# Patient Record
Sex: MALE | Race: WHITE | NOT HISPANIC OR LATINO | URBAN - METROPOLITAN AREA
[De-identification: names, ages, dates, MRNs, and addresses within clinical notes are randomized per-mention and may not be internally consistent; named-entity substitution may affect disease eponyms.]

---

## 2017-01-11 ENCOUNTER — APPOINTMENT (RX ONLY)
Dept: URBAN - METROPOLITAN AREA OTHER 9 | Facility: OTHER | Age: 77
Setting detail: DERMATOLOGY
End: 2017-01-11

## 2017-01-11 DIAGNOSIS — D22 MELANOCYTIC NEVI: ICD-10-CM

## 2017-01-11 DIAGNOSIS — D485 NEOPLASM OF UNCERTAIN BEHAVIOR OF SKIN: ICD-10-CM

## 2017-01-11 DIAGNOSIS — L82.1 OTHER SEBORRHEIC KERATOSIS: ICD-10-CM

## 2017-01-11 DIAGNOSIS — Z85.828 PERSONAL HISTORY OF OTHER MALIGNANT NEOPLASM OF SKIN: ICD-10-CM | Status: RESOLVING

## 2017-01-11 DIAGNOSIS — D18.0 HEMANGIOMA: ICD-10-CM

## 2017-01-11 DIAGNOSIS — L81.4 OTHER MELANIN HYPERPIGMENTATION: ICD-10-CM

## 2017-01-11 DIAGNOSIS — L72.0 EPIDERMAL CYST: ICD-10-CM

## 2017-01-11 PROBLEM — D48.5 NEOPLASM OF UNCERTAIN BEHAVIOR OF SKIN: Status: ACTIVE | Noted: 2017-01-11

## 2017-01-11 PROBLEM — D22.4 MELANOCYTIC NEVI OF SCALP AND NECK: Status: ACTIVE | Noted: 2017-01-11

## 2017-01-11 PROBLEM — D18.01 HEMANGIOMA OF SKIN AND SUBCUTANEOUS TISSUE: Status: ACTIVE | Noted: 2017-01-11

## 2017-01-11 PROCEDURE — ? BIOPSY BY SHAVE METHOD

## 2017-01-11 PROCEDURE — 11100: CPT

## 2017-01-11 PROCEDURE — ? COUNSELING

## 2017-01-11 PROCEDURE — 99213 OFFICE O/P EST LOW 20 MIN: CPT | Mod: 25

## 2017-01-11 PROCEDURE — 11101: CPT

## 2017-01-11 ASSESSMENT — LOCATION DETAILED DESCRIPTION DERM
LOCATION DETAILED: RIGHT SUPERIOR LATERAL UPPER BACK
LOCATION DETAILED: RIGHT INFERIOR UPPER BACK
LOCATION DETAILED: LEFT CLAVICULAR NECK
LOCATION DETAILED: LEFT CENTRAL FRONTAL SCALP
LOCATION DETAILED: RIGHT LATERAL INFERIOR EYELID
LOCATION DETAILED: LEFT LATERAL UPPER BACK
LOCATION DETAILED: RIGHT ANTERIOR SHOULDER
LOCATION DETAILED: RIGHT SUPERIOR CENTRAL MALAR CHEEK
LOCATION DETAILED: MID-FRONTAL SCALP
LOCATION DETAILED: EPIGASTRIC SKIN

## 2017-01-11 ASSESSMENT — LOCATION SIMPLE DESCRIPTION DERM
LOCATION SIMPLE: RIGHT INFERIOR EYELID
LOCATION SIMPLE: LEFT ANTERIOR NECK
LOCATION SIMPLE: ABDOMEN
LOCATION SIMPLE: RIGHT CHEEK
LOCATION SIMPLE: ANTERIOR SCALP
LOCATION SIMPLE: SCALP
LOCATION SIMPLE: LEFT UPPER BACK
LOCATION SIMPLE: RIGHT UPPER BACK
LOCATION SIMPLE: RIGHT SHOULDER

## 2017-01-11 ASSESSMENT — LOCATION ZONE DERM
LOCATION ZONE: EYELID
LOCATION ZONE: ARM
LOCATION ZONE: FACE
LOCATION ZONE: SCALP
LOCATION ZONE: NECK
LOCATION ZONE: TRUNK

## 2017-01-11 NOTE — PROCEDURE: BIOPSY BY SHAVE METHOD
Post-Care Instructions: I reviewed with the patient in detail post-care instructions. Patient is to keep the biopsy site dry overnight, and then apply bacitracin twice daily until healed. Patient may apply hydrogen peroxide soaks to remove any crusting.
Electrodesiccation Text: The wound bed was treated with electrodesiccation after the biopsy was performed.
Size Of Lesion In Cm: 0
Electrodesiccation And Curettage Text: The wound bed was treated with electrodesiccation and curettage after the biopsy was performed.
Wound Care: Polysporin ointment
Anesthesia Type: 1% lidocaine with 1:200,000 epinephrine
Curettage Text: The wound bed was treated with cautery after the biopsy was performed.
Bill [id number] For Specimen Handling/Conveyance To Laboratory?: no
Anesthesia Volume In Cc: 3
Silver Nitrate Text: The wound bed was treated with silver nitrate after the biopsy was performed.
Type Of Destruction Used: Curettage
Notification Instructions: Patient will be notified of biopsy results. However, patient instructed to call the office if not contacted within 2 weeks.
Biopsy Method: Personna blade
Cryotherapy Text: The wound bed was treated with cryotherapy after the biopsy was performed.
Billing Type: Third-Party Bill
Biopsy Type: H and E
Body Location Override (Optional - Billing Will Still Be Based On Selected Body Map Location If Applicable): Right anterior shoulder
Lab: [id number]
Hemostasis: Aluminum Chloride
Detail Level: Simple
Consent: Verbal consent was obtained and risks were reviewed including but not limited to scarring, infection, bleeding, scabbing, incomplete removal, nerve damage and allergy to anesthesia.
Dressing: bandage
Body Location Override (Optional - Billing Will Still Be Based On Selected Body Map Location If Applicable): Right lateral inferior eyelid

## 2017-01-24 ENCOUNTER — APPOINTMENT (RX ONLY)
Dept: URBAN - METROPOLITAN AREA OTHER 9 | Facility: OTHER | Age: 77
Setting detail: DERMATOLOGY
End: 2017-01-24

## 2017-01-24 PROBLEM — C44.319 BASAL CELL CARCINOMA OF SKIN OF OTHER PARTS OF FACE: Status: ACTIVE | Noted: 2017-01-24

## 2017-01-24 PROCEDURE — 13131 CMPLX RPR F/C/C/M/N/AX/G/H/F: CPT

## 2017-01-24 PROCEDURE — ? MOHS SURGERY

## 2017-01-24 PROCEDURE — 17311 MOHS 1 STAGE H/N/HF/G: CPT

## 2017-01-24 NOTE — PROCEDURE: MOHS SURGERY
Bcc Histology Text: There were numerous aggregates of basaloid cells.
Graft Donor Site Will Heal By Secondary Intention: No
Surgical Defect Width In Cm (Optional): 0
S Plasty Text: Given the location and shape of the defect, and the orientation of relaxed skin tension lines, an S-plasty was deemed most appropriate for repair.  Using a sterile surgical marker, the appropriate outline of the S-plasty was drawn, incorporating the defect and placing the expected incisions within the relaxed skin tension lines where possible.  The area thus outlined was incised deep to adipose tissue with a #15 scalpel blade.  The skin margins were undermined to an appropriate distance in all directions utilizing iris scissors. The skin flaps were advanced over the defect.  The opposing margins were then approximated with interrupted buried subcutaneous sutures.
Suture Removal: 10 days
Bi-Rhombic Flap Text: The defect edges were debeveled with a #15 scalpel blade.  Given the location of the defect and the proximity to free margins a bi-rhombic flap was deemed most appropriate.  Using a sterile surgical marker, an appropriate rhombic flap was drawn incorporating the defect. The area thus outlined was incised deep to adipose tissue with a #15 scalpel blade.  The skin margins were undermined to an appropriate distance in all directions utilizing iris scissors.
Stage 8: Additional Anesthesia Type: 1% lidocaine with epinephrine
Number Of Stages: 1
V-Y Flap Text: The defect edges were debeveled with a #15 scalpel blade.  Given the location of the defect, shape of the defect and the proximity to free margins a V-Y flap was deemed most appropriate.  Using a sterile surgical marker, an appropriate advancement flap was drawn incorporating the defect and placing the expected incisions within the relaxed skin tension lines where possible.    The area thus outlined was incised deep to adipose tissue with a #15 scalpel blade.  The skin margins were undermined to an appropriate distance in all directions utilizing iris scissors.
Hatchet Flap Text: The defect edges were debeveled with a #15 scalpel blade.  Given the location of the defect, shape of the defect and the proximity to free margins a hatchet flap was deemed most appropriate.  Using a sterile surgical marker, an appropriate hatchet flap was drawn incorporating the defect and placing the expected incisions within the relaxed skin tension lines where possible.    The area thus outlined was incised deep to adipose tissue with a #15 scalpel blade.  The skin margins were undermined to an appropriate distance in all directions utilizing iris scissors.
Complex Repair And Flap Additional Text (Will Appearing After The Standard Complex Repair Text): The complex repair was not sufficient to completely close the primary defect. The remaining additional defect was repaired with the flap mentioned below.
Island Pedicle Flap-Requiring Vessel Identification Text: The defect edges were debeveled with a #15 scalpel blade.  Given the location of the defect, shape of the defect and the proximity to free margins an island pedicle advancement flap was deemed most appropriate.  Using a sterile surgical marker, an appropriate advancement flap was drawn, based on the axial vessel mentioned above, incorporating the defect, outlining the appropriate donor tissue and placing the expected incisions within the relaxed skin tension lines where possible.    The area thus outlined was incised deep to adipose tissue with a #15 scalpel blade.  The skin margins were undermined to an appropriate distance in all directions around the primary defect and laterally outward around the island pedicle utilizing iris scissors.  There was minimal undermining beneath the pedicle flap.
Tissue Cultured Epidermal Autograft Text: The defect edges were debeveled with a #15 scalpel blade.  Given the location of the defect, shape of the defect and the proximity to free margins a tissue cultured epidermal autograft was deemed most appropriate.  The graft was then trimmed to fit the size of the defect.  The graft was then placed in the primary defect and oriented appropriately.
X Size Of Lesion In Cm (Optional): 0.6
Tumor Debulked?: scalpel
Composite Graft Text: The defect edges were debeveled with a #15 scalpel blade.  Given the location of the defect, shape of the defect, the proximity to free margins and the fact the defect was full thickness a composite graft was deemed most appropriate.  The defect was outline and then transferred to the donor site.  A full thickness graft was then excised from the donor site. The graft was then placed in the primary defect, oriented appropriately and then sutured into place.  The secondary defect was then repaired using a primary closure.
Crescentic Complex Repair Preamble Text (Leave Blank If You Do Not Want): Extensive wide undermining was performed.
Hemostasis: Electrodesiccation
Posterior Auricular Interpolation Flap Text: A decision was made to reconstruct the defect utilizing an interpolation axial flap and a staged reconstruction.  A telfa template was made of the defect.  This telfa template was then used to outline the posterior auricular interpolation flap.  The donor area for the pedicle flap was then injected with anesthesia.  The flap was excised through the skin and subcutaneous tissue down to the layer of the underlying musculature.  The pedicle flap was carefully excised within this deep plane to maintain its blood supply.  The edges of the donor site were undermined.   The donor site was closed in a primary fashion.  The pedicle was then rotated into position and sutured.  Once the tube was sutured into place, adequate blood supply was confirmed with blanching and refill.  The pedicle was then wrapped with xeroform gauze and dressed appropriately with a telfa and gauze bandage to ensure continued blood supply and protect the attached pedicle.
Biopsy Photograph Reviewed: Yes
Post-Care Instructions: I reviewed with the patient in detail post-care instructions. Patient is not to engage in any heavy lifting, exercise, or swimming for the next 14 days. Should the patient develop any fevers, chills, bleeding, or severe pain the patient will contact the office.
H Plasty Text: Given the location of the defect, shape of the defect and the proximity to free margins a H-plasty was deemed most appropriate for repair.  Using a sterile surgical marker, the appropriate advancement arms of the H-plasty were drawn incorporating the defect and placing the expected incisions within the relaxed skin tension lines where possible. The area thus outlined was incised deep to adipose tissue with a #15 scalpel blade. The skin margins were undermined to an appropriate distance in all directions utilizing iris scissors.  The opposing advancement arms were then advanced into place in opposite direction and anchored with interrupted buried subcutaneous sutures.
Epidermal Sutures: 5-0 Nylon
Manual Repair Warning Statement: We plan on removing the manually selected variable below in favor of our much easier automatic structured text blocks found in the previous tab. We decided to do this to help make the flow better and give you the full power of structured data. Manual selection is never going to be ideal in our platform and I would encourage you to avoid using manual selection from this point on, especially since I will be sunsetting this feature. It is important that you do one of two things with the customized text below. First, you can save all of the text in a word file so you can have it for future reference. Second, transfer the text to the appropriate area in the Library tab. Lastly, if there is a flap or graft type which we do not have you need to let us know right away so I can add it in before the variable is hidden. No need to panic, we plan to give you roughly 6 months to make the change.
Detail Level: Detailed
Inflammation Suggestive Of Cancer Camouflage Histology Text: There was a dense lymphocytic infiltrate which prevented adequate histologic evaluation of adjacent structures.
Surgeon/Pathologist Verbiage (Will Incorporate Name Of Surgeon From Intro If Not Blank): operated in two distinct and integrated capacities as the surgeon and pathologist.
Repair Anesthesia Method: local infiltration
Bilobed Transposition Flap Text: The defect edges were debeveled with a #15 scalpel blade.  Given the location of the defect and the proximity to free margins a bilobed transposition flap was deemed most appropriate.  Using a sterile surgical marker, an appropriate bilobe flap drawn around the defect.    The area thus outlined was incised deep to adipose tissue with a #15 scalpel blade.  The skin margins were undermined to an appropriate distance in all directions utilizing iris scissors.
W Plasty Text: The lesion was extirpated to the level of the fat with a #15 scalpel blade.  Given the location of the defect, shape of the defect and the proximity to free margins a W-plasty was deemed most appropriate for repair.  Using a sterile surgical marker, the appropriate transposition arms of the W-plasty were drawn incorporating the defect and placing the expected incisions within the relaxed skin tension lines where possible.    The area thus outlined was incised deep to adipose tissue with a #15 scalpel blade.  The skin margins were undermined to an appropriate distance in all directions utilizing iris scissors.  The opposing transposition arms were then transposed into place in opposite direction and anchored with interrupted buried subcutaneous sutures.
Island Pedicle Flap With Canthal Suspension Text: The defect edges were debeveled with a #15 scalpel blade.  Given the location of the defect, shape of the defect and the proximity to free margins an island pedicle advancement flap was deemed most appropriate.  Using a sterile surgical marker, an appropriate advancement flap was drawn incorporating the defect, outlining the appropriate donor tissue and placing the expected incisions within the relaxed skin tension lines where possible. The area thus outlined was incised deep to adipose tissue with a #15 scalpel blade.  The skin margins were undermined to an appropriate distance in all directions around the primary defect and laterally outward around the island pedicle utilizing iris scissors.  There was minimal undermining beneath the pedicle flap. A suspension suture was placed in the canthal tendon to prevent tension and prevent ectropion.
Consent (Temporal Branch)/Introductory Paragraph: The rationale for Mohs was explained to the patient and consent was obtained. The risks, benefits and alternatives to therapy were discussed in detail. Specifically, the risks of damage to the temporal branch of the facial nerve, infection, scarring, bleeding, prolonged wound healing, incomplete removal, allergy to anesthesia, and recurrence were addressed. Prior to the procedure, the treatment site was clearly identified and confirmed by the patient. All components of Universal Protocol/PAUSE Rule completed.
Advancement Flap (Single) Text: The defect edges were debeveled with a #15 scalpel blade.  Given the location of the defect and the proximity to free margins a single advancement flap was deemed most appropriate.  Using a sterile surgical marker, an appropriate advancement flap was drawn incorporating the defect and placing the expected incisions within the relaxed skin tension lines where possible.    The area thus outlined was incised deep to adipose tissue with a #15 scalpel blade.  The skin margins were undermined to an appropriate distance in all directions utilizing iris scissors.
O-T Advancement Flap Text: The defect edges were debeveled with a #15 scalpel blade.  Given the location of the defect, shape of the defect and the proximity to free margins an O-T advancement flap was deemed most appropriate.  Using a sterile surgical marker, an appropriate advancement flap was drawn incorporating the defect and placing the expected incisions within the relaxed skin tension lines where possible.    The area thus outlined was incised deep to adipose tissue with a #15 scalpel blade.  The skin margins were undermined to an appropriate distance in all directions utilizing iris scissors.
Purse String (Intermediate) Text: Given the location of the defect and the characteristics of the surrounding skin a pursestring intermediate closure was deemed most appropriate.  Undermining was performed circumfirentially around the surgical defect.  A purstring suture was then placed and tightened.
Melolabial Transposition Flap Text: The defect edges were debeveled with a #15 scalpel blade.  Given the location of the defect and the proximity to free margins a melolabial flap was deemed most appropriate.  Using a sterile surgical marker, an appropriate melolabial transposition flap was drawn incorporating the defect.    The area thus outlined was incised deep to adipose tissue with a #15 scalpel blade.  The skin margins were undermined to an appropriate distance in all directions utilizing iris scissors.
Referred To Plastics For Closure Text (Leave Blank If You Do Not Want): After obtaining clear surgical margins the patient was sent to plastics for surgical repair.  The patient understands they will receive post-surgical care and follow-up from the referring physician's office.
Area H Indication Text: Tumors in this location are included in Area H (eyelids, eyebrows, nose, lips, chin, ear, pre-auricular, post-auricular, temple, genitalia, hands, feet, ankles and areola).  Tissue conservation is critical in these anatomic locations.
Area L Indication Text: Tumors in this location are included in Area L (trunk and extremities).  Mohs surgery is indicated for larger tumors, 2 cm or larger, in these anatomic locations.
Lazy S Intermediate Repair Preamble Text (Leave Blank If You Do Not Want): Undermining was performed with blunt dissection.
Alar Island Pedicle Flap Text: The defect edges were debeveled with a #15 scalpel blade.  Given the location of the defect, shape of the defect and the proximity to the alar rim an island pedicle advancement flap was deemed most appropriate.  Using a sterile surgical marker, an appropriate advancement flap was drawn incorporating the defect, outlining the appropriate donor tissue and placing the expected incisions within the nasal ala running parallel to the alar rim. The area thus outlined was incised with a #15 scalpel blade.  The skin margins were undermined minimally to an appropriate distance in all directions around the primary defect and laterally outward around the island pedicle utilizing iris scissors.  There was minimal undermining beneath the pedicle flap.
Melolabial Interpolation Flap Text: A decision was made to reconstruct the defect utilizing an interpolation axial flap and a staged reconstruction.  A telfa template was made of the defect.  This telfa template was then used to outline the melolabial interpolation flap.  The donor area for the pedicle flap was then injected with anesthesia.  The flap was excised through the skin and subcutaneous tissue down to the layer of the underlying musculature.  The pedicle flap was carefully excised within this deep plane to maintain its blood supply.  The edges of the donor site were undermined.   The donor site was closed in a primary fashion.  The pedicle was then rotated into position and sutured.  Once the tube was sutured into place, adequate blood supply was confirmed with blanching and refill.  The pedicle was then wrapped with xeroform gauze and dressed appropriately with a telfa and gauze bandage to ensure continued blood supply and protect the attached pedicle.
Date Of Previous Biopsy (Optional): 1/11/17
Epidermal Closure Graft Donor Site (Optional): simple interrupted
Cheiloplasty (Complex) Text: A decision was made to reconstruct the defect with a  cheiloplasty.  The defect was undermined extensively.  Additional obicularis oris muscle was excised with a 15 blade scalpel.  The defect was converted into a full thickness wedge to facilite a better cosmetic result.  Small vessels were then tied off with 5-0 monocyrl. The obicularis oris, superficial fascia, adipose and dermis were then reapproximated.  After the deeper layers were approximated the epidermis was reapproximated with particular care given to realign the vermillion border.
Modified Advancement Flap Text: The defect edges were debeveled with a #15 scalpel blade.  Given the location of the defect, shape of the defect and the proximity to free margins a modified advancement flap was deemed most appropriate.  Using a sterile surgical marker, an appropriate advancement flap was drawn incorporating the defect and placing the expected incisions within the relaxed skin tension lines where possible.    The area thus outlined was incised deep to adipose tissue with a #15 scalpel blade.  The skin margins were undermined to an appropriate distance in all directions utilizing iris scissors.
Bilateral Helical Rim Advancement Flap Text: The defect edges were debeveled with a #15 blade scalpel.  Given the location of the defect and the proximity to free margins (helical rim) a bilateral helical rim advancement flap was deemed most appropriate.  Using a sterile surgical marker, the appropriate advancement flaps were drawn incorporating the defect and placing the expected incisions between the helical rim and antihelix where possible.  The area thus outlined was incised through and through with a #15 scalpel blade.  With a skin hook and iris scissors, the flaps were gently and sharply undermined and freed up.
Dressing: pressure dressing with telfa
Primary Defect Width In Cm (Final Defect Size - Required For Flaps/Grafts): 0.8
Alternatives Discussed Intro (Do Not Add Period): I discussed alternative treatments to Mohs surgery and specifically discussed the risks and benefits of
Consent (Nose)/Introductory Paragraph: The rationale for Mohs was explained to the patient and consent was obtained. The risks, benefits and alternatives to therapy were discussed in detail. Specifically, the risks of nasal deformity, changes in the flow of air through the nose, infection, scarring, bleeding, prolonged wound healing, incomplete removal, allergy to anesthesia, nerve injury and recurrence were addressed. Prior to the procedure, the treatment site was clearly identified and confirmed by the patient. All components of Universal Protocol/PAUSE Rule completed.
Cheek-To-Nose Interpolation Flap Text: A decision was made to reconstruct the defect utilizing an interpolation axial flap and a staged reconstruction.  A telfa template was made of the defect.  This telfa template was then used to outline the Cheek-To-Nose Interpolation flap.  The donor area for the pedicle flap was then injected with anesthesia.  The flap was excised through the skin and subcutaneous tissue down to the layer of the underlying musculature.  The interpolation flap was carefully excised within this deep plane to maintain its blood supply.  The edges of the donor site were undermined.   The donor site was closed in a primary fashion.  The pedicle was then rotated into position and sutured.  Once the tube was sutured into place, adequate blood supply was confirmed with blanching and refill.  The pedicle was then wrapped with xeroform gauze and dressed appropriately with a telfa and gauze bandage to ensure continued blood supply and protect the attached pedicle.
Anesthesia Volume In Cc: 6
Purse String (Simple) Text: Given the location of the defect and the characteristics of the surrounding skin a pursestring closure was deemed most appropriate.  Undermining was performed circumfirentially around the surgical defect.  A purstring suture was then placed and tightened.
Graft Donor Site Bandage (Optional-Leave Blank If You Don't Want In Note): Steri-strips and a pressure bandage were applied to the donor site.
Paramedian Forehead Flap Text: A decision was made to reconstruct the defect utilizing an interpolation axial flap and a staged reconstruction.  A telfa template was made of the defect.  This telfa template was then used to outline the paramedian forehead pedicle flap.  The donor area for the pedicle flap was then injected with anesthesia.  The flap was excised through the skin and subcutaneous tissue down to the layer of the underlying musculature.  The pedicle flap was carefully excised within this deep plane to maintain its blood supply.  The edges of the donor site were undermined.   The donor site was closed in a primary fashion.  The pedicle was then rotated into position and sutured.  Once the tube was sutured into place, adequate blood supply was confirmed with blanching and refill.  The pedicle was then wrapped with xeroform gauze and dressed appropriately with a telfa and gauze bandage to ensure continued blood supply and protect the attached pedicle.
Full Thickness Lip Wedge Repair (Flap) Text: Given the location of the defect and the proximity to free margins a full thickness wedge repair was deemed most appropriate.  Using a sterile surgical marker, the appropriate repair was drawn incorporating the defect and placing the expected incisions perpendicular to the vermillion border.  The vermillion border was also meticulously outlined to ensure appropriate reapproximation during the repair.  The area thus outlined was incised through and through with a #15 scalpel blade.  The muscularis and dermis were reaproximated with deep sutures following hemostasis. Care was taken to realign the vermillion border before proceeding with the superficial closure.  Once the vermillion was realigned the superfical and mucosal closure was finished.
Consent Type: Consent 1 (Standard)
Same Histology In Subsequent Stages Text: The pattern and morphology of the tumor is as described in the first stage.
Transposition Flap Text: The defect edges were debeveled with a #15 scalpel blade.  Given the location of the defect and the proximity to free margins a transposition flap was deemed most appropriate.  Using a sterile surgical marker, an appropriate transposition flap was drawn incorporating the defect.    The area thus outlined was incised deep to adipose tissue with a #15 scalpel blade.  The skin margins were undermined to an appropriate distance in all directions utilizing iris scissors.
Bilobed Flap Text: The defect edges were debeveled with a #15 scalpel blade.  Given the location of the defect and the proximity to free margins a bilobe flap was deemed most appropriate.  Using a sterile surgical marker, an appropriate bilobe flap drawn around the defect.    The area thus outlined was incised deep to adipose tissue with a #15 scalpel blade.  The skin margins were undermined to an appropriate distance in all directions utilizing iris scissors.
Referred To Otolaryngology For Closure Text (Leave Blank If You Do Not Want): After obtaining clear surgical margins the patient was sent to otolaryngology for surgical repair.  The patient understands they will receive post-surgical care and follow-up from the referring physician's office.
Split-Thickness Skin Graft Text: The defect edges were debeveled with a #15 scalpel blade.  Given the location of the defect, shape of the defect and the proximity to free margins a split thickness skin graft was deemed most appropriate.  Using a sterile surgical marker, the primary defect shape was transferred to the donor site. The split thickness graft was then harvested.  The skin graft was then placed in the primary defect and oriented appropriately.
Consent (Scalp)/Introductory Paragraph: The rationale for Mohs was explained to the patient and consent was obtained. The risks, benefits and alternatives to therapy were discussed in detail. Specifically, the risks of changes in hair growth pattern secondary to repair, infection, scarring, bleeding, prolonged wound healing, incomplete removal, allergy to anesthesia, nerve injury and recurrence were addressed. Prior to the procedure, the treatment site was clearly identified and confirmed by the patient. All components of Universal Protocol/PAUSE Rule completed.
Rotation Flap Text: The defect edges were debeveled with a #15 scalpel blade.  Given the location of the defect, shape of the defect and the proximity to free margins a rotation flap was deemed most appropriate.  Using a sterile surgical marker, an appropriate rotation flap was drawn incorporating the defect and placing the expected incisions within the relaxed skin tension lines where possible.    The area thus outlined was incised deep to adipose tissue with a #15 scalpel blade.  The skin margins were undermined to an appropriate distance in all directions utilizing iris scissors.
Closure 2 Information: This tab is for additional flaps and grafts, including complex repair and grafts and complex repair and flaps. You can also specify a different location for the additional defect, if the location is the same you do not need to select a new one. We will insert the automated text for the repair you select below just as we do for solitary flaps and grafts. Please note that at this time if you select a location with a different insurance zone you will need to override the ICD10 and CPT if appropriate.
Dorsal Nasal Flap Text: The defect edges were debeveled with a #15 scalpel blade.  Given the location of the defect and the proximity to free margins a dorsal nasal flap was deemed most appropriate.  Using a sterile surgical marker, an appropriate dorsal nasal flap was drawn around the defect.    The area thus outlined was incised deep to adipose tissue with a #15 scalpel blade.  The skin margins were undermined to an appropriate distance in all directions utilizing iris scissors.
No Repair - Repaired With Adjacent Surgical Defect Text (Leave Blank If You Do Not Want): After obtaining clear surgical margins the defect was repaired concurrently with another surgical defect which was in close approximation.
Crescentic Advancement Flap Text: The defect edges were debeveled with a #15 scalpel blade.  Given the location of the defect and the proximity to free margins a crescentic advancement flap was deemed most appropriate.  Using a sterile surgical marker, the appropriate advancement flap was drawn incorporating the defect and placing the expected incisions within the relaxed skin tension lines where possible.    The area thus outlined was incised deep to adipose tissue with a #15 scalpel blade.  The skin margins were undermined to an appropriate distance in all directions utilizing iris scissors.
Consent (Spinal Accessory)/Introductory Paragraph: The rationale for Mohs was explained to the patient and consent was obtained. The risks, benefits and alternatives to therapy were discussed in detail. Specifically, the risks of damage to the spinal accessory nerve, infection, scarring, bleeding, prolonged wound healing, incomplete removal, allergy to anesthesia, and recurrence were addressed. Prior to the procedure, the treatment site was clearly identified and confirmed by the patient. All components of Universal Protocol/PAUSE Rule completed.
Mohs Rapid Report Verbiage: The area of clinically evident tumor was marked with skin marking ink and appropriately hatched.  The initial incision was made following the Mohs approach through the skin.  The specimen was taken to the lab, divided into the necessary number of pieces, chromacoded and processed according to the Mohs protocol.  This was repeated in successive stages until a tumor free defect was achieved.
O-Z Plasty Text: The defect edges were debeveled with a #15 scalpel blade.  Given the location of the defect, shape of the defect and the proximity to free margins an O-Z plasty (double transposition flap) was deemed most appropriate.  Using a sterile surgical marker, the appropriate transposition flaps were drawn incorporating the defect and placing the expected incisions within the relaxed skin tension lines where possible.    The area thus outlined was incised deep to adipose tissue with a #15 scalpel blade.  The skin margins were undermined to an appropriate distance in all directions utilizing iris scissors.  Hemostasis was achieved with electrocautery.  The flaps were then transposed into place, one clockwise and the other counterclockwise, and anchored with interrupted buried subcutaneous sutures.
O-L Flap Text: The defect edges were debeveled with a #15 scalpel blade.  Given the location of the defect, shape of the defect and the proximity to free margins an O-L flap was deemed most appropriate.  Using a sterile surgical marker, an appropriate advancement flap was drawn incorporating the defect and placing the expected incisions within the relaxed skin tension lines where possible.    The area thus outlined was incised deep to adipose tissue with a #15 scalpel blade.  The skin margins were undermined to an appropriate distance in all directions utilizing iris scissors.
V-Y Plasty Text: The defect edges were debeveled with a #15 scalpel blade.  Given the location of the defect, shape of the defect and the proximity to free margins an V-Y advancement flap was deemed most appropriate.  Using a sterile surgical marker, an appropriate advancement flap was drawn incorporating the defect and placing the expected incisions within the relaxed skin tension lines where possible.    The area thus outlined was incised deep to adipose tissue with a #15 scalpel blade.  The skin margins were undermined to an appropriate distance in all directions utilizing iris scissors.
Estimated Blood Loss (Cc): minimal
Consent 3/Introductory Paragraph: I gave the patient a chance to ask questions they had about the procedure.  Following this I explained the Mohs procedure and consent was obtained. The risks, benefits and alternatives to therapy were discussed in detail. Specifically, the risks of infection, scarring, bleeding, prolonged wound healing, incomplete removal, allergy to anesthesia, nerve injury and recurrence were addressed. Prior to the procedure, the treatment site was clearly identified and confirmed by the patient. All components of Universal Protocol/PAUSE Rule completed.
Z Plasty Text: The lesion was extirpated to the level of the fat with a #15 scalpel blade.  Given the location of the defect, shape of the defect and the proximity to free margins a Z-plasty was deemed most appropriate for repair.  Using a sterile surgical marker, the appropriate transposition arms of the Z-plasty were drawn incorporating the defect and placing the expected incisions within the relaxed skin tension lines where possible.    The area thus outlined was incised deep to adipose tissue with a #15 scalpel blade.  The skin margins were undermined to an appropriate distance in all directions utilizing iris scissors.  The opposing transposition arms were then transposed into place in opposite direction and anchored with interrupted buried subcutaneous sutures.
Postop Diagnosis: same
Skin Substitute Text: The defect edges were debeveled with a #15 scalpel blade.  Given the location of the defect, shape of the defect and the proximity to free margins a skin substitute graft was deemed most appropriate.  The graft material was trimmed to fit the size of the defect. The graft was then placed in the primary defect and oriented appropriately.
No Residual Tumor Seen Histology Text: There were no malignant cells seen in the sections examined.
Trilobed Flap Text: The defect edges were debeveled with a #15 scalpel blade.  Given the location of the defect and the proximity to free margins a trilobed flap was deemed most appropriate.  Using a sterile surgical marker, an appropriate trilobed flap drawn around the defect.    The area thus outlined was incised deep to adipose tissue with a #15 scalpel blade.  The skin margins were undermined to an appropriate distance in all directions utilizing iris scissors.
Secondary Intention Text (Leave Blank If You Do Not Want): The defect will heal with secondary intention.
Double Island Pedicle Flap Text: The defect edges were debeveled with a #15 scalpel blade.  Given the location of the defect, shape of the defect and the proximity to free margins a double island pedicle advancement flap was deemed most appropriate.  Using a sterile surgical marker, an appropriate advancement flap was drawn incorporating the defect, outlining the appropriate donor tissue and placing the expected incisions within the relaxed skin tension lines where possible.    The area thus outlined was incised deep to adipose tissue with a #15 scalpel blade.  The skin margins were undermined to an appropriate distance in all directions around the primary defect and laterally outward around the island pedicle utilizing iris scissors.  There was minimal undermining beneath the pedicle flap.
Referred To Asc For Closure Text (Leave Blank If You Do Not Want): After obtaining clear surgical margins the patient was sent to an ASC for surgical repair.  The patient understands they will receive post-surgical care and follow-up from the ASC physician.
Body Location Override (Optional - Billing Will Still Be Based On Selected Body Map Location If Applicable): Right lateral inferior eyelid
Subsequent Stages Histo Method Verbiage: Using a similar technique to that described above, a thin layer of tissue was removed from all areas where tumor was visible on the previous stage.  The tissue was again oriented, mapped, dyed, and processed as above.
Mohs Histo Method Verbiage: Each section was then chromacoded and processed in the Mohs lab using the Mohs protocol and submitted for frozen section.
Eye Protection Verbiage: Before proceeding with the stage, a plastic scleral shield was inserted. The globe was anesthetized with a few drops of 1% lidocaine with 1:100,000 epinephrine. Then, an appropriate sized scleral shield was chosen and coated with lacrilube ointment. The shield was gently inserted and left in place for the duration of each stage. After the stage was completed, the shield was gently removed.
Mastoid Interpolation Flap Text: A decision was made to reconstruct the defect utilizing an interpolation axial flap and a staged reconstruction.  A telfa template was made of the defect.  This telfa template was then used to outline the mastoid interpolation flap.  The donor area for the pedicle flap was then injected with anesthesia.  The flap was excised through the skin and subcutaneous tissue down to the layer of the underlying musculature.  The pedicle flap was carefully excised within this deep plane to maintain its blood supply.  The edges of the donor site were undermined.   The donor site was closed in a primary fashion.  The pedicle was then rotated into position and sutured.  Once the tube was sutured into place, adequate blood supply was confirmed with blanching and refill.  The pedicle was then wrapped with xeroform gauze and dressed appropriately with a telfa and gauze bandage to ensure continued blood supply and protect the attached pedicle.
Muscle Hinge Flap Text: The defect edges were debeveled with a #15 scalpel blade.  Given the size, depth and location of the defect and the proximity to free margins a muscle hinge flap was deemed most appropriate.  Using a sterile surgical marker, an appropriate hinge flap was drawn incorporating the defect. The area thus outlined was incised with a #15 scalpel blade.  The skin margins were undermined to an appropriate distance in all directions utilizing iris scissors.
Repair Performed By Another Provider Text (Leave Blank If You Do Not Want): After obtaining clear surgical margins the defect was repaired by another provider.
Advancement-Rotation Flap Text: The defect edges were debeveled with a #15 scalpel blade.  Given the location of the defect, shape of the defect and the proximity to free margins an advancement-rotation flap was deemed most appropriate.  Using a sterile surgical marker, an appropriate flap was drawn incorporating the defect and placing the expected incisions within the relaxed skin tension lines where possible. The area thus outlined was incised deep to adipose tissue with a #15 scalpel blade.  The skin margins were undermined to an appropriate distance in all directions utilizing iris scissors.
Cheiloplasty (Less Than 50%) Text: A decision was made to reconstruct the defect with a  cheiloplasty.  The defect was undermined extensively.  Additional obicularis oris muscle was excised with a 15 blade scalpel.  The defect was converted into a full thickness wedge, of less than 50% of the vertical height of the lip, to facilite a better cosmetic result.  Small vessels were then tied off with 5-0 monocyrl. The obicularis oris, superficial fascia, adipose and dermis were then reapproximated.  After the deeper layers were approximated the epidermis was reapproximated with particular care given to realign the vermillion border.
Consent (Marginal Mandibular)/Introductory Paragraph: The rationale for Mohs was explained to the patient and consent was obtained. The risks, benefits and alternatives to therapy were discussed in detail. Specifically, the risks of damage to the marginal mandibular branch of the facial nerve, infection, scarring, bleeding, prolonged wound healing, incomplete removal, allergy to anesthesia, and recurrence were addressed. Prior to the procedure, the treatment site was clearly identified and confirmed by the patient. All components of Universal Protocol/PAUSE Rule completed.
Mohs Case Number: 
Rhombic Flap Text: The defect edges were debeveled with a #15 scalpel blade.  Given the location of the defect and the proximity to free margins a rhombic flap was deemed most appropriate.  Using a sterile surgical marker, an appropriate rhombic flap was drawn incorporating the defect.    The area thus outlined was incised deep to adipose tissue with a #15 scalpel blade.  The skin margins were undermined to an appropriate distance in all directions utilizing iris scissors.
Advancement Flap (Double) Text: The defect edges were debeveled with a #15 scalpel blade.  Given the location of the defect and the proximity to free margins a double advancement flap was deemed most appropriate.  Using a sterile surgical marker, the appropriate advancement flaps were drawn incorporating the defect and placing the expected incisions within the relaxed skin tension lines where possible.    The area thus outlined was incised deep to adipose tissue with a #15 scalpel blade.  The skin margins were undermined to an appropriate distance in all directions utilizing iris scissors.
Helical Rim Advancement Flap Text: The defect edges were debeveled with a #15 blade scalpel.  Given the location of the defect and the proximity to free margins (helical rim) a double helical rim advancement flap was deemed most appropriate.  Using a sterile surgical marker, the appropriate advancement flaps were drawn incorporating the defect and placing the expected incisions between the helical rim and antihelix where possible.  The area thus outlined was incised through and through with a #15 scalpel blade.  With a skin hook and iris scissors, the flaps were gently and sharply undermined and freed up.
A-T Advancement Flap Text: The defect edges were debeveled with a #15 scalpel blade.  Given the location of the defect, shape of the defect and the proximity to free margins an A-T advancement flap was deemed most appropriate.  Using a sterile surgical marker, an appropriate advancement flap was drawn incorporating the defect and placing the expected incisions within the relaxed skin tension lines where possible.    The area thus outlined was incised deep to adipose tissue with a #15 scalpel blade.  The skin margins were undermined to an appropriate distance in all directions utilizing iris scissors.
Anesthesia Type: 1% lidocaine with 1:200,000 epinephrine
Localized Dermabrasion With Wire Brush Text: The patient was draped in routine manner.  Localized dermabrasion using 3 x 17 mm wire brush was performed in routine manner to papillary dermis. This spot dermabrasion is being performed to complete skin cancer reconstruction. It also will eliminate the other sun damaged precancerous cells that are known to be part of the regional effect of a lifetime's worth of sun exposure. This localized dermabrasion is therapeutic and should not be considered cosmetic in any regard.
Area M Indication Text: Tumors in this location are included in Area M (cheek, forehead, scalp, neck, jawline and pretibial skin).  Mohs surgery is indicated for tumors 1 cm or larger in these anatomic locations.
Mucosal Advancement Flap Text: Given the location of the defect, shape of the defect and the proximity to free margins a mucosal advancement flap was deemed most appropriate. Incisions were made with a 15 blade scalpel in the appropriate fashion along the cutaneous vermillion border and the mucosal lip. The remaining actinically damaged mucosal tissue was excised.  The mucosal advancement flap was then elevated to the gingival sulcus with care taken to preserve the neurovascular structures and advanced into the primary defect. Care was taken to ensure that precise realignment of the vermillion border was achieved.
Primary Defect Length In Cm (Final Defect Size - Required For Flaps/Grafts): 1.5
Previous Accession (Optional): [id number]
Epidermal Autograft Text: The defect edges were debeveled with a #15 scalpel blade.  Given the location of the defect, shape of the defect and the proximity to free margins an epidermal autograft was deemed most appropriate.  Using a sterile surgical marker, the primary defect shape was transferred to the donor site. The epidermal graft was then harvested.  The skin graft was then placed in the primary defect and oriented appropriately.
Consent 1/Introductory Paragraph: The rationale for Mohs was explained to the patient and consent was obtained. The risks, benefits and alternatives to therapy were discussed in detail. Specifically, the risks of infection, scarring, bleeding, prolonged wound healing, incomplete removal, allergy to anesthesia, nerve injury and recurrence were addressed. Prior to the procedure, the treatment site was clearly identified and confirmed by the patient. All components of Universal Protocol/PAUSE Rule completed.
Home Suture Removal Text: Patient was provided instructions on removing sutures and will remove their sutures at home.  If they have any questions or difficulties they will call the office.
Consent (Ear)/Introductory Paragraph: The rationale for Mohs was explained to the patient and consent was obtained. The risks, benefits and alternatives to therapy were discussed in detail. Specifically, the risks of ear deformity, infection, scarring, bleeding, prolonged wound healing, incomplete removal, allergy to anesthesia, nerve injury and recurrence were addressed. Prior to the procedure, the treatment site was clearly identified and confirmed by the patient. All components of Universal Protocol/PAUSE Rule completed.
Medical Necessity Statement: Based on my medical judgement, Mohs surgery is the most appropriate treatment for this cancer compared to other treatments.
Mohs Method Verbiage: An incision at a 45 degree angle following the standard Mohs approach was done and the specimen was harvested as a microscopic controlled layer.
Closure 4 Information: This tab is for additional flaps and grafts above and beyond our usual structured repairs.  Please note if you enter information here it will not currently bill and you will need to add the billing information manually.
Stage 1: Number Of Blocks?: 2
Burow's Advancement Flap Text: The defect edges were debeveled with a #15 scalpel blade.  Given the location of the defect and the proximity to free margins a Burow's advancement flap was deemed most appropriate.  Using a sterile surgical marker, the appropriate advancement flap was drawn incorporating the defect and placing the expected incisions within the relaxed skin tension lines where possible.    The area thus outlined was incised deep to adipose tissue with a #15 scalpel blade.  The skin margins were undermined to an appropriate distance in all directions utilizing iris scissors.
Initial Size Of Lesion: 0.7
Consent (Near Eyelid Margin)/Introductory Paragraph: The rationale for Mohs was explained to the patient and consent was obtained. The risks, benefits and alternatives to therapy were discussed in detail. Specifically, the risks of ectropion or eyelid deformity, infection, scarring, bleeding, prolonged wound healing, incomplete removal, allergy to anesthesia, nerve injury and recurrence were addressed. Prior to the procedure, the treatment site was clearly identified and confirmed by the patient. All components of Universal Protocol/PAUSE Rule completed.
Ear Wedge Repair Text: A wedge excision was completed by carrying down an excision through the full thickness of the ear and cartilage with an inward facing Burow's triangle. The wound was then closed in a layered fashion.
Mauc Instructions: By selecting yes to the question below the MAUC number will be added into the note.  This will be calculated automatically based on the diagnosis chosen, the size entered, the body zone selected (H,M,L) and the specific indications you chose. You will also have the option to override the Mohs AUC if you disagree with the automatically calculated number and this option is found in the Case Summary tab.
Spiral Flap Text: The defect edges were debeveled with a #15 scalpel blade.  Given the location of the defect, shape of the defect and the proximity to free margins a spiral flap was deemed most appropriate.  Using a sterile surgical marker, an appropriate rotation flap was drawn incorporating the defect and placing the expected incisions within the relaxed skin tension lines where possible. The area thus outlined was incised deep to adipose tissue with a #15 scalpel blade.  The skin margins were undermined to an appropriate distance in all directions utilizing iris scissors.
Cheek Interpolation Flap Text: A decision was made to reconstruct the defect utilizing an interpolation axial flap and a staged reconstruction.  A telfa template was made of the defect.  This telfa template was then used to outline the Cheek Interpolation flap.  The donor area for the pedicle flap was then injected with anesthesia.  The flap was excised through the skin and subcutaneous tissue down to the layer of the underlying musculature.  The interpolation flap was carefully excised within this deep plane to maintain its blood supply.  The edges of the donor site were undermined.   The donor site was closed in a primary fashion.  The pedicle was then rotated into position and sutured.  Once the tube was sutured into place, adequate blood supply was confirmed with blanching and refill.  The pedicle was then wrapped with xeroform gauze and dressed appropriately with a telfa and gauze bandage to ensure continued blood supply and protect the attached pedicle.
Wound Care: Polysporin ointment
Dermal Autograft Text: The defect edges were debeveled with a #15 scalpel blade.  Given the location of the defect, shape of the defect and the proximity to free margins a dermal autograft was deemed most appropriate.  Using a sterile surgical marker, the primary defect shape was transferred to the donor site. The area thus outlined was incised deep to adipose tissue with a #15 scalpel blade.  The harvested graft was then trimmed of adipose and epidermal tissue until only dermis was left.  The skin graft was then placed in the primary defect and oriented appropriately.
Repair Type: Complex Repair
Complex Repair And Graft Additional Text (Will Appearing After The Standard Complex Repair Text): The complex repair was not sufficient to completely close the primary defect. The remaining additional defect was repaired with the graft mentioned below.
Ftsg Text: The defect edges were debeveled with a #15 scalpel blade.  Given the location of the defect, shape of the defect and the proximity to free margins a full thickness skin graft was deemed most appropriate.  Using a sterile surgical marker, the primary defect shape was transferred to the donor site. The area thus outlined was incised deep to adipose tissue with a #15 scalpel blade.  The harvested graft was then trimmed of adipose tissue until only dermis and epidermis was left.  The skin margins of the secondary defect were undermined to an appropriate distance in all directions utilizing iris scissors.  The secondary defect was closed with interrupted buried subcutaneous sutures.  The skin edges were then re-apposed with running  sutures.  The skin graft was then placed in the primary defect and oriented appropriately.
Island Pedicle Flap Text: The defect edges were debeveled with a #15 scalpel blade.  Given the location of the defect, shape of the defect and the proximity to free margins an island pedicle advancement flap was deemed most appropriate.  Using a sterile surgical marker, an appropriate advancement flap was drawn incorporating the defect, outlining the appropriate donor tissue and placing the expected incisions within the relaxed skin tension lines where possible.    The area thus outlined was incised deep to adipose tissue with a #15 scalpel blade.  The skin margins were undermined to an appropriate distance in all directions around the primary defect and laterally outward around the island pedicle utilizing iris scissors.  There was minimal undermining beneath the pedicle flap.
Referred To Oculoplastics For Closure Text (Leave Blank If You Do Not Want): After obtaining clear surgical margins the patient was sent to oculoplastics for surgical repair.  The patient understands they will receive post-surgical care and follow-up from the referring physician's office.
Cartilage Graft Text: The defect edges were debeveled with a #15 scalpel blade.  Given the location of the defect, shape of the defect, the fact the defect involved a full thickness cartilage defect a cartilage graft was deemed most appropriate.  An appropriate donor site was identified, cleansed, and anesthetized. The cartilage graft was then harvested and transferred to the recipient site, oriented appropriately and then sutured into place.  The secondary defect was then repaired using a primary closure.
Consent 2/Introductory Paragraph: Mohs surgery was explained to the patient and consent was obtained. The risks, benefits and alternatives to therapy were discussed in detail. Specifically, the risks of infection, scarring, bleeding, prolonged wound healing, incomplete removal, allergy to anesthesia, nerve injury and recurrence were addressed. Prior to the procedure, the treatment site was clearly identified and confirmed by the patient. All components of Universal Protocol/PAUSE Rule completed.
Ear Star Wedge Flap Text: The defect edges were debeveled with a #15 blade scalpel.  Given the location of the defect and the proximity to free margins (helical rim) an ear star wedge flap was deemed most appropriate.  Using a sterile surgical marker, the appropriate flap was drawn incorporating the defect and placing the expected incisions between the helical rim and antihelix where possible.  The area thus outlined was incised through and through with a #15 scalpel blade.
Xenograft Text: The defect edges were debeveled with a #15 scalpel blade.  Given the location of the defect, shape of the defect and the proximity to free margins a xenograft was deemed most appropriate.  The graft was then trimmed to fit the size of the defect.  The graft was then placed in the primary defect and oriented appropriately.
Interpolation Flap Text: A decision was made to reconstruct the defect utilizing an interpolation axial flap and a staged reconstruction.  A telfa template was made of the defect.  This telfa template was then used to outline the interpolation flap.  The donor area for the pedicle flap was then injected with anesthesia.  The flap was excised through the skin and subcutaneous tissue down to the layer of the underlying musculature.  The interpolation flap was carefully excised within this deep plane to maintain its blood supply.  The edges of the donor site were undermined.   The donor site was closed in a primary fashion.  The pedicle was then rotated into position and sutured.  Once the tube was sutured into place, adequate blood supply was confirmed with blanching and refill.  The pedicle was then wrapped with xeroform gauze and dressed appropriately with a telfa and gauze bandage to ensure continued blood supply and protect the attached pedicle.
Consent (Lip)/Introductory Paragraph: The rationale for Mohs was explained to the patient and consent was obtained. The risks, benefits and alternatives to therapy were discussed in detail. Specifically, the risks of lip deformity, changes in the oral aperture, infection, scarring, bleeding, prolonged wound healing, incomplete removal, allergy to anesthesia, nerve injury and recurrence were addressed. Prior to the procedure, the treatment site was clearly identified and confirmed by the patient. All components of Universal Protocol/PAUSE Rule completed.
Simple / Intermediate / Complex Repair - Final Wound Length In Cm: 2.5
O-T Plasty Text: The defect edges were debeveled with a #15 scalpel blade.  Given the location of the defect, shape of the defect and the proximity to free margins an O-T plasty was deemed most appropriate.  Using a sterile surgical marker, an appropriate O-T plasty was drawn incorporating the defect and placing the expected incisions within the relaxed skin tension lines where possible.    The area thus outlined was incised deep to adipose tissue with a #15 scalpel blade.  The skin margins were undermined to an appropriate distance in all directions utilizing iris scissors.
Bcc Infiltrative Histology Text: There were numerous aggregates of basaloid cells demonstrating an infiltrative pattern.
Unna Boot Text: An Unna boot was placed to help immobilize the limb and facilitate more rapid healing.

## 2017-01-24 NOTE — HPI: PROCEDURE (MOHS)
Has The Growth Been Previously Biopsied?: has been previously biopsied
Body Location Override (Optional): Right lateral inferior eyelid

## 2017-02-02 ENCOUNTER — APPOINTMENT (RX ONLY)
Dept: URBAN - METROPOLITAN AREA OTHER 9 | Facility: OTHER | Age: 77
Setting detail: DERMATOLOGY
End: 2017-02-02

## 2017-02-02 DIAGNOSIS — Z48.02 ENCOUNTER FOR REMOVAL OF SUTURES: ICD-10-CM

## 2017-02-02 PROCEDURE — ? SUTURE REMOVAL (GLOBAL PERIOD)

## 2017-02-02 PROCEDURE — 99024 POSTOP FOLLOW-UP VISIT: CPT

## 2017-02-02 ASSESSMENT — LOCATION ZONE DERM: LOCATION ZONE: FACE

## 2017-02-02 ASSESSMENT — LOCATION DETAILED DESCRIPTION DERM: LOCATION DETAILED: RIGHT SUPERIOR CENTRAL MALAR CHEEK

## 2017-02-02 ASSESSMENT — LOCATION SIMPLE DESCRIPTION DERM: LOCATION SIMPLE: RIGHT CHEEK

## 2017-02-02 NOTE — PROCEDURE: SUTURE REMOVAL (GLOBAL PERIOD)
Add 02185 Cpt? (Important Note: In 2017 The Use Of 46137 Is Being Tracked By Cms To Determine Future Global Period Reimbursement For Global Periods): yes
Detail Level: Detailed
Body Location Override (Optional - Billing Will Still Be Based On Selected Body Map Location If Applicable): Right lateral inferior eyelid

## 2017-05-30 ENCOUNTER — APPOINTMENT (RX ONLY)
Dept: URBAN - METROPOLITAN AREA OTHER 9 | Facility: OTHER | Age: 77
Setting detail: DERMATOLOGY
End: 2017-05-30

## 2017-05-30 DIAGNOSIS — D485 NEOPLASM OF UNCERTAIN BEHAVIOR OF SKIN: ICD-10-CM

## 2017-05-30 DIAGNOSIS — L72.0 EPIDERMAL CYST: ICD-10-CM

## 2017-05-30 DIAGNOSIS — Z87.2 PERSONAL HISTORY OF DISEASES OF THE SKIN AND SUBCUTANEOUS TISSUE: ICD-10-CM

## 2017-05-30 DIAGNOSIS — Z85.828 PERSONAL HISTORY OF OTHER MALIGNANT NEOPLASM OF SKIN: ICD-10-CM

## 2017-05-30 PROBLEM — D48.5 NEOPLASM OF UNCERTAIN BEHAVIOR OF SKIN: Status: ACTIVE | Noted: 2017-05-30

## 2017-05-30 PROCEDURE — 99213 OFFICE O/P EST LOW 20 MIN: CPT | Mod: 25

## 2017-05-30 PROCEDURE — 11100: CPT

## 2017-05-30 PROCEDURE — ? COUNSELING

## 2017-05-30 PROCEDURE — ? BIOPSY BY SHAVE METHOD

## 2017-05-30 ASSESSMENT — LOCATION DETAILED DESCRIPTION DERM
LOCATION DETAILED: LEFT MEDIAL MALAR CHEEK
LOCATION DETAILED: LEFT NASAL DORSUM
LOCATION DETAILED: LEFT NASAL DORSUM

## 2017-05-30 ASSESSMENT — LOCATION SIMPLE DESCRIPTION DERM
LOCATION SIMPLE: NOSE
LOCATION SIMPLE: NOSE
LOCATION SIMPLE: LEFT CHEEK

## 2017-05-30 ASSESSMENT — LOCATION ZONE DERM
LOCATION ZONE: NOSE
LOCATION ZONE: FACE
LOCATION ZONE: NOSE

## 2017-05-30 NOTE — PROCEDURE: BIOPSY BY SHAVE METHOD
Lab Facility: [id number]
Electrodesiccation And Curettage Text: The wound bed was treated with electrodesiccation and curettage after the biopsy was performed.
Silver Nitrate Text: The wound bed was treated with silver nitrate after the biopsy was performed.
Wound Care: Polysporin ointment
Electrodesiccation Text: The wound bed was treated with electrodesiccation after the biopsy was performed.
Anesthesia Volume In Cc: 1.5
Detail Level: Detailed
Biopsy Type: H and E
Cryotherapy Text: The wound bed was treated with cryotherapy after the biopsy was performed.
Destruction After The Procedure: No
Render Post-Care Instructions In Note?: yes
Billing Type: Third-Party Bill
Size Of Lesion In Cm: 0
Post-Care Instructions: I reviewed with the patient in detail post-care instructions. Patient is to keep the biopsy site dry overnight, and then apply bacitracin twice daily until healed. Patient may apply hydrogen peroxide soaks to remove any crusting.Dependent on the results of the pathology pt will return for additional surgery or wound check and repair
Hemostasis: Aluminum Chloride
Anesthesia Type: 1% lidocaine with 1:200,000 epinephrine
Type Of Destruction Used: Electrodesiccation
Notification Instructions: Patient will be notified of biopsy results. However, patient instructed to call the office if not contacted within 2 weeks.
Lab: [id number]
Biopsy Method: Dermablade
Dressing: bandage
Body Location Override (Optional - Billing Will Still Be Based On Selected Body Map Location If Applicable): Left side of nose
Consent: Written consent was obtained and risks were reviewed including but not limited to scarring, infection, bleeding, scabbing, incomplete removal, nerve damage and allergy to anesthesia.

## 2017-06-20 ENCOUNTER — APPOINTMENT (RX ONLY)
Dept: URBAN - METROPOLITAN AREA OTHER 9 | Facility: OTHER | Age: 77
Setting detail: DERMATOLOGY
End: 2017-06-20

## 2017-06-20 PROBLEM — C44.311 BASAL CELL CARCINOMA OF SKIN OF NOSE: Status: ACTIVE | Noted: 2017-06-20

## 2017-06-20 PROCEDURE — 13151 CMPLX RPR E/N/E/L 1.1-2.5 CM: CPT

## 2017-06-20 PROCEDURE — ? MOHS SURGERY

## 2017-06-20 PROCEDURE — 17311 MOHS 1 STAGE H/N/HF/G: CPT

## 2017-06-20 NOTE — PROCEDURE: MOHS SURGERY
Split-Thickness Skin Graft Text: The defect edges were debeveled with a #15 scalpel blade.  Given the location of the defect, shape of the defect and the proximity to free margins a split thickness skin graft was deemed most appropriate.  Using a sterile surgical marker, the primary defect shape was transferred to the donor site. The split thickness graft was then harvested.  The skin graft was then placed in the primary defect and oriented appropriately.
Melolabial Interpolation Flap Text: A decision was made to reconstruct the defect utilizing an interpolation axial flap and a staged reconstruction.  A telfa template was made of the defect.  This telfa template was then used to outline the melolabial interpolation flap.  The donor area for the pedicle flap was then injected with anesthesia.  The flap was excised through the skin and subcutaneous tissue down to the layer of the underlying musculature.  The pedicle flap was carefully excised within this deep plane to maintain its blood supply.  The edges of the donor site were undermined.   The donor site was closed in a primary fashion.  The pedicle was then rotated into position and sutured.  Once the tube was sutured into place, adequate blood supply was confirmed with blanching and refill.  The pedicle was then wrapped with xeroform gauze and dressed appropriately with a telfa and gauze bandage to ensure continued blood supply and protect the attached pedicle.
O-T Advancement Flap Text: The defect edges were debeveled with a #15 scalpel blade.  Given the location of the defect, shape of the defect and the proximity to free margins an O-T advancement flap was deemed most appropriate.  Using a sterile surgical marker, an appropriate advancement flap was drawn incorporating the defect and placing the expected incisions within the relaxed skin tension lines where possible.    The area thus outlined was incised deep to adipose tissue with a #15 scalpel blade.  The skin margins were undermined to an appropriate distance in all directions utilizing iris scissors.
Primary Defect Width In Cm (Final Defect Size - Required For Flaps/Grafts): 0.6
Quadrants Reporting?: 0
Display The Individual Mohs Indications As Separate Paragraphs: Yes
Full Thickness Lip Wedge Repair (Flap) Text: Given the location of the defect and the proximity to free margins a full thickness wedge repair was deemed most appropriate.  Using a sterile surgical marker, the appropriate repair was drawn incorporating the defect and placing the expected incisions perpendicular to the vermillion border.  The vermillion border was also meticulously outlined to ensure appropriate reapproximation during the repair.  The area thus outlined was incised through and through with a #15 scalpel blade.  The muscularis and dermis were reaproximated with deep sutures following hemostasis. Care was taken to realign the vermillion border before proceeding with the superficial closure.  Once the vermillion was realigned the superfical and mucosal closure was finished.
Quadrant Reporting?: no
Location Indication Override (Is Already Calculated Based On Selected Body Location): Area H
Muscle Hinge Flap Text: The defect edges were debeveled with a #15 scalpel blade.  Given the size, depth and location of the defect and the proximity to free margins a muscle hinge flap was deemed most appropriate.  Using a sterile surgical marker, an appropriate hinge flap was drawn incorporating the defect. The area thus outlined was incised with a #15 scalpel blade.  The skin margins were undermined to an appropriate distance in all directions utilizing iris scissors.
Stage 5: Additional Anesthesia Type: 1% lidocaine with epinephrine
O-Z Plasty Text: The defect edges were debeveled with a #15 scalpel blade.  Given the location of the defect, shape of the defect and the proximity to free margins an O-Z plasty (double transposition flap) was deemed most appropriate.  Using a sterile surgical marker, the appropriate transposition flaps were drawn incorporating the defect and placing the expected incisions within the relaxed skin tension lines where possible.    The area thus outlined was incised deep to adipose tissue with a #15 scalpel blade.  The skin margins were undermined to an appropriate distance in all directions utilizing iris scissors.  Hemostasis was achieved with electrocautery.  The flaps were then transposed into place, one clockwise and the other counterclockwise, and anchored with interrupted buried subcutaneous sutures.
Mohs Case Number: 
Modified Advancement Flap Text: The defect edges were debeveled with a #15 scalpel blade.  Given the location of the defect, shape of the defect and the proximity to free margins a modified advancement flap was deemed most appropriate.  Using a sterile surgical marker, an appropriate advancement flap was drawn incorporating the defect and placing the expected incisions within the relaxed skin tension lines where possible.    The area thus outlined was incised deep to adipose tissue with a #15 scalpel blade.  The skin margins were undermined to an appropriate distance in all directions utilizing iris scissors.
Epidermal Closure Graft Donor Site (Optional): simple interrupted
Bcc Infiltrative Histology Text: There were numerous aggregates of basaloid cells demonstrating an infiltrative pattern.
Area L Indication Text: Tumors in this location are included in Area L (trunk and extremities).  Mohs surgery is indicated for larger tumors, 2 cm or larger, in these anatomic locations.
No Repair - Repaired With Adjacent Surgical Defect Text (Leave Blank If You Do Not Want): After obtaining clear surgical margins the defect was repaired concurrently with another surgical defect which was in close approximation.
Purse String (Simple) Text: Given the location of the defect and the characteristics of the surrounding skin a pursestring closure was deemed most appropriate.  Undermining was performed circumfirentially around the surgical defect.  A purstring suture was then placed and tightened.
Skin Substitute Text: The defect edges were debeveled with a #15 scalpel blade.  Given the location of the defect, shape of the defect and the proximity to free margins a skin substitute graft was deemed most appropriate.  The graft material was trimmed to fit the size of the defect. The graft was then placed in the primary defect and oriented appropriately.
Medical Necessity Statement: Based on my medical judgement, Mohs surgery is the most appropriate treatment for this cancer compared to other treatments.
Burow's Advancement Flap Text: The defect edges were debeveled with a #15 scalpel blade.  Given the location of the defect and the proximity to free margins a Burow's advancement flap was deemed most appropriate.  Using a sterile surgical marker, the appropriate advancement flap was drawn incorporating the defect and placing the expected incisions within the relaxed skin tension lines where possible.    The area thus outlined was incised deep to adipose tissue with a #15 scalpel blade.  The skin margins were undermined to an appropriate distance in all directions utilizing iris scissors.
W Plasty Text: The lesion was extirpated to the level of the fat with a #15 scalpel blade.  Given the location of the defect, shape of the defect and the proximity to free margins a W-plasty was deemed most appropriate for repair.  Using a sterile surgical marker, the appropriate transposition arms of the W-plasty were drawn incorporating the defect and placing the expected incisions within the relaxed skin tension lines where possible.    The area thus outlined was incised deep to adipose tissue with a #15 scalpel blade.  The skin margins were undermined to an appropriate distance in all directions utilizing iris scissors.  The opposing transposition arms were then transposed into place in opposite direction and anchored with interrupted buried subcutaneous sutures.
Previous Accession (Optional): [id number]
V-Y Flap Text: The defect edges were debeveled with a #15 scalpel blade.  Given the location of the defect, shape of the defect and the proximity to free margins a V-Y flap was deemed most appropriate.  Using a sterile surgical marker, an appropriate advancement flap was drawn incorporating the defect and placing the expected incisions within the relaxed skin tension lines where possible.    The area thus outlined was incised deep to adipose tissue with a #15 scalpel blade.  The skin margins were undermined to an appropriate distance in all directions utilizing iris scissors.
Unna Boot Text: An Unna boot was placed to help immobilize the limb and facilitate more rapid healing.
V-Y Plasty Text: The defect edges were debeveled with a #15 scalpel blade.  Given the location of the defect, shape of the defect and the proximity to free margins an V-Y advancement flap was deemed most appropriate.  Using a sterile surgical marker, an appropriate advancement flap was drawn incorporating the defect and placing the expected incisions within the relaxed skin tension lines where possible.    The area thus outlined was incised deep to adipose tissue with a #15 scalpel blade.  The skin margins were undermined to an appropriate distance in all directions utilizing iris scissors.
Bi-Rhombic Flap Text: The defect edges were debeveled with a #15 scalpel blade.  Given the location of the defect and the proximity to free margins a bi-rhombic flap was deemed most appropriate.  Using a sterile surgical marker, an appropriate rhombic flap was drawn incorporating the defect. The area thus outlined was incised deep to adipose tissue with a #15 scalpel blade.  The skin margins were undermined to an appropriate distance in all directions utilizing iris scissors.
Body Location Override (Optional - Billing Will Still Be Based On Selected Body Map Location If Applicable): left side of nose
Referred To Mid-Level For Closure Text (Leave Blank If You Do Not Want): After obtaining clear surgical margins the patient was sent to a mid-level provider for surgical repair.  The patient understands they will receive post-surgical care and follow-up from the mid-level provider.
Consent 2/Introductory Paragraph: Mohs surgery was explained to the patient and consent was obtained. The risks, benefits and alternatives to therapy were discussed in detail. Specifically, the risks of infection, scarring, bleeding, prolonged wound healing, incomplete removal, allergy to anesthesia, nerve injury and recurrence were addressed. Prior to the procedure, the treatment site was clearly identified and confirmed by the patient. All components of Universal Protocol/PAUSE Rule completed.
Partial Purse String (Simple) Text: Given the location of the defect and the characteristics of the surrounding skin a simple purse string closure was deemed most appropriate.  Undermining was performed circumfirentially around the surgical defect.  A purse string suture was then placed and tightened. Wound tension only allowed a partial closure of the circular defect.
Hatchet Flap Text: The defect edges were debeveled with a #15 scalpel blade.  Given the location of the defect, shape of the defect and the proximity to free margins a hatchet flap was deemed most appropriate.  Using a sterile surgical marker, an appropriate hatchet flap was drawn incorporating the defect and placing the expected incisions within the relaxed skin tension lines where possible.    The area thus outlined was incised deep to adipose tissue with a #15 scalpel blade.  The skin margins were undermined to an appropriate distance in all directions utilizing iris scissors.
Tarsorrhaphy Text: A tarsorrhaphy was performed using Frost sutures.
Repair Anesthesia Method: local infiltration
Consent (Near Eyelid Margin)/Introductory Paragraph: The rationale for Mohs was explained to the patient and consent was obtained. The risks, benefits and alternatives to therapy were discussed in detail. Specifically, the risks of ectropion or eyelid deformity, infection, scarring, bleeding, prolonged wound healing, incomplete removal, allergy to anesthesia, nerve injury and recurrence were addressed. Prior to the procedure, the treatment site was clearly identified and confirmed by the patient. All components of Universal Protocol/PAUSE Rule completed.
Consent (Nose)/Introductory Paragraph: The rationale for Mohs was explained to the patient and consent was obtained. The risks, benefits and alternatives to therapy were discussed in detail. Specifically, the risks of nasal deformity, changes in the flow of air through the nose, infection, scarring, bleeding, prolonged wound healing, incomplete removal, allergy to anesthesia, nerve injury and recurrence were addressed. Prior to the procedure, the treatment site was clearly identified and confirmed by the patient. All components of Universal Protocol/PAUSE Rule completed.
Crescentic Complex Repair Preamble Text (Leave Blank If You Do Not Want): Extensive wide undermining was performed.
Bilobed Transposition Flap Text: The defect edges were debeveled with a #15 scalpel blade.  Given the location of the defect and the proximity to free margins a bilobed transposition flap was deemed most appropriate.  Using a sterile surgical marker, an appropriate bilobe flap drawn around the defect.    The area thus outlined was incised deep to adipose tissue with a #15 scalpel blade.  The skin margins were undermined to an appropriate distance in all directions utilizing iris scissors.
Composite Graft Text: The defect edges were debeveled with a #15 scalpel blade.  Given the location of the defect, shape of the defect, the proximity to free margins and the fact the defect was full thickness a composite graft was deemed most appropriate.  The defect was outline and then transferred to the donor site.  A full thickness graft was then excised from the donor site. The graft was then placed in the primary defect, oriented appropriately and then sutured into place.  The secondary defect was then repaired using a primary closure.
Advancement Flap (Single) Text: The defect edges were debeveled with a #15 scalpel blade.  Given the location of the defect and the proximity to free margins a single advancement flap was deemed most appropriate.  Using a sterile surgical marker, an appropriate advancement flap was drawn incorporating the defect and placing the expected incisions within the relaxed skin tension lines where possible.    The area thus outlined was incised deep to adipose tissue with a #15 scalpel blade.  The skin margins were undermined to an appropriate distance in all directions utilizing iris scissors.
Number Of Stages: 1
Detail Level: Detailed
Referred To Plastics For Closure Text (Leave Blank If You Do Not Want): After obtaining clear surgical margins the patient was sent to plastics for surgical repair.  The patient understands they will receive post-surgical care and follow-up from the referring physician's office.
Mohs Rapid Report Verbiage: The area of clinically evident tumor was marked with skin marking ink and appropriately hatched.  The initial incision was made following the Mohs approach through the skin.  The specimen was taken to the lab, divided into the necessary number of pieces, chromacoded and processed according to the Mohs protocol.  This was repeated in successive stages until a tumor free defect was achieved.
No Residual Tumor Seen Histology Text: There were no malignant cells seen in the sections examined.
Post-Care Instructions: I reviewed with the patient in detail post-care instructions. Patient is not to engage in any heavy lifting, exercise, or swimming for the next 14 days. Should the patient develop any fevers, chills, bleeding, or severe pain the patient will contact the office.
Estimated Blood Loss (Cc): minimal
Surgeon: Dr. KIRKPATRICK
Cheiloplasty (Complex) Text: A decision was made to reconstruct the defect with a  cheiloplasty.  The defect was undermined extensively.  Additional obicularis oris muscle was excised with a 15 blade scalpel.  The defect was converted into a full thickness wedge to facilite a better cosmetic result.  Small vessels were then tied off with 5-0 monocyrl. The obicularis oris, superficial fascia, adipose and dermis were then reapproximated.  After the deeper layers were approximated the epidermis was reapproximated with particular care given to realign the vermillion border.
Surgeon/Pathologist Verbiage (Will Incorporate Name Of Surgeon From Intro If Not Blank): operated in two distinct and integrated capacities as the surgeon and pathologist.
A-T Advancement Flap Text: The defect edges were debeveled with a #15 scalpel blade.  Given the location of the defect, shape of the defect and the proximity to free margins an A-T advancement flap was deemed most appropriate.  Using a sterile surgical marker, an appropriate advancement flap was drawn incorporating the defect and placing the expected incisions within the relaxed skin tension lines where possible.    The area thus outlined was incised deep to adipose tissue with a #15 scalpel blade.  The skin margins were undermined to an appropriate distance in all directions utilizing iris scissors.
Consent (Lip)/Introductory Paragraph: The rationale for Mohs was explained to the patient and consent was obtained. The risks, benefits and alternatives to therapy were discussed in detail. Specifically, the risks of lip deformity, changes in the oral aperture, infection, scarring, bleeding, prolonged wound healing, incomplete removal, allergy to anesthesia, nerve injury and recurrence were addressed. Prior to the procedure, the treatment site was clearly identified and confirmed by the patient. All components of Universal Protocol/PAUSE Rule completed.
Alar Island Pedicle Flap Text: The defect edges were debeveled with a #15 scalpel blade.  Given the location of the defect, shape of the defect and the proximity to the alar rim an island pedicle advancement flap was deemed most appropriate.  Using a sterile surgical marker, an appropriate advancement flap was drawn incorporating the defect, outlining the appropriate donor tissue and placing the expected incisions within the nasal ala running parallel to the alar rim. The area thus outlined was incised with a #15 scalpel blade.  The skin margins were undermined minimally to an appropriate distance in all directions around the primary defect and laterally outward around the island pedicle utilizing iris scissors.  There was minimal undermining beneath the pedicle flap.
Closure 4 Information: This tab is for additional flaps and grafts above and beyond our usual structured repairs.  Please note if you enter information here it will not currently bill and you will need to add the billing information manually.
Suture Removal: 7 days
Area H Indication Text: Tumors in this location are included in Area H (eyelids, eyebrows, nose, lips, chin, ear, pre-auricular, post-auricular, temple, genitalia, hands, feet, ankles and areola).  Tissue conservation is critical in these anatomic locations.
Keystone Flap Text: The defect edges were debeveled with a #15 scalpel blade.  Given the location of the defect, shape of the defect a keystone flap was deemed most appropriate.  Using a sterile surgical marker, an appropriate keystone flap was drawn incorporating the defect, outlining the appropriate donor tissue and placing the expected incisions within the relaxed skin tension lines where possible. The area thus outlined was incised deep to adipose tissue with a #15 scalpel blade.  The skin margins were undermined to an appropriate distance in all directions around the primary defect and laterally outward around the flap utilizing iris scissors.
Consent (Marginal Mandibular)/Introductory Paragraph: The rationale for Mohs was explained to the patient and consent was obtained. The risks, benefits and alternatives to therapy were discussed in detail. Specifically, the risks of damage to the marginal mandibular branch of the facial nerve, infection, scarring, bleeding, prolonged wound healing, incomplete removal, allergy to anesthesia, and recurrence were addressed. Prior to the procedure, the treatment site was clearly identified and confirmed by the patient. All components of Universal Protocol/PAUSE Rule completed.
Graft Donor Site Bandage (Optional-Leave Blank If You Don't Want In Note): Steri-strips and a pressure bandage were applied to the donor site.
Simple / Intermediate / Complex Repair - Final Wound Length In Cm: 2.2
Posterior Auricular Interpolation Flap Text: A decision was made to reconstruct the defect utilizing an interpolation axial flap and a staged reconstruction.  A telfa template was made of the defect.  This telfa template was then used to outline the posterior auricular interpolation flap.  The donor area for the pedicle flap was then injected with anesthesia.  The flap was excised through the skin and subcutaneous tissue down to the layer of the underlying musculature.  The pedicle flap was carefully excised within this deep plane to maintain its blood supply.  The edges of the donor site were undermined.   The donor site was closed in a primary fashion.  The pedicle was then rotated into position and sutured.  Once the tube was sutured into place, adequate blood supply was confirmed with blanching and refill.  The pedicle was then wrapped with xeroform gauze and dressed appropriately with a telfa and gauze bandage to ensure continued blood supply and protect the attached pedicle.
Partial Purse String (Intermediate) Text: Given the location of the defect and the characteristics of the surrounding skin an intermediate purse string closure was deemed most appropriate.  Undermining was performed circumfirentially around the surgical defect.  A purse string suture was then placed and tightened. Wound tension only allowed a partial closure of the circular defect.
Cheek Interpolation Flap Text: A decision was made to reconstruct the defect utilizing an interpolation axial flap and a staged reconstruction.  A telfa template was made of the defect.  This telfa template was then used to outline the Cheek Interpolation flap.  The donor area for the pedicle flap was then injected with anesthesia.  The flap was excised through the skin and subcutaneous tissue down to the layer of the underlying musculature.  The interpolation flap was carefully excised within this deep plane to maintain its blood supply.  The edges of the donor site were undermined.   The donor site was closed in a primary fashion.  The pedicle was then rotated into position and sutured.  Once the tube was sutured into place, adequate blood supply was confirmed with blanching and refill.  The pedicle was then wrapped with xeroform gauze and dressed appropriately with a telfa and gauze bandage to ensure continued blood supply and protect the attached pedicle.
Anesthesia Type: 1% lidocaine with 1:200,000 epinephrine
Island Pedicle Flap-Requiring Vessel Identification Text: The defect edges were debeveled with a #15 scalpel blade.  Given the location of the defect, shape of the defect and the proximity to free margins an island pedicle advancement flap was deemed most appropriate.  Using a sterile surgical marker, an appropriate advancement flap was drawn, based on the axial vessel mentioned above, incorporating the defect, outlining the appropriate donor tissue and placing the expected incisions within the relaxed skin tension lines where possible.    The area thus outlined was incised deep to adipose tissue with a #15 scalpel blade.  The skin margins were undermined to an appropriate distance in all directions around the primary defect and laterally outward around the island pedicle utilizing iris scissors.  There was minimal undermining beneath the pedicle flap.
Ear Star Wedge Flap Text: The defect edges were debeveled with a #15 blade scalpel.  Given the location of the defect and the proximity to free margins (helical rim) an ear star wedge flap was deemed most appropriate.  Using a sterile surgical marker, the appropriate flap was drawn incorporating the defect and placing the expected incisions between the helical rim and antihelix where possible.  The area thus outlined was incised through and through with a #15 scalpel blade.
Consent 3/Introductory Paragraph: I gave the patient a chance to ask questions they had about the procedure.  Following this I explained the Mohs procedure and consent was obtained. The risks, benefits and alternatives to therapy were discussed in detail. Specifically, the risks of infection, scarring, bleeding, prolonged wound healing, incomplete removal, allergy to anesthesia, nerve injury and recurrence were addressed. Prior to the procedure, the treatment site was clearly identified and confirmed by the patient. All components of Universal Protocol/PAUSE Rule completed.
Lazy S Intermediate Repair Preamble Text (Leave Blank If You Do Not Want): Undermining was performed with blunt dissection.
Spiral Flap Text: The defect edges were debeveled with a #15 scalpel blade.  Given the location of the defect, shape of the defect and the proximity to free margins a spiral flap was deemed most appropriate.  Using a sterile surgical marker, an appropriate rotation flap was drawn incorporating the defect and placing the expected incisions within the relaxed skin tension lines where possible. The area thus outlined was incised deep to adipose tissue with a #15 scalpel blade.  The skin margins were undermined to an appropriate distance in all directions utilizing iris scissors.
Transposition Flap Text: The defect edges were debeveled with a #15 scalpel blade.  Given the location of the defect and the proximity to free margins a transposition flap was deemed most appropriate.  Using a sterile surgical marker, an appropriate transposition flap was drawn incorporating the defect.    The area thus outlined was incised deep to adipose tissue with a #15 scalpel blade.  The skin margins were undermined to an appropriate distance in all directions utilizing iris scissors.
Alternatives Discussed Intro (Do Not Add Period): I discussed alternative treatments to Mohs surgery and specifically discussed the risks and benefits of
Complex Repair And Graft Additional Text (Will Appearing After The Standard Complex Repair Text): The complex repair was not sufficient to completely close the primary defect. The remaining additional defect was repaired with the graft mentioned below.
Island Pedicle Flap Text: The defect edges were debeveled with a #15 scalpel blade.  Given the location of the defect, shape of the defect and the proximity to free margins an island pedicle advancement flap was deemed most appropriate.  Using a sterile surgical marker, an appropriate advancement flap was drawn incorporating the defect, outlining the appropriate donor tissue and placing the expected incisions within the relaxed skin tension lines where possible.    The area thus outlined was incised deep to adipose tissue with a #15 scalpel blade.  The skin margins were undermined to an appropriate distance in all directions around the primary defect and laterally outward around the island pedicle utilizing iris scissors.  There was minimal undermining beneath the pedicle flap.
Tissue Cultured Epidermal Autograft Text: The defect edges were debeveled with a #15 scalpel blade.  Given the location of the defect, shape of the defect and the proximity to free margins a tissue cultured epidermal autograft was deemed most appropriate.  The graft was then trimmed to fit the size of the defect.  The graft was then placed in the primary defect and oriented appropriately.
Manual Repair Warning Statement: We plan on removing the manually selected variable below in favor of our much easier automatic structured text blocks found in the previous tab. We decided to do this to help make the flow better and give you the full power of structured data. Manual selection is never going to be ideal in our platform and I would encourage you to avoid using manual selection from this point on, especially since I will be sunsetting this feature. It is important that you do one of two things with the customized text below. First, you can save all of the text in a word file so you can have it for future reference. Second, transfer the text to the appropriate area in the Library tab. Lastly, if there is a flap or graft type which we do not have you need to let us know right away so I can add it in before the variable is hidden. No need to panic, we plan to give you roughly 6 months to make the change.
Trilobed Flap Text: The defect edges were debeveled with a #15 scalpel blade.  Given the location of the defect and the proximity to free margins a trilobed flap was deemed most appropriate.  Using a sterile surgical marker, an appropriate trilobed flap drawn around the defect.    The area thus outlined was incised deep to adipose tissue with a #15 scalpel blade.  The skin margins were undermined to an appropriate distance in all directions utilizing iris scissors.
Mastoid Interpolation Flap Text: A decision was made to reconstruct the defect utilizing an interpolation axial flap and a staged reconstruction.  A telfa template was made of the defect.  This telfa template was then used to outline the mastoid interpolation flap.  The donor area for the pedicle flap was then injected with anesthesia.  The flap was excised through the skin and subcutaneous tissue down to the layer of the underlying musculature.  The pedicle flap was carefully excised within this deep plane to maintain its blood supply.  The edges of the donor site were undermined.   The donor site was closed in a primary fashion.  The pedicle was then rotated into position and sutured.  Once the tube was sutured into place, adequate blood supply was confirmed with blanching and refill.  The pedicle was then wrapped with xeroform gauze and dressed appropriately with a telfa and gauze bandage to ensure continued blood supply and protect the attached pedicle.
Dermal Autograft Text: The defect edges were debeveled with a #15 scalpel blade.  Given the location of the defect, shape of the defect and the proximity to free margins a dermal autograft was deemed most appropriate.  Using a sterile surgical marker, the primary defect shape was transferred to the donor site. The area thus outlined was incised deep to adipose tissue with a #15 scalpel blade.  The harvested graft was then trimmed of adipose and epidermal tissue until only dermis was left.  The skin graft was then placed in the primary defect and oriented appropriately.
Stage 1: Number Of Blocks?: 2
Cartilage Graft Text: The defect edges were debeveled with a #15 scalpel blade.  Given the location of the defect, shape of the defect, the fact the defect involved a full thickness cartilage defect a cartilage graft was deemed most appropriate.  An appropriate donor site was identified, cleansed, and anesthetized. The cartilage graft was then harvested and transferred to the recipient site, oriented appropriately and then sutured into place.  The secondary defect was then repaired using a primary closure.
S Plasty Text: Given the location and shape of the defect, and the orientation of relaxed skin tension lines, an S-plasty was deemed most appropriate for repair.  Using a sterile surgical marker, the appropriate outline of the S-plasty was drawn, incorporating the defect and placing the expected incisions within the relaxed skin tension lines where possible.  The area thus outlined was incised deep to adipose tissue with a #15 scalpel blade.  The skin margins were undermined to an appropriate distance in all directions utilizing iris scissors. The skin flaps were advanced over the defect.  The opposing margins were then approximated with interrupted buried subcutaneous sutures.
Purse String (Intermediate) Text: Given the location of the defect and the characteristics of the surrounding skin a pursestring intermediate closure was deemed most appropriate.  Undermining was performed circumfirentially around the surgical defect.  A purstring suture was then placed and tightened.
Helical Rim Advancement Flap Text: The defect edges were debeveled with a #15 blade scalpel.  Given the location of the defect and the proximity to free margins (helical rim) a double helical rim advancement flap was deemed most appropriate.  Using a sterile surgical marker, the appropriate advancement flaps were drawn incorporating the defect and placing the expected incisions between the helical rim and antihelix where possible.  The area thus outlined was incised through and through with a #15 scalpel blade.  With a skin hook and iris scissors, the flaps were gently and sharply undermined and freed up.
Bcc Histology Text: There were numerous aggregates of basaloid cells.
Consent Type: Consent 1 (Standard)
Postop Diagnosis: same
Epidermal Sutures: 5-0 Nylon
Secondary Intention Text (Leave Blank If You Do Not Want): The defect will heal with secondary intention.
Consent (Ear)/Introductory Paragraph: The rationale for Mohs was explained to the patient and consent was obtained. The risks, benefits and alternatives to therapy were discussed in detail. Specifically, the risks of ear deformity, infection, scarring, bleeding, prolonged wound healing, incomplete removal, allergy to anesthesia, nerve injury and recurrence were addressed. Prior to the procedure, the treatment site was clearly identified and confirmed by the patient. All components of Universal Protocol/PAUSE Rule completed.
Rotation Flap Text: The defect edges were debeveled with a #15 scalpel blade.  Given the location of the defect, shape of the defect and the proximity to free margins a rotation flap was deemed most appropriate.  Using a sterile surgical marker, an appropriate rotation flap was drawn incorporating the defect and placing the expected incisions within the relaxed skin tension lines where possible.    The area thus outlined was incised deep to adipose tissue with a #15 scalpel blade.  The skin margins were undermined to an appropriate distance in all directions utilizing iris scissors.
Wound Care: Polysporin ointment
Ftsg Text: The defect edges were debeveled with a #15 scalpel blade.  Given the location of the defect, shape of the defect and the proximity to free margins a full thickness skin graft was deemed most appropriate.  Using a sterile surgical marker, the primary defect shape was transferred to the donor site. The area thus outlined was incised deep to adipose tissue with a #15 scalpel blade.  The harvested graft was then trimmed of adipose tissue until only dermis and epidermis was left.  The skin margins of the secondary defect were undermined to an appropriate distance in all directions utilizing iris scissors.  The secondary defect was closed with interrupted buried subcutaneous sutures.  The skin edges were then re-apposed with running  sutures.  The skin graft was then placed in the primary defect and oriented appropriately.
O-L Flap Text: The defect edges were debeveled with a #15 scalpel blade.  Given the location of the defect, shape of the defect and the proximity to free margins an O-L flap was deemed most appropriate.  Using a sterile surgical marker, an appropriate advancement flap was drawn incorporating the defect and placing the expected incisions within the relaxed skin tension lines where possible.    The area thus outlined was incised deep to adipose tissue with a #15 scalpel blade.  The skin margins were undermined to an appropriate distance in all directions utilizing iris scissors.
Advancement Flap (Double) Text: The defect edges were debeveled with a #15 scalpel blade.  Given the location of the defect and the proximity to free margins a double advancement flap was deemed most appropriate.  Using a sterile surgical marker, the appropriate advancement flaps were drawn incorporating the defect and placing the expected incisions within the relaxed skin tension lines where possible.    The area thus outlined was incised deep to adipose tissue with a #15 scalpel blade.  The skin margins were undermined to an appropriate distance in all directions utilizing iris scissors.
Repair Performed By Another Provider Text (Leave Blank If You Do Not Want): After obtaining clear surgical margins the defect was repaired by another provider.
Initial Size Of Lesion: 0.5
Mucosal Advancement Flap Text: Given the location of the defect, shape of the defect and the proximity to free margins a mucosal advancement flap was deemed most appropriate. Incisions were made with a 15 blade scalpel in the appropriate fashion along the cutaneous vermillion border and the mucosal lip. The remaining actinically damaged mucosal tissue was excised.  The mucosal advancement flap was then elevated to the gingival sulcus with care taken to preserve the neurovascular structures and advanced into the primary defect. Care was taken to ensure that precise realignment of the vermillion border was achieved.
Interpolation Flap Text: A decision was made to reconstruct the defect utilizing an interpolation axial flap and a staged reconstruction.  A telfa template was made of the defect.  This telfa template was then used to outline the interpolation flap.  The donor area for the pedicle flap was then injected with anesthesia.  The flap was excised through the skin and subcutaneous tissue down to the layer of the underlying musculature.  The interpolation flap was carefully excised within this deep plane to maintain its blood supply.  The edges of the donor site were undermined.   The donor site was closed in a primary fashion.  The pedicle was then rotated into position and sutured.  Once the tube was sutured into place, adequate blood supply was confirmed with blanching and refill.  The pedicle was then wrapped with xeroform gauze and dressed appropriately with a telfa and gauze bandage to ensure continued blood supply and protect the attached pedicle.
H Plasty Text: Given the location of the defect, shape of the defect and the proximity to free margins a H-plasty was deemed most appropriate for repair.  Using a sterile surgical marker, the appropriate advancement arms of the H-plasty were drawn incorporating the defect and placing the expected incisions within the relaxed skin tension lines where possible. The area thus outlined was incised deep to adipose tissue with a #15 scalpel blade. The skin margins were undermined to an appropriate distance in all directions utilizing iris scissors.  The opposing advancement arms were then advanced into place in opposite direction and anchored with interrupted buried subcutaneous sutures.
Island Pedicle Flap With Canthal Suspension Text: The defect edges were debeveled with a #15 scalpel blade.  Given the location of the defect, shape of the defect and the proximity to free margins an island pedicle advancement flap was deemed most appropriate.  Using a sterile surgical marker, an appropriate advancement flap was drawn incorporating the defect, outlining the appropriate donor tissue and placing the expected incisions within the relaxed skin tension lines where possible. The area thus outlined was incised deep to adipose tissue with a #15 scalpel blade.  The skin margins were undermined to an appropriate distance in all directions around the primary defect and laterally outward around the island pedicle utilizing iris scissors.  There was minimal undermining beneath the pedicle flap. A suspension suture was placed in the canthal tendon to prevent tension and prevent ectropion.
Paramedian Forehead Flap Text: A decision was made to reconstruct the defect utilizing an interpolation axial flap and a staged reconstruction.  A telfa template was made of the defect.  This telfa template was then used to outline the paramedian forehead pedicle flap.  The donor area for the pedicle flap was then injected with anesthesia.  The flap was excised through the skin and subcutaneous tissue down to the layer of the underlying musculature.  The pedicle flap was carefully excised within this deep plane to maintain its blood supply.  The edges of the donor site were undermined.   The donor site was closed in a primary fashion.  The pedicle was then rotated into position and sutured.  Once the tube was sutured into place, adequate blood supply was confirmed with blanching and refill.  The pedicle was then wrapped with xeroform gauze and dressed appropriately with a telfa and gauze bandage to ensure continued blood supply and protect the attached pedicle.
Melolabial Transposition Flap Text: The defect edges were debeveled with a #15 scalpel blade.  Given the location of the defect and the proximity to free margins a melolabial flap was deemed most appropriate.  Using a sterile surgical marker, an appropriate melolabial transposition flap was drawn incorporating the defect.    The area thus outlined was incised deep to adipose tissue with a #15 scalpel blade.  The skin margins were undermined to an appropriate distance in all directions utilizing iris scissors.
Rhombic Flap Text: The defect edges were debeveled with a #15 scalpel blade.  Given the location of the defect and the proximity to free margins a rhombic flap was deemed most appropriate.  Using a sterile surgical marker, an appropriate rhombic flap was drawn incorporating the defect.    The area thus outlined was incised deep to adipose tissue with a #15 scalpel blade.  The skin margins were undermined to an appropriate distance in all directions utilizing iris scissors.
Eye Protection Verbiage: Before proceeding with the stage, a plastic scleral shield was inserted. The globe was anesthetized with a few drops of 1% lidocaine with 1:100,000 epinephrine. Then, an appropriate sized scleral shield was chosen and coated with lacrilube ointment. The shield was gently inserted and left in place for the duration of each stage. After the stage was completed, the shield was gently removed.
Repair Type: Complex Repair
Referred To Oculoplastics For Closure Text (Leave Blank If You Do Not Want): After obtaining clear surgical margins the patient was sent to oculoplastics for surgical repair.  The patient understands they will receive post-surgical care and follow-up from the referring physician's office.
Mauc Override (Will Disregard Factors Selected In Indications Tab): 9 (Appropriate)
Hemostasis: Electrodesiccation
Same Histology In Subsequent Stages Text: The pattern and morphology of the tumor is as described in the first stage.
Consent 1/Introductory Paragraph: The rationale for Mohs was explained to the patient and consent was obtained. The risks, benefits and alternatives to therapy were discussed in detail. Specifically, the risks of infection, scarring, bleeding, prolonged wound healing, incomplete removal, allergy to anesthesia, nerve injury and recurrence were addressed. Prior to the procedure, the treatment site was clearly identified and confirmed by the patient. All components of Universal Protocol/PAUSE Rule completed.
Xenograft Text: The defect edges were debeveled with a #15 scalpel blade.  Given the location of the defect, shape of the defect and the proximity to free margins a xenograft was deemed most appropriate.  The graft was then trimmed to fit the size of the defect.  The graft was then placed in the primary defect and oriented appropriately.
Anesthesia Volume In Cc: 12
Tumor Debulked?: scalpel
Referred To Asc For Closure Text (Leave Blank If You Do Not Want): After obtaining clear surgical margins the patient was sent to an ASC for surgical repair.  The patient understands they will receive post-surgical care and follow-up from the ASC physician.
Bilateral Helical Rim Advancement Flap Text: The defect edges were debeveled with a #15 blade scalpel.  Given the location of the defect and the proximity to free margins (helical rim) a bilateral helical rim advancement flap was deemed most appropriate.  Using a sterile surgical marker, the appropriate advancement flaps were drawn incorporating the defect and placing the expected incisions between the helical rim and antihelix where possible.  The area thus outlined was incised through and through with a #15 scalpel blade.  With a skin hook and iris scissors, the flaps were gently and sharply undermined and freed up.
Bilobed Flap Text: The defect edges were debeveled with a #15 scalpel blade.  Given the location of the defect and the proximity to free margins a bilobe flap was deemed most appropriate.  Using a sterile surgical marker, an appropriate bilobe flap drawn around the defect.    The area thus outlined was incised deep to adipose tissue with a #15 scalpel blade.  The skin margins were undermined to an appropriate distance in all directions utilizing iris scissors.
Inflammation Suggestive Of Cancer Camouflage Histology Text: There was a dense lymphocytic infiltrate which prevented adequate histologic evaluation of adjacent structures.
Subsequent Stages Histo Method Verbiage: Using a similar technique to that described above, a thin layer of tissue was removed from all areas where tumor was visible on the previous stage.  The tissue was again oriented, mapped, dyed, and processed as above.
Localized Dermabrasion With Wire Brush Text: The patient was draped in routine manner.  Localized dermabrasion using 3 x 17 mm wire brush was performed in routine manner to papillary dermis. This spot dermabrasion is being performed to complete skin cancer reconstruction. It also will eliminate the other sun damaged precancerous cells that are known to be part of the regional effect of a lifetime's worth of sun exposure. This localized dermabrasion is therapeutic and should not be considered cosmetic in any regard.
Referred To Otolaryngology For Closure Text (Leave Blank If You Do Not Want): After obtaining clear surgical margins the patient was sent to otolaryngology for surgical repair.  The patient understands they will receive post-surgical care and follow-up from the referring physician's office.
Complex Repair And Flap Additional Text (Will Appearing After The Standard Complex Repair Text): The complex repair was not sufficient to completely close the primary defect. The remaining additional defect was repaired with the flap mentioned below.
Home Suture Removal Text: Patient was provided instructions on removing sutures and will remove their sutures at home.  If they have any questions or difficulties they will call the office.
Cheiloplasty (Less Than 50%) Text: A decision was made to reconstruct the defect with a  cheiloplasty.  The defect was undermined extensively.  Additional obicularis oris muscle was excised with a 15 blade scalpel.  The defect was converted into a full thickness wedge, of less than 50% of the vertical height of the lip, to facilite a better cosmetic result.  Small vessels were then tied off with 5-0 monocyrl. The obicularis oris, superficial fascia, adipose and dermis were then reapproximated.  After the deeper layers were approximated the epidermis was reapproximated with particular care given to realign the vermillion border.
Dressing: pressure dressing with telfa
Cheek-To-Nose Interpolation Flap Text: A decision was made to reconstruct the defect utilizing an interpolation axial flap and a staged reconstruction.  A telfa template was made of the defect.  This telfa template was then used to outline the Cheek-To-Nose Interpolation flap.  The donor area for the pedicle flap was then injected with anesthesia.  The flap was excised through the skin and subcutaneous tissue down to the layer of the underlying musculature.  The interpolation flap was carefully excised within this deep plane to maintain its blood supply.  The edges of the donor site were undermined.   The donor site was closed in a primary fashion.  The pedicle was then rotated into position and sutured.  Once the tube was sutured into place, adequate blood supply was confirmed with blanching and refill.  The pedicle was then wrapped with xeroform gauze and dressed appropriately with a telfa and gauze bandage to ensure continued blood supply and protect the attached pedicle.
Mauc Instructions: By selecting yes to the question below the MAUC number will be added into the note.  This will be calculated automatically based on the diagnosis chosen, the size entered, the body zone selected (H,M,L) and the specific indications you chose. You will also have the option to override the Mohs AUC if you disagree with the automatically calculated number and this option is found in the Case Summary tab.
Consent (Spinal Accessory)/Introductory Paragraph: The rationale for Mohs was explained to the patient and consent was obtained. The risks, benefits and alternatives to therapy were discussed in detail. Specifically, the risks of damage to the spinal accessory nerve, infection, scarring, bleeding, prolonged wound healing, incomplete removal, allergy to anesthesia, and recurrence were addressed. Prior to the procedure, the treatment site was clearly identified and confirmed by the patient. All components of Universal Protocol/PAUSE Rule completed.
Epidermal Autograft Text: The defect edges were debeveled with a #15 scalpel blade.  Given the location of the defect, shape of the defect and the proximity to free margins an epidermal autograft was deemed most appropriate.  Using a sterile surgical marker, the primary defect shape was transferred to the donor site. The epidermal graft was then harvested.  The skin graft was then placed in the primary defect and oriented appropriately.
Advancement-Rotation Flap Text: The defect edges were debeveled with a #15 scalpel blade.  Given the location of the defect, shape of the defect and the proximity to free margins an advancement-rotation flap was deemed most appropriate.  Using a sterile surgical marker, an appropriate flap was drawn incorporating the defect and placing the expected incisions within the relaxed skin tension lines where possible. The area thus outlined was incised deep to adipose tissue with a #15 scalpel blade.  The skin margins were undermined to an appropriate distance in all directions utilizing iris scissors.
Star Wedge Flap Text: The defect edges were debeveled with a #15 scalpel blade.  Given the location of the defect, shape of the defect and the proximity to free margins a star wedge flap was deemed most appropriate.  Using a sterile surgical marker, an appropriate rotation flap was drawn incorporating the defect and placing the expected incisions within the relaxed skin tension lines where possible. The area thus outlined was incised deep to adipose tissue with a #15 scalpel blade.  The skin margins were undermined to an appropriate distance in all directions utilizing iris scissors.
Consent (Temporal Branch)/Introductory Paragraph: The rationale for Mohs was explained to the patient and consent was obtained. The risks, benefits and alternatives to therapy were discussed in detail. Specifically, the risks of damage to the temporal branch of the facial nerve, infection, scarring, bleeding, prolonged wound healing, incomplete removal, allergy to anesthesia, and recurrence were addressed. Prior to the procedure, the treatment site was clearly identified and confirmed by the patient. All components of Universal Protocol/PAUSE Rule completed.
Double Island Pedicle Flap Text: The defect edges were debeveled with a #15 scalpel blade.  Given the location of the defect, shape of the defect and the proximity to free margins a double island pedicle advancement flap was deemed most appropriate.  Using a sterile surgical marker, an appropriate advancement flap was drawn incorporating the defect, outlining the appropriate donor tissue and placing the expected incisions within the relaxed skin tension lines where possible.    The area thus outlined was incised deep to adipose tissue with a #15 scalpel blade.  The skin margins were undermined to an appropriate distance in all directions around the primary defect and laterally outward around the island pedicle utilizing iris scissors.  There was minimal undermining beneath the pedicle flap.
O-T Plasty Text: The defect edges were debeveled with a #15 scalpel blade.  Given the location of the defect, shape of the defect and the proximity to free margins an O-T plasty was deemed most appropriate.  Using a sterile surgical marker, an appropriate O-T plasty was drawn incorporating the defect and placing the expected incisions within the relaxed skin tension lines where possible.    The area thus outlined was incised deep to adipose tissue with a #15 scalpel blade.  The skin margins were undermined to an appropriate distance in all directions utilizing iris scissors.
Mohs Method Verbiage: An incision at a 45 degree angle following the standard Mohs approach was done and the specimen was harvested as a microscopic controlled layer.
Epidermal Closure: running horizontal mattress
Consent (Scalp)/Introductory Paragraph: The rationale for Mohs was explained to the patient and consent was obtained. The risks, benefits and alternatives to therapy were discussed in detail. Specifically, the risks of changes in hair growth pattern secondary to repair, infection, scarring, bleeding, prolonged wound healing, incomplete removal, allergy to anesthesia, nerve injury and recurrence were addressed. Prior to the procedure, the treatment site was clearly identified and confirmed by the patient. All components of Universal Protocol/PAUSE Rule completed.
Area M Indication Text: Tumors in this location are included in Area M (cheek, forehead, scalp, neck, jawline and pretibial skin).  Mohs surgery is indicated for tumors 1 cm or larger in these anatomic locations.
Dorsal Nasal Flap Text: The defect edges were debeveled with a #15 scalpel blade.  Given the location of the defect and the proximity to free margins a dorsal nasal flap was deemed most appropriate.  Using a sterile surgical marker, an appropriate dorsal nasal flap was drawn around the defect.    The area thus outlined was incised deep to adipose tissue with a #15 scalpel blade.  The skin margins were undermined to an appropriate distance in all directions utilizing iris scissors.
Mohs Histo Method Verbiage: Each section was then chromacoded and processed in the Mohs lab using the Mohs protocol and submitted for frozen section.
Date Of Previous Biopsy (Optional): 5/30/2017
Deep Sutures: 4-0 Vicryl
Closure 2 Information: This tab is for additional flaps and grafts, including complex repair and grafts and complex repair and flaps. You can also specify a different location for the additional defect, if the location is the same you do not need to select a new one. We will insert the automated text for the repair you select below just as we do for solitary flaps and grafts. Please note that at this time if you select a location with a different insurance zone you will need to override the ICD10 and CPT if appropriate.
Crescentic Advancement Flap Text: The defect edges were debeveled with a #15 scalpel blade.  Given the location of the defect and the proximity to free margins a crescentic advancement flap was deemed most appropriate.  Using a sterile surgical marker, the appropriate advancement flap was drawn incorporating the defect and placing the expected incisions within the relaxed skin tension lines where possible.    The area thus outlined was incised deep to adipose tissue with a #15 scalpel blade.  The skin margins were undermined to an appropriate distance in all directions utilizing iris scissors.
Ear Wedge Repair Text: A wedge excision was completed by carrying down an excision through the full thickness of the ear and cartilage with an inward facing Burow's triangle. The wound was then closed in a layered fashion.
Z Plasty Text: The lesion was extirpated to the level of the fat with a #15 scalpel blade.  Given the location of the defect, shape of the defect and the proximity to free margins a Z-plasty was deemed most appropriate for repair.  Using a sterile surgical marker, the appropriate transposition arms of the Z-plasty were drawn incorporating the defect and placing the expected incisions within the relaxed skin tension lines where possible.    The area thus outlined was incised deep to adipose tissue with a #15 scalpel blade.  The skin margins were undermined to an appropriate distance in all directions utilizing iris scissors.  The opposing transposition arms were then transposed into place in opposite direction and anchored with interrupted buried subcutaneous sutures.

## 2017-06-20 NOTE — HPI: PROCEDURE (MOHS)
Has The Growth Been Previously Biopsied?: has been previously biopsied
Body Location Override (Optional): Left side of nose

## 2017-06-27 ENCOUNTER — APPOINTMENT (RX ONLY)
Dept: URBAN - METROPOLITAN AREA OTHER 9 | Facility: OTHER | Age: 77
Setting detail: DERMATOLOGY
End: 2017-06-27

## 2017-06-27 DIAGNOSIS — Z48.02 ENCOUNTER FOR REMOVAL OF SUTURES: ICD-10-CM

## 2017-06-27 PROCEDURE — ? SUTURE REMOVAL (GLOBAL PERIOD)

## 2017-06-27 PROCEDURE — 99024 POSTOP FOLLOW-UP VISIT: CPT

## 2017-06-27 ASSESSMENT — LOCATION ZONE DERM: LOCATION ZONE: NOSE

## 2017-06-27 ASSESSMENT — LOCATION SIMPLE DESCRIPTION DERM: LOCATION SIMPLE: NOSE

## 2017-06-27 ASSESSMENT — LOCATION DETAILED DESCRIPTION DERM: LOCATION DETAILED: LEFT NASAL DORSUM

## 2017-06-27 NOTE — PROCEDURE: SUTURE REMOVAL (GLOBAL PERIOD)
Body Location Override (Optional - Billing Will Still Be Based On Selected Body Map Location If Applicable): left side of nose
Detail Level: Detailed
Add 27998 Cpt? (Important Note: In 2017 The Use Of 47532 Is Being Tracked By Cms To Determine Future Global Period Reimbursement For Global Periods): yes

## 2017-08-01 ENCOUNTER — APPOINTMENT (RX ONLY)
Dept: URBAN - METROPOLITAN AREA OTHER 9 | Facility: OTHER | Age: 77
Setting detail: DERMATOLOGY
End: 2017-08-01

## 2017-08-01 DIAGNOSIS — L92.8 OTHER GRANULOMATOUS DISORDERS OF THE SKIN AND SUBCUTANEOUS TISSUE: ICD-10-CM

## 2017-08-01 DIAGNOSIS — Z48.817 ENCOUNTER FOR SURGICAL AFTERCARE FOLLOWING SURGERY ON THE SKIN AND SUBCUTANEOUS TISSUE: ICD-10-CM

## 2017-08-01 PROBLEM — D48.5 NEOPLASM OF UNCERTAIN BEHAVIOR OF SKIN: Status: ACTIVE | Noted: 2017-08-01

## 2017-08-01 PROCEDURE — 11100: CPT

## 2017-08-01 PROCEDURE — 99024 POSTOP FOLLOW-UP VISIT: CPT

## 2017-08-01 PROCEDURE — ? BIOPSY BY SHAVE METHOD

## 2017-08-01 PROCEDURE — ? POST-OP WOUND EVALUATION

## 2017-08-01 ASSESSMENT — LOCATION ZONE DERM: LOCATION ZONE: NOSE

## 2017-08-01 ASSESSMENT — LOCATION DETAILED DESCRIPTION DERM
LOCATION DETAILED: LEFT NASAL SIDEWALL
LOCATION DETAILED: NASAL DORSUM

## 2017-08-01 ASSESSMENT — LOCATION SIMPLE DESCRIPTION DERM
LOCATION SIMPLE: NOSE
LOCATION SIMPLE: LEFT NOSE

## 2017-08-01 NOTE — PROCEDURE: POST-OP WOUND EVALUATION
Wound Evaluated By (Optional): Dr. Branch
Detail Level: Simple
Wound Diameter In Cm(Optional): 0
Add 53575 Cpt? (Important Note: In 2017 The Use Of 83479 Is Being Tracked By Cms To Determine Future Global Period Reimbursement For Global Periods): yes
Patient To Follow-Up With?: our clinic
Body Location Override (Optional): left side of the nose

## 2017-08-01 NOTE — PROCEDURE: BIOPSY BY SHAVE METHOD
Bill For Surgical Tray: no
Additional Anesthesia Volume In Cc (Will Not Render If 0): 0
Cryotherapy Text: The wound bed was treated with cryotherapy after the biopsy was performed.
Silver Nitrate Text: The wound bed was treated with silver nitrate after the biopsy was performed.
Type Of Destruction Used: Electrodesiccation
Electrodesiccation Text: The wound bed was treated with electrodesiccation after the biopsy was performed.
Render Post-Care Instructions In Note?: yes
Dressing: bandage
Body Location Override (Optional - Billing Will Still Be Based On Selected Body Map Location If Applicable): Left side of nose
Billing Type: Third-Party Bill
Consent: Written consent was obtained and risks were reviewed including but not limited to scarring, infection, bleeding, scabbing, incomplete removal, nerve damage and allergy to anesthesia.
Biopsy Method: Dermablade
Detail Level: Detailed
Notification Instructions: Patient will be notified of biopsy results. However, patient instructed to call the office if not contacted within 2 weeks.
Electrodesiccation And Curettage Text: The wound bed was treated with electrodesiccation and curettage after the biopsy was performed.
Size Of Lesion In Cm: 0.2
Post-Care Instructions: I reviewed with the patient in detail post-care instructions. Patient is to keep the biopsy site dry overnight, and then apply bacitracin twice daily until healed. Patient may apply hydrogen peroxide soaks to remove any crusting.Dependent on the results of the pathology pt will return for additional surgery or wound check and repair
Lab: [id number]
Biopsy Type: H and E
Lab Facility: [id number]
Anesthesia Volume In Cc: 1.5
Anesthesia Type: 1% lidocaine with 1:200,000 epinephrine
Wound Care: Polysporin ointment
Hemostasis: Aluminum Chloride

## 2017-08-15 ENCOUNTER — APPOINTMENT (RX ONLY)
Dept: URBAN - METROPOLITAN AREA OTHER 9 | Facility: OTHER | Age: 77
Setting detail: DERMATOLOGY
End: 2017-08-15

## 2017-08-15 DIAGNOSIS — Z48.02 ENCOUNTER FOR REMOVAL OF SUTURES: ICD-10-CM

## 2017-08-15 PROCEDURE — ? SUTURE REMOVAL (GLOBAL PERIOD)

## 2017-08-15 PROCEDURE — 99024 POSTOP FOLLOW-UP VISIT: CPT

## 2017-08-15 ASSESSMENT — LOCATION DETAILED DESCRIPTION DERM: LOCATION DETAILED: LEFT NASAL ALA

## 2017-08-15 ASSESSMENT — LOCATION ZONE DERM: LOCATION ZONE: NOSE

## 2017-08-15 ASSESSMENT — LOCATION SIMPLE DESCRIPTION DERM: LOCATION SIMPLE: LEFT NOSE

## 2017-08-15 NOTE — PROCEDURE: SUTURE REMOVAL (GLOBAL PERIOD)
Body Location Override (Optional - Billing Will Still Be Based On Selected Body Map Location If Applicable): left nasal ala
Detail Level: Detailed
Add 10712 Cpt? (Important Note: In 2017 The Use Of 14732 Is Being Tracked By Cms To Determine Future Global Period Reimbursement For Global Periods): yes

## 2017-12-04 ENCOUNTER — APPOINTMENT (RX ONLY)
Dept: URBAN - METROPOLITAN AREA OTHER 9 | Facility: OTHER | Age: 77
Setting detail: DERMATOLOGY
End: 2017-12-04

## 2017-12-04 DIAGNOSIS — L72.0 EPIDERMAL CYST: ICD-10-CM

## 2017-12-04 DIAGNOSIS — L73.8 OTHER SPECIFIED FOLLICULAR DISORDERS: ICD-10-CM

## 2017-12-04 DIAGNOSIS — L81.4 OTHER MELANIN HYPERPIGMENTATION: ICD-10-CM

## 2017-12-04 DIAGNOSIS — L82.1 OTHER SEBORRHEIC KERATOSIS: ICD-10-CM

## 2017-12-04 DIAGNOSIS — D22 MELANOCYTIC NEVI: ICD-10-CM

## 2017-12-04 PROBLEM — D22.5 MELANOCYTIC NEVI OF TRUNK: Status: ACTIVE | Noted: 2017-12-04

## 2017-12-04 PROCEDURE — 99214 OFFICE O/P EST MOD 30 MIN: CPT

## 2017-12-04 PROCEDURE — ? OBSERVATION

## 2017-12-04 PROCEDURE — ? COUNSELING

## 2017-12-04 ASSESSMENT — LOCATION DETAILED DESCRIPTION DERM
LOCATION DETAILED: RIGHT INFERIOR CENTRAL MALAR CHEEK
LOCATION DETAILED: LEFT MID-UPPER BACK
LOCATION DETAILED: LEFT INFERIOR CENTRAL MALAR CHEEK
LOCATION DETAILED: RIGHT SUPERIOR FOREHEAD
LOCATION DETAILED: RIGHT INFERIOR MEDIAL MIDBACK
LOCATION DETAILED: INFERIOR THORACIC SPINE
LOCATION DETAILED: LEFT MEDIAL FRONTAL SCALP

## 2017-12-04 ASSESSMENT — LOCATION SIMPLE DESCRIPTION DERM
LOCATION SIMPLE: RIGHT CHEEK
LOCATION SIMPLE: LEFT SCALP
LOCATION SIMPLE: LEFT UPPER BACK
LOCATION SIMPLE: RIGHT LOWER BACK
LOCATION SIMPLE: RIGHT FOREHEAD
LOCATION SIMPLE: UPPER BACK
LOCATION SIMPLE: LEFT CHEEK

## 2017-12-04 ASSESSMENT — LOCATION ZONE DERM
LOCATION ZONE: SCALP
LOCATION ZONE: FACE
LOCATION ZONE: TRUNK

## 2018-06-04 ENCOUNTER — APPOINTMENT (RX ONLY)
Dept: URBAN - METROPOLITAN AREA OTHER 9 | Facility: OTHER | Age: 78
Setting detail: DERMATOLOGY
End: 2018-06-04

## 2018-06-04 DIAGNOSIS — D22 MELANOCYTIC NEVI: ICD-10-CM

## 2018-06-04 DIAGNOSIS — D18.0 HEMANGIOMA: ICD-10-CM

## 2018-06-04 DIAGNOSIS — L82.1 OTHER SEBORRHEIC KERATOSIS: ICD-10-CM

## 2018-06-04 DIAGNOSIS — L81.4 OTHER MELANIN HYPERPIGMENTATION: ICD-10-CM

## 2018-06-04 PROBLEM — D22.39 MELANOCYTIC NEVI OF OTHER PARTS OF FACE: Status: ACTIVE | Noted: 2018-06-04

## 2018-06-04 PROBLEM — D22.5 MELANOCYTIC NEVI OF TRUNK: Status: ACTIVE | Noted: 2018-06-04

## 2018-06-04 PROBLEM — D18.01 HEMANGIOMA OF SKIN AND SUBCUTANEOUS TISSUE: Status: ACTIVE | Noted: 2018-06-04

## 2018-06-04 PROBLEM — D23.71 OTHER BENIGN NEOPLASM OF SKIN OF RIGHT LOWER LIMB, INCLUDING HIP: Status: ACTIVE | Noted: 2018-06-04

## 2018-06-04 PROCEDURE — 99213 OFFICE O/P EST LOW 20 MIN: CPT

## 2018-06-04 PROCEDURE — ? COUNSELING

## 2018-06-04 ASSESSMENT — LOCATION DETAILED DESCRIPTION DERM
LOCATION DETAILED: RIGHT LATERAL ZYGOMA
LOCATION DETAILED: RIGHT SUPERIOR MEDIAL MIDBACK
LOCATION DETAILED: LEFT MID-UPPER BACK
LOCATION DETAILED: RIGHT POSTERIOR SHOULDER
LOCATION DETAILED: EPIGASTRIC SKIN
LOCATION DETAILED: LEFT INFERIOR UPPER BACK
LOCATION DETAILED: LEFT CLAVICULAR SKIN

## 2018-06-04 ASSESSMENT — LOCATION SIMPLE DESCRIPTION DERM
LOCATION SIMPLE: ABDOMEN
LOCATION SIMPLE: LEFT CLAVICULAR SKIN
LOCATION SIMPLE: RIGHT LOWER BACK
LOCATION SIMPLE: RIGHT SHOULDER
LOCATION SIMPLE: LEFT UPPER BACK
LOCATION SIMPLE: RIGHT ZYGOMA

## 2018-06-04 ASSESSMENT — LOCATION ZONE DERM
LOCATION ZONE: FACE
LOCATION ZONE: ARM
LOCATION ZONE: TRUNK

## 2018-12-04 ENCOUNTER — APPOINTMENT (RX ONLY)
Dept: URBAN - METROPOLITAN AREA OTHER 9 | Facility: OTHER | Age: 78
Setting detail: DERMATOLOGY
End: 2018-12-04

## 2018-12-04 DIAGNOSIS — L91.8 OTHER HYPERTROPHIC DISORDERS OF THE SKIN: ICD-10-CM

## 2018-12-04 DIAGNOSIS — L44.8 OTHER SPECIFIED PAPULOSQUAMOUS DISORDERS: ICD-10-CM

## 2018-12-04 DIAGNOSIS — L73.8 OTHER SPECIFIED FOLLICULAR DISORDERS: ICD-10-CM

## 2018-12-04 DIAGNOSIS — F42.4 EXCORIATION (SKIN-PICKING) DISORDER: ICD-10-CM | Status: WELL CONTROLLED

## 2018-12-04 DIAGNOSIS — L82.1 OTHER SEBORRHEIC KERATOSIS: ICD-10-CM

## 2018-12-04 DIAGNOSIS — D18.0 HEMANGIOMA: ICD-10-CM

## 2018-12-04 PROBLEM — S80.922A UNSPECIFIED SUPERFICIAL INJURY OF LEFT LOWER LEG, INITIAL ENCOUNTER: Status: ACTIVE | Noted: 2018-12-04

## 2018-12-04 PROBLEM — D18.01 HEMANGIOMA OF SKIN AND SUBCUTANEOUS TISSUE: Status: ACTIVE | Noted: 2018-12-04

## 2018-12-04 PROCEDURE — ? COUNSELING

## 2018-12-04 PROCEDURE — 99213 OFFICE O/P EST LOW 20 MIN: CPT

## 2018-12-04 ASSESSMENT — LOCATION DETAILED DESCRIPTION DERM
LOCATION DETAILED: RIGHT CENTRAL FRONTAL SCALP
LOCATION DETAILED: LEFT LATERAL INFERIOR CHEST
LOCATION DETAILED: LEFT MID TEMPLE
LOCATION DETAILED: LEFT SUPERIOR MEDIAL FOREHEAD
LOCATION DETAILED: LEFT DISTAL PRETIBIAL REGION
LOCATION DETAILED: SUPERIOR MID FOREHEAD
LOCATION DETAILED: PERIUMBILICAL SKIN
LOCATION DETAILED: LEFT ANTERIOR PROXIMAL THIGH
LOCATION DETAILED: LEFT CLAVICULAR NECK
LOCATION DETAILED: RIGHT DISTAL LATERAL POSTERIOR THIGH

## 2018-12-04 ASSESSMENT — LOCATION SIMPLE DESCRIPTION DERM
LOCATION SIMPLE: SUPERIOR FOREHEAD
LOCATION SIMPLE: LEFT FOREHEAD
LOCATION SIMPLE: LEFT PRETIBIAL REGION
LOCATION SIMPLE: ABDOMEN
LOCATION SIMPLE: LEFT THIGH
LOCATION SIMPLE: RIGHT POSTERIOR THIGH
LOCATION SIMPLE: LEFT ANTERIOR NECK
LOCATION SIMPLE: LEFT TEMPLE
LOCATION SIMPLE: RIGHT SCALP
LOCATION SIMPLE: CHEST

## 2018-12-04 ASSESSMENT — LOCATION ZONE DERM
LOCATION ZONE: NECK
LOCATION ZONE: SCALP
LOCATION ZONE: TRUNK
LOCATION ZONE: LEG
LOCATION ZONE: FACE

## 2019-03-04 ENCOUNTER — APPOINTMENT (RX ONLY)
Dept: URBAN - METROPOLITAN AREA OTHER 9 | Facility: OTHER | Age: 79
Setting detail: DERMATOLOGY
End: 2019-03-04

## 2019-03-04 DIAGNOSIS — D485 NEOPLASM OF UNCERTAIN BEHAVIOR OF SKIN: ICD-10-CM

## 2019-03-04 PROBLEM — D48.5 NEOPLASM OF UNCERTAIN BEHAVIOR OF SKIN: Status: ACTIVE | Noted: 2019-03-04

## 2019-03-04 PROCEDURE — 99213 OFFICE O/P EST LOW 20 MIN: CPT | Mod: 25

## 2019-03-04 PROCEDURE — ? BIOPSY BY PUNCH METHOD

## 2019-03-04 PROCEDURE — 11104 PUNCH BX SKIN SINGLE LESION: CPT

## 2019-03-04 ASSESSMENT — LOCATION SIMPLE DESCRIPTION DERM: LOCATION SIMPLE: RIGHT ANKLE

## 2019-03-04 ASSESSMENT — LOCATION ZONE DERM: LOCATION ZONE: LEG

## 2019-03-04 ASSESSMENT — LOCATION DETAILED DESCRIPTION DERM: LOCATION DETAILED: RIGHT MEDIAL POSTERIOR ANKLE

## 2019-03-04 NOTE — PROCEDURE: BIOPSY BY PUNCH METHOD
Detail Level: Detailed
Anesthesia Type: 1% lidocaine without epinephrine
Billing Type: Third-Party Bill
Size Of Lesion In Cm (Optional): 0
Lab Facility: [id number]
Render Post-Care Instructions In Note?: no
Suture Removal: 14 days
Post-Care Instructions: I reviewed with the patient in detail post-care instructions. Patient is to keep the biopsy site dry overnight, and then apply bacitracin twice daily until healed. Patient may apply hydrogen peroxide soaks to remove any crusting.
Punch Size In Mm: 3
Anesthesia Volume In Cc: 2
Was A Bandage Applied: Yes
Epidermal Sutures: 4-0 Nylon
Biopsy Type: H and E
Home Suture Removal Text: Patient was provided a home suture removal kit and will remove their sutures at home.  If they have any questions or difficulties they will call the office.
Wound Care: No ointment
Hemostasis: Pressure
Notification Instructions: Patient will be notified of biopsy results. However, patient instructed to call the office if not contacted within 2 weeks.
Dressing: bandage
Consent: Written consent was obtained and risks were reviewed including but not limited to scarring, infection, bleeding, scabbing, incomplete removal, nerve damage and allergy to anesthesia.
Lab: [id number]

## 2019-03-18 ENCOUNTER — RX ONLY (OUTPATIENT)
Age: 79
Setting detail: RX ONLY
End: 2019-03-18

## 2019-03-18 RX ORDER — TRIAMCINOLONE ACETONIDE 1 MG/G
CREAM TOPICAL
Qty: 1 | Refills: 1 | Status: ERX | COMMUNITY
Start: 2019-03-18

## 2019-06-04 ENCOUNTER — APPOINTMENT (RX ONLY)
Dept: URBAN - METROPOLITAN AREA OTHER 9 | Facility: OTHER | Age: 79
Setting detail: DERMATOLOGY
End: 2019-06-04

## 2019-06-04 DIAGNOSIS — L82.1 OTHER SEBORRHEIC KERATOSIS: ICD-10-CM

## 2019-06-04 DIAGNOSIS — L72.0 EPIDERMAL CYST: ICD-10-CM

## 2019-06-04 DIAGNOSIS — L57.0 ACTINIC KERATOSIS: ICD-10-CM

## 2019-06-04 DIAGNOSIS — Z85.828 PERSONAL HISTORY OF OTHER MALIGNANT NEOPLASM OF SKIN: ICD-10-CM

## 2019-06-04 DIAGNOSIS — D22 MELANOCYTIC NEVI: ICD-10-CM

## 2019-06-04 DIAGNOSIS — I87.2 VENOUS INSUFFICIENCY (CHRONIC) (PERIPHERAL): ICD-10-CM

## 2019-06-04 DIAGNOSIS — L73.8 OTHER SPECIFIED FOLLICULAR DISORDERS: ICD-10-CM

## 2019-06-04 DIAGNOSIS — L81.4 OTHER MELANIN HYPERPIGMENTATION: ICD-10-CM

## 2019-06-04 PROBLEM — D22.5 MELANOCYTIC NEVI OF TRUNK: Status: ACTIVE | Noted: 2019-06-04

## 2019-06-04 PROCEDURE — 99213 OFFICE O/P EST LOW 20 MIN: CPT | Mod: 25

## 2019-06-04 PROCEDURE — ? COUNSELING

## 2019-06-04 PROCEDURE — ? LIQUID NITROGEN

## 2019-06-04 PROCEDURE — 17000 DESTRUCT PREMALG LESION: CPT

## 2019-06-04 PROCEDURE — 17003 DESTRUCT PREMALG LES 2-14: CPT

## 2019-06-04 ASSESSMENT — LOCATION SIMPLE DESCRIPTION DERM
LOCATION SIMPLE: RIGHT UPPER BACK
LOCATION SIMPLE: LEFT NOSE
LOCATION SIMPLE: LEFT FOREHEAD
LOCATION SIMPLE: RIGHT PRETIBIAL REGION
LOCATION SIMPLE: LEFT CHEEK
LOCATION SIMPLE: NOSE
LOCATION SIMPLE: LEFT TEMPLE
LOCATION SIMPLE: RIGHT SUPERIOR EYELID
LOCATION SIMPLE: LEFT ANTERIOR NECK
LOCATION SIMPLE: LEFT UPPER BACK
LOCATION SIMPLE: RIGHT CHEEK
LOCATION SIMPLE: RIGHT TEMPLE

## 2019-06-04 ASSESSMENT — LOCATION DETAILED DESCRIPTION DERM
LOCATION DETAILED: RIGHT CENTRAL MALAR CHEEK
LOCATION DETAILED: NASAL SUPRATIP
LOCATION DETAILED: RIGHT MID-UPPER BACK
LOCATION DETAILED: LEFT CLAVICULAR NECK
LOCATION DETAILED: LEFT SUPERIOR UPPER BACK
LOCATION DETAILED: LEFT SUPERIOR PREAURICULAR CHEEK
LOCATION DETAILED: LEFT INFERIOR LATERAL NECK
LOCATION DETAILED: RIGHT MID TEMPLE
LOCATION DETAILED: LEFT NASAL SIDEWALL
LOCATION DETAILED: LEFT MID TEMPLE
LOCATION DETAILED: RIGHT DISTAL PRETIBIAL REGION
LOCATION DETAILED: RIGHT LATERAL SUPERIOR EYELID
LOCATION DETAILED: RIGHT SUPERIOR UPPER BACK
LOCATION DETAILED: LEFT FOREHEAD
LOCATION DETAILED: LEFT INFERIOR LATERAL MALAR CHEEK

## 2019-06-04 ASSESSMENT — LOCATION ZONE DERM
LOCATION ZONE: NECK
LOCATION ZONE: LEG
LOCATION ZONE: NOSE
LOCATION ZONE: FACE
LOCATION ZONE: EYELID
LOCATION ZONE: TRUNK

## 2019-07-17 ENCOUNTER — TRANSFERRED RECORDS (OUTPATIENT)
Dept: HEALTH INFORMATION MANAGEMENT | Facility: CLINIC | Age: 79
End: 2019-07-17

## 2019-10-11 ENCOUNTER — APPOINTMENT (OUTPATIENT)
Dept: ULTRASOUND IMAGING | Facility: CLINIC | Age: 79
End: 2019-10-11
Attending: INTERNAL MEDICINE
Payer: MEDICARE

## 2019-10-11 ENCOUNTER — HOSPITAL ENCOUNTER (INPATIENT)
Facility: CLINIC | Age: 79
LOS: 11 days | Discharge: HOME | DRG: 698 | End: 2019-10-22
Attending: INTERNAL MEDICINE | Admitting: INTERNAL MEDICINE
Payer: MEDICARE

## 2019-10-11 ENCOUNTER — HOSPITAL ENCOUNTER (EMERGENCY)
Facility: CLINIC | Age: 79
Discharge: ANOTHER HOSPITAL | End: 2019-10-11
Attending: EMERGENCY MEDICINE | Admitting: EMERGENCY MEDICINE
Payer: MEDICARE

## 2019-10-11 VITALS
DIASTOLIC BLOOD PRESSURE: 77 MMHG | WEIGHT: 187.61 LBS | SYSTOLIC BLOOD PRESSURE: 140 MMHG | TEMPERATURE: 97.8 F | HEART RATE: 96 BPM | OXYGEN SATURATION: 96 % | RESPIRATION RATE: 17 BRPM | HEIGHT: 71 IN | BODY MASS INDEX: 26.27 KG/M2

## 2019-10-11 DIAGNOSIS — N19 RENAL FAILURE, UNSPECIFIED CHRONICITY: ICD-10-CM

## 2019-10-11 DIAGNOSIS — N18.9 ACUTE KIDNEY INJURY SUPERIMPOSED ON CHRONIC KIDNEY DISEASE (H): ICD-10-CM

## 2019-10-11 DIAGNOSIS — N17.9 ACUTE KIDNEY INJURY SUPERIMPOSED ON CHRONIC KIDNEY DISEASE (H): ICD-10-CM

## 2019-10-11 DIAGNOSIS — L03.90 CELLULITIS, UNSPECIFIED CELLULITIS SITE: ICD-10-CM

## 2019-10-11 DIAGNOSIS — L03.116 CELLULITIS OF LEFT LOWER EXTREMITY: Primary | ICD-10-CM

## 2019-10-11 LAB
ALBUMIN SERPL-MCNC: 3.3 G/DL (ref 3.4–5)
ALBUMIN UR-MCNC: 50 MG/DL
ALP SERPL-CCNC: 230 U/L (ref 40–150)
ALT SERPL W P-5'-P-CCNC: 21 U/L (ref 0–70)
ANION GAP SERPL CALCULATED.3IONS-SCNC: 7 MMOL/L (ref 3–14)
ANION GAP SERPL CALCULATED.3IONS-SCNC: 9 MMOL/L (ref 3–14)
APPEARANCE UR: CLEAR
AST SERPL W P-5'-P-CCNC: 16 U/L (ref 0–45)
BACTERIA #/AREA URNS HPF: ABNORMAL /HPF
BASOPHILS # BLD AUTO: 0.1 10E9/L (ref 0–0.2)
BASOPHILS NFR BLD AUTO: 0.5 %
BILIRUB SERPL-MCNC: 1.8 MG/DL (ref 0.2–1.3)
BILIRUB UR QL STRIP: NEGATIVE
BUN SERPL-MCNC: 61 MG/DL (ref 7–30)
BUN SERPL-MCNC: 61 MG/DL (ref 7–30)
CALCIUM SERPL-MCNC: 8.7 MG/DL (ref 8.5–10.1)
CALCIUM SERPL-MCNC: 8.9 MG/DL (ref 8.5–10.1)
CHLORIDE SERPL-SCNC: 103 MMOL/L (ref 94–109)
CHLORIDE SERPL-SCNC: 103 MMOL/L (ref 94–109)
CO2 BLDCOV-SCNC: 22 MMOL/L (ref 21–28)
CO2 SERPL-SCNC: 24 MMOL/L (ref 20–32)
CO2 SERPL-SCNC: 25 MMOL/L (ref 20–32)
COLOR UR AUTO: YELLOW
CREAT SERPL-MCNC: 5.44 MG/DL (ref 0.66–1.25)
CREAT SERPL-MCNC: 5.47 MG/DL (ref 0.66–1.25)
DIFFERENTIAL METHOD BLD: ABNORMAL
EOSINOPHIL # BLD AUTO: 0.1 10E9/L (ref 0–0.7)
EOSINOPHIL NFR BLD AUTO: 1 %
ERYTHROCYTE [DISTWIDTH] IN BLOOD BY AUTOMATED COUNT: 14.1 % (ref 10–15)
GFR SERPL CREATININE-BSD FRML MDRD: 9 ML/MIN/{1.73_M2}
GFR SERPL CREATININE-BSD FRML MDRD: 9 ML/MIN/{1.73_M2}
GLUCOSE SERPL-MCNC: 94 MG/DL (ref 70–99)
GLUCOSE SERPL-MCNC: 99 MG/DL (ref 70–99)
GLUCOSE UR STRIP-MCNC: NEGATIVE MG/DL
HCT VFR BLD AUTO: 34.3 % (ref 40–53)
HGB BLD-MCNC: 10.9 G/DL (ref 13.3–17.7)
HGB UR QL STRIP: ABNORMAL
IMM GRANULOCYTES # BLD: 0 10E9/L (ref 0–0.4)
IMM GRANULOCYTES NFR BLD: 0.4 %
INR PPP: 4.49 (ref 0.86–1.14)
KETONES UR STRIP-MCNC: NEGATIVE MG/DL
LACTATE BLD-SCNC: 0.9 MMOL/L (ref 0.7–2.1)
LEUKOCYTE ESTERASE UR QL STRIP: ABNORMAL
LYMPHOCYTES # BLD AUTO: 0.3 10E9/L (ref 0.8–5.3)
LYMPHOCYTES NFR BLD AUTO: 3 %
MCH RBC QN AUTO: 29.6 PG (ref 26.5–33)
MCHC RBC AUTO-ENTMCNC: 31.8 G/DL (ref 31.5–36.5)
MCV RBC AUTO: 93 FL (ref 78–100)
MONOCYTES # BLD AUTO: 0.7 10E9/L (ref 0–1.3)
MONOCYTES NFR BLD AUTO: 7.3 %
NEUTROPHILS # BLD AUTO: 8.7 10E9/L (ref 1.6–8.3)
NEUTROPHILS NFR BLD AUTO: 87.8 %
NITRATE UR QL: NEGATIVE
NRBC # BLD AUTO: 0 10*3/UL
NRBC BLD AUTO-RTO: 0 /100
PCO2 BLDV: 37 MM HG (ref 40–50)
PH BLDV: 7.38 PH (ref 7.32–7.43)
PH UR STRIP: 7 PH (ref 5–7)
PLATELET # BLD AUTO: 334 10E9/L (ref 150–450)
PO2 BLDV: 33 MM HG (ref 25–47)
POTASSIUM SERPL-SCNC: 4.7 MMOL/L (ref 3.4–5.3)
POTASSIUM SERPL-SCNC: 4.7 MMOL/L (ref 3.4–5.3)
PROT SERPL-MCNC: 7 G/DL (ref 6.8–8.8)
RBC # BLD AUTO: 3.68 10E12/L (ref 4.4–5.9)
RBC #/AREA URNS AUTO: 88 /HPF (ref 0–2)
SAO2 % BLDV FROM PO2: 63 %
SODIUM SERPL-SCNC: 135 MMOL/L (ref 133–144)
SODIUM SERPL-SCNC: 136 MMOL/L (ref 133–144)
SOURCE: ABNORMAL
SP GR UR STRIP: 1.01 (ref 1–1.03)
UROBILINOGEN UR STRIP-MCNC: NORMAL MG/DL (ref 0–2)
WBC # BLD AUTO: 9.9 10E9/L (ref 4–11)
WBC #/AREA URNS AUTO: 30 /HPF (ref 0–5)

## 2019-10-11 PROCEDURE — 99223 1ST HOSP IP/OBS HIGH 75: CPT | Mod: AI | Performed by: INTERNAL MEDICINE

## 2019-10-11 PROCEDURE — 25000131 ZZH RX MED GY IP 250 OP 636 PS 637: Mod: GY | Performed by: STUDENT IN AN ORGANIZED HEALTH CARE EDUCATION/TRAINING PROGRAM

## 2019-10-11 PROCEDURE — 80048 BASIC METABOLIC PNL TOTAL CA: CPT | Performed by: INTERNAL MEDICINE

## 2019-10-11 PROCEDURE — 12000001 ZZH R&B MED SURG/OB UMMC

## 2019-10-11 PROCEDURE — 99202 OFFICE O/P NEW SF 15 MIN: CPT | Performed by: INTERNAL MEDICINE

## 2019-10-11 PROCEDURE — 80053 COMPREHEN METABOLIC PANEL: CPT | Performed by: EMERGENCY MEDICINE

## 2019-10-11 PROCEDURE — 25000128 H RX IP 250 OP 636: Performed by: INTERNAL MEDICINE

## 2019-10-11 PROCEDURE — 25000132 ZZH RX MED GY IP 250 OP 250 PS 637: Mod: GY | Performed by: INTERNAL MEDICINE

## 2019-10-11 PROCEDURE — 85610 PROTHROMBIN TIME: CPT | Performed by: EMERGENCY MEDICINE

## 2019-10-11 PROCEDURE — 99207 ZZC CDG-CODE CATEGORY CHANGED: CPT | Performed by: INTERNAL MEDICINE

## 2019-10-11 PROCEDURE — 85025 COMPLETE CBC W/AUTO DIFF WBC: CPT | Performed by: EMERGENCY MEDICINE

## 2019-10-11 PROCEDURE — 76770 US EXAM ABDO BACK WALL COMP: CPT

## 2019-10-11 PROCEDURE — 36415 COLL VENOUS BLD VENIPUNCTURE: CPT | Performed by: EMERGENCY MEDICINE

## 2019-10-11 PROCEDURE — 99285 EMERGENCY DEPT VISIT HI MDM: CPT | Mod: 25

## 2019-10-11 PROCEDURE — 40000141 ZZH STATISTIC PERIPHERAL IV START W/O US GUIDANCE

## 2019-10-11 PROCEDURE — 80158 DRUG ASSAY CYCLOSPORINE: CPT | Performed by: EMERGENCY MEDICINE

## 2019-10-11 PROCEDURE — 83605 ASSAY OF LACTIC ACID: CPT

## 2019-10-11 PROCEDURE — 25000132 ZZH RX MED GY IP 250 OP 250 PS 637: Mod: GY | Performed by: STUDENT IN AN ORGANIZED HEALTH CARE EDUCATION/TRAINING PROGRAM

## 2019-10-11 PROCEDURE — 93005 ELECTROCARDIOGRAM TRACING: CPT

## 2019-10-11 PROCEDURE — 93010 ELECTROCARDIOGRAM REPORT: CPT | Performed by: INTERNAL MEDICINE

## 2019-10-11 PROCEDURE — 25000128 H RX IP 250 OP 636: Performed by: EMERGENCY MEDICINE

## 2019-10-11 PROCEDURE — 82803 BLOOD GASES ANY COMBINATION: CPT

## 2019-10-11 PROCEDURE — 81001 URINALYSIS AUTO W/SCOPE: CPT | Performed by: EMERGENCY MEDICINE

## 2019-10-11 PROCEDURE — 87086 URINE CULTURE/COLONY COUNT: CPT | Performed by: EMERGENCY MEDICINE

## 2019-10-11 PROCEDURE — 96365 THER/PROPH/DIAG IV INF INIT: CPT

## 2019-10-11 PROCEDURE — 87040 BLOOD CULTURE FOR BACTERIA: CPT | Performed by: EMERGENCY MEDICINE

## 2019-10-11 RX ORDER — MYCOPHENOLATE MOFETIL 500 MG/1
1000 TABLET ORAL
Status: DISCONTINUED | OUTPATIENT
Start: 2019-10-11 | End: 2019-10-14

## 2019-10-11 RX ORDER — POTASSIUM CITRATE 5 MEQ/1
15 TABLET, EXTENDED RELEASE ORAL 2 TIMES DAILY
Status: DISCONTINUED | OUTPATIENT
Start: 2019-10-12 | End: 2019-10-13

## 2019-10-11 RX ORDER — DILTIAZEM HYDROCHLORIDE 180 MG/1
180 CAPSULE, COATED, EXTENDED RELEASE ORAL DAILY
Status: DISCONTINUED | OUTPATIENT
Start: 2019-10-12 | End: 2019-10-22 | Stop reason: HOSPADM

## 2019-10-11 RX ORDER — CEFAZOLIN SODIUM 1 G/50ML
1750 SOLUTION INTRAVENOUS ONCE
Status: DISCONTINUED | OUTPATIENT
Start: 2019-10-11 | End: 2019-10-11

## 2019-10-11 RX ORDER — GABAPENTIN 300 MG/1
300 CAPSULE ORAL AT BEDTIME
Status: DISCONTINUED | OUTPATIENT
Start: 2019-10-11 | End: 2019-10-21

## 2019-10-11 RX ORDER — ACETAMINOPHEN 325 MG/1
325 TABLET ORAL EVERY 4 HOURS PRN
Status: DISCONTINUED | OUTPATIENT
Start: 2019-10-11 | End: 2019-10-12

## 2019-10-11 RX ORDER — VANCOMYCIN HYDROCHLORIDE 1 G/200ML
1000 INJECTION, SOLUTION INTRAVENOUS ONCE
Status: COMPLETED | OUTPATIENT
Start: 2019-10-11 | End: 2019-10-11

## 2019-10-11 RX ORDER — LIDOCAINE 40 MG/G
CREAM TOPICAL
Status: DISCONTINUED | OUTPATIENT
Start: 2019-10-11 | End: 2019-10-11 | Stop reason: HOSPADM

## 2019-10-11 RX ORDER — CYCLOSPORINE 25 MG/1
25 CAPSULE, LIQUID FILLED ORAL
Status: DISCONTINUED | OUTPATIENT
Start: 2019-10-11 | End: 2019-10-14

## 2019-10-11 RX ORDER — ACETAMINOPHEN 325 MG/1
650 TABLET ORAL EVERY 4 HOURS
Status: DISCONTINUED | OUTPATIENT
Start: 2019-10-11 | End: 2019-10-12

## 2019-10-11 RX ORDER — SODIUM CHLORIDE 9 MG/ML
INJECTION, SOLUTION INTRAVENOUS
Status: DISCONTINUED
Start: 2019-10-11 | End: 2019-10-12 | Stop reason: HOSPADM

## 2019-10-11 RX ADMIN — GABAPENTIN 300 MG: 300 CAPSULE ORAL at 21:36

## 2019-10-11 RX ADMIN — CYCLOSPORINE 25 MG: 25 CAPSULE, LIQUID FILLED ORAL at 21:41

## 2019-10-11 RX ADMIN — TAZOBACTAM SODIUM AND PIPERACILLIN SODIUM 4.5 G: 500; 4 INJECTION, SOLUTION INTRAVENOUS at 14:50

## 2019-10-11 RX ADMIN — MYCOPHENOLATE MOFETIL 1000 MG: 500 TABLET ORAL at 21:41

## 2019-10-11 RX ADMIN — VANCOMYCIN HYDROCHLORIDE 1000 MG: 1 INJECTION, SOLUTION INTRAVENOUS at 22:36

## 2019-10-11 RX ADMIN — ACETAMINOPHEN 650 MG: 325 TABLET, FILM COATED ORAL at 22:36

## 2019-10-11 ASSESSMENT — MIFFLIN-ST. JEOR: SCORE: 1583.13

## 2019-10-11 ASSESSMENT — ENCOUNTER SYMPTOMS
SHORTNESS OF BREATH: 0
FEVER: 1
NAUSEA: 0
ARTHRALGIAS: 1
WOUND: 1
COLOR CHANGE: 1
MYALGIAS: 1

## 2019-10-11 NOTE — ED NOTES
".  [facility]  ED Nurse Handoff Report    Matheus White Sr. is a 79 year old male   ED Chief complaint: Leg Pain  . ED Diagnosis:   Final diagnoses:   Cellulitis, unspecified cellulitis site   Renal failure, unspecified chronicity     Allergies: No Known Allergies    Code Status: Full Code  Activity level - Baseline/Home:  Independent. Activity Level - Current:   Assist X 1. Lift room needed: No. Bariatric: No   Needed: No   Isolation: No. Infection: Not Applicable.     Vital Signs:   Vitals:    10/11/19 1400 10/11/19 1415 10/11/19 1421 10/11/19 1430   BP: (!) 145/105 (!) 156/83  (!) 158/120   Pulse: 83 114  111   Resp:       Temp:       TempSrc:       SpO2: 97% 96%  97%   Weight:   85.1 kg (187 lb 9.8 oz)    Height:   1.803 m (5' 11\")        Cardiac Rhythm:  ,   Cardiac  Cardiac Rhythm: Atrial fibrillation(with T wave abnormality)  Pain level: 0-10 Pain Scale: 9  Patient confused: No. Patient Falls Risk: Yes.   Elimination Status: Has voided   Patient Report - Initial Complaint: 79 year old male, with a history of atrial fibrillation, HTN, CKD and renal transplant, who is on warfarin, who presents with his daughter-in-law to the emergency department for evaluation of left lower extremity pain. The patient reports he is in the process of moving here from [place] and was putting together his bed frame when it fell onto his left lower extremity three days prior to evaluation. He reports he was seen at urgent care with a negative x-ray and was discharged with oxycodone. He reports a fever in the last few days and increased pain and redness to the area of injury. He denies any chest pain, shortness of breath, or nausea..   Focused Assessment:   Cardiac - Cardiac WDL: -WDL except; rhythm (denies chest pain or dyspnea)  Chest Pain Assessment - Rating (0-10): 0   Cardiac Monitoring - EKG Monitoring: Yes  Cardiac Regularity: Irregular  Cardiac Rhythm: Atrial fibrillation (with T wave " abnormality) LA      15:18 Musculoskeletal Musculoskeletal - Musculoskeletal WDL: -WDL except; all  Extremity Movement: RLE  Pain Body Location:  (edematous, reddened area to anterior leg) RLE Weight-Bearing Status: weight-bearing as tolerated  CMS Intact: Yes  Musculoskeletal Comment: right arm edematous t/o entire extremity         Tests Performed: EKG, labs, UA. Abnormal Results: .  Labs Ordered and Resulted from Time of ED Arrival Up to the Time of Departure from the ED   CBC WITH PLATELETS DIFFERENTIAL - Abnormal; Notable for the following components:       Result Value    RBC Count 3.68 (*)     Hemoglobin 10.9 (*)     Hematocrit 34.3 (*)     Absolute Neutrophil 8.7 (*)     Absolute Lymphocytes 0.3 (*)     All other components within normal limits   COMPREHENSIVE METABOLIC PANEL - Abnormal; Notable for the following components:    Urea Nitrogen 61 (*)     Creatinine 5.47 (*)     GFR Estimate 9 (*)     GFR Estimate If Black 10 (*)     Bilirubin Total 1.8 (*)     Albumin 3.3 (*)     Alkaline Phosphatase 230 (*)     All other components within normal limits   INR - Abnormal; Notable for the following components:    INR 4.49 (*)     All other components within normal limits   ISTAT  GASES LACTATE KEESHA POCT - Abnormal; Notable for the following components:    PCO2 Venous 37 (*)     All other components within normal limits   ROUTINE UA WITH MICROSCOPIC   CYCLOSPORINE   PULSE OXIMETRY NURSING   PERIPHERAL IV CATHETER   NURSING DRAW AND HOLD   ISTAT CG4 GASES LACTATE KEESHA NURSING POCT   CARDIAC CONTINUOUS MONITORING   STRICT INTAKE AND OUTPUT   BLOOD CULTURE   BLOOD CULTURE     .   Treatments provided: zosyn  Family Comments: NA  OBS brochure/video discussed/provided to patient:  N/A  ED Medications:   Medications   lidocaine 1 % 0.1-1 mL (has no administration in time range)   lidocaine (LMX4) cream (has no administration in time range)   sodium chloride (PF) 0.9% PF flush 3 mL (has no administration in time range)    sodium chloride (PF) 0.9% PF flush 3 mL (has no administration in time range)   piperacillin-tazobactam (ZOSYN) intermittent infusion 4.5 g (4.5 g Intravenous New Bag 10/11/19 1450)     Drips infusing:  No  For the majority of the shift, the patient's behavior Green. Interventions performed were NA.     Severe Sepsis OR Septic Shock Diagnosis Present: No      ED Nurse Name/Phone Number: Lilli Anthony RN, ERA 3:21 PM

## 2019-10-11 NOTE — ED PROVIDER NOTES
"  History     Chief Complaint:  Leg Pain    HPI   Matheus White Sr. is a 79 year old male, with a history of atrial fibrillation, HTN, CKD and renal transplant, who is on warfarin, who presents with his daughter-in-law to the emergency department for evaluation of left lower extremity pain. The patient reports he is in the process of moving here from [place] and was putting together his bed frame when it fell onto his left lower extremity three days prior to evaluation. He reports he was seen at urgent care with a negative x-ray and was discharged with oxycodone. He reports a fever in the last few days and increased pain and redness to the area of injury. He denies any chest pain, shortness of breath, or nausea.    Imaging 10/8/19  XR Tibia and Fibula 2 Views Left  IMPRESSION:  Negative exam of the left tibia fibula.  As read by Radiology.    Allergies:  No known drug allergies     Medications:    Cyclosporine  Cardizem  Mycophenolate  Warfarin  Prilosec  Gabapentin    Past Medical History:    Atrial fibrillation  HTN  Renal transplant recipient  CKD  Esophageal stricture    Past Surgical History:    Renal transplant  Shunt revision    Family History:    Emphysema    Social History:  Smoking status: Never  Alcohol use: Yes  The patient presents to the emergency department with his daughter-in-law.  Marital Status:   [5]     Review of Systems   Constitutional: Positive for fever.   Respiratory: Negative for shortness of breath.    Cardiovascular: Negative for chest pain.   Gastrointestinal: Negative for nausea.   Musculoskeletal: Positive for arthralgias and myalgias.   Skin: Positive for color change and wound.   All other systems reviewed and are negative.    Physical Exam   Patient Vitals for the past 24 hrs:   BP Temp Temp src Pulse Resp SpO2 Height Weight   10/11/19 1430 (!) 158/120 -- -- 111 -- 97 % -- --   10/11/19 1421 -- -- -- -- -- -- 1.803 m (5' 11\") 85.1 kg (187 lb 9.8 oz)   10/11/19 1415 " (!) 156/83 -- -- 114 -- 96 % -- --   10/11/19 1400 (!) 145/105 -- -- 83 -- 97 % -- --   10/11/19 1345 (!) 138/91 -- -- 117 -- 96 % -- --   10/11/19 1340 (!) 154/103 97.8  F (36.6  C) Oral 124 16 95 % -- --     Physical Exam  VS: Reviewed per above  HENT: Mucous membranes moist  EYES: sclera anicteric  CV: Rate as noted, irregularly irregular rhythm.   RESP: Effort normal. Breath sounds are normal bilaterally.  GI: no tenderness/rebound/guarding, not distended.  NEURO: Alert, moving all extremities  MSK: Redness and swelling to the left lower leg, no crepitance.  SKIN: Warm and dry    Emergency Department Course   ECG (15:04:11):  Rate 95 bpm. DE interval *. QRS duration 122. QT/QTc 386/485. P-R-T axes * 12 121. Atrial fibrillation with a competing junctional pacemaker. Nonspecific intraventricular conduction delay. T wave abnormality, consider lateral ischemia. Abnormal ECG. Interpreted at 1510 by Roberto Porter MD.    Laboratory:  CBC:HGB 10.9 (L) o/w WNL (WBC 9.9, )  CMP: Urea Nitrogen 61 (H), Creatinine 5.47 (H), GFR Estimate 9 (L), Bilirubin Total 1.8 (H), Albumin 3.3 (L), Alkaline Phophatase 230 (H) o/w WNL  Blood Culture x2: Pending  ISTAT gases lactate bernardino POCT: PCO2 27 (L) o/w WNL  INR: 4.49 (H)    Interventions:  1450 Zosyn 4.5 g IV    Emergency Department Course:  Past medical records, nursing notes, and vitals reviewed.  1348: I performed an exam of the patient and obtained history, as documented above.    IV inserted and blood drawn.     1426: I rechecked the patient. Explained findings to the patient.    Findings and plan explained to the Patient and daughter who consents to admission.     1459: Discussed the patient with Dr. Garcia, who evaluated pt and felt that patient would be better served at [facility] with renal transplant team after his discussion with Dr Daniels of nephrology.     3:58pm: Discussed with Dr Caldera at [facility] who will accept patient.     Impression & Plan    Medical Decision  Making:  Patient presents to the ER for evaluation of redness and swelling to the left lower leg.  No fevers here.  Her heart rate vacillates between 80s and low 100s.  He has a history of atrial fibrillation on Coumadin and is in atrial fibrillation on EKG here.  Exam reveals evidence of left lower extremity cellulitis crepitance or signs of necrotizing soft tissue infection.  Lactic acid is normal, there is no significant leukocytosis.  Labs incidentally revealed a creatinine of 5.4 up from 1.42 months ago.  Urinalysis could be consistent with UTI.  He was given Zosyn for both cellulitis and possible urinary tract infection.  Initially plan to admit the patient to [facility] however after he evaluated the patient and discussed with nephrology, it was recommended to transfer to the [facility] where renal transplant team is available.  Patient and family were notified.  Upon signout to my colleague, patient was awaiting inpatient bed at the [facility].    Diagnosis:    ICD-10-CM   1. Cellulitis, unspecified cellulitis site L03.90   2. Renal failure, unspecified chronicity N19     Disposition:  Admitted to adult med/surg.    Nandini Bates  10/11/2019   [facility] EMERGENCY DEPARTMENT  Scribe Disclosure:  I, Nandini Bates, am serving as a scribe at 1:48 PM on 10/11/2019 to document services personally performed by Roberto Porter MD based on my observations and the provider's statements to me.      Roberto Porter MD  10/11/19 3292

## 2019-10-11 NOTE — PROGRESS NOTES
[facility]  Transfer Triage Note    Date of call: 10/11/19  Time of call: 4:01 PM    Reason for Transfer:Patient has established care here  Transplant evaluation that requires inpatient admission   Pt has hx renal transplant in 2009 done here at [facility], used to follow here but now Nephrologist is at presents to [facility] ED with new Cr elevation to >5.0    Diagnosis: Cellulitis, DEB in setting of hx renal transplant (2010)     Outside Records: Available  Additional records requested to be faxed to [phone].    Stability of Patient: Patient is vitally stable, with no critical labs, and will likely remain stable throughout the transfer process    Expected Time of Arrival for Transfer: 0-8 hours    Recommendations for Management and Stabilization: Not needed    Additional Comments   Mr. White is a 79 year old male with medical history of ESRD secondary to a colonoscopy prep (per Dr. Gonzalez's note in 2009) vs HTN; received a DDKT 03/2019 at [facility] and then moved to [place], just moved back to [place] 2 months ago. History also notable for AFib on Warfarin,  Baseline Cr per nephrology note at [facility] in 08/2019 is 1.4. Presented to [facility] ED on 10/11 with worsening LLE pain after dropping something on it a few days ago. On lab workup at [facility], noted to have Cr of 5.4, unclear etiology. Was discussed with hospitalist at [facility] as well as nephrologist, who felt that patient required transfer to [facility] for further evaluation of his graft. In the interim, received IV Zosyn x1 for cellulitis, will repeat BMP and obtain renal US to eval for obstruction while awaiting bed at [facility].      Arleen Caldera MD

## 2019-10-11 NOTE — CONSULTS
ED Consult     Summary: Mr. White is an 79-year-old gentleman with a history of kidney transplant about 10 years ago who now came to attention with left lower extremity cellulitis that is been brewing over the last several days.  He is incidentally found to have very significantly elevated creatinine relative to his baseline.  In the absence of a proximate cause for his increased creatinine, he will need evaluation of his graft possibly with biopsy for rejection.    Diagnoses:  1.  Acute renal failure in this patient status post renal transplant 2009.  2.  Left lower extremity.  Patient is at risk for MRSA though none is documented in his chart.    Recommendation: I spoke with Dr. Porter and recommended that the patient be transferred to [place] for appropriate management of his transplant graft.  We will recheck the basic metabolic panel stat as well as renal ultrasound to make sure that he is not obstructed but based on the history I do not think that these studies are going to indicate either erroneous lab testing or urinary obstruction.  We can change direction if these studies suggest an easily reversible cause for his acute kidney failure.    Addendum:  Labs and ultrasound returned and showed that the creatinine value previously obtained is consistent with previous.  Urinalysis is suggestive of nephritis; I do not know whether this is consistent with acute rejection.  The ultrasound indicates only mild hydronephrosis of the superior pole of the transplanted kidney.    At this point I think that transferred to [facility] is appropriate.      History:  Matheus White Sr. is an 79-year-old gentleman who underwent renal transplant at the [facility] in 2009 who came to attention this afternoon due to pain and swelling involving the left lower extremity.  I was called to admit the patient for left lower extremity cellulitis and acute renal failure which was identified on lab  work.    Chart review is relatively limited but I note that as of 8/21/2019, the [facility]  He had just returned from years in [place], [place] to the [facility] at about that time and met Dr. Zena Parkinson at the [facility] to establish nephrology follow-up.  She did not add or take away any medications at that time.  She requested follow-up in 3 months time.    Otherwise, the patient has chronic atrial fibrillation for which he is anticoagulated.  He also was recently evaluated for dysphagia and underwent esophageal dilatation.    Medications have not recently been changed to include cyclosporine, diltiazem CD, mycophenolate, potassium citrate, warfarin, omeprazole, oxycodone, gabapentin    At this time, Mr. White indicates that he has been feeling generally well except for the developing pain involving his left lower extremity.  He went to the [facility] on 10/8/2019 reporting that he had injured his left ankle just 2 days prior to that.  He was concerned that he had been developing cellulitis over that shin and although the note indicates that there was no erythema, drainage or increased warmth the patient indicates that his left lower leg was similar in appearance at that time.  The patient was NOT given an antibiotic but instead was given 10 tablets of oxycodone to use for the pain.  And he was discharged to home.    At this time, Mr. White denies significant fevers, sweats or chills but notes that his pain in his left lower extremity is really what brought him to attention today.  He he does have mild chronic lower extremity edema but much less than is seen today involving his left leg.  He indicates that he urinates several times a night but denies problems with feeling like he is not emptying appropriately.  He has been drinking plenty of water and indicates that he has not perceived a change in his urinary habits.  He  "denies nausea, vomiting, diarrhea.  He has not had any problems at all with dizziness or weakness.    In the emergency department at [facility], patient is noted to be hypertensive if anything and certainly hemodynamically stable.  Hemoglobin 10.9, white cell count 9.9 with a left shift.  INR is elevated at 4.5.  Creatinine 5.47 BUN 61 electrolytes are entirely normal with potassium 4.7 bicarbonate 24 calcium 8.7.  Alkaline phosphatase is mildly elevated to 30, bilirubin elevated 1.8 albumin 3.3.  Venous blood gas shows a pH of 7.38.  EKG shows atrial fibrillation rhythm that is rate controlled despite frequent junctional beats.    BP (!) 170/110   Pulse 108   Temp 97.8  F (36.6  C) (Oral)   Resp 16   Ht 1.803 m (5' 11\")   Wt 85.1 kg (187 lb 9.8 oz)   SpO2 97%   BMI 26.17 kg/m    In general, the patient is alert and pleasant appearing younger than his stated age.  He is in no apparent distress breathing room air.  He is fully coherent and appropriate.  At this time he is quite anxious about the reported abnormal creatinine.  HEENT: No facial muscular asymmetry.  Neck: No significant thyromegaly or lymphadenopathy.  Chest: Clear to auscultation.  Heart: Irregularly irregular without murmur.  Abdomen: Soft without obvious tenderness.  Extremities: The left lower extremity is deeply erythematous and extremely tender to palpation.  Anterior shin is edematous probably with some subcutaneous fluid present though patient does not tolerate palpation for blotting.  Distal perfusion appears to be intact.  The right lower extremity shows chronic venous stasis changes and only mild edema.    Addendum:  Results for orders placed or performed during the hospital encounter of 10/11/19 (from the past 24 hour(s))   CBC with platelets differential   Result Value Ref Range    WBC 9.9 4.0 - 11.0 10e9/L    RBC Count 3.68 (L) 4.4 - 5.9 10e12/L    Hemoglobin 10.9 (L) 13.3 - 17.7 g/dL    Hematocrit 34.3 (L) 40.0 - 53.0 %    MCV 93 78 - " 100 fl    MCH 29.6 26.5 - 33.0 pg    MCHC 31.8 31.5 - 36.5 g/dL    RDW 14.1 10.0 - 15.0 %    Platelet Count 334 150 - 450 10e9/L    Diff Method Automated Method     % Neutrophils 87.8 %    % Lymphocytes 3.0 %    % Monocytes 7.3 %    % Eosinophils 1.0 %    % Basophils 0.5 %    % Immature Granulocytes 0.4 %    Nucleated RBCs 0 0 /100    Absolute Neutrophil 8.7 (H) 1.6 - 8.3 10e9/L    Absolute Lymphocytes 0.3 (L) 0.8 - 5.3 10e9/L    Absolute Monocytes 0.7 0.0 - 1.3 10e9/L    Absolute Eosinophils 0.1 0.0 - 0.7 10e9/L    Absolute Basophils 0.1 0.0 - 0.2 10e9/L    Abs Immature Granulocytes 0.0 0 - 0.4 10e9/L    Absolute Nucleated RBC 0.0    Comprehensive metabolic panel   Result Value Ref Range    Sodium 136 133 - 144 mmol/L    Potassium 4.7 3.4 - 5.3 mmol/L    Chloride 103 94 - 109 mmol/L    Carbon Dioxide 24 20 - 32 mmol/L    Anion Gap 9 3 - 14 mmol/L    Glucose 94 70 - 99 mg/dL    Urea Nitrogen 61 (H) 7 - 30 mg/dL    Creatinine 5.47 (H) 0.66 - 1.25 mg/dL    GFR Estimate 9 (L) >60 mL/min/[1.73_m2]    GFR Estimate If Black 10 (L) >60 mL/min/[1.73_m2]    Calcium 8.7 8.5 - 10.1 mg/dL    Bilirubin Total 1.8 (H) 0.2 - 1.3 mg/dL    Albumin 3.3 (L) 3.4 - 5.0 g/dL    Protein Total 7.0 6.8 - 8.8 g/dL    Alkaline Phosphatase 230 (H) 40 - 150 U/L    ALT 21 0 - 70 U/L    AST 16 0 - 45 U/L   INR   Result Value Ref Range    INR 4.49 (H) 0.86 - 1.14   ISTAT gases lactate bernardino POCT   Result Value Ref Range    Ph Venous 7.38 7.32 - 7.43 pH    PCO2 Venous 37 (L) 40 - 50 mm Hg    PO2 Venous 33 25 - 47 mm Hg    Bicarbonate Venous 22 21 - 28 mmol/L    O2 Sat Venous 63 %    Lactic Acid 0.9 0.7 - 2.1 mmol/L   EKG 12-lead, tracing only   Result Value Ref Range    Interpretation ECG Click View Image link to view waveform and result    UA with Microscopic   Result Value Ref Range    Color Urine Yellow     Appearance Urine Clear     Glucose Urine Negative NEG^Negative mg/dL    Bilirubin Urine Negative NEG^Negative    Ketones Urine Negative  NEG^Negative mg/dL    Specific Gravity Urine 1.014 1.003 - 1.035    Blood Urine Large (A) NEG^Negative    pH Urine 7.0 5.0 - 7.0 pH    Protein Albumin Urine 50 (A) NEG^Negative mg/dL    Urobilinogen mg/dL Normal 0.0 - 2.0 mg/dL    Nitrite Urine Negative NEG^Negative    Leukocyte Esterase Urine Moderate (A) NEG^Negative    Source Midstream Urine     WBC Urine 30 (H) 0 - 5 /HPF    RBC Urine 88 (H) 0 - 2 /HPF    Bacteria Urine Few (A) NEG^Negative /HPF   Basic metabolic panel   Result Value Ref Range    Sodium 135 133 - 144 mmol/L    Potassium 4.7 3.4 - 5.3 mmol/L    Chloride 103 94 - 109 mmol/L    Carbon Dioxide 25 20 - 32 mmol/L    Anion Gap 7 3 - 14 mmol/L    Glucose 99 70 - 99 mg/dL    Urea Nitrogen 61 (H) 7 - 30 mg/dL    Creatinine 5.44 (H) 0.66 - 1.25 mg/dL    GFR Estimate 9 (L) >60 mL/min/[1.73_m2]    GFR Estimate If Black 11 (L) >60 mL/min/[1.73_m2]    Calcium 8.9 8.5 - 10.1 mg/dL   US Renal Complete    Narrative    ULTRASOUND RENAL COMPLETE 10/11/2019 4:21 PM     HISTORY: Acute renal failure of transplanted kidney.     COMPARISON: 7/16/2009    FINDINGS: Status post right nephrectomy, with a right lower quadrant  renal transplant. The transplant kidney has mild hydronephrosis of the  superior pole. The transplant kidney measures 10.6 cm in length. The  native left kidney is difficult to image due to atrophy, but there  appears to be a 1.7 cm cyst in this region. The bladder is  incompletely distended.      Impression    IMPRESSION:  Mild hydronephrosis of the transplant kidney superior  pole.

## 2019-10-12 PROBLEM — N18.9 ACUTE KIDNEY INJURY SUPERIMPOSED ON CHRONIC KIDNEY DISEASE (H): Status: ACTIVE | Noted: 2019-10-12

## 2019-10-12 PROBLEM — N17.9 ACUTE KIDNEY INJURY SUPERIMPOSED ON CHRONIC KIDNEY DISEASE (H): Status: ACTIVE | Noted: 2019-10-12

## 2019-10-12 LAB
ANION GAP SERPL CALCULATED.3IONS-SCNC: 8 MMOL/L (ref 3–14)
BACTERIA SPEC CULT: NO GROWTH
BUN SERPL-MCNC: 58 MG/DL (ref 7–30)
CALCIUM SERPL-MCNC: 8.3 MG/DL (ref 8.5–10.1)
CHLORIDE SERPL-SCNC: 107 MMOL/L (ref 94–109)
CK SERPL-CCNC: 64 U/L (ref 30–300)
CO2 SERPL-SCNC: 23 MMOL/L (ref 20–32)
CREAT SERPL-MCNC: 5.03 MG/DL (ref 0.66–1.25)
CYCLOSPORINE BLD LC/MS/MS-MCNC: 149 UG/L (ref 50–400)
CYCLOSPORINE BLD LC/MS/MS-MCNC: 57 UG/L (ref 50–400)
ERYTHROCYTE [DISTWIDTH] IN BLOOD BY AUTOMATED COUNT: 13.9 % (ref 10–15)
GFR SERPL CREATININE-BSD FRML MDRD: 10 ML/MIN/{1.73_M2}
GLUCOSE SERPL-MCNC: 88 MG/DL (ref 70–99)
HCT VFR BLD AUTO: 30.3 % (ref 40–53)
HGB BLD-MCNC: 9.6 G/DL (ref 13.3–17.7)
INR PPP: 4.45 (ref 0.86–1.14)
INTERPRETATION ECG - MUSE: NORMAL
Lab: NORMAL
MCH RBC QN AUTO: 29.5 PG (ref 26.5–33)
MCHC RBC AUTO-ENTMCNC: 31.7 G/DL (ref 31.5–36.5)
MCV RBC AUTO: 93 FL (ref 78–100)
PLATELET # BLD AUTO: 298 10E9/L (ref 150–450)
POTASSIUM SERPL-SCNC: 3.6 MMOL/L (ref 3.4–5.3)
RBC # BLD AUTO: 3.25 10E12/L (ref 4.4–5.9)
SODIUM SERPL-SCNC: 138 MMOL/L (ref 133–144)
SPECIMEN SOURCE: NORMAL
TME LAST DOSE: NORMAL H
TME LAST DOSE: NORMAL H
WBC # BLD AUTO: 7.1 10E9/L (ref 4–11)

## 2019-10-12 PROCEDURE — 36415 COLL VENOUS BLD VENIPUNCTURE: CPT | Performed by: INTERNAL MEDICINE

## 2019-10-12 PROCEDURE — 25800030 ZZH RX IP 258 OP 636: Performed by: STUDENT IN AN ORGANIZED HEALTH CARE EDUCATION/TRAINING PROGRAM

## 2019-10-12 PROCEDURE — 85610 PROTHROMBIN TIME: CPT | Performed by: STUDENT IN AN ORGANIZED HEALTH CARE EDUCATION/TRAINING PROGRAM

## 2019-10-12 PROCEDURE — 85027 COMPLETE CBC AUTOMATED: CPT | Performed by: INTERNAL MEDICINE

## 2019-10-12 PROCEDURE — 25000131 ZZH RX MED GY IP 250 OP 636 PS 637: Mod: GY | Performed by: STUDENT IN AN ORGANIZED HEALTH CARE EDUCATION/TRAINING PROGRAM

## 2019-10-12 PROCEDURE — 25000132 ZZH RX MED GY IP 250 OP 250 PS 637: Mod: GY | Performed by: STUDENT IN AN ORGANIZED HEALTH CARE EDUCATION/TRAINING PROGRAM

## 2019-10-12 PROCEDURE — 80158 DRUG ASSAY CYCLOSPORINE: CPT | Performed by: INTERNAL MEDICINE

## 2019-10-12 PROCEDURE — 99233 SBSQ HOSP IP/OBS HIGH 50: CPT | Mod: GC | Performed by: STUDENT IN AN ORGANIZED HEALTH CARE EDUCATION/TRAINING PROGRAM

## 2019-10-12 PROCEDURE — 82550 ASSAY OF CK (CPK): CPT | Performed by: INTERNAL MEDICINE

## 2019-10-12 PROCEDURE — 36415 COLL VENOUS BLD VENIPUNCTURE: CPT | Performed by: STUDENT IN AN ORGANIZED HEALTH CARE EDUCATION/TRAINING PROGRAM

## 2019-10-12 PROCEDURE — 25000132 ZZH RX MED GY IP 250 OP 250 PS 637: Mod: GY | Performed by: INTERNAL MEDICINE

## 2019-10-12 PROCEDURE — 12000001 ZZH R&B MED SURG/OB UMMC

## 2019-10-12 PROCEDURE — 80048 BASIC METABOLIC PNL TOTAL CA: CPT | Performed by: INTERNAL MEDICINE

## 2019-10-12 RX ORDER — SODIUM CHLORIDE 9 MG/ML
INJECTION, SOLUTION INTRAVENOUS CONTINUOUS
Status: DISCONTINUED | OUTPATIENT
Start: 2019-10-12 | End: 2019-10-14

## 2019-10-12 RX ORDER — DOXYCYCLINE 100 MG/1
100 CAPSULE ORAL EVERY 12 HOURS SCHEDULED
Status: DISCONTINUED | OUTPATIENT
Start: 2019-10-12 | End: 2019-10-14

## 2019-10-12 RX ORDER — NALOXONE HYDROCHLORIDE 0.4 MG/ML
.1-.4 INJECTION, SOLUTION INTRAMUSCULAR; INTRAVENOUS; SUBCUTANEOUS
Status: DISCONTINUED | OUTPATIENT
Start: 2019-10-12 | End: 2019-10-22 | Stop reason: HOSPADM

## 2019-10-12 RX ORDER — ACETAMINOPHEN 500 MG
1000 TABLET ORAL 3 TIMES DAILY
Status: DISCONTINUED | OUTPATIENT
Start: 2019-10-13 | End: 2019-10-22 | Stop reason: HOSPADM

## 2019-10-12 RX ORDER — LIDOCAINE 40 MG/G
CREAM TOPICAL
Status: DISCONTINUED | OUTPATIENT
Start: 2019-10-12 | End: 2019-10-22 | Stop reason: HOSPADM

## 2019-10-12 RX ORDER — CEPHALEXIN 500 MG/1
500 CAPSULE ORAL 3 TIMES DAILY
Status: DISCONTINUED | OUTPATIENT
Start: 2019-10-12 | End: 2019-10-14

## 2019-10-12 RX ADMIN — SODIUM CHLORIDE: 9 INJECTION, SOLUTION INTRAVENOUS at 18:07

## 2019-10-12 RX ADMIN — ACETAMINOPHEN 650 MG: 325 TABLET, FILM COATED ORAL at 04:14

## 2019-10-12 RX ADMIN — MYCOPHENOLATE MOFETIL 1000 MG: 500 TABLET ORAL at 09:35

## 2019-10-12 RX ADMIN — CEPHALEXIN 500 MG: 500 CAPSULE ORAL at 14:38

## 2019-10-12 RX ADMIN — CYCLOSPORINE 25 MG: 25 CAPSULE, LIQUID FILLED ORAL at 18:06

## 2019-10-12 RX ADMIN — CEPHALEXIN 500 MG: 500 CAPSULE ORAL at 09:35

## 2019-10-12 RX ADMIN — DOXYCYCLINE 100 MG: 100 CAPSULE ORAL at 20:26

## 2019-10-12 RX ADMIN — CEPHALEXIN 500 MG: 500 CAPSULE ORAL at 20:26

## 2019-10-12 RX ADMIN — DILTIAZEM HYDROCHLORIDE 180 MG: 180 CAPSULE, COATED, EXTENDED RELEASE ORAL at 09:35

## 2019-10-12 RX ADMIN — ACETAMINOPHEN 650 MG: 325 TABLET, FILM COATED ORAL at 14:39

## 2019-10-12 RX ADMIN — GABAPENTIN 300 MG: 300 CAPSULE ORAL at 21:50

## 2019-10-12 RX ADMIN — ACETAMINOPHEN 650 MG: 325 TABLET, FILM COATED ORAL at 09:34

## 2019-10-12 RX ADMIN — POTASSIUM CITRATE 15 MEQ: 5 TABLET ORAL at 09:36

## 2019-10-12 RX ADMIN — MYCOPHENOLATE MOFETIL 1000 MG: 500 TABLET ORAL at 18:06

## 2019-10-12 RX ADMIN — CYCLOSPORINE 25 MG: 25 CAPSULE, LIQUID FILLED ORAL at 09:35

## 2019-10-12 RX ADMIN — POTASSIUM CITRATE 15 MEQ: 5 TABLET ORAL at 20:26

## 2019-10-12 RX ADMIN — DOXYCYCLINE 100 MG: 100 CAPSULE ORAL at 09:35

## 2019-10-12 RX ADMIN — ACETAMINOPHEN 650 MG: 325 TABLET, FILM COATED ORAL at 18:06

## 2019-10-12 SDOH — HEALTH STABILITY: MENTAL HEALTH: HOW MANY STANDARD DRINKS CONTAINING ALCOHOL DO YOU HAVE ON A TYPICAL DAY?: 1 OR 2

## 2019-10-12 SDOH — HEALTH STABILITY: MENTAL HEALTH: HOW OFTEN DO YOU HAVE A DRINK CONTAINING ALCOHOL?: MONTHLY OR LESS

## 2019-10-12 ASSESSMENT — PAIN DESCRIPTION - DESCRIPTORS
DESCRIPTORS: TENDER
DESCRIPTORS: TENDER
DESCRIPTORS: ACHING;SORE

## 2019-10-12 ASSESSMENT — ACTIVITIES OF DAILY LIVING (ADL)
ADLS_ACUITY_SCORE: 11
ADLS_ACUITY_SCORE: 14
ADLS_ACUITY_SCORE: 11
ADLS_ACUITY_SCORE: 13

## 2019-10-12 NOTE — PLAN OF CARE
NEURO: alert and oriented x4.   RESPIRATORY: LS clear, diminished in bases. SpO2> 90% on RA.  CARDIO: VSS.   GI/: BS active. Voiding without difficulty. Refused spears placement. Tolerating PO intake well.   SKIN: RLE- medial lower shin- purple/red intact area. Pt reported it is not an acute condition LLE - edematous, inflamed, warm to the touch with small scabs. Other skin intact.   ACTIVITY: Up with assist 1.   PAIN: denied at rest, reported severe pain with touch to LLE. Scheduled tylenol given, refused PRN pain medications when offered.   LINES: Right PIV saline locked.   PLAN:    Continue to monitor and notify MD with concerns.

## 2019-10-12 NOTE — PLAN OF CARE
BPs rising, now 174/81, MD notified for orders. All other VSS, HR irregular. Pt reports LLE pain, unrelieved from Tylenol x1 & repositioning. LLE red & hot, x2 small scabbed wounds on shin, no drainage, site DEBORAH. CMS intact to LLE. +1-+3 edema: RUE & BLE (L>R). Tolerating reg diet. Pt up with SBA, voiding adequately. MD ordered spears insertion, pt refused. Discussed risks of refusal with pt, states he will reconsider when Nephrology rounds tomorrow, stress importance of accurate I&O overnight.

## 2019-10-12 NOTE — H&P
[facility], [place]    History and Physical - NightFlt Service        Date of Admission:  10/11/2019    Assessment & Plan     Matheus White is a 79 year old man with CKD3 s/p DDKT, HTN, Afib on warfarin and diltiazem, who presents with cellulitis after an acute injury and found to have DEB on CKD.    Non-purulent Cellulitis of Left Lower Extremity  1 week old injury to foot while assembling bed. Previously went to urgent care, foot mildly edematous, but non-erythematous given bacitracin at that time. Now, more edematous, erythematous from ankle to mid-shin, warm and tender to touch. Recent cross-country road trip noted with tachycardia and unilateral leg swelling, however DVT/PE is low on differential with more likely etiology on history and physical exam.  - Patient received zosyn 4.5 g IV in ED. Switched to one time renally-dosed vancomycin. Would de-escalate to doxycycline/ancef/keflex in AM.  - Patient received oxycodone 5 mg earlier in week; became acutely altered. Will avoid narcotics. Scheduled tylenol 650 mg PO q 4 hrs.    DEB on CKD3  Status post  Donor Kidney Transplant  Microhematuria  DDKT in 2009. BUN 61; Cr 5.4.  Baseline creatinine 1.5 (last creatinine from 2019 = 1.41). Dry weight 175 lbs; today 190 lbs. He still makes urine but notes decreased from baseline 4x a day to 2 times a day. UA with 88 RBC and 30 WBC with few bacteria. Renal ultrasound shows mild hydronephrosis. INR 4.5.  DDX: glomerulonephritis vs. Vs. ATN vs. Post-obstructive  - Case discussed with Nephrology, recommend spears for possible obstruction. Patient refused Spears and wants to speak with Nephrology. Also, no urgent need for dialysis.  - Protein:Cr and microalbumin ordered for AM  - Patient supratherapeutic on warfarin and may be contributing to microhematuria.    Atrial Fibrillation  Patient has had diagnosis of Afib since at le2009 diagnosed by Holter monitor. Tracing  shows atrial fibrillation with PVCs. Pulse currently <130 and will monitor symptoms and vitals.  - Cardizem 180 mg continued; patient previously on metoprolol (no longer taking)  - Supratherapeutic. Holding warfarin    Hypertension  Patient has recorded history of hypertension. Per his drug list that he brought from home, he is not currently on any anti-hypertensives. His blood pressure was elevated to as high as 174/81 but dropped to 136/62 without any intervention.  - Patient is new to [place] and will need a primary care provider.       Diet: Regular Diet Adult    Fluids: None  DVT Prophylaxis: Warfarin  Olvera Catheter: not present, Attempted, but refused  Code Status: Full Code      Disposition Plan   Expected discharge: 2 - 3 days, recommended to prior living arrangement once antibiotic plan established and renal function improved.  Entered: Todd Silva MD 10/11/2019, 10:26 PM     The patient's care was discussed with the Attending Physician, Dr. Arleen Caldera MD.    Todd Silva MD  Night Float Service  [facility], [place]  Pager: Please see sticky note for cross cover information  ______________________________________________________________________    Chief Complaint   Left Leg Pain    History is obtained from the patient    History of Present Illness   Matheus White Sr. is a 79 year old man with CKD s/p DDKT in 2009, HTN, atrial fibrillation who is admitted for left leg cellulitis and DEB on CKD. Patient dropped bed post one week ago on leg with tenderness. Went to urgent care on October 9. X-ray of leg negative for fracture and discharged with bacitracin and oxycodone. Leg increased in tenderness, redness, and warmth. Presented to ED today and received 4.5 g of zosyn at 1450. No fluids given.    Patient also notes decreased urine output from 4 to 2 times a day. ED labs show serum creatinine of 5.4 today (1.41 in August) He denies abdominal  pain, hematuria, dysuria, nausea, vomiting, SOB, headache, chest pain, flank pain.    Review of Systems    CONSTITUTIONAL: POSITIVE for weight gain NEGATIVE for fever, chills  INTEGUMENTARY/SKIN: POSITIVE for erythema, skin wound  ENT/MOUTH: NEGATIVE for ear, mouth and throat problems  RESP: NEGATIVE for significant cough or SOB  CV: NEGATIVE for chest pain  GI: NEGATIVE for nausea, abdominal pain, heartburn, or change in bowel habits  : POSITIVE for decreased urine output NEGATIVE for dysuria, or gross hematuria  MUSCULOSKELETAL: NEGATIVE for significant arthralgias or myalgia  NEURO: NEGATIVE for weakness, dizziness or paresthesias  HEME: NEGATIVE for bleeding problems  PSYCHIATRIC: NEGATIVE for changes in mood or affect    Past Medical History    I have reviewed this patient's medical history and updated it with pertinent information if needed.   Past Medical History:   Diagnosis Date     Atrial fibrillation, permanent 04/2009     CKD (chronic kidney disease)      HTN (hypertension), benign         Past Surgical History   I have reviewed this patient's surgical history and updated it with pertinent information if needed.  Past Surgical History:   Procedure Laterality Date     AS TRANSPLANT,PREP CADAVER RENAL GRAFT Right 04/2009      Esophageal stricture with dilatation on 09/10/2019    Social History   I have personally reviewed the social history with the patient. He moved to [place] from [place], [place] in May. He transported his remaining belonging by way of a cross country trip last week. He lives with step-daughter and son. He is . He has never smoked, has 1 beer with dinner about once or twice a week. Never recreational drug use.    Family History   I have reviewed this patient's family history and updated it with pertinent information if needed.   Family History   Problem Relation Age of Onset     Emphysema Father        Prior to Admission Medications   None     Allergies   No Known  Allergies    Physical Exam   Vital Signs: Temp: 97.7  F (36.5  C) Temp src: Oral BP: (!) 165/92 Pulse: 99   Resp: 18 SpO2: 95 % O2 Device: None (Room air)    Weight: 190 lbs 3.2 oz    Constitutional: awake, alert, cooperative, no apparent distress, and appears stated age  Eyes: Lids and lashes normal, pupils equal, round and reactive to light, extra ocular muscles intact, sclera clear, conjunctiva normal  ENT: Normocephalic, without obvious abnormality, atraumatic, external ears without lesions, oral pharynx with moist mucous membranes, tonsils without erythema or exudates, gums normal and good dentition.  Hematologic / Lymphatic: no cervical lymphadenopathy and no supraclavicular lymphadenopathy  Respiratory: No increased work of breathing, good air exchange, clear to auscultation bilaterally, no crackles or wheezing  Cardiovascular: Normal apical impulse, regular rate and rhythm, normal S1 and S2, no S3 or S4, and no murmur noted  GI: No scars, normal bowel sounds, soft, non-distended, non-tender, no masses palpated, no hepatosplenomegally  Skin: Edematous, circumferential erythema from ankle to mid shin, left lower extremity with three well-healed eschars. Left leg warm and tender to touch. Right lower extremity with dermatic changes of venostasis.  Musculoskeletal: Ankles with full range of active and passive motion bilaterally.  Neurologic: Awake, alert, oriented to name, place and time.  Cranial nerves II-XII are grossly intact.  Motor is 5 out of 5 bilaterally.  Cerebellar finger to nose, heel to shin intact.  Sensory is intact.  Babinski down going, Romberg negative, and gait is normal.  Neuropsychiatric: General: normal, calm and normal eye contact    Data   Data reviewed today: I reviewed all medications, new labs and imaging results over the last 24 hours. I personally reviewed the EKG tracing showing atrial fibrillation with PVC's.    Recent Labs   Lab 10/11/19  1539 10/11/19  1413   WBC  --  9.9   HGB   --  10.9*   MCV  --  93   PLT  --  334   INR  --  4.49*    136   POTASSIUM 4.7 4.7   CHLORIDE 103 103   CO2 25 24   BUN 61* 61*   CR 5.44* 5.47*   ANIONGAP 7 9   ELEAZAR 8.9 8.7   GLC 99 94   ALBUMIN  --  3.3*   PROTTOTAL  --  7.0   BILITOTAL  --  1.8*   ALKPHOS  --  230*   ALT  --  21   AST  --  16

## 2019-10-12 NOTE — PHARMACY-VANCOMYCIN DOSING SERVICE
Pharmacy Vancomycin Initial Note  Date of Service 2019  Patient's  1940  79 year old, male  Actual Body Weight: 86.3 kg    Indication: Skin and Soft Tissue Infection    Current estimated CrCl ~15 mL/min    Creatinine for last 3 days  10/11/2019:  2:13 PM Creatinine 5.47 mg/dL;  3:39 PM Creatinine 5.44 mg/dL    Recent Vancomycin Level(s) for last 3 days  No results found for requested labs within last 72 hours.      Vancomycin IV Administrations (past 72 hours)      No vancomycin orders with administrations in past 72 hours.                Nephrotoxins and other renal medications (From now, onward)    Start     Dose/Rate Route Frequency Ordered Stop    10/11/19 2100  cycloSPORINE modified (GENERIC EQUIVALENT) capsule 25 mg      25 mg Oral 2 TIMES DAILY. 10/11/19 2056            Contrast Orders - past 72 hours (72h ago, onward)    None              Plan:  1.  Start intermittent vancomycin give 1000 mg IV once tonight.  2.  Goal Trough Level: 10-15 mg/L   3.  Pharmacy will check trough levels as appropriate in 1-3 Days.    4.  Serum creatinine levels will be ordered daily for the first week of therapy and at least twice weekly for subsequent weeks.      Veronica Figueredo, PharmD  [phone] (text capable)

## 2019-10-12 NOTE — PROGRESS NOTES
[facility], [place]    Progress Note - [facility] 1 Service        Date of Admission:  10/11/2019    Assessment & Plan   Matheus White is a 79 year old man with CKD3 s/p DDKT, HTN, Afib on warfarin and diltiazem, who presents with cellulitis after an acute injury and found to have DEB on CKD. Creatinine stable to minimally improved today, LLE with slightly improved pain and erythema.     Cellulitis of Left Lower Extremity  Injury to LLE 1 week prior, assessed at urgent care, XR negative for fracture, topical bacitracin for wound given and discharged. Interval marked increase in edema and erythema from ankle to mid-shin, exquisitely tender over focal area of swelling on anterior surface of shin. Recent cross-country road trip noted with tachycardia and unilateral leg swelling, however DVT/PE is low on differential with more likely etiology on history and physical exam. S/p one dose Zosyn in ED and one time renally-dosed vancomycin.   - Transitioned to doxycycline 500mg BID PO + cephalexin 250mg BID PO for mgmt of cellulitis  - Focal area of swelling may require drainage, will monitor, considering ultrasound to eval for focal collection that could be drained  - Tylenol 1000mg TID     DEB on CKD3   Donor Kidney Transplant  Microhematuria  DDKT in 2009. BUN 61; Cr 5.4.  Baseline creatinine 1.5 (last creatinine from 2019 = 1.41). Dry weight 175 lbs; today 190 lbs. He still makes urine, reports normal amount of urination (~6x per day). UA with 88 RBC and 30 WBC with few bacteria. Renal ultrasound shows mild hydronephrosis. INR 4.5. Nephrology following, most concerned for ATN at this time and less likely glomerulonephritis or urinary tract infection, not concerned re: degree of hydronephrosis observed. Cyclosporin level within therapeutic range. Supratherapeutic INR may be contributing to microhematuria.  - Nephrology following, appreciate recs  - BMP in am  - Continue PTA  immunosuppression   - 25mg cyclosporin BID   - 1000mg mycophenolate BID  - Avoid nephrotoxins     Atrial Fibrillation  Patient has had diagnosis of Afib since at lease 2009 diagnosed by Holter monitor. Tracing shows atrial fibrillation with PVCs. Pulse currently <130 and will monitor symptoms and vitals.  - Cardizem 180 mg continued; patient previously on metoprolol (no longer taking)  - Supratherapeutic INR, holding warfarin, will reassess daily     Hypertension  Patient has recorded history of hypertension. Per his drug list that he brought from home, he is not currently on any anti-hypertensives. SBPs in 170's early in admission, now improved to 130's. No new antihypertensives added at this time.  - Patient is new to [place] and will need a primary care provider.     Diet: Regular Diet Adult    Fluids: None, PO  Lines: PIV  DVT Prophylaxis: Warfarin  Olvera Catheter: not present  Code Status: Full Code      Disposition Plan   Expected discharge: 4 - 7 days, recommended to prior living arrangement once antibiotic plan established and wound improving, creatinine improving..  Entered: Alison M. Lerman, MD 10/12/2019, 7:19 AM       The patient's care was discussed with the Attending Physician, Dr. Caputo.    Alison M. Lerman, MD  [facility] 1 Service  [facility], [place]  Please see sticky note for cross cover information  ______________________________________________________________________    Interval History   Feeling better this morning, no more subjective fever/chills and nausea is improved. Improved swelling and pain of LLE. Tolerating a diet. Urinating. Pain with ambulation and palpation in the LLE but mildly improved from prior.    Data reviewed today: I reviewed all medications, new labs and imaging results over the last 24 hours. I personally reviewed no images or EKG's today.    Physical Exam   Vital Signs: Temp: 97.5  F (36.4  C) Temp src: Oral BP: (!) 160/87 Pulse: 97    Resp: 18 SpO2: 95 % O2 Device: None (Room air)    Weight: 190 lbs 3.2 oz    Constitutional: awake, alert, cooperative, no apparent distress, and appears stated age  Eyes: Lids and lashes normal, extra ocular muscles intact  ENT: Normocephalic, without obvious abnormality, atraumatic, external ears without lesions, no oliva-oral lesions  Respiratory: No increased work of breathing, good air exchange, clear to auscultation bilaterally, no crackles or wheezing  Cardiovascular: Irregularly irregular, normal S1 and S2, no S3 or S4, and no murmur noted  GI: No scars, normal bowel sounds, soft, non-distended, non-tender, no masses palpated, no hepatosplenomegally  Extremities: Marked erythema and warmth in LLE, concentrated in anterior portion of shin slightly above the ankle to slightly below the knee. Focal [id number] swelling on anterior portion of shin that is the most tender to palpation with nearby two small areas of skin breakdown with overlying crust, no active bleeding or drainage. +1 PE of the LLE, none in RLE. No pain to palpation in calf of RLE, erythema improved in posterior portion.  Musculoskeletal: There is no redness, warmth, or swelling of the joints.  Full range of motion noted.    Neurologic: Awake, alert, oriented to name, place and time.  Cranial nerves II-XII are grossly intact. No focal deficits.  Neuropsychiatric: General: normal, calm and normal eye contact      Data   Recent Labs   Lab 10/12/19  1358 10/12/19  0924 10/11/19  1539 10/11/19  1413   WBC  --  7.1  --  9.9   HGB  --  9.6*  --  10.9*   MCV  --  93  --  93   PLT  --  298  --  334   INR 4.45*  --   --  4.49*   NA  --  138 135 136   POTASSIUM  --  3.6 4.7 4.7   CHLORIDE  --  107 103 103   CO2  --  23 25 24   BUN  --  58* 61* 61*   CR  --  5.03* 5.44* 5.47*   ANIONGAP  --  8 7 9   ELEAZAR  --  8.3* 8.9 8.7   GLC  --  88 99 94   ALBUMIN  --   --   --  3.3*   PROTTOTAL  --   --   --  7.0   BILITOTAL  --   --   --  1.8*   ALKPHOS  --   --   --  230*    ALT  --   --   --  21   AST  --   --   --  16

## 2019-10-12 NOTE — PLAN OF CARE
A&O. Min BP this shift 136/62  Max /87 this shift. MD aware of BPs no new orders.  LLE red & hot with edema. x2 small scabbed wounds on shin, no drainage, site DEBORAH.     Patient verbalized that pain has improved  and is comfortably manageable. Refused to have indwelling spears placed as ordered.  Pt states he will reconsider when Nephrology rounds tomorrow. Patient voiding adequate amount of urine and was bladder scanned for 25cc for post void residual.      PIV x1 TKO. Up w/ SBA.Pt is strict in and outs stress importance of accurate I&O overnight. Continue to monitor BP, urine output and left leg.

## 2019-10-12 NOTE — CONSULTS
[facility], [place]  Transplant Nephrology Consult  Date of Admission:  10/11/2019  Today's Date: 10/12/2019  Requesting physician: Jb Caputo*    Assessment & Plan   # DDKT: DEB with creatinine up to 5.4 from 1.6 mg / dl in August 2019.   - Baseline Cr ~ 1.5-1.8 mg /d l   - Proteinuria: Not checked recently   - Date DSA Last Checked: not checked recently   - BK Viremia: Not checked recently   - Kidney Tx Biopsy: No    Renal tx ultrasound: mild hydro with some bladder distension  ua with 88 rbc, 30 rbc and few bacteria with 1+ albuminuria by dipstick  bp is not low.     Send ck.     The underlying etiology of the acute kidney injury at this time is unclear.  I do not feel urinary obstruction is the underlying cause of renal dysfunction as he is passing urine and the PVR was minimal.     Slight improvement in creatinine to 5 from 5.4 mg / dl with abx and IVF.    Of the intrarenal possibilities highest on the list would be acute tubular necrosis of unclear etiology but ischemic is less likely with toxic being more likely.  The patient notes no new medications in the last months making an allergic interstitial nephritis unlikely.  He does have few bacteria and 30 white blood cells with moderate leuk or leukocyte Estrace making transplant pyelonephritis also possible.      He is currently on vancomycin and Zosyn which would be good coverage for urinary source and we will follow-up the urine culture.    While it is conceivable that the patient has developed a de tiffany glomerulonephritis this is lower on the probability.  Also thrombotic microangiopathy is unlikely as well.    Of the transplant etiologies I doubt cellular rejection based on the age of the allograft and continued 100% adherence to his immunosuppression regimen with slightly supratherapeutic cyclosporine levels even in August of this year.  I also doubt BK nephropathy given the length of time post transplant.  There  have been no donor specific antibody checks on this patient and antibody mediated rejection is always a possibility but again less likely in a medically adherent patient.    # Immunosuppression: Cyclosporine (goal 50-75) and Mycophenolate mofetil (goal not followed)   - Changes: No     Follow up cyclo trough this am.     # Hypertension: Controlled; Goal BP: < 130/80   - Volume status: Euvolemic   - Changes: No    # 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: Hgb: Stable      AISHWARYA: No   - Iron studies: Not checked recently    # Mineral Bone Disorder:   - Secondary renal hyperparathyroidism; PTH level: Not checked recently  - Vitamin D; level: Not checked recently  - Calcium; level: Normal  - Phosphorus; level: Normal    # Electrolytes:   - Potassium; level: Normal  - Magnesium; level: Not checked recently  - Bicarbonate; level: Normal  - Sodium; level: Normal    LE cellulitis: on vancomycin and zosyn currently for this. Make sure there is no abscess that needs debridement.    # Transplant History:  Etiology of Kidney Failure: Unknown etiology   Tx: DDKT  Transplant: 3/5/2009 (Kidney)  Donor Type: Donation after Brain Death Donor Class: Expanded Criteria Donor  Crossmatch at time of Tx: negative  Significant changes in immunosuppression: None  Significant transplant-related complications: None    Recommendations were communicated to the primary team via this note.    Josiah Ponce MD    REASON FOR CONSULT   DEB in renal tx patient    History of Present Illness   Matheus Sainzsheila Maldonado. is a 79 year old male with history of  donor kidney transplant in  who was admitted with a right lower extremity erythema and swelling thought to due to cellulitis who was found to have acute kidney injury.    The patient was transplanted in  and has remained on cyclosporine and CellCept for his primary immunosuppression.  His baseline creatinine is approximately 1.6 to 1.8 mg/dL by chart review.  The patient's last  cyclosporine level was in August 2019 and was 124 ng/mL at that time.    The patient had labs drawn on August 21 of 2019 showing a creatinine of 1.4 mg/dL.  He also had a urine protein to creatinine ratio at that time that showed 1.7 g/g of creatinine proteinuria with approximately 981 mg of albuminuria on the same urine specimen.  There have been no prior urine specimens for comparison for proteinuria evaluation.    The patient was putting together a 4 post bed and unfortunately injured his left lower leg while doing so that resulted in pain swelling and erythema that has worsened over time.  He denies any other symptoms such as fever but the tenderness is quite painful.    He denies any headache or vision changes.  He denies any dysphasia.  He denies any chest pain or breathing difficulties.  He has no nausea or vomiting.  He has no dysuria.  He denies any hematuria.  He has no constipation or diarrhea.  He notes no numbness weakness or tingling.    Review of Systems    The 10 point Review of Systems is negative other than noted in the HPI or here.     Past Medical History    I have reviewed this patient's medical history and updated it with pertinent information if needed.   Past Medical History:   Diagnosis Date     Atrial fibrillation, permanent 04/2009     CKD (chronic kidney disease)      HTN (hypertension), benign      Past Surgical History   I have reviewed this patient's surgical history and updated it with pertinent information if needed.  Past Surgical History:   Procedure Laterality Date     AS TRANSPLANT,PREP CADAVER RENAL GRAFT Right 04/2009     Social History   I have reviewed this patient's social history and updated it with pertinent information if needed. Matheus White Sr.  reports that he has never smoked. He has never used smokeless tobacco. He reports current alcohol use.    Family History   I have reviewed this patient's family history and updated it with pertinent information if  needed.   Family History   Problem Relation Age of Onset     Emphysema Father      Allergies   No Known Allergies  Prior to Admission Medications   None     Physical Exam   Temp  Av.7  F (36.5  C)  Min: 97.5  F (36.4  C)  Max: 98  F (36.7  C)      Pulse  Av.5  Min: 68  Max: 124 Resp  Av.7  Min: 16  Max: 19  SpO2  Av.1 %  Min: 95 %  Max: 98 %     BP (!) 142/75 (BP Location: Right arm)   Pulse 68   Temp 97.6  F (36.4  C) (Oral)   Resp 18   Wt 86.3 kg (190 lb 3.2 oz)   SpO2 95%   BMI 26.53 kg/m     Date 10/12/19 0700 - 10/13/19 0659   Shift 5753-6214 4151-3299 6899-3713 24 Hour Total   INTAKE   Shift Total(mL/kg)       OUTPUT   Urine 325   325   Shift Total(mL/kg) 325(3.77)   325(3.77)   Weight (kg) 86.27 86.27 86.27 86.27      Admit Weight: 86.3 kg (190 lb 3.2 oz)     GENERAL APPEARANCE: alert and no distress  HENT: mouth without ulcers or lesions  LYMPHATICS: no cervical or supraclavicular nodes  RESP: lungs clear to auscultation - no rales, rhonchi or wheezes  CV: regular rhythm, normal rate, no rub, no murmur  EDEMA: no LE edema bilaterally  ABDOMEN: soft, nondistended, nontender, bowel sounds normal  MS: extremities normal - no gross deformities noted, no evidence of inflammation in joints, no muscle tenderness  SKIN: no rash    Data   CMP  Recent Labs   Lab 10/11/19  1539 10/11/19  1413    136   POTASSIUM 4.7 4.7   CHLORIDE 103 103   CO2 25 24   ANIONGAP 7 9   GLC 99 94   BUN 61* 61*   CR 5.44* 5.47*   GFRESTIMATED 9* 9*   GFRESTBLACK 11* 10*   ELEAZAR 8.9 8.7   PROTTOTAL  --  7.0   ALBUMIN  --  3.3*   BILITOTAL  --  1.8*   ALKPHOS  --  230*   AST  --  16   ALT  --  21     CBC  Recent Labs   Lab 10/11/19  1413   HGB 10.9*   WBC 9.9   RBC 3.68*   HCT 34.3*   MCV 93   MCH 29.6   MCHC 31.8   RDW 14.1        INR  Recent Labs   Lab 10/11/19  1413   INR 4.49*     Urine Studies  Recent Labs   Lab Test 10/11/19  1516   COLOR Yellow   APPEARANCE Clear   URINEGLC Negative   URINEBILI  Negative   URINEKETONE Negative   SG 1.014   UBLD Large*   URINEPH 7.0   PROTEIN 50*   NITRITE Negative   LEUKEST Moderate*   RBCU 88*   WBCU 30*     IMAGING:  All imaging studies reviewed by me.

## 2019-10-13 ENCOUNTER — APPOINTMENT (OUTPATIENT)
Dept: ULTRASOUND IMAGING | Facility: CLINIC | Age: 79
DRG: 698 | End: 2019-10-13
Attending: STUDENT IN AN ORGANIZED HEALTH CARE EDUCATION/TRAINING PROGRAM
Payer: MEDICARE

## 2019-10-13 LAB
ANION GAP SERPL CALCULATED.3IONS-SCNC: 9 MMOL/L (ref 3–14)
BASOPHILS # BLD AUTO: 0.1 10E9/L (ref 0–0.2)
BASOPHILS NFR BLD AUTO: 0.7 %
BUN SERPL-MCNC: 50 MG/DL (ref 7–30)
CALCIUM SERPL-MCNC: 8.4 MG/DL (ref 8.5–10.1)
CHLORIDE SERPL-SCNC: 108 MMOL/L (ref 94–109)
CO2 SERPL-SCNC: 22 MMOL/L (ref 20–32)
CREAT SERPL-MCNC: 4.38 MG/DL (ref 0.66–1.25)
CREAT UR-MCNC: 94 MG/DL
DIFFERENTIAL METHOD BLD: ABNORMAL
EOSINOPHIL # BLD AUTO: 0.3 10E9/L (ref 0–0.7)
EOSINOPHIL NFR BLD AUTO: 3.8 %
ERYTHROCYTE [DISTWIDTH] IN BLOOD BY AUTOMATED COUNT: 14.1 % (ref 10–15)
GFR SERPL CREATININE-BSD FRML MDRD: 12 ML/MIN/{1.73_M2}
GLUCOSE SERPL-MCNC: 86 MG/DL (ref 70–99)
HCT VFR BLD AUTO: 31.7 % (ref 40–53)
HGB BLD-MCNC: 10 G/DL (ref 13.3–17.7)
IMM GRANULOCYTES # BLD: 0 10E9/L (ref 0–0.4)
IMM GRANULOCYTES NFR BLD: 0.4 %
INR PPP: 4.21 (ref 0.86–1.14)
LYMPHOCYTES # BLD AUTO: 0.4 10E9/L (ref 0.8–5.3)
LYMPHOCYTES NFR BLD AUTO: 5.7 %
MCH RBC QN AUTO: 29.3 PG (ref 26.5–33)
MCHC RBC AUTO-ENTMCNC: 31.5 G/DL (ref 31.5–36.5)
MCV RBC AUTO: 93 FL (ref 78–100)
MICROALBUMIN UR-MCNC: 286 MG/L
MICROALBUMIN/CREAT UR: 302.65 MG/G CR (ref 0–17)
MONOCYTES # BLD AUTO: 0.8 10E9/L (ref 0–1.3)
MONOCYTES NFR BLD AUTO: 11.3 %
NEUTROPHILS # BLD AUTO: 5.3 10E9/L (ref 1.6–8.3)
NEUTROPHILS NFR BLD AUTO: 78.1 %
NRBC # BLD AUTO: 0 10*3/UL
NRBC BLD AUTO-RTO: 0 /100
PLATELET # BLD AUTO: 292 10E9/L (ref 150–450)
POTASSIUM SERPL-SCNC: 3.5 MMOL/L (ref 3.4–5.3)
PROT UR-MCNC: 1.02 G/L
PROT/CREAT 24H UR: 1.08 G/G CR (ref 0–0.2)
RBC # BLD AUTO: 3.41 10E12/L (ref 4.4–5.9)
SODIUM SERPL-SCNC: 139 MMOL/L (ref 133–144)
VANCOMYCIN SERPL-MCNC: 8.2 MG/L
WBC # BLD AUTO: 6.8 10E9/L (ref 4–11)

## 2019-10-13 PROCEDURE — 99233 SBSQ HOSP IP/OBS HIGH 50: CPT | Mod: GC | Performed by: STUDENT IN AN ORGANIZED HEALTH CARE EDUCATION/TRAINING PROGRAM

## 2019-10-13 PROCEDURE — 25000131 ZZH RX MED GY IP 250 OP 636 PS 637: Mod: GY | Performed by: STUDENT IN AN ORGANIZED HEALTH CARE EDUCATION/TRAINING PROGRAM

## 2019-10-13 PROCEDURE — 82043 UR ALBUMIN QUANTITATIVE: CPT | Performed by: STUDENT IN AN ORGANIZED HEALTH CARE EDUCATION/TRAINING PROGRAM

## 2019-10-13 PROCEDURE — 25000125 ZZHC RX 250: Performed by: STUDENT IN AN ORGANIZED HEALTH CARE EDUCATION/TRAINING PROGRAM

## 2019-10-13 PROCEDURE — 25000132 ZZH RX MED GY IP 250 OP 250 PS 637: Mod: GY | Performed by: STUDENT IN AN ORGANIZED HEALTH CARE EDUCATION/TRAINING PROGRAM

## 2019-10-13 PROCEDURE — 80202 ASSAY OF VANCOMYCIN: CPT | Performed by: STUDENT IN AN ORGANIZED HEALTH CARE EDUCATION/TRAINING PROGRAM

## 2019-10-13 PROCEDURE — 84156 ASSAY OF PROTEIN URINE: CPT | Performed by: STUDENT IN AN ORGANIZED HEALTH CARE EDUCATION/TRAINING PROGRAM

## 2019-10-13 PROCEDURE — 36415 COLL VENOUS BLD VENIPUNCTURE: CPT | Performed by: STUDENT IN AN ORGANIZED HEALTH CARE EDUCATION/TRAINING PROGRAM

## 2019-10-13 PROCEDURE — 25800030 ZZH RX IP 258 OP 636: Performed by: STUDENT IN AN ORGANIZED HEALTH CARE EDUCATION/TRAINING PROGRAM

## 2019-10-13 PROCEDURE — 80048 BASIC METABOLIC PNL TOTAL CA: CPT | Performed by: STUDENT IN AN ORGANIZED HEALTH CARE EDUCATION/TRAINING PROGRAM

## 2019-10-13 PROCEDURE — 25000128 H RX IP 250 OP 636: Performed by: STUDENT IN AN ORGANIZED HEALTH CARE EDUCATION/TRAINING PROGRAM

## 2019-10-13 PROCEDURE — 12000001 ZZH R&B MED SURG/OB UMMC

## 2019-10-13 PROCEDURE — 85610 PROTHROMBIN TIME: CPT | Performed by: STUDENT IN AN ORGANIZED HEALTH CARE EDUCATION/TRAINING PROGRAM

## 2019-10-13 PROCEDURE — 85025 COMPLETE CBC W/AUTO DIFF WBC: CPT | Performed by: STUDENT IN AN ORGANIZED HEALTH CARE EDUCATION/TRAINING PROGRAM

## 2019-10-13 PROCEDURE — 93971 EXTREMITY STUDY: CPT | Mod: LT

## 2019-10-13 PROCEDURE — 25000132 ZZH RX MED GY IP 250 OP 250 PS 637: Mod: GY | Performed by: INTERNAL MEDICINE

## 2019-10-13 RX ADMIN — CYCLOSPORINE 25 MG: 25 CAPSULE, LIQUID FILLED ORAL at 08:27

## 2019-10-13 RX ADMIN — MYCOPHENOLATE MOFETIL 1000 MG: 500 TABLET ORAL at 08:27

## 2019-10-13 RX ADMIN — CYCLOSPORINE 25 MG: 25 CAPSULE, LIQUID FILLED ORAL at 19:07

## 2019-10-13 RX ADMIN — CEPHALEXIN 500 MG: 500 CAPSULE ORAL at 13:31

## 2019-10-13 RX ADMIN — ACETAMINOPHEN 1000 MG: 500 TABLET, FILM COATED ORAL at 13:31

## 2019-10-13 RX ADMIN — DOXYCYCLINE 100 MG: 100 CAPSULE ORAL at 08:27

## 2019-10-13 RX ADMIN — MYCOPHENOLATE MOFETIL 1000 MG: 500 TABLET ORAL at 19:07

## 2019-10-13 RX ADMIN — ACETAMINOPHEN 1000 MG: 500 TABLET, FILM COATED ORAL at 19:06

## 2019-10-13 RX ADMIN — GABAPENTIN 300 MG: 300 CAPSULE ORAL at 21:30

## 2019-10-13 RX ADMIN — POTASSIUM CITRATE 15 MEQ: 5 TABLET ORAL at 08:28

## 2019-10-13 RX ADMIN — SODIUM CHLORIDE: 9 INJECTION, SOLUTION INTRAVENOUS at 09:40

## 2019-10-13 RX ADMIN — CEPHALEXIN 500 MG: 500 CAPSULE ORAL at 19:06

## 2019-10-13 RX ADMIN — SODIUM CHLORIDE: 9 INJECTION, SOLUTION INTRAVENOUS at 17:47

## 2019-10-13 RX ADMIN — PHYTONADIONE 5 MG: 10 INJECTION, EMULSION INTRAMUSCULAR; INTRAVENOUS; SUBCUTANEOUS at 13:31

## 2019-10-13 RX ADMIN — ACETAMINOPHEN 1000 MG: 500 TABLET, FILM COATED ORAL at 08:27

## 2019-10-13 RX ADMIN — DILTIAZEM HYDROCHLORIDE 180 MG: 180 CAPSULE, COATED, EXTENDED RELEASE ORAL at 08:27

## 2019-10-13 RX ADMIN — DOXYCYCLINE 100 MG: 100 CAPSULE ORAL at 19:07

## 2019-10-13 RX ADMIN — SODIUM CHLORIDE: 9 INJECTION, SOLUTION INTRAVENOUS at 00:37

## 2019-10-13 RX ADMIN — CEPHALEXIN 500 MG: 500 CAPSULE ORAL at 08:27

## 2019-10-13 ASSESSMENT — ACTIVITIES OF DAILY LIVING (ADL)
ADLS_ACUITY_SCORE: 14

## 2019-10-13 NOTE — PROGRESS NOTES
[facility], [place]   Transplant Nephrology Progress Note  Date of Admission:  10/11/2019  Today's Date: 10/13/2019    # DDKT: DEB with creatinine up to 5.4 from 1.6 mg / dl in August 2019.              - Baseline Cr ~ 1.5-1.8 mg /d l              - Proteinuria: Not checked recently              - Date DSA Last Checked: not checked recently              - BK Viremia: Not checked recently              - Kidney Tx Biopsy: No     Renal tx ultrasound: mild hydro with some bladder distension  ua with 88 rbc, 30 rbc and few bacteria with 1+ albuminuria by dipstick - micro with NGTD of urine from 10/11.   bp is not low.      CK normal.     The underlying etiology of the acute kidney injury at this time is unclear.  I do not feel urinary obstruction is the underlying cause of renal dysfunction as he is passing urine and the PVR was minimal.      Creatinine improving with abx and IVF making pre-renal azotemia vs. Early ATN in the setting of L LE cellulitis.     Doubt AIN, GN, TMA based on presentation and empiric improvement as above. Hematuria may be due to high INR.      He is currently on vancomycin and Zosyn. Monitor vanco trough closely.     Of the transplant etiologies I doubt cellular rejection based on the age of the allograft and continued 100% adherence to his immunosuppression regimen with slightly supratherapeutic cyclosporine levels even in August of this year.  I also doubt BK nephropathy given the length of time post transplant.  There have been no donor specific antibody checks on this patient and antibody mediated rejection is always a possibility but again less likely in a medically adherent patient.     # Immunosuppression: Cyclosporine (goal 50-75) and Mycophenolate mofetil (goal not followed)              - Changes: No      Cyclo trough at goal this am.      # Hypertension: Controlled; Goal BP: < 130/80              - Volume status: Euvolemic              - Changes: No     #  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: Hgb: Stable      AISHWARYA: No              - Iron studies: Not checked recently     # Mineral Bone Disorder:   - Secondary renal hyperparathyroidism; PTH level: Not checked recently  - Vitamin D; level: Not checked recently  - Calcium; level: Normal  - Phosphorus; level: Normal     # Electrolytes:   - Potassium; level: Normal  - Magnesium; level: Not checked recently  - Bicarbonate; level: Normal  - Sodium; level: Normal     LE cellulitis: on vancomycin and zosyn currently for this. Make sure there is no abscess that needs debridement - surgery seeing patient.      # Transplant History:  Etiology of Kidney Failure: Unknown etiology   Tx: DDKT  Transplant: 3/5/2009 (Kidney)  Donor Type: Donation after Brain Death        Donor Class: Expanded Criteria Donor  Crossmatch at time of Tx: negative  Significant changes in immunosuppression: None  Significant transplant-related complications: None     Recommendations were communicated to the primary team via this note    Josiah Ponce MD    Interval History   LLE pain improved. Redness stable. No fever. He denies cp, sob, no n/v/d, no f/s/c. Tmax: 98.    I/O: 1628/2125    Review of Systems   4 point ROS was obtained and negative except as noted in the Interval History.    MEDICATIONS:  Current Facility-Administered Medications   Medication     acetaminophen (TYLENOL) tablet 1,000 mg     cephALEXin (KEFLEX) capsule 500 mg     cycloSPORINE modified (GENERIC EQUIVALENT) capsule 25 mg     diltiazem ER COATED BEADS (CARDIZEM CD/CARTIA XT) 24 hr capsule 180 mg     doxycycline hyclate (VIBRAMYCIN) capsule 100 mg     gabapentin (NEURONTIN) capsule 300 mg     HOLD: warfarin (COUMADIN) therapy     influenza Vac Split High-Dose (FLUZONE) injection 0.5 mL     lidocaine (LMX4) cream     lidocaine 1 % 0.1-1 mL     melatonin tablet 1 mg     mycophenolate (GENERIC EQUIVALENT) tablet 1,000 mg     naloxone (NARCAN) injection 0.1-0.4 mg     phytonadione  (MEPHYTON/VITAMIN K) 1 MG/ML oral solution 5 mg     potassium citrate (UROCIT-K) CR tablet 15 mEq     sodium chloride (PF) 0.9% PF flush 3 mL     sodium chloride (PF) 0.9% PF flush 3 mL     sodium chloride 0.9% infusion     vancomycin place mckee - receiving intermittent dosing       Physical Exam   Temp  Av.6  F (36.4  C)  Min: 96.3  F (35.7  C)  Max: 98  F (36.7  C)      Pulse  Av.3  Min: 66  Max: 124 Resp  Av.5  Min: 16  Max: 19  SpO2  Av.1 %  Min: 95 %  Max: 98 %     BP (!) 150/70 (BP Location: Right arm)   Pulse 66   Temp 98  F (36.7  C) (Oral)   Resp 16   Wt 86.3 kg (190 lb 3.2 oz)   SpO2 95%   BMI 26.53 kg/m     Date 10/13/19 0700 - 10/14/19 0659   Shift 5218-7988 4969-9665 1740-6343 24 Hour Total   INTAKE   P.O. 420   420   Shift Total(mL/kg) 420(4.87)   420(4.87)   OUTPUT   Urine 400   400   Shift Total(mL/kg) 400(4.64)   400(4.64)   Weight (kg) 86.27 86.27 86.27 86.27      Admit Weight: 86.3 kg (190 lb 3.2 oz)     GENERAL APPEARANCE: alert and no distress  HENT: mouth without ulcers or lesions  LYMPHATICS: no cervical or supraclavicular nodes  RESP: lungs clear to auscultation - no rales, rhonchi or wheezes  CV: regular rhythm, normal rate, no rub, no murmur  EDEMA: 1+ on the L LE edema   ABDOMEN: soft, nondistended, nontender, bowel sounds normal  MS: extremities normal - no gross deformities noted, no evidence of inflammation in joints, no muscle tenderness  SKIN: LLE with erythema and fluctuance of 4 cm area on the anterior lateral compartment of lower leg.     Data   All labs reviewed by me.  CMP  Recent Labs   Lab 10/13/19  0727 10/12/19  0924 10/11/19  1539 10/11/19  1413    138 135 136   POTASSIUM 3.5 3.6 4.7 4.7   CHLORIDE 108 107 103 103   CO2 22 23 25 24   ANIONGAP 9 8 7 9   GLC 86 88 99 94   BUN 50* 58* 61* 61*   CR 4.38* 5.03* 5.44* 5.47*   GFRESTIMATED 12* 10* 9* 9*   GFRESTBLACK 14* 12* 11* 10*   ELEAZAR 8.4* 8.3* 8.9 8.7   PROTTOTAL  --   --   --  7.0   ALBUMIN   --   --   --  3.3*   BILITOTAL  --   --   --  1.8*   ALKPHOS  --   --   --  230*   AST  --   --   --  16   ALT  --   --   --  21     CBC  Recent Labs   Lab 10/13/19  0727 10/12/19  0924 10/11/19  1413   HGB 10.0* 9.6* 10.9*   WBC 6.8 7.1 9.9   RBC 3.41* 3.25* 3.68*   HCT 31.7* 30.3* 34.3*   MCV 93 93 93   MCH 29.3 29.5 29.6   MCHC 31.5 31.7 31.8   RDW 14.1 13.9 14.1    298 334     INR  Recent Labs   Lab 10/12/19  1358 10/11/19  1413   INR 4.45* 4.49*     ABGNo lab results found in last 7 days.   Urine Studies  Recent Labs   Lab Test 10/11/19  1516   COLOR Yellow   APPEARANCE Clear   URINEGLC Negative   URINEBILI Negative   URINEKETONE Negative   SG 1.014   UBLD Large*   URINEPH 7.0   PROTEIN 50*   NITRITE Negative   LEUKEST Moderate*   RBCU 88*   WBCU 30*     IMAGING:  All imaging studies reviewed by me.

## 2019-10-13 NOTE — PLAN OF CARE
Blood pressure (!) 150/70, pulse 66, temperature 98  F (36.7  C), temperature source Oral, resp. rate 16, weight 86.3 kg (190 lb 3.2 oz), SpO2 95 %.    A/O x 4. HTN but within parameters. Pain managed with tylenol. LLE redness, Ultrasound done this morning. Good po intake. Voiding adequate output.NPO at midnight. MIVF infusing at 125cc/hr. Up with SBA. Continue to monitor and follow POC.

## 2019-10-13 NOTE — CONSULTS
SURGERY CONSULT  Matheus White Sr. MRN# [id number]   YOB: 1940 Age: 79 year old     Date of Admission: 10/13/19    REASON FOR CONSULTATION: LLE cellulitis     HPI: Matheus White Sr. is a 79 year old year old male with history of CKD s/p DDKT, HTN and atrial fibrillation (on warfarin, INR supratherapeutic at 4.45 on admission). He presented to the ED 10/12/19 complaining of LLE pain and swelling after injuring his leg while putting together a bed frame. He had previously presented to urgent care where XR tib/fib was negative for pathology, and he was sent home with bacitracin and pain medications. Today, WBC 6.8. He has remained afebrile and hemodynamically stable. Has been started on oral doxycycline and keflex. His left lower extremity cellulitis has worsened over the past several days, and there is now a focal area of exquisite tenderness over the shin. Warfarin has been held, but he has not yet received anything for INR reversal. No history of DVT/PE.     REVIEW OF SYSTEMS: The remainder of the complete ROS was negative unless noted in the HPI. Denies visual changes, headache, sore throat, rhinorrhea, chest pain, sob, abdominal pain, diarrhea, constipation, fevers, night sweats, weight loss.     PAST MEDICAL HISTORY:   Past Medical History:   Diagnosis Date     Atrial fibrillation, permanent 04/2009     CKD (chronic kidney disease)      HTN (hypertension), benign        PAST SURGICAL HISTORY:   Past Surgical History:   Procedure Laterality Date     AS TRANSPLANT,PREP CADAVER RENAL GRAFT Right 04/2009       ALLERGIES:  No Known Allergies    HOME MEDICATIONS: No current facility-administered medications on file prior to encounter.   No current outpatient medications on file prior to encounter.      SOCIAL HISTORY:   Social History     Tobacco Use     Smoking status: Never Smoker     Smokeless tobacco: Never Used   Substance Use Topics     Alcohol use: Yes     Frequency: Monthly or  less     Drinks per session: 1 or 2     Drug use: None       FAMILY HISTORY:   Family History   Problem Relation Age of Onset     Emphysema Father        PHYSICAL EXAMINATION:  BP (!) 150/70 (BP Location: Right arm)   Pulse 66   Temp 98  F (36.7  C) (Oral)   Resp 16   Wt 86.3 kg (190 lb 3.2 oz)   SpO2 95%   BMI 26.53 kg/m       General: NAD, awake and alert   CV: Non-cyanotic   Pulm: No increased work of breathing on room air   Abd: soft, non-distended, non-tender   : No spears   Extremities: LLE blanching erythema extending from the ankle to below the knee. There is a [id number] area of fluctuance over the shin bone which is exquisitely tender to palpation. No drainage. No blisters or subcutaneous emphysema present on exam. RLE is normal.   Neuro: No focal deficits noted, patient moves all extremities spontaneously    LABS:  Recent Labs   Lab 10/13/19  0727   WBC 6.8   RBC 3.41*   HGB 10.0*   HCT 31.7*   MCV 93   MCH 29.3   MCHC 31.5   RDW 14.1          Recent Labs   Lab 10/13/19  0727      POTASSIUM 3.5   CHLORIDE 108   CO2 22   BUN 50*   CR 4.38*   GLC 86   ELEAZAR 8.4*       Recent Labs   Lab 10/12/19  1358 10/11/19  1413   AST  --  16   ALT  --  21   ALKPHOS  --  230*   BILITOTAL  --  1.8*   ALBUMIN  --  3.3*   INR 4.45* 4.49*       IMAGING: read reviewed   XR tib/fib 10/8/19    IMPRESSION:  Negative exam of the left tibia fibula.    A/P: Matheus Sainzsheila Sr. is a 79 year old male with history of afib on warfarin (INR 4.45), CKD status post DDKT, and hypertension who presents with LLE cellulitis after injury sustained while building a bed frame 4 days ago.     - Recommend LLE US for DVT and US of fluctuant area.   - Recommend reversal of INR with vitamin K   - Recommend daily INR   - Agree with antibiotics   - Plan for incision and drainage of LLE fluctuance when INR is subtherapeutic (goal <1.8 at least).   - NPO at midnight     Discussed with staff       Nichol Sen   Surgery Resident    [id number]

## 2019-10-13 NOTE — PLAN OF CARE
Matheus White is a 79 year old man with CKD3 s/p DDKT, HTN, Afib on warfarin and diltiazem, who presents with cellulitis after an acute injury and found to have DEB on CKD. (Md note)     A&O. Hypertensive but within defined parameters. Pain comfortably manageable. Pt verbalized that left leg pain has improved greatly from yesterday. Rested soundly through the night. Voiding adequates amount of urine. Up with x1 assist and walker. PIV infusing NS @ 100 ml/hr. Tolerating regular diet. Left leg sore to touch and is red. Continue with POC

## 2019-10-13 NOTE — PLAN OF CARE
AVSS on RA. Pt taking sched tylenol for LLE pain management. LLE erythemic, hot, edematous. There are a few scabs, no drainage. RLE w/ area of red/purplish skin. Pt stated his pain and the redness on the LLE are improving. Pt ambulated to bathroom w/ assist of 2, walker, and gait belt. Pt was able to balance on one foot. Regular diet, denies nausea. Using urinal at bedside w/ adequate UOP. Strict Is and Os. +gas, +BS. X1 BM this shift. PIV infusing MIVF. Cont to monitor BLEs and pain management. Cont w/ POC.

## 2019-10-13 NOTE — PROGRESS NOTES
[facility], [place]    Progress Note - [place] 1 Service        Date of Admission:  10/11/2019    Assessment & Plan   Matheus White is a 79 year old man with CKD3 s/p DDKT, HTN, Afib on warfarin and diltiazem, who presents with cellulitis after an acute injury and found to have DEB on CKD. Creatinine stable to minimally improved today, LLE with slightly improved pain and erythema.    Changes Today:   - Surgery evaluated, possible I&D tomorrow pending improvement in INR  - LLE ultrasound negative for DVT, showing fluid collection over fluctuant area in L anterior lower extremity  - 5mg PO vitamin K given for reversal of supratherapeutic INR, recheck in am  - Creatinine improving  - Continue gentle hydration given NPO @ MN, will likely discontinue tomorrow     Cellulitis of Left Lower Extremity  Injury to LLE 1 week prior, assessed at urgent care, XR negative for fracture, topical bacitracin for wound given and discharged. Interval marked increase in edema and erythema from ankle to mid-shin, exquisitely tender over focal area of swelling on anterior surface of shin. Recent cross-country road trip noted with tachycardia and unilateral leg swelling, however DVT/PE is low on differential with more likely etiology on history and physical exam. S/p one dose Zosyn in ED and one time renally-dosed vancomycin.   - Transitioned to doxycycline 500mg BID PO + cephalexin 250mg BID PO for mgmt of cellulitis on 10/12  - Surgery evaluated, possible I&D tomorrow pending improvement in INR  - LLE ultrasound negative for DVT, showing fluid collection over fluctuant area in L anterior lower extremity  - Tylenol 1000mg TID     DEB on CKD3   Donor Kidney Transplant  Microhematuria  DDKT in 2009. BUN 61; Cr 5.4 on presentation, down to 4.5 today 10/13.  Baseline creatinine 1.5 (last creatinine from 2019 = 1.41). Dry weight 175 lbs; today 190 lbs. He still makes urine, reports normal  amount of urination (~6x per day). UA with 88 RBC and 30 WBC with few bacteria. Renal ultrasound shows mild hydronephrosis. INR 4.5. Nephrology following, most concerned for ATN at this time and less likely glomerulonephritis or urinary tract infection, not concerned re: degree of hydronephrosis observed. Cyclosporin level within therapeutic range. Supratherapeutic INR may be contributing to microhematuria.  - Nephrology following, appreciate recs  - BMP in am  - Continue PTA immunosuppression   - 25mg cyclosporin BID   - 1000mg mycophenolate BID  - Avoid nephrotoxins  - Continue gentle hydration with NS @ 125mL/h, will likely discontinue tomorrow after period of NPO     Atrial Fibrillation  Patient has had diagnosis of Afib since at lease 2009 diagnosed by Holter monitor. Tracing shows atrial fibrillation with PVCs. Pulse currently <130 and will monitor symptoms and vitals.  - Cardizem 180 mg continued; patient previously on metoprolol (no longer taking)  - Supratherapeutic INR, holding warfarin, will reassess daily  - 5mg PO vitamin K today for reversal, recheck in am     Hypertension  Patient has recorded history of hypertension. Per his drug list that he brought from home, he is not currently on any anti-hypertensives. SBPs in 170's early in admission, now improved to 130's. No new antihypertensives added at this time.  - Patient is new to [place] and will need a primary care provider.     Diet: Regular Diet Adult    Fluids: None, PO  Lines: PIV  DVT Prophylaxis: Warfarin  Olvera Catheter: not present  Code Status: Full Code      Disposition Plan   Expected discharge: 2 - 4 days, recommended to prior living arrangement once antibiotic plan established and wound improving, creatinine improving..  Entered: Alison M. Lerman, MD 10/13/2019, 7:28 AM       The patient's care was discussed with the Attending Physician, Dr. Caputo.    Alison M. Lerman, MD  [facility] 1 Service  [facility],  [place]  Please see sticky note for cross cover information  ______________________________________________________________________    Interval History   Feels well this morning. Improved swelling and pain of LLE. Tolerating a diet. Urinating 5-6 times daily. Pain with ambulation and palpation in the LLE but states better than yesterday. Relieved that his creatinine is improving.    Data reviewed today: I reviewed all medications, new labs and imaging results over the last 24 hours. I personally reviewed no images or EKG's today.    Physical Exam   Vital Signs: Temp: 97.7  F (36.5  C) Temp src: Oral BP: (!) 141/78 Pulse: 81   Resp: 16 SpO2: 95 % O2 Device: None (Room air)    Weight: 190 lbs 3.2 oz    Constitutional: awake, alert, cooperative, no apparent distress, and appears stated age  Eyes: Lids and lashes normal, extra ocular muscles intact  ENT: Normocephalic, without obvious abnormality, atraumatic, external ears without lesions, no oliva-oral lesions  Respiratory: No increased work of breathing, good air exchange, clear to auscultation bilaterally, no crackles or wheezing  Cardiovascular: Irregularly irregular, normal S1 and S2, no S3 or S4, and no murmur noted  GI: Soft, non-distended, non-tender  Extremities: Marked erythema and warmth in LLE, slightly decreased in area from prior exam, concentrated in anterior portion of shin slightly above the ankle to slightly below the knee. Focal [id number] swelling on anterior portion of shin that is the most tender to palpation with nearby two small areas of skin breakdown with overlying crust, no active bleeding or drainage. +1 PE of the LLE, none in RLE. No pain to palpation in calf of RLE, erythema improved in posterior portion.  Musculoskeletal: There is no redness, warmth, or swelling of the joints.  Full range of motion noted.    Neurologic: Awake, alert, oriented to name, place and time.  Cranial nerves II-XII are grossly intact. No focal deficits.   Neuropsychiatric: General: normal, calm and normal eye contact      Data   Recent Labs   Lab 10/12/19  1358 10/12/19  0924 10/11/19  1539 10/11/19  1413   WBC  --  7.1  --  9.9   HGB  --  9.6*  --  10.9*   MCV  --  93  --  93   PLT  --  298  --  334   INR 4.45*  --   --  4.49*   NA  --  138 135 136   POTASSIUM  --  3.6 4.7 4.7   CHLORIDE  --  107 103 103   CO2  --  23 25 24   BUN  --  58* 61* 61*   CR  --  5.03* 5.44* 5.47*   ANIONGAP  --  8 7 9   ELEAZAR  --  8.3* 8.9 8.7   GLC  --  88 99 94   ALBUMIN  --   --   --  3.3*   PROTTOTAL  --   --   --  7.0   BILITOTAL  --   --   --  1.8*   ALKPHOS  --   --   --  230*   ALT  --   --   --  21   AST  --   --   --  16

## 2019-10-14 LAB
ANION GAP SERPL CALCULATED.3IONS-SCNC: 8 MMOL/L (ref 3–14)
BUN SERPL-MCNC: 51 MG/DL (ref 7–30)
CALCIUM SERPL-MCNC: 8.4 MG/DL (ref 8.5–10.1)
CHLORIDE SERPL-SCNC: 112 MMOL/L (ref 94–109)
CO2 SERPL-SCNC: 20 MMOL/L (ref 20–32)
CREAT SERPL-MCNC: 4.23 MG/DL (ref 0.66–1.25)
ERYTHROCYTE [DISTWIDTH] IN BLOOD BY AUTOMATED COUNT: 14.1 % (ref 10–15)
GFR SERPL CREATININE-BSD FRML MDRD: 12 ML/MIN/{1.73_M2}
GLUCOSE SERPL-MCNC: 92 MG/DL (ref 70–99)
HCT VFR BLD AUTO: 30.9 % (ref 40–53)
HGB BLD-MCNC: 9.6 G/DL (ref 13.3–17.7)
INR PPP: 2.05 (ref 0.86–1.14)
INTERPRETATION ECG - MUSE: NORMAL
MCH RBC QN AUTO: 29.2 PG (ref 26.5–33)
MCHC RBC AUTO-ENTMCNC: 31.1 G/DL (ref 31.5–36.5)
MCV RBC AUTO: 94 FL (ref 78–100)
PLATELET # BLD AUTO: 294 10E9/L (ref 150–450)
POTASSIUM SERPL-SCNC: 3.4 MMOL/L (ref 3.4–5.3)
RBC # BLD AUTO: 3.29 10E12/L (ref 4.4–5.9)
SODIUM SERPL-SCNC: 141 MMOL/L (ref 133–144)
WBC # BLD AUTO: 6.3 10E9/L (ref 4–11)

## 2019-10-14 PROCEDURE — 12000001 ZZH R&B MED SURG/OB UMMC

## 2019-10-14 PROCEDURE — 85027 COMPLETE CBC AUTOMATED: CPT | Performed by: STUDENT IN AN ORGANIZED HEALTH CARE EDUCATION/TRAINING PROGRAM

## 2019-10-14 PROCEDURE — 99233 SBSQ HOSP IP/OBS HIGH 50: CPT | Mod: GC | Performed by: STUDENT IN AN ORGANIZED HEALTH CARE EDUCATION/TRAINING PROGRAM

## 2019-10-14 PROCEDURE — 25000131 ZZH RX MED GY IP 250 OP 636 PS 637: Mod: GY | Performed by: STUDENT IN AN ORGANIZED HEALTH CARE EDUCATION/TRAINING PROGRAM

## 2019-10-14 PROCEDURE — 36415 COLL VENOUS BLD VENIPUNCTURE: CPT | Performed by: STUDENT IN AN ORGANIZED HEALTH CARE EDUCATION/TRAINING PROGRAM

## 2019-10-14 PROCEDURE — 25000132 ZZH RX MED GY IP 250 OP 250 PS 637: Mod: GY | Performed by: STUDENT IN AN ORGANIZED HEALTH CARE EDUCATION/TRAINING PROGRAM

## 2019-10-14 PROCEDURE — 40000141 ZZH STATISTIC PERIPHERAL IV START W/O US GUIDANCE

## 2019-10-14 PROCEDURE — 25800030 ZZH RX IP 258 OP 636: Performed by: STUDENT IN AN ORGANIZED HEALTH CARE EDUCATION/TRAINING PROGRAM

## 2019-10-14 PROCEDURE — 80048 BASIC METABOLIC PNL TOTAL CA: CPT | Performed by: STUDENT IN AN ORGANIZED HEALTH CARE EDUCATION/TRAINING PROGRAM

## 2019-10-14 PROCEDURE — 25000128 H RX IP 250 OP 636: Performed by: STUDENT IN AN ORGANIZED HEALTH CARE EDUCATION/TRAINING PROGRAM

## 2019-10-14 PROCEDURE — 85610 PROTHROMBIN TIME: CPT | Performed by: STUDENT IN AN ORGANIZED HEALTH CARE EDUCATION/TRAINING PROGRAM

## 2019-10-14 RX ORDER — WARFARIN SODIUM 3 MG/1
TABLET ORAL
Status: ON HOLD | COMMUNITY
End: 2023-01-01

## 2019-10-14 RX ORDER — CEFAZOLIN SODIUM 1 G/3ML
1 INJECTION, POWDER, FOR SOLUTION INTRAMUSCULAR; INTRAVENOUS EVERY 8 HOURS
Status: DISCONTINUED | OUTPATIENT
Start: 2019-10-14 | End: 2019-10-14

## 2019-10-14 RX ORDER — DILTIAZEM HYDROCHLORIDE 180 MG/1
180 CAPSULE, COATED, EXTENDED RELEASE ORAL DAILY
Status: ON HOLD | COMMUNITY
End: 2023-01-01

## 2019-10-14 RX ORDER — POTASSIUM CITRATE 15 MEQ/1
1 TABLET, EXTENDED RELEASE ORAL 2 TIMES DAILY
COMMUNITY
End: 2019-12-31 | Stop reason: DRUGHIGH

## 2019-10-14 RX ORDER — GABAPENTIN 300 MG/1
300 CAPSULE ORAL AT BEDTIME
COMMUNITY

## 2019-10-14 RX ORDER — CYCLOSPORINE 25 MG/1
50 CAPSULE, LIQUID FILLED ORAL
Status: DISCONTINUED | OUTPATIENT
Start: 2019-10-14 | End: 2019-10-22 | Stop reason: HOSPADM

## 2019-10-14 RX ORDER — CYCLOSPORINE 25 MG/1
50 CAPSULE, LIQUID FILLED ORAL 2 TIMES DAILY
COMMUNITY

## 2019-10-14 RX ORDER — CEFAZOLIN SODIUM 1 G/3ML
1 INJECTION, POWDER, FOR SOLUTION INTRAMUSCULAR; INTRAVENOUS EVERY 12 HOURS
Status: DISCONTINUED | OUTPATIENT
Start: 2019-10-14 | End: 2019-10-16

## 2019-10-14 RX ORDER — MYCOPHENOLATE MOFETIL 500 MG/1
500 TABLET ORAL 2 TIMES DAILY
COMMUNITY

## 2019-10-14 RX ORDER — MYCOPHENOLATE MOFETIL 500 MG/1
500 TABLET ORAL
Status: DISCONTINUED | OUTPATIENT
Start: 2019-10-14 | End: 2019-10-22 | Stop reason: HOSPADM

## 2019-10-14 RX ADMIN — CEPHALEXIN 500 MG: 500 CAPSULE ORAL at 08:21

## 2019-10-14 RX ADMIN — MYCOPHENOLATE MOFETIL 500 MG: 500 TABLET ORAL at 18:57

## 2019-10-14 RX ADMIN — ACETAMINOPHEN 1000 MG: 500 TABLET, FILM COATED ORAL at 08:20

## 2019-10-14 RX ADMIN — CYCLOSPORINE 25 MG: 25 CAPSULE, LIQUID FILLED ORAL at 08:21

## 2019-10-14 RX ADMIN — GABAPENTIN 300 MG: 300 CAPSULE ORAL at 20:45

## 2019-10-14 RX ADMIN — ACETAMINOPHEN 1000 MG: 500 TABLET, FILM COATED ORAL at 19:00

## 2019-10-14 RX ADMIN — ACETAMINOPHEN 1000 MG: 500 TABLET, FILM COATED ORAL at 14:00

## 2019-10-14 RX ADMIN — DILTIAZEM HYDROCHLORIDE 180 MG: 180 CAPSULE, COATED, EXTENDED RELEASE ORAL at 08:21

## 2019-10-14 RX ADMIN — CEFAZOLIN 1 G: 330 INJECTION, POWDER, FOR SOLUTION INTRAMUSCULAR; INTRAVENOUS at 21:40

## 2019-10-14 RX ADMIN — VANCOMYCIN HYDROCHLORIDE 1250 MG: 10 INJECTION, POWDER, LYOPHILIZED, FOR SOLUTION INTRAVENOUS at 11:34

## 2019-10-14 RX ADMIN — DOXYCYCLINE 100 MG: 100 CAPSULE ORAL at 08:21

## 2019-10-14 RX ADMIN — CYCLOSPORINE 50 MG: 25 CAPSULE, LIQUID FILLED ORAL at 18:58

## 2019-10-14 RX ADMIN — MYCOPHENOLATE MOFETIL 1000 MG: 500 TABLET ORAL at 08:20

## 2019-10-14 ASSESSMENT — ACTIVITIES OF DAILY LIVING (ADL)
ADLS_ACUITY_SCORE: 14

## 2019-10-14 NOTE — PROGRESS NOTES
Resident/Fellow Attestation   I, Alison M. Lerman, was present with the medical student who participated in the service and in the documentation of the note.  I have verified the history and personally performed the physical exam and medical decision making.  I agree with the assessment and plan of care as documented in the note.      Erythema and swelling of LLE overall stable but not improved. Focal area of swelling possibly worse. Remains afebrile, tolerating diet. Surgery states fluid collection not ideal for drainage given elongated and shallow area of collection. Will continue to treat medically. Renal function improving, will continue to monitor.    Alison M. Lerman, MD  PGY1  Date of Service (when I saw the patient): 10/14/19    [facility], [place]    Progress Note - [place] 1 Service        Date of Admission:  10/11/2019    Assessment & Plan   Matheus White is a 79 year old man with CKD3 s/p DDKT, HTN, Afib on warfarin and diltiazem, who presents with cellulitis after an acute injury and was found to have DEB on CKD. Creatinine stable to minimally improved today, LLE with slightly improved pain and erythema currently hemodynamically stable on vancomycin and cephalexin.      Changes Today:   - Surgery evaluated, I&D not indicated at this time  - Start vancomycin 1250 mg IV and cefazolin 1g IV  - Monitoring INR, 5 mg PO vitamin K given 10/13 for reversal of supratherapeutic INR  - Creatinine stable with minimal improvement  - Continue gentle hydration     Cellulitis of Left Lower Extremity  Injury to LLE 1 week prior to admission, assessed at urgent care, XR negative for fracture, topical bacitracin for wound given and discharged. No interval change in edema and erythema from ankle to mid-shin, increased tenderness today over the focal area of swelling on anterior surface of shin. Recent cross-country road trip noted with tachycardia and unilateral leg swelling, however lower  extremity ultrasound was negative for DVT. S/p one dose Zosyn in ED and one time renally-dosed vancomycin.   - Transition to vancomycin 1250 mg IV on 10/14 and cefazolin 1g IV for mgmt of cellulitis  - Surgery will continue to follow, no indication for I&D at this time due to risk of open wound, will continue to treat cellulitis medically.   - Recommend keeping leg wrapped and elevated  - LLE ultrasound negative for DVT, showing fluid collection over fluctuant area in L anterior lower extremity  - Cont Tylenol 1000mg TID for pain management     DEB on CKD3   Donor Kidney Transplant  Microhematuria  DDKT on 2009. BUN 51; Cr 5.4 on presentation, down to 4.25  today 10/14.  Baseline creatinine 1.5 (last creatinine from 2019 = 1.41). Dry weight 175 lbs; today 192 lbs. Cyclosporine level within therapeutic range.  He still makes urine, reports normal amount of urination (~6x per day). UA with 88 RBC and 30 WBC with few bacteria. Renal ultrasound shows mild hydronephrosis. Nephrology following, most concerned for pre-renal etiology or ATN at this time and less likely glomerulonephritis, urinary tract infection, or other obstruction; not concerned re: degree of hydronephrosis observed.  Supratherapeutic INR may be contributing to microhematuria.  - Nephrology following, appreciate recs  - Trend BMP  - Continue PTA immunosuppression              - 25mg cyclosporin BID              - 1000mg mycophenolate BID  - Monitor vancomycin levels (troughs)  - Avoid nephrotoxins  - Continue gentle hydration with NS @ 125mL/h     Atrial Fibrillation  INR 2.05. Patient has had diagnosis of Afib since atleast  diagnosed by Holter monitor. Tracing shows atrial fibrillation with PVCs. Pulse currently <130 and will monitor symptoms and vitals.  - Continue cardizem 180 mg   - Supratherapeutic INR, holding warfarin, will reassess daily  - s/p 5mg PO vitamin K on 10/13  for reversal, recheck in  am     Hypertension  Overnight was hypertensive to 155/81. Patient has recorded history of hypertension. Currently not on any home anti-hypertensives. SBPs in 170's early in admission, now improved to 130's.   - Monitor blood pressures, no new antihypertensives added at this time  - Patient is new to [place] and will need a primary care provider.       Diet: Regular Adult Diet   Fluids: mIVF  Lines: PIV  DVT Prophylaxis: Warfarin  Olvera Catheter: not present  Code Status: Full Code      Disposition Plan   Expected discharge: 2 - 3 days, recommended to prior living arrangement once antibiotic plan established and wound improving, creatinine improving.  Entered: Ki Weems 10/14/2019, 6:47 AM       The patient's care was discussed with the Attending Physician, Dr. Caputo.    Ki Weems   Medical Student  [place] 1 Service  [facility], [place]  Pager: [phone]  Please see sticky note for cross cover information  ______________________________________________________________________    Interval History   No acute events overnight. Patient reports the pain and swelling in his left leg feel about the same as yesterday. Tolerable when laying in bed but rates the pain as 8-9/10 when he walks. He has not had any abdominal pain, nausea, vomiting, or fevers. No dysuria and good urine output overnight. Complaining of lightheadedness and feeling dehydrated.     Data reviewed today: I reviewed all medications, new labs and imaging results over the last 24 hours. I personally reviewed no images or EKG's today.    Physical Exam   Vital Signs: Temp: 97.5  F (36.4  C) Temp src: Oral BP: (!) 140/68 Pulse: 78   Resp: 18 SpO2: 94 % O2 Device: None (Room air)    Weight: 192 lbs 4.8 oz  General Appearance: Awake, alert, cooperative, no apparent distress  Respiratory: No increased work of breathing, clear to auscultation bilaterally, no crackles or wheezes  Cardiovascular: Irregularly irregular rhythm,  normal S1 and S2 with no murmurs  GI: Abdomen is soft, non-distended, non-tender, no masses, no hepatosplenomegaly  Skin: Marked erythema and warmth in LLE, focused in the anterior shin slightly above the ankle to slightly below the knee.  Focal 3cm x 3cm area of swelling on anterior portion of shin that is the most tender to palpation with nearby two small areas of skin breakdown with overlying crust, no active bleeding or drainage. +1 PE of the LLE, none in RLE. No pain to palpation in calf of RLE, erythema improved in posterior portion.  Neurologic: Oriented to name, place and time. CNS II-XII grossly intact. Sensory intact and motor strength 5/5 in bilateral extremities.     Data   Recent Labs   Lab 10/14/19  0704 10/13/19  1132 10/13/19  0727 10/12/19  1358 10/12/19  0924  10/11/19  1413   WBC 6.3  --  6.8  --  7.1  --  9.9   HGB 9.6*  --  10.0*  --  9.6*  --  10.9*   MCV 94  --  93  --  93  --  93     --  292  --  298  --  334   INR 2.05* 4.21*  --  4.45*  --   --  4.49*     --  139  --  138   < > 136   POTASSIUM 3.4  --  3.5  --  3.6   < > 4.7   CHLORIDE 112*  --  108  --  107   < > 103   CO2 20  --  22  --  23   < > 24   BUN 51*  --  50*  --  58*   < > 61*   CR 4.23*  --  4.38*  --  5.03*   < > 5.47*   ANIONGAP 8  --  9  --  8   < > 9   ELEAZAR 8.4*  --  8.4*  --  8.3*   < > 8.7   GLC 92  --  86  --  88   < > 94   ALBUMIN  --   --   --   --   --   --  3.3*   PROTTOTAL  --   --   --   --   --   --  7.0   BILITOTAL  --   --   --   --   --   --  1.8*   ALKPHOS  --   --   --   --   --   --  230*   ALT  --   --   --   --   --   --  21   AST  --   --   --   --   --   --  16    < > = values in this interval not displayed.

## 2019-10-14 NOTE — PLAN OF CARE
VSS. Up with 1 assist and walker. LLE with erythema/edema. Pain tolerable with scheduled Tylenol, elevating left leg on pillows while in bed. Taking oral antibiotics. IVF infusing into PIV @ 125 mL/hr. Tolerating reg diet, NPO @ midnight. Took a shower tonight. Continue with POC.

## 2019-10-14 NOTE — PLAN OF CARE
Matheus White is a 79 year old man with CKD3 s/p DDKT, HTN, Afib on warfarin and diltiazem, who presents with cellulitis after an acute injury and found to have DEB on CKD. (Md note)     A&O. NPO since midnight. Vitals stable. Complained of left leg pain but shortly drifted to sleep and has been resting comfortably since. Voiding adequates amount of urine at urinal Up with x1 assist and walker. PIV infusing NS @ 100 ml/hr. Tolerating regular diet. Left leg sore to touch and is red. Plan is to have possible incision and drainage of LLE today.

## 2019-10-14 NOTE — PHARMACY-ADMISSION MEDICATION HISTORY
"Admission medication history interview status for the 10/11/2019 admission is complete. See Epic admission navigator for allergy information, pharmacy, prior to admission medications and immunization status.     Medication history interview sources:  Patient, medication list hardcopy; Rx Claims Database; CareEverywhere    Changes made to PTA medication list (reason)  Added:  - diltiazem  mg PO every day  - potassium citrate 15 mEq PO BID  - warfarin 3 mg PO every day  - gabapentin 300 mg PO daily.  Deleted:  None  Changed:  potassium citrate 15 mEq from daily (how Rx written) to BID (how patient actually takes)    Additional medication history information (including reliability of information, actions taken by pharmacist):  - Patient states his warfarin regimen is 3 mg PO daily; INR goal is 2-3 for permanent A.Fib.   - Timing of immunosuppressant meds - patient states he does not take these meds Q12H; rather, he states he takes them at about 0600 and 2200 (16 hours apart) and has been doing that \"for years.\"        Prior to Admission medications    Medication Sig Last Dose Taking? Auth Provider   cycloSPORINE modified (GENERIC EQUIVALENT) 25 MG capsule Take 50 mg (2 capsules) by mouth 2 times daily 10/11/2019 at am Yes Unknown, Entered By History   diltiazem ER COATED BEADS (CARDIZEM CD/CARTIA XT) 180 MG 24 hr capsule Take 180 mg by mouth daily 10/11/2019 at am Yes Unknown, Entered By History   gabapentin (NEURONTIN) 300 MG capsule Take 300 mg by mouth daily 10/10/2019 at pm Yes Unknown, Entered By History   mycophenolate (GENERIC EQUIVALENT) 500 MG tablet Take 500 mg by mouth 2 times daily 10/11/2019 at am Yes Unknown, Entered By History   potassium citrate 15 MEQ (1620 MG) TBCR Take 1 tablet by mouth 2 times daily 10/11/2019 at am Yes Unknown, Entered By History   warfarin ANTICOAGULANT (COUMADIN) 3 MG tablet Take 3 mg by mouth daily 10/11/2019 at am Yes Unknown, Entered By History       Medication history " completed by:  Matthew Booth, PharmD candidate (2020)

## 2019-10-14 NOTE — PLAN OF CARE
Vitals stable,afebrile.Left lower extremity swollen reddened,no changes in marked area.Leg elevated on pillow above heart,Pt seen by surgical team,ace wrap applied to LLE by surgeon,no surgery procedure planned.Pt to keep leg elevated  to reduce swelling.LLE pain comfortably managed with scheduled tylenol.Patient  was started on IV vancomycin.Right arm swollen,pt denied any discomfort in this arm, running  NS  fluid was stopped,arm elevated on a pillow.[facility] resident doctor was notified  about arm swelling,came to assess arm,said clot not suspected,said she would order continuous IV fluid discontinued.Continue per plan of care.

## 2019-10-14 NOTE — PHARMACY-VANCOMYCIN DOSING SERVICE
Pharmacy Vancomycin Initial Note  Date of Service 2019  Patient's  1940  79 year old, male    Indication: Abscess    Current estimated CrCl = CrCl cannot be calculated (Unknown ideal weight.).    Creatinine for last 3 days  10/11/2019:  2:13 PM Creatinine 5.47 mg/dL;  3:39 PM Creatinine 5.44 mg/dL  10/12/2019:  9:24 AM Creatinine 5.03 mg/dL  10/13/2019:  7:27 AM Creatinine 4.38 mg/dL  10/14/2019:  7:04 AM Creatinine 4.23 mg/dL    Recent Vancomycin Level(s) for last 3 days  10/13/2019:  7:27 AM Vancomycin Level 8.2 mg/L      Vancomycin IV Administrations (past 72 hours)      No vancomycin orders with administrations in past 72 hours.                Nephrotoxins and other renal medications (From now, onward)    Start     Dose/Rate Route Frequency Ordered Stop    10/14/19 1015  vancomycin 1250 mg in 0.9% NaCl 250 mL intermittent infusion 1,250 mg      1,250 mg  over 90 Minutes Intravenous ONCE 10/14/19 1008      10/14/19 1008  vancomycin place mckee - receiving intermittent dosing      1 each Intravenous SEE ADMIN INSTRUCTIONS 10/14/19 1009      10/11/19 2100  cycloSPORINE modified (GENERIC EQUIVALENT) capsule 25 mg      25 mg Oral 2 TIMES DAILY. 10/11/19 2056            Contrast Orders - past 72 hours (72h ago, onward)    None                Plan:  1.  Start vancomycin  1250 (~14 mg/kg)  mg IV once, and will determine further doses based on levels  2.  Goal Trough Level: 10-15 mg/L   3.  Pharmacy will check trough levels as appropriate in 1-3 Days.    4. Serum creatinine levels will be ordered a minimum of twice weekly.    5. [place] method utilized to dose vancomycin therapy: Method 2    Ann Doe, MarieD

## 2019-10-15 ENCOUNTER — APPOINTMENT (OUTPATIENT)
Dept: INTERVENTIONAL RADIOLOGY/VASCULAR | Facility: CLINIC | Age: 79
DRG: 698 | End: 2019-10-15
Attending: NURSE PRACTITIONER
Payer: MEDICARE

## 2019-10-15 LAB
ANION GAP SERPL CALCULATED.3IONS-SCNC: 9 MMOL/L (ref 3–14)
APPEARANCE FLD: NORMAL
BUN SERPL-MCNC: 44 MG/DL (ref 7–30)
CALCIUM SERPL-MCNC: 8.9 MG/DL (ref 8.5–10.1)
CHLORIDE SERPL-SCNC: 112 MMOL/L (ref 94–109)
CO2 SERPL-SCNC: 20 MMOL/L (ref 20–32)
COLOR FLD: NORMAL
CREAT SERPL-MCNC: 3.74 MG/DL (ref 0.66–1.25)
CRP SERPL-MCNC: 29 MG/L (ref 0–8)
CRYPTOC AG SPEC QL: NORMAL
EOSINOPHIL NFR FLD MANUAL: 3 %
ERYTHROCYTE [DISTWIDTH] IN BLOOD BY AUTOMATED COUNT: 14.2 % (ref 10–15)
GFR SERPL CREATININE-BSD FRML MDRD: 14 ML/MIN/{1.73_M2}
GLUCOSE SERPL-MCNC: 88 MG/DL (ref 70–99)
GRAM STN SPEC: NORMAL
HCT VFR BLD AUTO: 32.1 % (ref 40–53)
HGB BLD-MCNC: 10.2 G/DL (ref 13.3–17.7)
INR PPP: 1.59 (ref 0.86–1.14)
LYMPHOCYTES NFR FLD MANUAL: 4 %
MCH RBC QN AUTO: 29.8 PG (ref 26.5–33)
MCHC RBC AUTO-ENTMCNC: 31.8 G/DL (ref 31.5–36.5)
MCV RBC AUTO: 94 FL (ref 78–100)
NEUTS BAND NFR FLD MANUAL: 93 %
PLATELET # BLD AUTO: 274 10E9/L (ref 150–450)
POTASSIUM SERPL-SCNC: 3.4 MMOL/L (ref 3.4–5.3)
RBC # BLD AUTO: 3.42 10E12/L (ref 4.4–5.9)
SODIUM SERPL-SCNC: 141 MMOL/L (ref 133–144)
SPECIMEN SOURCE FLD: NORMAL
SPECIMEN SOURCE: NORMAL
SPECIMEN SOURCE: NORMAL
WBC # BLD AUTO: 6.5 10E9/L (ref 4–11)
WBC # FLD AUTO: 746 /UL

## 2019-10-15 PROCEDURE — 36415 COLL VENOUS BLD VENIPUNCTURE: CPT | Performed by: STUDENT IN AN ORGANIZED HEALTH CARE EDUCATION/TRAINING PROGRAM

## 2019-10-15 PROCEDURE — 86140 C-REACTIVE PROTEIN: CPT | Performed by: STUDENT IN AN ORGANIZED HEALTH CARE EDUCATION/TRAINING PROGRAM

## 2019-10-15 PROCEDURE — 25000125 ZZHC RX 250: Performed by: PHYSICIAN ASSISTANT

## 2019-10-15 PROCEDURE — 25000132 ZZH RX MED GY IP 250 OP 250 PS 637: Mod: GY | Performed by: STUDENT IN AN ORGANIZED HEALTH CARE EDUCATION/TRAINING PROGRAM

## 2019-10-15 PROCEDURE — 85610 PROTHROMBIN TIME: CPT | Performed by: STUDENT IN AN ORGANIZED HEALTH CARE EDUCATION/TRAINING PROGRAM

## 2019-10-15 PROCEDURE — 12000001 ZZH R&B MED SURG/OB UMMC

## 2019-10-15 PROCEDURE — 87070 CULTURE OTHR SPECIMN AEROBIC: CPT | Performed by: STUDENT IN AN ORGANIZED HEALTH CARE EDUCATION/TRAINING PROGRAM

## 2019-10-15 PROCEDURE — 99233 SBSQ HOSP IP/OBS HIGH 50: CPT | Mod: GC | Performed by: STUDENT IN AN ORGANIZED HEALTH CARE EDUCATION/TRAINING PROGRAM

## 2019-10-15 PROCEDURE — 89051 BODY FLUID CELL COUNT: CPT | Performed by: STUDENT IN AN ORGANIZED HEALTH CARE EDUCATION/TRAINING PROGRAM

## 2019-10-15 PROCEDURE — 87205 SMEAR GRAM STAIN: CPT | Performed by: STUDENT IN AN ORGANIZED HEALTH CARE EDUCATION/TRAINING PROGRAM

## 2019-10-15 PROCEDURE — 87075 CULTR BACTERIA EXCEPT BLOOD: CPT | Performed by: STUDENT IN AN ORGANIZED HEALTH CARE EDUCATION/TRAINING PROGRAM

## 2019-10-15 PROCEDURE — 87899 AGENT NOS ASSAY W/OPTIC: CPT | Performed by: STUDENT IN AN ORGANIZED HEALTH CARE EDUCATION/TRAINING PROGRAM

## 2019-10-15 PROCEDURE — 85027 COMPLETE CBC AUTOMATED: CPT | Performed by: STUDENT IN AN ORGANIZED HEALTH CARE EDUCATION/TRAINING PROGRAM

## 2019-10-15 PROCEDURE — 10005 FNA BX W/US GDN 1ST LES: CPT

## 2019-10-15 PROCEDURE — 80048 BASIC METABOLIC PNL TOTAL CA: CPT | Performed by: STUDENT IN AN ORGANIZED HEALTH CARE EDUCATION/TRAINING PROGRAM

## 2019-10-15 PROCEDURE — 25000128 H RX IP 250 OP 636: Performed by: STUDENT IN AN ORGANIZED HEALTH CARE EDUCATION/TRAINING PROGRAM

## 2019-10-15 PROCEDURE — 25000131 ZZH RX MED GY IP 250 OP 636 PS 637: Mod: GY | Performed by: STUDENT IN AN ORGANIZED HEALTH CARE EDUCATION/TRAINING PROGRAM

## 2019-10-15 PROCEDURE — 0J9P3ZX DRAINAGE OF LEFT LOWER LEG SUBCUTANEOUS TISSUE AND FASCIA, PERCUTANEOUS APPROACH, DIAGNOSTIC: ICD-10-PCS | Performed by: PHYSICIAN ASSISTANT

## 2019-10-15 RX ORDER — LIDOCAINE HYDROCHLORIDE 10 MG/ML
1-30 INJECTION, SOLUTION EPIDURAL; INFILTRATION; INTRACAUDAL; PERINEURAL
Status: COMPLETED | OUTPATIENT
Start: 2019-10-15 | End: 2019-10-15

## 2019-10-15 RX ADMIN — ACETAMINOPHEN 1000 MG: 500 TABLET, FILM COATED ORAL at 08:03

## 2019-10-15 RX ADMIN — CEFAZOLIN 1 G: 330 INJECTION, POWDER, FOR SOLUTION INTRAMUSCULAR; INTRAVENOUS at 21:59

## 2019-10-15 RX ADMIN — MYCOPHENOLATE MOFETIL 500 MG: 500 TABLET ORAL at 08:03

## 2019-10-15 RX ADMIN — DILTIAZEM HYDROCHLORIDE 180 MG: 180 CAPSULE, COATED, EXTENDED RELEASE ORAL at 08:03

## 2019-10-15 RX ADMIN — CYCLOSPORINE 50 MG: 25 CAPSULE, LIQUID FILLED ORAL at 18:08

## 2019-10-15 RX ADMIN — CYCLOSPORINE 50 MG: 25 CAPSULE, LIQUID FILLED ORAL at 08:03

## 2019-10-15 RX ADMIN — GABAPENTIN 300 MG: 300 CAPSULE ORAL at 18:12

## 2019-10-15 RX ADMIN — MYCOPHENOLATE MOFETIL 500 MG: 500 TABLET ORAL at 18:08

## 2019-10-15 RX ADMIN — CEFAZOLIN 1 G: 330 INJECTION, POWDER, FOR SOLUTION INTRAMUSCULAR; INTRAVENOUS at 10:32

## 2019-10-15 RX ADMIN — ACETAMINOPHEN 1000 MG: 500 TABLET, FILM COATED ORAL at 20:56

## 2019-10-15 RX ADMIN — LIDOCAINE HYDROCHLORIDE 5 ML: 10 INJECTION, SOLUTION INFILTRATION; PERINEURAL at 15:46

## 2019-10-15 ASSESSMENT — ACTIVITIES OF DAILY LIVING (ADL)
ADLS_ACUITY_SCORE: 14

## 2019-10-15 ASSESSMENT — PAIN DESCRIPTION - DESCRIPTORS: DESCRIPTORS: TENDER

## 2019-10-15 NOTE — PROGRESS NOTES
[facility], [place]   Transplant Nephrology Progress Note  Date of Admission:  10/11/2019  Today's Date: 10/15/2019    Assessment & Plan     # DDKT: DEB with creatinine peaked at 5.4 from 1.6 mg / dL in August 2019. Slowly improving. Working diagnosis is DEB in the settings of cellulitis.               - Baseline Cr ~ 1.5-1.8 mg /dL              - Proteinuria: Not checked recently              - Date DSA Last Checked: not checked recently              - BK Viremia: Not checked recently              - Kidney Tx Biopsy: No      # Immunosuppression: Cyclosporine (goal 50-75) and Mycophenolate mofetil (goal not followed)              - Changes: discussed resuming his home regimen and checking his levels.      Follow up cyclo trough this am.      # Hypertension: Controlled; Goal BP: < 130/80              - Volume status: Euvolemic              - Changes: No     # 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: Hgb: Stable      AISHWARYA: No              - Iron studies: Not checked recently     # Mineral Bone Disorder:   - Secondary renal hyperparathyroidism; PTH level: Not checked recently  - Vitamin D; level: Not checked recently  - Calcium; level: Normal  - Phosphorus; level: Normal     # Electrolytes:   - Potassium; level: Normal  - Magnesium; level: Not checked recently  - Bicarbonate; level: Normal  - Sodium; level: Normal     # LE cellulitis: Currently on vancomycin and Ancef.     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      Josias Villarreal MD  Pager: [phone]    Interval History     Matheus was seen and examined in the company of his son.  He reports slight  improvement in his overall leg pain.  He is able to empty his bladder efficiently and well according to him.    Review of Systems   4 point ROS was obtained and negative except as noted in the Interval History.    MEDICATIONS:  Current Facility-Administered Medications   Medication     acetaminophen (TYLENOL) tablet 1,000 mg     ceFAZolin (ANCEF) 1 g vial to attach to  ml bag for ADULT or 50 ml bag for PEDS     cycloSPORINE modified (GENERIC EQUIVALENT) capsule 50 mg     diltiazem ER COATED BEADS (CARDIZEM CD/CARTIA XT) 24 hr capsule 180 mg     gabapentin (NEURONTIN) capsule 300 mg     influenza Vac Split High-Dose (FLUZONE) injection 0.5 mL     lidocaine (LMX4) cream     lidocaine 1 % 0.1-1 mL     melatonin tablet 1 mg     mycophenolate (GENERIC EQUIVALENT) tablet 500 mg     naloxone (NARCAN) injection 0.1-0.4 mg     sodium chloride (PF) 0.9% PF flush 3 mL     sodium chloride (PF) 0.9% PF flush 3 mL     vancomycin place mckee - receiving intermittent dosing       Physical Exam   Temp  Av.6  F (36.4  C)  Min: 96.3  F (35.7  C)  Max: 98.1  F (36.7  C)      Pulse  Av.5  Min: 66  Max: 124 Resp  Av.9  Min: 16  Max: 19  SpO2  Av.7 %  Min: 94 %  Max: 98 %     BP (!) 153/84 (BP Location: Right arm)   Pulse 79   Temp 95.7  F (35.4  C) (Oral)   Resp 18   Wt 88.3 kg (194 lb 11.2 oz)   SpO2 96%   BMI 27.16 kg/m     Date 10/15/19 07 - 10/16/19 0659   Shift 8592-7532 4846-8354 4052-2156 24 Hour Total   INTAKE   Shift Total(mL/kg)       OUTPUT   Urine 150   150   Shift Total(mL/kg) 150(1.7)   150(1.7)   Weight (kg) 88.31 88.31 88.31 88.31      Admit Weight: 86.3 kg (190 lb 3.2 oz)     GENERAL APPEARANCE: alert and no distress  HENT: mouth without ulcers or lesions  LYMPHATICS: no cervical or supraclavicular nodes  RESP: lungs clear to auscultation - no rales, rhonchi or wheezes  CV: regular rhythm, normal rate, no rub, no murmur  EDEMA: Left leg with significant erythema and area of swelling  over the mid shin area.  Tenderness was noted and warmth on exam.  ABDOMEN: soft, nondistended, nontender, bowel sounds normal  MS: extremities normal - no gross deformities noted, no evidence of inflammation in joints, no muscle tenderness  SKIN: no rash    Data   All labs reviewed by me.  CMP  Recent Labs   Lab 10/15/19  0702 10/14/19  0704 10/13/19  0727 10/12/19  0924  10/11/19  1413    141 139 138   < > 136   POTASSIUM 3.4 3.4 3.5 3.6   < > 4.7   CHLORIDE 112* 112* 108 107   < > 103   CO2 20 20 22 23   < > 24   ANIONGAP 9 8 9 8   < > 9   GLC 88 92 86 88   < > 94   BUN 44* 51* 50* 58*   < > 61*   CR 3.74* 4.23* 4.38* 5.03*   < > 5.47*   GFRESTIMATED 14* 12* 12* 10*   < > 9*   GFRESTBLACK 17* 14* 14* 12*   < > 10*   ELEAZAR 8.9 8.4* 8.4* 8.3*   < > 8.7   PROTTOTAL  --   --   --   --   --  7.0   ALBUMIN  --   --   --   --   --  3.3*   BILITOTAL  --   --   --   --   --  1.8*   ALKPHOS  --   --   --   --   --  230*   AST  --   --   --   --   --  16   ALT  --   --   --   --   --  21    < > = values in this interval not displayed.     CBC  Recent Labs   Lab 10/15/19  0702 10/14/19  0704 10/13/19  0727 10/12/19  0924   HGB 10.2* 9.6* 10.0* 9.6*   WBC 6.5 6.3 6.8 7.1   RBC 3.42* 3.29* 3.41* 3.25*   HCT 32.1* 30.9* 31.7* 30.3*   MCV 94 94 93 93   MCH 29.8 29.2 29.3 29.5   MCHC 31.8 31.1* 31.5 31.7   RDW 14.2 14.1 14.1 13.9    294 292 298     INR  Recent Labs   Lab 10/15/19  0702 10/14/19  0704 10/13/19  1132 10/12/19  1358   INR 1.59* 2.05* 4.21* 4.45*     ABGNo lab results found in last 7 days.   Urine Studies  Recent Labs   Lab Test 10/11/19  1516   COLOR Yellow   APPEARANCE Clear   URINEGLC Negative   URINEBILI Negative   URINEKETONE Negative   SG 1.014   UBLD Large*   URINEPH 7.0   PROTEIN 50*   NITRITE Negative   LEUKEST Moderate*   RBCU 88*   WBCU 30*     Recent Labs   Lab Test 10/13/19  2140   UTPG 1.08*     PTH  No lab results found.  Iron Studies  No lab results found.    IMAGING:  All imaging studies  reviewed by me.

## 2019-10-15 NOTE — PLAN OF CARE
VSS. LLE pain tolerable with scheduled Tylenol at rest, c/o increased pain with activity. Reg diet - pt did not want to eat tonight, states he had a late lunch. Right arm swelling assessed by MD at shift change this afternoon. New PIV placed by vascular access team. Getting intermittent IV abx. Left leg with erythema, ace wrapped and elevating in bed. Voiding adequately. Pt states he had a BM today. Continue with POC.

## 2019-10-15 NOTE — IR NOTE
Patient Name: Matheus White Sr.  Medical Record Number: [id number]  Today's Date: 10/15/2019    Procedure: Left shin fluid collection  Proceduralist: Shaheed Howard PA-C    Sedation Notes: lidocaine used at the site     Procedure start time: 1541  Puncture time: 1541  Procedure end time: 1600    Report given to: Pat CONTI 7C      Other Notes: Pt arrived to IR room 6 from . Consent reviewed, pt confirmed. Pt denies any questions or concerns regarding procedure. Pt positioned supine and monitored per protocol. Labs sent as ordered. 3 ml dark bloody fluid drained from site. Site cleansed and dressed per protocol. Pt tolerated procedure without any noted complications. Pt transferred back to .

## 2019-10-15 NOTE — PROGRESS NOTES
Resident/Fellow Attestation   I, Alison M. Lerman, was present with the medical student who participated in the service and in the documentation of the note.  I have verified the history and personally performed the physical exam and medical decision making.  I agree with the assessment and plan of care as documented in the note.      Ongoing rubor and callor of LLE with enlarging focal area of fluid collection that remains exquisitely tender. Erythema receeding somewhat from lines drawn below knee earlier in the week. Pain distant from the focal area of fluid collection is improved. Pain close/overlying the fluid collection not improved. Remains on IV antibiotics. Given overall lack of improvement, concern for lack of source control or lack of appropriate antibiotic coverage. IR sampled collection today, appreciate involvement. Patient tolerated the procedure well, examined after the procedure, no active bleeding or drainage, pain controlled. Sensation intact, able to ambulate with pain. Remains afebrile and vitally stable. Renal function slowly improving, nephrology following, continue to monitor daily, avoid nephrotoxins, continue home immunosuppressive regimen.     Alison M. Lerman, MD  PGY4  Date of Service (when I saw the patient): 10/15/19    [facility], [place]    Progress Note - [place] 1 Service        Date of Admission:  10/11/2019    Assessment & Plan   Matheus White is a 79 year old man with CKD3 s/p DDKT, HTN, Afib on warfarin and diltiazem, who presents with cellulitis after an acute injury and was found to have DEB on CKD. Creatinine stable to minimally improved today, LLE with minimally improved pain and erythema currently hemodynamically stable on vancomycin and cefazolin.      Changes Today:   - Surgery following, I&D not indicated at this time  - IR consulted for fluid aspiration today  - Continue vancomycin 1250 mg IV and cefazolin 1g IV  - Monitoring INR, 5 mg PO  vitamin K given 10/13 for reversal of supratherapeutic INR, consider restarting 10/16  - Creatinine with minimal improvement     Cellulitis of Left Lower Extremity  WBC 6.5. CRP 29.   Injury to LLE 1 week prior to admission, assessed at urgent care, XR negative for fracture, topical bacitracin for wound given and discharged. S/p one dose Zosyn in ED and one time renally-dosed vancomycin, switched to PO doxycycline and cephalexin on 10/12. No interval change in edema and erythema from ankle to mid-shin, increased tenderness today over the focal area of swelling on anterior surface of shin.  Per surgery evaluation, no indication for incision and drainage at this time due to the risk of open wound in a patient with delayed healing. Would appreciate IR evaluation for possibility of draining fluid collection/hematoma to minimize infection risk, and obtaining fluid cultures to narrow antibiotics.   - Continue vancomycin 1250 mg IV on 10/14 and cefazolin 1g IV for mgmt of cellulitis  - IR consult for aspiration of fluid collection today  - Surgery following, recommend continuing to treat cellulitis medically   - Recommend keeping leg wrapped and elevated  - LLE ultrasound negative for DVT, showing fluid collection over fluctuant area in L anterior lower extremity  - Cont Tylenol 1000mg TID for pain management  - Crypto testing ordered per nephrology recs     DEB on CKD3   Donor Kidney Transplant  Microhematuria  DDKT on 2009. BUN 51; Cr 5.4 on presentation, down to 3.74  today 10/14.  Baseline creatinine 1.5 (last creatinine from 2019 = 1.41). Dry weight 175 lbs; today 194 lbs. Cyclosporine level within therapeutic range.Good urine output. UA with 88 RBC and 30 WBC with few bacteria. Renal ultrasound shows mild hydronephrosis. Nephrology following, most concerned for pre-renal etiology or ATN, and less likely glomerulonephritis, urinary tract infection, or other obstruction considering degree of  hydronephrosis. Supratherapeutic INR may be contributing to microhematuria.  - Nephrology following, appreciate recs  - Trend BMP  - Continue PTA immunosuppression              - 50mg cyclosporin BID              - 500mg mycophenolate BID  - Monitor vancomycin levels (troughs)  - Avoid nephrotoxins    Atrial Fibrillation  INR 1.59. Patient has had diagnosis of Afib since atleast 2009 diagnosed by Holter monitor. Tracing shows atrial fibrillation with PVCs. Pulse currently <130 and will monitor symptoms and vitals.  - Continue cardizem 180 mg   - Supratherapeutic INR, holding warfarin, will reassess daily  - s/p 5mg PO vitamin K on 10/13  for reversal, recheck in am     Hypertension  Overnight was hypertensive to 163/83. Patient has recorded history of hypertension. Currently not on any home anti-hypertensives. SBPs in 170's early in admission, now improved to 130's.   - Monitor blood pressures, no new antihypertensives added at this time  - Patient is new to [place] and will need a primary care provider.        Diet: Regular Diet Adult    Fluids: mIVF  Lines: R PIV  DVT Prophylaxis: Warfarin  Olvera Catheter: not present  Code Status: Full Code      Disposition Plan   Expected discharge: 2 - 3 days, recommended to prior living arrangement once antibiotic plan established.  Entered: Ki Weems 10/15/2019, 11:00 AM       The patient's care was discussed with the Attending Physician, Dr. Garcia.    Ki Weems  Medical Student  [facility]  [facility], [place]  Pager: [phone]  Please see sticky note for cross cover information  ______________________________________________________________________    Interval History   Overnight patient became disoriented for 10 minutes and was unsure of where he was, attempting to pull out his IV and walk around. He reports that this happened to him about a week ago when he was taking oxycodone. He thinks that his leg feels the same as yesterday,  with minimal pain at rest but severe pain when walking or putting pressure on the leg. He has not had any chest pain, shortness of breath, or abdominal pain. No dysuria, good urine output. Normal bowel movements.     Data reviewed today: I reviewed all medications, new labs and imaging results over the last 24 hours. I personally reviewed no images or EKG's today.    Physical Exam   Vital Signs: Temp: 97.6  F (36.4  C) Temp src: Oral BP: (!) 160/98 Pulse: 79 Heart Rate: 90 Resp: 16 SpO2: 94 % O2 Device: None (Room air)    Weight: 194 lbs 11.2 oz  General Appearance:  Awake, alert, cooperative, no apparent distress  Respiratory: No increased work of breathing, clear to auscultation bilaterally, no crackles or wheezes  Cardiovascular: Irregularly irregular rhythm, normal S1 and S2 with no murmurs  GI: Abdomen is soft, non-distended, non-tender, no masses, no hepatosplenomegaly  Skin: Marked erythema and warmth in LLE, focused in the anterior shin slightly above the ankle to slightly below the knee.  Focal 3cm x 3cm area of swelling on anterior portion of shin that is the most tender to palpation with nearby two small areas of skin breakdown with overlying crust, no active bleeding or drainage. +1 PE of the LLE, none in RLE. No pain to palpation in calf of RLE, erythema improving in posterior portion.  Neurologic: Oriented to name, place and time. CNS II-XII grossly intact. Sensory intact and motor strength 5/5 in bilateral extremities.     Data   Recent Labs   Lab 10/15/19  0702 10/14/19  0704 10/13/19  1132 10/13/19  0727  10/11/19  1413   WBC 6.5 6.3  --  6.8   < > 9.9   HGB 10.2* 9.6*  --  10.0*   < > 10.9*   MCV 94 94  --  93   < > 93    294  --  292   < > 334   INR 1.59* 2.05* 4.21*  --    < > 4.49*    141  --  139   < > 136   POTASSIUM 3.4 3.4  --  3.5   < > 4.7   CHLORIDE 112* 112*  --  108   < > 103   CO2 20 20  --  22   < > 24   BUN 44* 51*  --  50*   < > 61*   CR 3.74* 4.23*  --  4.38*   < >  5.47*   ANIONGAP 9 8  --  9   < > 9   ELEAZAR 8.9 8.4*  --  8.4*   < > 8.7   GLC 88 92  --  86   < > 94   ALBUMIN  --   --   --   --   --  3.3*   PROTTOTAL  --   --   --   --   --  7.0   BILITOTAL  --   --   --   --   --  1.8*   ALKPHOS  --   --   --   --   --  230*   ALT  --   --   --   --   --  21   AST  --   --   --   --   --  16    < > = values in this interval not displayed.

## 2019-10-15 NOTE — PROGRESS NOTES
GENERAL SURGERY PROGRESS NOTE  [id number]  Matheus White Sr.    S: No acute events overnight. Pain and swelling have improved. No n/v/f/c.     O:  BP (!) 160/98 (BP Location: Right arm)   Pulse 79   Temp 97.6  F (36.4  C) (Oral)   Resp 16   Wt 88.3 kg (194 lb 11.2 oz)   SpO2 94%   BMI 27.16 kg/m      Gen: NAD, lying in bed  Car: RRR  Pulm: Normal work of breathing  Abd: Soft, nontender  Ext: LLE with improvement in edema but continues to have erythema though within the lines of demarcation, leg wrapped in ACE. RLE with chronic venous statis changes at shin. WWP otherwise.    BCx: NGTD      A/P: Matheus White Sr. is a 79 year old male with history of afib on warfarin , CKD status post DDKT, and hypertension who presents with LLE cellulitis after injury sustained while building a bed frame 4 days ago. UNliekly that surgical intervention would provide a benefit, recommend treating medically.    - OK to feed, diabetes diet  - Continue to wrap LLE in ACE and elevate  - Anticoagulation per primary team, no plans for surgery  - Continue abx    Will discuss with staff    Ej Espinal MD  [facility] Service, PGY2

## 2019-10-15 NOTE — PROGRESS NOTES
[facility], [place]   Transplant Nephrology Progress Note  Date of Admission:  10/11/2019  Today's Date: 10/15/2019    Assessment & Plan     # DDKT: DEB with creatinine peaked at 5.4 from 1.6 mg / dL in August 2019. Slowly improving. Working diagnosis is DEB in the settings of cellulitis.               - Baseline Cr ~ 1.5-1.8 mg /dL              - Proteinuria: Not checked recently              - Date DSA Last Checked: not checked recently              - BK Viremia: Not checked recently              - Kidney Tx Biopsy: No      # Immunosuppression: Cyclosporine (goal 50-75) and Mycophenolate mofetil (goal not followed)              - Changes: discussed resuming his home regimen and checking his levels.      Follow up cyclo trough this am.      # Hypertension: Controlled; Goal BP: < 130/80              - Volume status: Euvolemic              - Changes: No     # 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: Hgb: Stable      AISHWARYA: No              - Iron studies: Not checked recently     # Mineral Bone Disorder:   - Secondary renal hyperparathyroidism; PTH level: Not checked recently  - Vitamin D; level: Not checked recently  - Calcium; level: Normal  - Phosphorus; level: Normal     # Electrolytes:   - Potassium; level: Normal  - Magnesium; level: Not checked recently  - Bicarbonate; level: Normal  - Sodium; level: Normal     # LE cellulitis: Currently on vancomycin and Ancef.     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      Josias Villarreal MD  Pager: [phone]    Interval History     Matheus was seen and examined in the company of his son.  He reports slight  improvement in his overall leg pain.  He is able to empty his bladder efficiently and well according to him.    Review of Systems   4 point ROS was obtained and negative except as noted in the Interval History.    MEDICATIONS:  Current Facility-Administered Medications   Medication     acetaminophen (TYLENOL) tablet 1,000 mg     ceFAZolin (ANCEF) 1 g vial to attach to  ml bag for ADULT or 50 ml bag for PEDS     cycloSPORINE modified (GENERIC EQUIVALENT) capsule 50 mg     diltiazem ER COATED BEADS (CARDIZEM CD/CARTIA XT) 24 hr capsule 180 mg     gabapentin (NEURONTIN) capsule 300 mg     influenza Vac Split High-Dose (FLUZONE) injection 0.5 mL     lidocaine (LMX4) cream     lidocaine 1 % 0.1-1 mL     melatonin tablet 1 mg     mycophenolate (GENERIC EQUIVALENT) tablet 500 mg     naloxone (NARCAN) injection 0.1-0.4 mg     sodium chloride (PF) 0.9% PF flush 3 mL     sodium chloride (PF) 0.9% PF flush 3 mL     vancomycin place mckee - receiving intermittent dosing       Physical Exam   Temp  Av.6  F (36.4  C)  Min: 96.3  F (35.7  C)  Max: 98.1  F (36.7  C)      Pulse  Av.5  Min: 66  Max: 124 Resp  Av.9  Min: 16  Max: 19  SpO2  Av.7 %  Min: 94 %  Max: 98 %     BP (!) 160/98 (BP Location: Right arm)   Pulse 79   Temp 97.6  F (36.4  C) (Oral)   Resp 16   Wt 88.3 kg (194 lb 11.2 oz)   SpO2 94%   BMI 27.16 kg/m     Date 10/15/19 07 - 10/16/19 0659   Shift 3927-1882 9861-0630 6059-5794 24 Hour Total   INTAKE   Shift Total(mL/kg)       OUTPUT   Urine 150   150   Shift Total(mL/kg) 150(1.7)   150(1.7)   Weight (kg) 88.31 88.31 88.31 88.31      Admit Weight: 86.3 kg (190 lb 3.2 oz)     GENERAL APPEARANCE: alert and no distress  HENT: mouth without ulcers or lesions  LYMPHATICS: no cervical or supraclavicular nodes  RESP: lungs clear to auscultation - no rales, rhonchi or wheezes  CV: regular rhythm, normal rate, no rub, no murmur  EDEMA: Left lower extremity was bandaged with Ace wrap.  ABDOMEN:  soft, nondistended, nontender, bowel sounds normal  MS: extremities normal - no gross deformities noted, no evidence of inflammation in joints, no muscle tenderness  SKIN: no rash    Data   All labs reviewed by me.  CMP  Recent Labs   Lab 10/15/19  0702 10/14/19  0704 10/13/19  0727 10/12/19  0924  10/11/19  1413    141 139 138   < > 136   POTASSIUM 3.4 3.4 3.5 3.6   < > 4.7   CHLORIDE 112* 112* 108 107   < > 103   CO2 20 20 22 23   < > 24   ANIONGAP 9 8 9 8   < > 9   GLC 88 92 86 88   < > 94   BUN 44* 51* 50* 58*   < > 61*   CR 3.74* 4.23* 4.38* 5.03*   < > 5.47*   GFRESTIMATED 14* 12* 12* 10*   < > 9*   GFRESTBLACK 17* 14* 14* 12*   < > 10*   ELEAZAR 8.9 8.4* 8.4* 8.3*   < > 8.7   PROTTOTAL  --   --   --   --   --  7.0   ALBUMIN  --   --   --   --   --  3.3*   BILITOTAL  --   --   --   --   --  1.8*   ALKPHOS  --   --   --   --   --  230*   AST  --   --   --   --   --  16   ALT  --   --   --   --   --  21    < > = values in this interval not displayed.     CBC  Recent Labs   Lab 10/15/19  0702 10/14/19  0704 10/13/19  0727 10/12/19  0924   HGB 10.2* 9.6* 10.0* 9.6*   WBC 6.5 6.3 6.8 7.1   RBC 3.42* 3.29* 3.41* 3.25*   HCT 32.1* 30.9* 31.7* 30.3*   MCV 94 94 93 93   MCH 29.8 29.2 29.3 29.5   MCHC 31.8 31.1* 31.5 31.7   RDW 14.2 14.1 14.1 13.9    294 292 298     INR  Recent Labs   Lab 10/15/19  0702 10/14/19  0704 10/13/19  1132 10/12/19  1358   INR 1.59* 2.05* 4.21* 4.45*     ABGNo lab results found in last 7 days.   Urine Studies  Recent Labs   Lab Test 10/11/19  1516   COLOR Yellow   APPEARANCE Clear   URINEGLC Negative   URINEBILI Negative   URINEKETONE Negative   SG 1.014   UBLD Large*   URINEPH 7.0   PROTEIN 50*   NITRITE Negative   LEUKEST Moderate*   RBCU 88*   WBCU 30*     Recent Labs   Lab Test 10/13/19  2140   UTPG 1.08*     PTH  No lab results found.  Iron Studies  No lab results found.    IMAGING:  All imaging studies reviewed by me.

## 2019-10-15 NOTE — PLAN OF CARE
" HD #4 Matheus White is a 79 year old man with CKD3 s/p DDKT, HTN, Afib on warfarin and diltiazem, who presents with cellulitis after an acute injury and found to have DEB on CKD. (Md note)     A&O. Hypertensive but within defined parameters.Tolerating regular diet w/ no complaints of N&V. Vitals stable. Voiding adequates amount of urine at urinal. Up with x1 assist and walker. PIV infusing NS @ 100 ml/hr. Left leg sore to touch and is red.  Declined scheduled 1400 tylenol. He stated \"When I dont move my left leg I dont feel pain at all\". Plan is to go down to IR this evening to drain left leg.   "

## 2019-10-15 NOTE — PLAN OF CARE
/75   Pulse 79   Temp 97.5  F (36.4  C) (Oral)   Resp 16   Wt 88.3 kg (194 lb 11.2 oz)   SpO2 95%   BMI 27.16 kg/m      Time: 6067-1904  Status:pt admitted for LLE injury related erythema and swelling.    Neuro:  A&Ox4, some confusion overnight. Bed alarm on for safety.   Vitals: vs stable on RA and afebrile.   Activity: One assist/SBA for transfer using a walker.   Pain: pt reported tolerable pain level when at rest. Pain increases with activity.   Cardiac: WNL, denied chest pain.   Respiratory: wnl  GI/: no bm this shift. Audible bowel sound. Voided using a urinal and bathroom with one assist.   Diet: regular   Skin: redness/swelling to LLE and RE from iv infiltration. Seen by MD.   LDAs: PIV to right forearm.   Labs/Imaging: creatine 4.23.  New change for this shift: patient got disoriented and tried to get up out of bed and tried to pull out iv. Patient was disoriented for place, situation, and time. Assisted to the bathroom per his request. Patient was able to recall for his reason of admission being in hospital. Couldn't figured out how got disoriented. Bed alarm turned on for safety and educated for safety measures.   Plan: continue on iv abx. Revaluate today if fluid draining is needed to left lower leg per ct result. Follow POC and contact team for any changes.

## 2019-10-15 NOTE — CONSULTS
Patient is on IR schedule 10/15/2019 for a left shin fluid collection aspiration.   No NPO required.  Consent will be done prior to procedure.     Please contact the IR charge RN at [phone] for estimated time of procedure.     Case discussed with Dr. Riojas from IR and page out to Dr. Lerman. This is an 79 year old man with CKD3 s/p DDKT, HTN, Afib on warfarin and diltiazem, who presents with cellulitis after an acute injury and was found to have DEB on CKD. Creatinine stable to minimally improved today, LLE with slightly improved pain and erythema currently hemodynamically stable on vancomycin and cephalexin. Surgery was consulted for I&D which they did not feel was indicated because on their exam the LLE is improving with the current regiment. Primary team reports the LLE is not improving on exam and feels this area could be infected. They are therefore requesting an aspiration of a sample from this collection.    Diagnostic lab orders need to be entered before the patient will be brought to IR. To be entered by Dr. Alison Lerman.    Brisa Rubio DNP, APRN  Interventional Radiology  Pager: [phone]

## 2019-10-16 ENCOUNTER — APPOINTMENT (OUTPATIENT)
Dept: GENERAL RADIOLOGY | Facility: CLINIC | Age: 79
DRG: 698 | End: 2019-10-16
Attending: INTERNAL MEDICINE
Payer: MEDICARE

## 2019-10-16 ENCOUNTER — APPOINTMENT (OUTPATIENT)
Dept: CT IMAGING | Facility: CLINIC | Age: 79
DRG: 698 | End: 2019-10-16
Attending: INTERNAL MEDICINE
Payer: MEDICARE

## 2019-10-16 LAB
ALBUMIN UR-MCNC: 30 MG/DL
ANION GAP SERPL CALCULATED.3IONS-SCNC: 10 MMOL/L (ref 3–14)
APPEARANCE UR: CLEAR
BILIRUB UR QL STRIP: NEGATIVE
BUN SERPL-MCNC: 42 MG/DL (ref 7–30)
CALCIUM SERPL-MCNC: 9 MG/DL (ref 8.5–10.1)
CHLORIDE SERPL-SCNC: 111 MMOL/L (ref 94–109)
CO2 SERPL-SCNC: 21 MMOL/L (ref 20–32)
COLOR UR AUTO: YELLOW
CREAT SERPL-MCNC: 3.66 MG/DL (ref 0.66–1.25)
ERYTHROCYTE [DISTWIDTH] IN BLOOD BY AUTOMATED COUNT: 14.2 % (ref 10–15)
GFR SERPL CREATININE-BSD FRML MDRD: 15 ML/MIN/{1.73_M2}
GLUCOSE SERPL-MCNC: 86 MG/DL (ref 70–99)
GLUCOSE UR STRIP-MCNC: 30 MG/DL
HCT VFR BLD AUTO: 30.9 % (ref 40–53)
HGB BLD-MCNC: 9.8 G/DL (ref 13.3–17.7)
HGB UR QL STRIP: ABNORMAL
INR PPP: 1.5 (ref 0.86–1.14)
KETONES UR STRIP-MCNC: NEGATIVE MG/DL
LEUKOCYTE ESTERASE UR QL STRIP: ABNORMAL
MCH RBC QN AUTO: 29.4 PG (ref 26.5–33)
MCHC RBC AUTO-ENTMCNC: 31.7 G/DL (ref 31.5–36.5)
MCV RBC AUTO: 93 FL (ref 78–100)
NITRATE UR QL: NEGATIVE
PH UR STRIP: 6.5 PH (ref 5–7)
PLATELET # BLD AUTO: 263 10E9/L (ref 150–450)
POTASSIUM SERPL-SCNC: 3.1 MMOL/L (ref 3.4–5.3)
RBC # BLD AUTO: 3.33 10E12/L (ref 4.4–5.9)
RBC #/AREA URNS AUTO: 5 /HPF (ref 0–2)
SODIUM SERPL-SCNC: 142 MMOL/L (ref 133–144)
SOURCE: ABNORMAL
SP GR UR STRIP: 1.01 (ref 1–1.03)
UROBILINOGEN UR STRIP-MCNC: NORMAL MG/DL (ref 0–2)
VANCOMYCIN SERPL-MCNC: 11.7 MG/L
WBC # BLD AUTO: 5.3 10E9/L (ref 4–11)
WBC #/AREA URNS AUTO: 3 /HPF (ref 0–5)

## 2019-10-16 PROCEDURE — 80202 ASSAY OF VANCOMYCIN: CPT | Performed by: STUDENT IN AN ORGANIZED HEALTH CARE EDUCATION/TRAINING PROGRAM

## 2019-10-16 PROCEDURE — 12000001 ZZH R&B MED SURG/OB UMMC

## 2019-10-16 PROCEDURE — 99233 SBSQ HOSP IP/OBS HIGH 50: CPT | Mod: GC | Performed by: STUDENT IN AN ORGANIZED HEALTH CARE EDUCATION/TRAINING PROGRAM

## 2019-10-16 PROCEDURE — 81001 URINALYSIS AUTO W/SCOPE: CPT | Performed by: STUDENT IN AN ORGANIZED HEALTH CARE EDUCATION/TRAINING PROGRAM

## 2019-10-16 PROCEDURE — 25000128 H RX IP 250 OP 636: Performed by: STUDENT IN AN ORGANIZED HEALTH CARE EDUCATION/TRAINING PROGRAM

## 2019-10-16 PROCEDURE — 85027 COMPLETE CBC AUTOMATED: CPT | Performed by: STUDENT IN AN ORGANIZED HEALTH CARE EDUCATION/TRAINING PROGRAM

## 2019-10-16 PROCEDURE — 80048 BASIC METABOLIC PNL TOTAL CA: CPT | Performed by: STUDENT IN AN ORGANIZED HEALTH CARE EDUCATION/TRAINING PROGRAM

## 2019-10-16 PROCEDURE — 36415 COLL VENOUS BLD VENIPUNCTURE: CPT | Performed by: STUDENT IN AN ORGANIZED HEALTH CARE EDUCATION/TRAINING PROGRAM

## 2019-10-16 PROCEDURE — 71045 X-RAY EXAM CHEST 1 VIEW: CPT

## 2019-10-16 PROCEDURE — 25000132 ZZH RX MED GY IP 250 OP 250 PS 637: Mod: GY | Performed by: STUDENT IN AN ORGANIZED HEALTH CARE EDUCATION/TRAINING PROGRAM

## 2019-10-16 PROCEDURE — 40000141 ZZH STATISTIC PERIPHERAL IV START W/O US GUIDANCE

## 2019-10-16 PROCEDURE — 25000131 ZZH RX MED GY IP 250 OP 636 PS 637: Mod: GY | Performed by: STUDENT IN AN ORGANIZED HEALTH CARE EDUCATION/TRAINING PROGRAM

## 2019-10-16 PROCEDURE — 25800030 ZZH RX IP 258 OP 636: Performed by: STUDENT IN AN ORGANIZED HEALTH CARE EDUCATION/TRAINING PROGRAM

## 2019-10-16 PROCEDURE — 73700 CT LOWER EXTREMITY W/O DYE: CPT | Mod: LT

## 2019-10-16 PROCEDURE — 85610 PROTHROMBIN TIME: CPT | Performed by: STUDENT IN AN ORGANIZED HEALTH CARE EDUCATION/TRAINING PROGRAM

## 2019-10-16 RX ORDER — OXYCODONE HYDROCHLORIDE 5 MG/1
5 TABLET ORAL EVERY 6 HOURS PRN
Status: DISCONTINUED | OUTPATIENT
Start: 2019-10-16 | End: 2019-10-22 | Stop reason: HOSPADM

## 2019-10-16 RX ORDER — POTASSIUM CHLORIDE 750 MG/1
40 TABLET, EXTENDED RELEASE ORAL ONCE
Status: COMPLETED | OUTPATIENT
Start: 2019-10-16 | End: 2019-10-16

## 2019-10-16 RX ORDER — CEFEPIME HYDROCHLORIDE 1 G/1
1 INJECTION, POWDER, FOR SOLUTION INTRAMUSCULAR; INTRAVENOUS EVERY 24 HOURS
Status: DISCONTINUED | OUTPATIENT
Start: 2019-10-16 | End: 2019-10-20

## 2019-10-16 RX ORDER — WARFARIN SODIUM 3 MG/1
3 TABLET ORAL
Status: COMPLETED | OUTPATIENT
Start: 2019-10-16 | End: 2019-10-16

## 2019-10-16 RX ADMIN — GABAPENTIN 300 MG: 300 CAPSULE ORAL at 22:44

## 2019-10-16 RX ADMIN — WARFARIN SODIUM 3 MG: 3 TABLET ORAL at 18:36

## 2019-10-16 RX ADMIN — CEFEPIME HYDROCHLORIDE 1 G: 1 INJECTION, POWDER, FOR SOLUTION INTRAMUSCULAR; INTRAVENOUS at 13:18

## 2019-10-16 RX ADMIN — ACETAMINOPHEN 1000 MG: 500 TABLET, FILM COATED ORAL at 20:10

## 2019-10-16 RX ADMIN — MYCOPHENOLATE MOFETIL 500 MG: 500 TABLET ORAL at 18:36

## 2019-10-16 RX ADMIN — DILTIAZEM HYDROCHLORIDE 180 MG: 180 CAPSULE, COATED, EXTENDED RELEASE ORAL at 08:24

## 2019-10-16 RX ADMIN — MYCOPHENOLATE MOFETIL 500 MG: 500 TABLET ORAL at 08:55

## 2019-10-16 RX ADMIN — ACETAMINOPHEN 1000 MG: 500 TABLET, FILM COATED ORAL at 04:23

## 2019-10-16 RX ADMIN — ACETAMINOPHEN 1000 MG: 500 TABLET, FILM COATED ORAL at 13:18

## 2019-10-16 RX ADMIN — CYCLOSPORINE 50 MG: 25 CAPSULE, LIQUID FILLED ORAL at 08:55

## 2019-10-16 RX ADMIN — POTASSIUM CHLORIDE 40 MEQ: 750 TABLET, EXTENDED RELEASE ORAL at 11:57

## 2019-10-16 RX ADMIN — CYCLOSPORINE 50 MG: 25 CAPSULE, LIQUID FILLED ORAL at 18:36

## 2019-10-16 RX ADMIN — VANCOMYCIN HYDROCHLORIDE 1250 MG: 10 INJECTION, POWDER, LYOPHILIZED, FOR SOLUTION INTRAVENOUS at 09:29

## 2019-10-16 ASSESSMENT — ACTIVITIES OF DAILY LIVING (ADL)
ADLS_ACUITY_SCORE: 14

## 2019-10-16 ASSESSMENT — PAIN DESCRIPTION - DESCRIPTORS
DESCRIPTORS: TENDER
DESCRIPTORS: TENDER

## 2019-10-16 NOTE — PROGRESS NOTES
Physician Attestation   I, Mell Garcia, was present with the medical student who participated in the service and in the documentation of the note.  I have verified the history and personally performed the physical exam and medical decision making.  I agree with the assessment and plan of care as documented in the note.      I personally reviewed vital signs, medications, labs and imaging.    Leg pain and redness worse today, monitoring culture from IR aspiration. Broadening empirically to vanc/cefepime. Plan to get CT of leg to eval fluid collection.     Mell Garcia MD  Date of Service (when I saw the patient): 10/16/19    [facility], [place]    Progress Note - [facility] 1 Service        Date of Admission:  10/11/2019    Assessment & Plan   Matheus White is a 79 year old man with CKD3 s/p DDKT, HTN, Afib on warfarin and diltiazem, who was admitted 6 days ago with cellulitis of the left leg after an acute injury and was found to have DEB on CKD. Creatinine stable to minimally improved today, LLE with minimally improved pain and erythema currently hemodynamically stable on vancomycin and cefepime.     Changes Today:     - Following fluid cultures from aspiration done 10/15  - CXR for sudden onset dyspnea and tachypnea  - CT scan of left lower extremity for worsening cellulitis  - Broaden antibiotics to cefepime 1g q24h (renally dosed per pharmacy), continue vancomycin 1250 mg IV  - Repeat urinalysis  - Start oxycodone 5 mg q6h for pain management  - Surgery signing off, appreciate recommendations  - Monitoring INR, 5 mg PO vitamin K given 10/13 for reversal of supratherapeutic INR  - Creatinine with minimal improvement  - K+ replacement    Dyspnea   Pulmonary Edema  Currently stable. Patient became dyspneic and tachypneic today with O2 sat 96%, started on 2 L O2 via nasal cannula and respiratory status improved. CXR showed bilateral opacities, consistent with pulmonary edema and a  right sided pleural effusion. Etiology is likely related to volume overload from IV fluids, would expect to improve with post-ATN diuresis.  - CXR today  - if respiratory status worsens, consider IV diuresis and BIPAP    Cellulitis of Left Lower Extremity  WBC 5.3. CRP 29.   Stable to worsening. Redness appears to be receding however tenderness around the focal area of swelling is worse. Injury to LLE 1 week prior to admission, assessed at urgent care, XR negative for fracture, topical bacitracin for wound given and discharged. S/p one dose Zosyn in ED and one time renally-dosed vancomycin, switched to PO doxycycline and cephalexin on 10/12. Some interval improvement in edema and erythema from ankle to mid-shin, however increased tenderness today over the focal area of swelling on anterior surface of shin.  Per surgery evaluation, no indication for incision and drainage at this time due to the risk of open wound in a patient with delayed healing. IR evaluated, 3 ml of fluid drained, most likely consistent with a hematoma, fluid cultures negative so far.  - Continue vancomycin 1250 mg IV on 10/14 and start cefepime 1g q24h IV given immunosuppressed status for pseudomonal coverage  - IR consulted for fluid aspiration done 10/15  - Surgery signing off, recommending continuing to treat cellulitis medically, keeping leg wrapped and elevated  - Start oxycodone 5mg q6h and cont Tylenol 1000mg TID for pain management  - Crypto testing negative, ordered per nephrology recs     DEB on CKD3   Donor Kidney Transplant  Microhematuria  DDKT on 2009. BUN 42; Cr 5.4 on presentation, down to 3.66  today 10/16.  Baseline creatinine 1.5 (last creatinine from 2019 = 1.41). Dry weight 175 lbs; now 194 lbs. Cyclosporine level within therapeutic range. Good urine output. Repeat UA 10/16 with with 5 RBC and 3 WBC. Renal ultrasound showed mild hydronephrosis. Nephrology following, most concerned for pre-renal etiology  or ATN, and less likely glomerulonephritis, urinary tract infection, or other obstruction considering degree of hydronephrosis. Supratherapeutic INR may be contributing to microhematuria at admission.  - Nephrology following, appreciate recs  - Repeat urinalysis   - K+ replacement 40 mEq PO  - Trend BMP  - Continue PTA immunosuppression              - 50mg cyclosporin BID              - 500mg mycophenolate BID  - Monitor vancomycin levels (troughs)  - Avoid nephrotoxins     Atrial Fibrillation  INR 1.50. Patient has had diagnosis of Afib since atleast 2009 diagnosed by Holter monitor. Tracing shows atrial fibrillation with PVCs. Pulse currently <130 and will monitor symptoms and vitals.  - Continue cardizem 180 mg   - Supratherapeutic INR, holding warfarin, will reassess daily, plan to restart warfarin 10/17  - s/p 5mg PO vitamin K on 10/13  for reversal, recheck in am     Hypertension  Stable. Patient has recorded history of hypertension. Currently not on any home anti-hypertensives. SBPs in 170's early in admission, now improved to 130's.   - Monitor blood pressures, no new antihypertensives added at this time  - Patient is new to [place] and will need a primary care provider.     Diet: Regular Diet Adult    Fluids: none  DVT Prophylaxis: Warfarin  Olvera Catheter: not present  Code Status: Full Code      Disposition Plan   Expected discharge: 2 - 3 days, recommended to prior living arrangement once antibiotic plan established.  Entered: Ki Weems 10/16/2019, 8:28 AM     The patient's care was discussed with the Attending Physician, Dr. Garcia.    Ki Weems  Medical Student  [facility] 1 Service  [facility], [place]  Pager: [phone]  Please see sticky note for cross cover information  ______________________________________________________________________    Interval History   No acute events overnight. Pain in left leg is worse and patient reports it interrupted his sleep. He did not  have any confusion or disorientation overnight. He did not have any chest pain or shortness of breath overnight. No abdominal pain or fevers/chills. This morning became lethargic with sudden onset dyspnea, improved with 2L O2 nasal cannula, currently stable on room air.     Data reviewed today: I reviewed all medications, new labs and imaging results over the last 24 hours. I personally reviewed the CXR image(s) showing bilateral opacities consistent with pulmonary edema and right pleural effusion and CT of the left leg showing a pretibial fluid collection consistent with a hematoma. .    Physical Exam   Vital Signs: Temp: 96.9  F (36.1  C) Temp src: Oral BP: (!) 153/76 Pulse: 80 Heart Rate: 76 Resp: 18 SpO2: 93 % O2 Device: None (Room air)    Weight: 194 lbs 11.2 oz  General Appearance:  Awake, alert, cooperative, no apparent distress  Respiratory: No increased work of breathing, clear to auscultation bilaterally, no crackles or wheezes  Cardiovascular: Irregularly irregular rhythm, normal S1 and S2 with no murmurs  GI: Abdomen is soft, non-distended, non-tender, no masses, no hepatosplenomegaly  Skin: Marked erythema and warmth in LLE, focused in the anterior shin slightly above the ankle to slightly below the knee.  Focal 3cm x 3cm area of swelling on anterior portion of shin that is the most tender to palpation with nearby two small areas of skin breakdown with overlying crust, no active bleeding or drainage. +1 PE of the LLE, none in RLE. No pain to palpation in calf of RLE, erythema improving in posterior portion.  Neurologic: Oriented to name, place and time.     Data   Recent Results (from the past 24 hour(s))   IR Fine Needle Aspiration w Imaging    Narrative    PRE-PROCEDURE DIAGNOSIS: Left shin fluid collection    PROCEDURE: IR FINE NEEDLE ASPIRATION W ULTRASOUND    Impression    IMPRESSION: Completed ultrasound-guided left anterior lower leg  aspiration. A total of 3 mL thick dark purple fluid aspirated  "from  left anterior shin consistent with resolving hematoma. No immediate  complication.      ----------    CLINICAL HISTORY: Patient with history of atrial fibrillation on  warfarin and recent trauma to left anterior shin hitting it on the  site of a bed now with area of swelling and likely cellulitis.  Internal medicine has consulted IR for an aspiration of left anterior  shin fluid collection which was seen on ultrasound a few days ago. The  patient reports the \"lump\" has improved over the past 2 days.    PERFORMED BY: Shaheed Howard PA-C    CONSENT: Written informed consent was obtained and is documented in  the patient record.    MEDICATIONS: 2 mL 1% lidocaine subcutaneous     DESCRIPTION: Fluid collection was identified on limited ultrasound  exam of the left anterior shin immediately underneath scab and picture  is documented in the patient's record. The left anterior shin was  prepped and draped in the usual sterile fashion. Local anesthesia was  achieved. Under ultrasound guidance, a 5-Azeri centesis  needle/catheter was advanced into the fluid collection. Needle was  removed. Fluid was aspirated with difficulty. Catheter was removed on  completion of drainage and sterile dressing was applied.     COMPLICATIONS: No immediate concerns, the patient remained stable  throughout the procedure and tolerated it well.    ESTIMATED BLOOD LOSS: Minimal    SPECIMENS: None    KRISTINA HOWARD PA-C   XR Chest Port 1 View    Narrative    EXAM: XR CHEST PORT 1 VW  10/16/2019 12:53 PM     HISTORY:  New shortness of breath and wheezing. R arm swelling with  concern for thrombus.       COMPARISON:  Chest x-ray 4/8/2009    FINDINGS:   Left costophrenic sulcus is collimated.  The trachea is midline. The cardiomediastinal silhouette is normal.  The pulmonary vasculature are mildly prominent.    The included osseous thorax, soft tissues and upper abdomen and are  within normal limits.     Increased mixed bilateral interstitial/patchy " airspace opacities.  Small right pleural effusion.      Impression    IMPRESSION: Bilateral mixed opacities and pulmonary vascular  prominence suggestive of pulmonary edema. Small right pleural  effusion.    I have personally reviewed the examination and initial interpretation  and I agree with the findings.    STEFANIA PONCE MD   CT Tibia Fibula Lower Leg Left wo Contrast    Narrative    EXAM: CT TIBIA FIBULA LOWER LEG LEFT WO CONTRAST  10/16/2019 2:05 PM      HISTORY: Concern for worsening cellulitis and possible new collections  or growth of fluid collection    Additional history: Fluid collection visualized on ultrasound of the  anterior left calf.    COMPARISON: Ultrasound 10/13/2019    Technique:  Axial CT without the presence of intravenous contrast was obtained of  the left lower extremity from the distal femur above the patella to  the forefoot with sagittal and coronal reconstructions in bone and  soft tissue windows. Axial thin slices were reviewed.     Contrast: None    FINDINGS:     Dense area of pretibial subcutaneous thickening overlying the distal  third of the left tibial diaphysis measuring 1 cm deep, 2 cm wide, and  about 11 cm in craniocaudal dimension, although exact dimensions are  difficult to determine without the presence of IV contrast. There is  no central hypodensity. Mild stranding to the overlying dermis  consistent with edema. The tibial cortex is intact without evidence of  periostitis.    The remainder of the soft tissues and bones are unremarkable. No  significant degenerative changes to the knee or ankle joints.  Extensive popliteal and infrapopliteal vascular calcifications.  Probable bone island distal fibula      Impression    IMPRESSION:     1. Left pretibial fluid collection with mild surrounding soft tissue  edema, considerations include hematoma versus phlegmon/abscess. Soft  tissue tumor much less likely, follow-up to document resolution.  2.Extensive vascular  calcifications.    I have personally reviewed the examination and initial interpretation  and I agree with the findings.    IVÁN CORDERO MD

## 2019-10-16 NOTE — PLAN OF CARE
AVSS. Pt reports left leg pain, given scheduled tylenol with some improvement. Denies nausea, on a regular diet. Denies passing flatus and states his last BM was about a day ago. Voiding large amounts. Up with assist of one and a walker. Left leg with erythema and edema that is unchanged. Lymphedema wraps in place on BLE. Pt is on IV ancef q 12 hours. Plan to discharge to prior living arrangement in 2-3 days pending antibiotic plan and renal function/creatinine. Continue to monitor pain, s/s of infection and GI/ function.

## 2019-10-16 NOTE — PROVIDER NOTIFICATION
Notified provider via page [facility]  Pager [phone]  re: Patient's sudden complaint of having trouble breathing, Wheezing in lung and increased edema in right arm and lethargy.

## 2019-10-16 NOTE — PROGRESS NOTES
GENERAL SURGERY PROGRESS NOTE  [id number]  Matheus White Sr.    S: Patient went to IR for drainage of small fluid collection. 3cc of hematoma drained.    O:  BP (!) 153/76 (BP Location: Right arm)   Pulse 80   Temp 96.9  F (36.1  C) (Oral)   Resp 18   Wt 88.3 kg (194 lb 11.2 oz)   SpO2 93%   BMI 27.16 kg/m      Patient not seen today.     A/P: Matheus White Sr. is a 79 year old male with history of afib on warfarin, CKD status post DDKT, and hypertension who presents with LLE cellulitis after injury sustained while building a bed frame, now s/p IR drainage, 3cc hematoma evacuated.    - continue cares per primary  - no acute surgical intervention foreseeable.   - signing off, please call if new questions arise.    Will discuss with staff.    Quintin Richard MD, [facility]  Plastic Surgery PGY-1  Pager: [phone]

## 2019-10-16 NOTE — PHARMACY-ANTICOAGULATION SERVICE
Clinical Pharmacy - Warfarin Dosing Consult     Pharmacy has been consulted to manage this patient s warfarin therapy.  Indication: Atrial Fibrillation  Therapy Goal: INR 2-3  [facility]  Warfarin Prior to Admission: Yes  Warfarin PTA Regimen: was on 3mg daily, last dose 10/11 pta. NOTE pt received Vit K 5mg on 10/13.  Recent documented change in oral intake/nutrition: No    INR   Date Value Ref Range Status   10/16/2019 1.50 (H) 0.86 - 1.14 Final   10/15/2019 1.59 (H) 0.86 - 1.14 Final       Team wishes to restart warfarin conservatively given hematoma. Since pt received vit K, will give warfarin 3 mg today.  Pharmacy will monitor Matheus White Sr. daily and order warfarin doses to achieve specified goal.      Please contact pharmacy as soon as possible if the warfarin needs to be held for a procedure or if the warfarin goals change.    Veronica Figueredo, PharmD  [phone] (text capable)

## 2019-10-16 NOTE — PLAN OF CARE
OVSS, hypertensive but within parameters. Pain managed with scheduled tylenol. Denies nausea, little appetite this evening. Aspiration of LLE done in IR today. Puncture site CDI. LLE reddened, warm; wrapped with Ace bandage, LLE elevated. Up with 1 and walker. Voiding in urinal, adequate amounts. Pt resting comfortably. Continue POC.

## 2019-10-16 NOTE — PHARMACY-VANCOMYCIN DOSING SERVICE
Pharmacy Vancomycin Note  Date of Service 2019  Patient's  1940   79 year old, male    Indication: Abscess  Goal Trough Level: 10-15 mg/L  Day of Therapy: 3  Current Vancomycin regimen:  Intermittent dosing, last received 1250 mg IV on 10/14    Current estimated CrCl = CrCl cannot be calculated (Unknown ideal weight.).  (~19mL/min)    Creatinine for last 3 days  10/14/2019:  7:04 AM Creatinine 4.23 mg/dL  10/15/2019:  7:02 AM Creatinine 3.74 mg/dL  10/16/2019:  7:05 AM Creatinine 3.66 mg/dL    Recent Vancomycin Levels (past 3 days)  10/16/2019:  7:05 AM Vancomycin Level 11.7 mg/L (~44hr trough)    Vancomycin IV Administrations (past 72 hours)                   vancomycin 1250 mg in 0.9% NaCl 250 mL intermittent infusion 1,250 mg (mg) 1,250 mg Given 10/14/19 1134                Nephrotoxins and other renal medications (From now, onward)    Start     Dose/Rate Route Frequency Ordered Stop    10/16/19 0900  vancomycin 1250 mg in 0.9% NaCl 250 mL intermittent infusion 1,250 mg      1,250 mg  over 90 Minutes Intravenous ONCE 10/16/19 0806      10/14/19 1800  cycloSPORINE modified (GENERIC EQUIVALENT) capsule 50 mg      50 mg Oral 2 TIMES DAILY. 10/14/19 1454      10/14/19 1008  vancomycin place mckee - receiving intermittent dosing      1 each Intravenous SEE ADMIN INSTRUCTIONS 10/14/19 1009               Contrast Orders - past 72 hours (72h ago, onward)    None          Interpretation of levels and current regimen:  Trough level is  Therapeutic    Has serum creatinine changed > 50% in last 72 hours: No    Urine output:  good urine output    Renal Function: Improving but baseline creatinine ~1.4     Plan:  1.  Continue intermittent dosing based on level as renal function improves  2.  Pharmacy will check trough levels as appropriate in 1-3 Days.    3. Serum creatinine levels will be ordered daily for the first week of therapy and at least twice weekly for subsequent weeks.      Maddy Oliver,  AMILCAR        .

## 2019-10-16 NOTE — PLAN OF CARE
HD #5 Matheus White is a 79 year old man with CKD3 s/p DDKT, HTN, Afib on warfarin and diltiazem, who presents with cellulitis after an acute injury and found to have DEB on CKD. (Md note)     Pain managed with scheduled tylenol. Declined prn oxycodone when offered.    - This morning pt c/o of sudden shortness of breath. Despite o2 saturations at 96% on R/A. Wheezing ausculted in RUL of lung.  MD notified and came to assess pt at bedside and ordered chest x-ray. 2 L/ of Oxygen via nasal prongs applied for pt comfort. Pt now says that he feels his SOB has improved and is on r/a.  Md also notified of increased edema in right upper arm.     - Voiding adequate amount of urine. Up with x1 assist and walker. Tolerating regular diet with no complaints of N&V. 1 PIV in place.    - Pt can be forgetful about plan of care so please remind patient of care goals. Continue to monitor vitals, and left leg.

## 2019-10-16 NOTE — PLAN OF CARE
PT-7C- Cancel, upon AM attempt, pt reports SOB, RN communicating with MD team as pts RUE also appearing more edematous.

## 2019-10-17 PROBLEM — L03.116 CELLULITIS OF LEFT LOWER EXTREMITY: Status: ACTIVE | Noted: 2019-10-17

## 2019-10-17 PROBLEM — I10 HTN (HYPERTENSION): Status: ACTIVE | Noted: 2019-06-24

## 2019-10-17 PROBLEM — I48.21 PERMANENT ATRIAL FIBRILLATION (H): Status: ACTIVE | Noted: 2019-06-24

## 2019-10-17 PROBLEM — Z79.01 ANTICOAGULATION MONITORING, INR RANGE 2-3: Status: ACTIVE | Noted: 2019-06-24

## 2019-10-17 PROBLEM — Z94.0 HISTORY OF RENAL TRANSPLANT: Status: ACTIVE | Noted: 2019-06-24

## 2019-10-17 LAB
ANION GAP SERPL CALCULATED.3IONS-SCNC: 9 MMOL/L (ref 3–14)
BACTERIA SPEC CULT: NO GROWTH
BACTERIA SPEC CULT: NO GROWTH
BUN SERPL-MCNC: 36 MG/DL (ref 7–30)
CALCIUM SERPL-MCNC: 9.2 MG/DL (ref 8.5–10.1)
CHLORIDE SERPL-SCNC: 111 MMOL/L (ref 94–109)
CO2 SERPL-SCNC: 22 MMOL/L (ref 20–32)
CREAT SERPL-MCNC: 3.21 MG/DL (ref 0.66–1.25)
ERYTHROCYTE [DISTWIDTH] IN BLOOD BY AUTOMATED COUNT: 14.2 % (ref 10–15)
GFR SERPL CREATININE-BSD FRML MDRD: 17 ML/MIN/{1.73_M2}
GLUCOSE SERPL-MCNC: 95 MG/DL (ref 70–99)
HCT VFR BLD AUTO: 31.9 % (ref 40–53)
HGB BLD-MCNC: 10 G/DL (ref 13.3–17.7)
INR PPP: 1.48 (ref 0.86–1.14)
Lab: NORMAL
Lab: NORMAL
MCH RBC QN AUTO: 29.2 PG (ref 26.5–33)
MCHC RBC AUTO-ENTMCNC: 31.3 G/DL (ref 31.5–36.5)
MCV RBC AUTO: 93 FL (ref 78–100)
PLATELET # BLD AUTO: 255 10E9/L (ref 150–450)
POTASSIUM SERPL-SCNC: 3.3 MMOL/L (ref 3.4–5.3)
RBC # BLD AUTO: 3.42 10E12/L (ref 4.4–5.9)
SODIUM SERPL-SCNC: 142 MMOL/L (ref 133–144)
SPECIMEN SOURCE: NORMAL
SPECIMEN SOURCE: NORMAL
WBC # BLD AUTO: 6.5 10E9/L (ref 4–11)

## 2019-10-17 PROCEDURE — 85027 COMPLETE CBC AUTOMATED: CPT | Performed by: STUDENT IN AN ORGANIZED HEALTH CARE EDUCATION/TRAINING PROGRAM

## 2019-10-17 PROCEDURE — 99233 SBSQ HOSP IP/OBS HIGH 50: CPT | Mod: GC | Performed by: STUDENT IN AN ORGANIZED HEALTH CARE EDUCATION/TRAINING PROGRAM

## 2019-10-17 PROCEDURE — 40000556 ZZH STATISTIC PERIPHERAL IV START W US GUIDANCE

## 2019-10-17 PROCEDURE — 25000131 ZZH RX MED GY IP 250 OP 636 PS 637: Mod: GY | Performed by: STUDENT IN AN ORGANIZED HEALTH CARE EDUCATION/TRAINING PROGRAM

## 2019-10-17 PROCEDURE — 25000132 ZZH RX MED GY IP 250 OP 250 PS 637: Mod: GY | Performed by: STUDENT IN AN ORGANIZED HEALTH CARE EDUCATION/TRAINING PROGRAM

## 2019-10-17 PROCEDURE — 80048 BASIC METABOLIC PNL TOTAL CA: CPT | Performed by: STUDENT IN AN ORGANIZED HEALTH CARE EDUCATION/TRAINING PROGRAM

## 2019-10-17 PROCEDURE — 36415 COLL VENOUS BLD VENIPUNCTURE: CPT | Performed by: STUDENT IN AN ORGANIZED HEALTH CARE EDUCATION/TRAINING PROGRAM

## 2019-10-17 PROCEDURE — 25000128 H RX IP 250 OP 636: Performed by: STUDENT IN AN ORGANIZED HEALTH CARE EDUCATION/TRAINING PROGRAM

## 2019-10-17 PROCEDURE — 12000001 ZZH R&B MED SURG/OB UMMC

## 2019-10-17 PROCEDURE — 85610 PROTHROMBIN TIME: CPT | Performed by: STUDENT IN AN ORGANIZED HEALTH CARE EDUCATION/TRAINING PROGRAM

## 2019-10-17 RX ORDER — WARFARIN SODIUM 3 MG/1
3 TABLET ORAL
Status: COMPLETED | OUTPATIENT
Start: 2019-10-17 | End: 2019-10-17

## 2019-10-17 RX ADMIN — CYCLOSPORINE 50 MG: 25 CAPSULE, LIQUID FILLED ORAL at 18:11

## 2019-10-17 RX ADMIN — CYCLOSPORINE 50 MG: 25 CAPSULE, LIQUID FILLED ORAL at 08:37

## 2019-10-17 RX ADMIN — GABAPENTIN 300 MG: 300 CAPSULE ORAL at 20:00

## 2019-10-17 RX ADMIN — MYCOPHENOLATE MOFETIL 500 MG: 500 TABLET ORAL at 18:11

## 2019-10-17 RX ADMIN — WARFARIN SODIUM 3 MG: 3 TABLET ORAL at 18:11

## 2019-10-17 RX ADMIN — CEFEPIME HYDROCHLORIDE 1 G: 1 INJECTION, POWDER, FOR SOLUTION INTRAMUSCULAR; INTRAVENOUS at 12:18

## 2019-10-17 RX ADMIN — ACETAMINOPHEN 1000 MG: 500 TABLET, FILM COATED ORAL at 08:38

## 2019-10-17 RX ADMIN — MYCOPHENOLATE MOFETIL 500 MG: 500 TABLET ORAL at 08:37

## 2019-10-17 RX ADMIN — DILTIAZEM HYDROCHLORIDE 180 MG: 180 CAPSULE, COATED, EXTENDED RELEASE ORAL at 08:37

## 2019-10-17 ASSESSMENT — ACTIVITIES OF DAILY LIVING (ADL)
ADLS_ACUITY_SCORE: 14

## 2019-10-17 ASSESSMENT — PAIN DESCRIPTION - DESCRIPTORS: DESCRIPTORS: TENDER

## 2019-10-17 NOTE — PROGRESS NOTES
[facility], [place]   Transplant Nephrology Progress Note  Date of Admission:  10/11/2019  Today's Date: 10/17/2019    Assessment & Plan     # DDKT: DEB with creatinine peaked at 5.4 from 1.6 mg / dL in August 2019. Slowly improving. Working diagnosis is DEB in the settings of cellulitis. Creatinine is slowly improving               - Baseline Cr ~ 1.5-1.8 mg /dL              - Proteinuria: Not checked recently              - Date DSA Last Checked: not checked recently              - BK Viremia: Not checked recently              - Kidney Tx Biopsy: No      # Immunosuppression: Cyclosporine (goal 50-75) and Mycophenolate mofetil (goal not followed)              - Changes: discussed resuming his home regimen and checking his levels.      Follow up cyclo trough this am.      # Hypertension: Controlled; Goal BP: < 130/80              - Volume status: Euvolemic              - Changes: No     # 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: Hgb: Stable      AISHWARYA: No              - Iron studies: Not checked recently     # Mineral Bone Disorder:   - Secondary renal hyperparathyroidism; PTH level: Not checked recently  - Vitamin D; level: Not checked recently  - Calcium; level: Normal  - Phosphorus; level: Normal     # Electrolytes:   - Potassium; level: low advised gentle replacement   - Magnesium; level: Not checked recently  - Bicarbonate; level: Normal  - Sodium; level: Normal     # LE cellulitis: Currently on vancomycin and Ancef. Please consider transplant ID consult. Await aspiration results.      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      Josias Allen  MD Phil  Pager: [phone]    Interval History     Matheus was seen this morning. Feels slightly better but his cellulitis is not improving thus far.   Team is agreeable to consult transplant ID     Review of Systems   4 point ROS was obtained and negative except as noted in the Interval History.    MEDICATIONS:  Current Facility-Administered Medications   Medication     acetaminophen (TYLENOL) tablet 1,000 mg     ceFEPIme (MAXIPIME) 1g vial to attach to  ml bag for ADULTS or NS 50 ml bag for PEDS     cycloSPORINE modified (GENERIC EQUIVALENT) capsule 50 mg     diltiazem ER COATED BEADS (CARDIZEM CD/CARTIA XT) 24 hr capsule 180 mg     gabapentin (NEURONTIN) capsule 300 mg     influenza Vac Split High-Dose (FLUZONE) injection 0.5 mL     lidocaine (LMX4) cream     lidocaine 1 % 0.1-1 mL     melatonin tablet 1 mg     mycophenolate (GENERIC EQUIVALENT) tablet 500 mg     naloxone (NARCAN) injection 0.1-0.4 mg     oxyCODONE (ROXICODONE) tablet 5 mg     sodium chloride (PF) 0.9% PF flush 3 mL     sodium chloride (PF) 0.9% PF flush 3 mL     vancomycin place mckee - receiving intermittent dosing     warfarin ANTICOAGULANT (COUMADIN) tablet 3 mg     Warfarin Therapy Reminder (Check START DATE - warfarin may be starting in the FUTURE)       Physical Exam   Temp  Av.6  F (36.4  C)  Min: 96.3  F (35.7  C)  Max: 98.1  F (36.7  C)      Pulse  Av.5  Min: 66  Max: 124 Resp  Av.9  Min: 16  Max: 19  SpO2  Av.7 %  Min: 94 %  Max: 98 %     /66 (BP Location: Left arm)   Pulse 75   Temp 98.7  F (37.1  C) (Oral)   Resp 18   Wt 86.3 kg (190 lb 3.2 oz)   SpO2 95%   BMI 26.53 kg/m     Date 10/15/19 0700 - 10/16/19 0659   Shift 6973-3073 0262-4566 1645-6407 24 Hour Total   INTAKE   Shift Total(mL/kg)       OUTPUT   Urine 150   150   Shift Total(mL/kg) 150(1.7)   150(1.7)   Weight (kg) 88.31 88.31 88.31 88.31      Admit Weight: 86.3 kg (190 lb 3.2 oz)     GENERAL APPEARANCE: alert and no distress  HENT: mouth  without ulcers or lesions  LYMPHATICS: no cervical or supraclavicular nodes  RESP: lungs clear to auscultation - no rales, rhonchi or wheezes  CV: irregular rhythm, normal rate, no rub, no murmur  EDEMA: Left leg with significant erythema and area of swelling over the mid shin area.  Tenderness was noted and warmth on exam (largely unchanged)  ABDOMEN: soft, nondistended, nontender, bowel sounds normal  MS: extremities normal - no gross deformities noted, no evidence of inflammation in joints, no muscle tenderness  SKIN: no rash    Data   All labs reviewed by me.  CMP  Recent Labs   Lab 10/17/19  0740 10/16/19  0705 10/15/19  0702 10/14/19  0704  10/11/19  1413    142 141 141   < > 136   POTASSIUM 3.3* 3.1* 3.4 3.4   < > 4.7   CHLORIDE 111* 111* 112* 112*   < > 103   CO2 22 21 20 20   < > 24   ANIONGAP 9 10 9 8   < > 9   GLC 95 86 88 92   < > 94   BUN 36* 42* 44* 51*   < > 61*   CR 3.21* 3.66* 3.74* 4.23*   < > 5.47*   GFRESTIMATED 17* 15* 14* 12*   < > 9*   GFRESTBLACK 20* 17* 17* 14*   < > 10*   ELEAZAR 9.2 9.0 8.9 8.4*   < > 8.7   PROTTOTAL  --   --   --   --   --  7.0   ALBUMIN  --   --   --   --   --  3.3*   BILITOTAL  --   --   --   --   --  1.8*   ALKPHOS  --   --   --   --   --  230*   AST  --   --   --   --   --  16   ALT  --   --   --   --   --  21    < > = values in this interval not displayed.     CBC  Recent Labs   Lab 10/17/19  0740 10/16/19  0705 10/15/19  0702 10/14/19  0704   HGB 10.0* 9.8* 10.2* 9.6*   WBC 6.5 5.3 6.5 6.3   RBC 3.42* 3.33* 3.42* 3.29*   HCT 31.9* 30.9* 32.1* 30.9*   MCV 93 93 94 94   MCH 29.2 29.4 29.8 29.2   MCHC 31.3* 31.7 31.8 31.1*   RDW 14.2 14.2 14.2 14.1    263 274 294     INR  Recent Labs   Lab 10/17/19  0740 10/16/19  0705 10/15/19  0702 10/14/19  0704   INR 1.48* 1.50* 1.59* 2.05*     ABGNo lab results found in last 7 days.   Urine Studies  Recent Labs   Lab Test 10/16/19  1204 10/11/19  1516   COLOR Yellow Yellow   APPEARANCE Clear Clear   URINEGLC 30* Negative    URINEBILI Negative Negative   URINEKETONE Negative Negative   SG 1.010 1.014   UBLD Moderate* Large*   URINEPH 6.5 7.0   PROTEIN 30* 50*   NITRITE Negative Negative   LEUKEST Trace* Moderate*   RBCU 5* 88*   WBCU 3 30*     Recent Labs   Lab Test 10/13/19  2140   UTPG 1.08*     PTH  No lab results found.  Iron Studies  No lab results found.    IMAGING:  All imaging studies reviewed by me.

## 2019-10-17 NOTE — PLAN OF CARE
Afebrile, VSS on RA.  Alert and oriented x4.  Pain managed with scheduled Tylenol.  Denies nausea.  Tolerating regular diet, appetite good.  Left leg dressing CDI, wrapped with ace bandage, erythema unchanged.  Passing gas, no BM this shift.  Voiding adequate amount.  Up with assist of 1 and walker.  Continue plan of care.

## 2019-10-17 NOTE — PLAN OF CARE
PT-7C- Cancel, pt declined PT, pt reports poor sleep overnight, firmly declines despite education on importance of participating in PT and ambulation.

## 2019-10-17 NOTE — CONSULTS
[facility]  Transplant Infectious Disease Consult Note - New Patient     Patient:  Matheus White Sr., Date of birth 1940, Medical record number [id number]  Date of Visit:  10/17/2019  Consult requested by Dr. Lerman for evaluation of LLE cellulitis         Assessment and Recommendations:   Recommendations:  - Continue cefepime and vancomycin for now  - Awaiting results of cultures on hematoma aspirate    Assessment:  Matheus White is a 79 year old man with s/p DDKT in 2009, HTN, Afib on warfarin and diltiazem, who presents with cellulitis and hematoma after an acute injury and found to have DEB on CKD.     # LLE cellulitis with associated hematoma, suspected infected: He developed a cellulitis and hematoma at the site of trauma as of his right shin.  He received initial treatment with piperacillin tazobactam and vancomycin in the emergency department but then was transitioned to Keflex and doxycycline oral therapy.  He had worsening of his local symptoms  while on this regimen.  There are several possible factors that may contribute to this.  First, he may have needed additional IV therapy to achieve high sustained drug levels.  Second he may have needed additional source control.  Aspiration of the fluid collection revealed a hematoma with elevated white count to suggest that may have been infected.  His failure to improve may have related to inadequate antibiotic penetration into this collection.  Third possibility is the spectrum of antibiotics chosen.  His antibiotivsc have recently been broadened to include both pseudomonal and MRSA coverage and he shown some clinical response on this.  We did favor continuing him on this regimen for the time being waiting a longer period of sustained clinical improvement while we await his cultures.    - QTc interval: 465 msec on 10/11/2019  - PCP prophylaxis: None  - Viral serostatus & prophylaxis: CMV D-R+, EBV D+, none  - Gamma  "globulin status: not checked  - Isolation status: Good hand hygiene.    Staffed with Dr. Allan.    Dinorah Castillo MD, PhD  Adult & Pediatric Infectious Diseases Fellow PGY8, [facility]  Pager: [phone]        History of Infectious Disease Illness:   Matheus White Sr. is a 79 year old man with CKD s/p DDKT in 2009, HTN, atrial fibrillation who was admitted on 10/11/19 for left leg cellulitis and DEB on CKD. Patient dropped bed post one week prior to admission on leg with tenderness. Went to urgent care on 10/9. X-ray of leg negative for fracture and discharged with bacitracin and oxycodone. Leg increased in tenderness, redness, and warmth over the following days. He also had subjective fevers/chills and \"hallucinations\" at the time of presentation. Presented to [facility] ED on 10/11.  He reported decreased urine output but denied abdominal pain, hematuria, dysuria, nausea, vomiting, SOB, headache, chest pain, flank pain.  He was tachycardic on initial exam.  Labs in ED notable for DEB with cr 5.4 up from baseline 1.4. and received pip-tazo and vancomycin.     The admitting team switched him to cephalexin and doxycycline on hospital day 2 after the initial dose of piperacillin tazobactam and vancomycin.  His exam worsened during that time ultimately leading to a switch to ceftezole and and vancomycin on 10/14.  He continued to worsen leading to broadening of his antibiotics to cefepime and vancomycin on 10/16.  He has been afebrile throughout his stay with Tmax 98.7 in past 24h.  Creatinine gradually trending down to 3.21 today.  Normal WBCs on admission and throughout stay though lymphopenic at 0.4.  Admission blood and urine culture negative.  He was evaluated by surgery who declined to perform incision and drainage and said to have had an aspiration of the fluid collection performed by interventional radiology on 10/15 with removal of 3 ml of red fluid.  No organisms seen on Gram stain but 746 WBC with 93% " PMNs.  CRISTIANO reviewed chart on 10/17 and recommended stopping vancomycin and cefepime and starting cefazolin.  On interview today he reports that he has not felt like he has been improving until the last couple days.    Social history and risk factors reviewed.  Patient previously lived in [place], [place].  Recently moved back and is staying in [facility].  He has several pet cats but they have not directly been in contact with his wound.  He has not been in any lakes or pools.    Antibiotics  Cefepime 10/16-present  Vancomycin 10/11, 10/14, 10/16    Past  Pip-tazo 10/11  Cephalexin 10/12-10/14  Cefazolin 10/14-10/15  Doxycycline 10/12-10/14      Transplants:  3/5/2009 (Kidney), Postoperative day:  3878  Immunosuppression:    Past Medical History:   Diagnosis Date     Atrial fibrillation, permanent 04/2009     CKD (chronic kidney disease)      HTN (hypertension), benign      Past Surgical History:   Procedure Laterality Date     AS TRANSPLANT,PREP CADAVER RENAL GRAFT Right 04/2009     IR FINE NEEDLE ASPIRATION W ULTRASOUND  10/15/2019     Family History   Problem Relation Age of Onset     Emphysema Father      Social History     Socioeconomic History     Marital status: Single     Spouse name: Not on file     Number of children: Not on file     Years of education: Not on file     Highest education level: Not on file   Occupational History     Not on file   Social Needs     Financial resource strain: Not on file     Food insecurity:     Worry: Not on file     Inability: Not on file     Transportation needs:     Medical: Not on file     Non-medical: Not on file   Tobacco Use     Smoking status: Never Smoker     Smokeless tobacco: Never Used   Substance and Sexual Activity     Alcohol use: Yes     Frequency: Monthly or less     Drinks per session: 1 or 2     Drug use: Not on file     Sexual activity: Not Currently   Lifestyle     Physical activity:     Days per week: Not on file     Minutes per session: Not on  file     Stress: Not on file   Relationships     Social connections:     Talks on phone: Not on file     Gets together: Not on file     Attends Gnosticist service: Not on file     Active member of club or organization: Not on file     Attends meetings of clubs or organizations: Not on file     Relationship status: Not on file     Intimate partner violence:     Fear of current or ex partner: Not on file     Emotionally abused: Not on file     Physically abused: Not on file     Forced sexual activity: Not on file   Other Topics Concern     Not on file   Social History Narrative     Not on file     There is no immunization history for the selected administration types on file for this patient.  Patient Active Problem List   Diagnosis     Acute kidney injury superimposed on chronic kidney disease (H)            Review of Systems:   CONSTITUTIONAL:  No fevers or chills  EYES: negative for icterus  ENT:  negative for hearing loss, tinnitus and sore throat  RESPIRATORY:  negative for cough with sputum or dyspnea  CARDIOVASCULAR:  negative for chest pain  GASTROINTESTINAL:  negative for nausea, vomiting, diarrhea or constipation  GENITOURINARY:  negative for dysuria  HEME:  No easy bruising  INTEGUMENT:  negative for rash and pruritus  NEURO:  Negative for headache         Current Medications (antimicrobials listed in bold):       acetaminophen  1,000 mg Oral TID     ceFEPIme (MAXIPIME) IV  1 g Intravenous Q24H     cycloSPORINE modified  50 mg Oral BID IS     diltiazem ER COATED BEADS  180 mg Oral Daily     gabapentin  300 mg Oral At Bedtime     influenza Vac Split High-Dose  0.5 mL Intramuscular Prior to discharge     mycophenolate  500 mg Oral BID IS     sodium chloride (PF)  3 mL Intracatheter Q8H     vancomycin place mckee - receiving intermittent dosing  1 each Intravenous See Admin Instructions     warfarin ANTICOAGULANT  3 mg Oral ONCE at 18:00     Infusions:    Warfarin Therapy Reminder            Allergies:   No  Known Allergies       Physical Exam:   Vitals were reviewed.    Ranges for his vital signs:  Temp:  [97.2  F (36.2  C)-98.7  F (37.1  C)] 98.7  F (37.1  C)  Pulse:  [75] 75  Heart Rate:  [75-83] 76  Resp:  [16-18] 18  BP: (125-160)/(66-77) 125/66  SpO2:  [95 %-96 %] 95 %  Physical Examination:  GENERAL:  well-developed, well-nourished, in bed in no acute distress.  HEENT:  Head is normocephalic, atraumatic   EYES:  Eyes have anicteric sclerae without conjunctival injection  ENT:  Oropharynx is moist without exudates or ulcers. Tongue is midline  NECK:  Supple. No cervical lymphadenopathy  LUNGS:  Clear to auscultation bilateral.   CARDIOVASCULAR:  Regular rate and rhythm with no murmurs, gallops or rubs.  ABDOMEN:  Normal bowel sounds, soft, nontender. No appreciable hepatosplenomegaly.  SKIN:  Left leg with two dry lacerations and small puncture would at site of prior aspiration.  There is still a visible hematoma that is exquisitely tender to touch.  The surrounding skill is warm (see photo below).  MSK: Right arm swollen with significant ecchymoses at site of previous IV infiltration.  NEUROLOGIC:  Grossly nonfocal. Active x4 extremities               Laboratory Data:     Inflammatory Markers    Recent Labs   Lab Test 10/15/19  0702   CRP 29.0*     Immune Globulin Studies  No lab results found.  Metabolic Studies       Recent Labs   Lab Test 10/17/19  0740 10/16/19  0705 10/15/19  0702 10/14/19  0704 10/13/19  0727 10/12/19  0924    142 141 141 139 138   POTASSIUM 3.3* 3.1* 3.4 3.4 3.5 3.6   CHLORIDE 111* 111* 112* 112* 108 107   CO2 22 21 20 20 22 23   ANIONGAP 9 10 9 8 9 8   BUN 36* 42* 44* 51* 50* 58*   CR 3.21* 3.66* 3.74* 4.23* 4.38* 5.03*   GFRESTIMATED 17* 15* 14* 12* 12* 10*   GLC 95 86 88 92 86 88   ELEAZAR 9.2 9.0 8.9 8.4* 8.4* 8.3*     Hepatic Studies    Recent Labs   Lab Test 10/11/19  1413   BILITOTAL 1.8*   ALKPHOS 230*   ALBUMIN 3.3*   AST 16   ALT 21     Hematology Studies      Recent Labs   Lab  Test 10/17/19  0740 10/16/19  0705 10/15/19  0702 10/14/19  0704 10/13/19  0727 10/12/19  0924 10/11/19  1413   WBC 6.5 5.3 6.5 6.3 6.8 7.1 9.9   ANEU  --   --   --   --  5.3  --  8.7*   ALYM  --   --   --   --  0.4*  --  0.3*   GREG  --   --   --   --  0.8  --  0.7   AEOS  --   --   --   --  0.3  --  0.1   HGB 10.0* 9.8* 10.2* 9.6* 10.0* 9.6* 10.9*   HCT 31.9* 30.9* 32.1* 30.9* 31.7* 30.3* 34.3*   MCV 93 93 94 94 93 93 93    263 274 294 292 298 334     Urine Studies    Recent Labs   Lab Test 10/16/19  1204 10/11/19  1516   URINEPH 6.5 7.0   NITRITE Negative Negative   LEUKEST Trace* Moderate*   WBCU 3 30*     Vancomycin Levels     Recent Labs   Lab Test 10/16/19  0705 10/13/19  0727   VANCOMYCIN 11.7 8.2       Microbiology:    Serum CrAg   10/15/19 negative    Blood   10/11/19 NGTD    Urine   10/11/19 negative    Left leg wound   10/15/19 GS with many WBCs and RBCs.  No organisms seen.  Culture NGTD    Imaging:  10/16/19 CXR  Bilateral mixed opacities and pulmonary vascular  prominence suggestive of pulmonary edema. Small right pleural  Effusion.    10/16/19 CT leg  1. Left pretibial fluid collection with mild surrounding soft tissue  edema, considerations include hematoma versus phlegmon/abscess. Soft  tissue tumor much less likely, follow-up to document resolution.  2.Extensive vascular calcifications.

## 2019-10-17 NOTE — PHARMACY
"Antimicrobial Stewardship Team Note    Antimicrobial Stewardship Program - A joint venture between [facility] and  Physicians to optimize antibiotic management.  NOT a formal consult - Restricted Antimicrobial Review     Patient: Matheus White Sr.  MRN: [id number]  Allergies: Patient has no known allergies.    Brief Summary:   Matheus White is an 79 year old male with a PMH of CKD3 s/p DDKT in 03/2009 and atrial fibrillation on warfarin.  He presented to the ED on 10/11/19 with pain and erythema of the LLE. While trying to put together a bed a week previous, he sustained a wound to the LLE, and then went to urgent care and received bacitracin ointment.  His labs on admission showed a SCr of 5.47, INR of 4.49, and no leukocytosis (although he is on immunosuppression with cyclosporine and mycophenolate).  He was afebrile at admission and continues to be so, however, he has been on scheduled acetaminophen 1000mg Q8H since 10/13. Ultrasound showed a likely hematoma in the LLE.  A dose of zosyn and vancomycin were given in the ED and then changed to doxycycline and cephalexin on 10/12. These were transitioned back to IV antibiotics on 10/14 for unclear reasons.  Surgery does not feel I&D is appropriate. IR aspirated the hematoma on 10/15 where the patient reported that the lump had improved over the last 2 days. The gram stain showed no organisms. The fluid culture is currently negative while monitoring continues. On 10/16, coverage was broadened from cefazolin to cefepime because, \"Redness appears to be receding however tenderness around the focal area of swelling is worse.\"         Active Anti-infective Medications   (From admission, onward)                Start     Stop    10/14/19 2215  ceFAZolin  1 g,   Intravenous,   EVERY 12 HOURS     Abscess        --    10/14/19 1015  vancomycin 1250 mg  1,250 mg,   Intravenous,   ONCE     Abscess        --    10/14/19 1008  Vancomycin Place Simon - " Receiving Intermittent Dosing  1 each,   Intravenous,   SEE ADMIN INSTRUCTIONS     Abscess        --          Assessment:   LLE cellulitis vs hematoma  Matheus is low risk for MRSA infection given that he has not been hospitalized or received IV antibiotics within the last 6 months, he has not previously grown MRSA from culture, and the wound is non-purulent. Empiric coverage of MSSA and strep species is appropriate for SSTI.  He is also low risk for pseudomonas infection because he has not had a recent hospitalization, required mechanical ventilation, and has not previously grown the organism.  Additionally, WBCs have been trending down while on antibiotics that do not cover pseudomonas and the patient reported that the lump was improving to IR on 10/15. Reported increased tenderness on 10/16 could be due to IR procedure. Cefazolin and nafcillin are appropriate choices to cover empiric pathogen, but nafcillin should be avoided because it can decrease level of cyclosporine.    Recommendations:  1) Stop vancomycin  2) Stop cefepime. Restart cefazolin and transition to cephalexin when appropriate to complete duration of 7 to 10 days (10/11 to 10/18-10/21).    Discussed with ID Staff Pat Barnes MD, and Heather Sanchez, MarieD  [facility]  Phone: [phone]    Vital Signs/Clinical Features:  Vitals         10/14 0700  -  10/15 0659 10/15 0700  -  10/16 0659 10/16 0700  -  10/16 1159   Most Recent    Temp ( F) 97 -  97.6    95.7 -  98.5    96.9 -  98.4     98.4 (36.9)    Pulse 77 -  79    77 -  96    80 -  82     82    Heart Rate 65 -  80    76 -  90       76    Resp   16    16 -  20    18 -  20     20    /75 -  163/83    140/71 -  169/88    153/76 -  155/89     155/89    SpO2 (%) 94 -  96    94 -  97    93 -  96     96            Labs  CrCl cannot be calculated (Unknown ideal weight.).  Recent Labs   Lab Test 10/11/19  1539 10/12/19  0924 10/13/19  0727 10/14/19  0704 10/15/19  0702 10/16/19  0705   CR  5.44* 5.03* 4.38* 4.23* 3.74* 3.66*       Recent Labs   Lab Test 10/11/19  1413 10/12/19  0924 10/13/19  0727 10/14/19  0704 10/15/19  0702 10/16/19  0705   WBC 9.9 7.1 6.8 6.3 6.5 5.3   ANEU 8.7*  --  5.3  --   --   --    ALYM 0.3*  --  0.4*  --   --   --    GREG 0.7  --  0.8  --   --   --    AEOS 0.1  --  0.3  --   --   --    HGB 10.9* 9.6* 10.0* 9.6* 10.2* 9.8*   HCT 34.3* 30.3* 31.7* 30.9* 32.1* 30.9*   MCV 93 93 93 94 94 93    298 292 294 274 263       Recent Labs   Lab Test 10/11/19  1413   BILITOTAL 1.8*   ALKPHOS 230*   ALBUMIN 3.3*   AST 16   ALT 21       Recent Labs   Lab Test 10/11/19  1414 10/15/19  0702   LACT 0.9  --    CRP  --  29.0*       Recent Labs   Lab Test 10/12/19  0924  10/16/19  0705   VANCOMYCIN  --    < > 11.7   CYCLSP 57  --   --     < > = values in this interval not displayed.       Culture Results:  7-Day Micro Results       Procedure Component Value Units Date/Time    Cell count with differential fluid [[id number]] Collected:  10/15/19 1545    Order Status:  Completed Lab Status:  Edited Result - FINAL Updated:  10/15/19 1953    Specimen:  Abscess      Body Fluid Analysis Source Abscess     Color Fluid Bloody     Appearance Fluid Cloudy     WBC Fluid 746 /uL      Comment: No reference ranges have been established.  This result should be interpreted   in the context of the patient's clinical condition and compared to   simultaneous measurement in the patient's blood.  Refer to Lab Guide for   specific interpretive guidelines.          % Neutrophils Fluid 93 %      % Lymphocytes Fluid 4 %      % Eosinophils Fluid 3 %     Fluid Culture Aerobic Bacterial [[id number]] Collected:  10/15/19 1545    Order Status:  Completed Lab Status:  Preliminary result Updated:  10/16/19 1141    Specimen:  Left Leg from Leg Lower, Left      Specimen Description Left Leg Lower Abscess     Culture Micro Culture negative monitoring continues    Gram stain [[id number]] Collected:  10/15/19 154    Order Status:   Completed Lab Status:  Final result Updated:  10/15/19 2104    Specimen:  Left Leg      Specimen Description Left Leg Lower Abscess     Gram Stain No organisms seen      Moderate  WBC'S seen  predominantly PMN's        Many  Red blood cells seen      Anaerobic bacterial culture [[id number]] Collected:  10/15/19 1545    Order Status:  Completed Lab Status:  Preliminary result Updated:  10/15/19 1949    Specimen:  Left Leg from Leg Lower, Left      Specimen Description Left Leg Lower Abscess     Special Requests Received in anaerobic tubes.     Culture Micro PENDING    Urine Culture [[id number]] Collected:  10/11/19 1535    Order Status:  Completed Lab Status:  Final result Updated:  10/12/19 2134    Specimen:  Midstream Urine      Specimen Description Midstream Urine     Special Requests Specimen received in preservative     Culture Micro No growth    Blood culture [[id number]] Collected:  10/11/19 1435    Order Status:  Completed Lab Status:  Preliminary result Updated:  10/16/19 0356    Specimen:  Blood      Specimen Description Blood Right Hand     Special Requests Received in aerobic bottle only     Culture Micro No growth after 5 days    Blood culture [[id number]] Collected:  10/11/19 1413    Order Status:  Completed Lab Status:  Preliminary result Updated:  10/16/19 0356    Specimen:  Blood      Specimen Description Blood Right Arm     Special Requests Aerobic and anaerobic bottles received     Culture Micro No growth after 5 days    Blood culture     Order Status:  Canceled Lab Status:  No result     Specimen:  Blood             Recent Labs   Lab Test 10/11/19  1516   URINEPH 7.0   NITRITE Negative   LEUKEST Moderate*   WBCU 30*                         Imaging: Us Lower Extremity Venous Duplex Left    Result Date: 10/13/2019  EXAMINATION: DOPPLER VENOUS ULTRASOUND OF THE LEFT LOWER EXTREMITY, 10/13/2019 12:30 PM INDICATION: Swelling COMPARISON: None. TECHNIQUE:  Gray-scale evaluation with compression, spectral flow, and color  Doppler assessment of the deep venous system of the left leg from groin to knee, and then at the ankle. FINDINGS: In the left lower extremity, the common femoral, femoral, popliteal and posterior tibial veins demonstrate normal compressibility and blood flow. Fluid collection noted on the anterior aspect of the shin measuring 2 x 1 x 7.4 cm. Likely remote representing hematoma.     IMPRESSION: 1.  No evidence left lower extremity deep venous thrombosis. 2.  7.4 cm likely hematoma on the anterior shin. I have personally reviewed the examination and initial interpretation and I agree with the findings. VICKI ONEIL MD    Us Renal Complete    Result Date: 10/14/2019  ULTRASOUND RENAL COMPLETE 10/11/2019 4:21 PM HISTORY: Acute renal failure of transplanted kidney.  COMPARISON: 7/16/2009 FINDINGS: Status post right nephrectomy, with a right lower quadrant renal transplant. The transplant kidney has mild hydronephrosis of the superior pole. The transplant kidney measures 10.6 cm in length. The native left kidney is difficult to image due to atrophy, but there appears to be a 1.7 cm cyst in this region. The bladder is incompletely distended.     IMPRESSION:  Mild hydronephrosis of the transplant kidney superior pole. ABRAHAM SEQUEIRA MD    Ir Fine Needle Aspiration W Imaging    Result Date: 10/16/2019  PRE-PROCEDURE DIAGNOSIS: Left shin fluid collection PROCEDURE: IR FINE NEEDLE ASPIRATION W ULTRASOUND    IMPRESSION: Completed ultrasound-guided left anterior lower leg aspiration. A total of 3 mL thick dark purple fluid aspirated from left anterior shin consistent with resolving hematoma. No immediate complication.  ---------- CLINICAL HISTORY: Patient with history of atrial fibrillation on warfarin and recent trauma to left anterior shin hitting it on the site of a bed now with area of swelling and likely cellulitis. Internal medicine has consulted IR for an aspiration of left anterior shin fluid collection which was seen on  "ultrasound a few days ago. The patient reports the \"lump\" has improved over the past 2 days. PERFORMED BY: Shaheed Howard PA-C CONSENT: Written informed consent was obtained and is documented in the patient record. MEDICATIONS: 2 mL 1% lidocaine subcutaneous DESCRIPTION: Fluid collection was identified on limited ultrasound exam of the left anterior shin immediately underneath scab and picture is documented in the patient's record. The left anterior shin was prepped and draped in the usual sterile fashion. Local anesthesia was achieved. Under ultrasound guidance, a 5-Tamazight centesis needle/catheter was advanced into the fluid collection. Needle was removed. Fluid was aspirated with difficulty. Catheter was removed on completion of drainage and sterile dressing was applied. COMPLICATIONS: No immediate concerns, the patient remained stable throughout the procedure and tolerated it well. ESTIMATED BLOOD LOSS: Minimal SPECIMENS: None KRISTINA HOWARD PA-C          "

## 2019-10-17 NOTE — PROGRESS NOTES
Resident/Fellow Attestation   I, Alison M. Lerman, was present with the medical student who participated in the service and in the documentation of the note.  I have verified the history and personally performed the physical exam and medical decision making.  I agree with the assessment and plan of care as documented in the note.      Alison M. Lerman, MD  PGY4  Date of Service (when I saw the patient): 10/18/19    [facility], [place]    Progress Note - [facility] 1 Service        Date of Admission:  10/11/2019    Assessment & Plan   Changes Today:      - Transplant ID consult, appreciate recs   - Following fluid cultures from aspiration done 10/15  - Continue cefepime 1g q24h (renally dosed per pharmacy) and vancomycin 1250 mg IV  - Continue Tylenol 1000 mg TID and PRN oxycodone 5 mg q6h for pain management  - Monitoring INR, restarted warfarin 10/16 s/p vitamin K given 10/13 for reversal of supratherapeutic INR  - Creatinine improving  - K+ replacement        Cellulitis of Left Lower Extremity  Hematoma  WBC 5.3. CRP 29.   Stable to mildly improving on IV broad coverage antibiotics including MRSA and pseudomonas. Redness appears to be receding however tenderness around the focal area of swelling is worse. Injury to LLE 1 week prior to admission, assessed at urgent care, XR negative for fracture, topical bacitracin for wound given and discharged. S/p one dose Zosyn in ED and one time renally-dosed vancomycin, switched to PO doxycycline and cephalexin on 10/12. Some interval improvement in edema and erythema from ankle to mid-shin, tenderness over the focal area of swelling on anterior shin is decreasing.  Per surgery evaluation, no indication for incision and drainage at this time due to the risk of open wound in a patient with delayed healing. IR evaluated, 3 ml of fluid drained, most likely consistent with a hematoma, fluid cultures negative so far.  - Transplant ID consulted,  appreciate recs  - Continue vancomycin 1250 mg IV and cefepime 1g q24h IV, per transplant ID low suspicion for MRSA, consider switching to ampicillin-sulbactam if improvement tomorrow  - Continue Tylenol 1000mg TID and PRN oxycodone 5mg q6h for pain management    Dyspnea   Pulmonary Edema  Currently stable. Patient became dyspneic and tachypneic yesterday with O2 sat 96%, started on 2 L O2 via nasal cannula and respiratory status improved. CXR showed bilateral opacities, consistent with pulmonary edema and a right sided pleural effusion. Etiology is likely related to volume overload from IV fluids, would expect to improve with post-ATN diuresis.  - CXR 10/16  - if respiratory status worsens, consider IV diuresis and BIPAP    DEB on CKD3   Donor Kidney Transplant  Microhematuria  Improving. Good urine output. DDKT on 2009. BUN 42; Cr 5.4 on presentation, down to 3.22  today 10/17.  Baseline creatinine 1.5 (last creatinine from 2019 = 1.41). Dry weight 175 lbs; now 194 lbs. Cyclosporine level within therapeutic range. Repeat UA 10/16 with with 5 RBC and 3 WBC. K+ 3.3 today. Renal ultrasound showed mild hydronephrosis. Nephrology following, most concerned for pre-renal etiology or ATN, and less likely glomerulonephritis, urinary tract infection, or other obstruction considering degree of hydronephrosis. Supratherapeutic INR may have contributed to microhematuria at admission.  - Nephrology following, appreciate recs  - K+ replacement 40 mEq PO  - Trend BMP  - Continue PTA immunosuppression              - 50mg cyclosporin BID              - 500mg mycophenolate BID  - Monitor vancomycin levels (troughs)  - Avoid nephrotoxins     Atrial Fibrillation  INR 1.50. Patient has had diagnosis of Afib since atleast  diagnosed by Holter monitor. On warfarin, presented with supratherapeutic INR. Tracing shows atrial fibrillation with PVCs. Pulse has remained <130 and will monitor symptoms and vitals.  -  Restarted warfarin 10/17 at 3 mg (pharmacy dosing) with goal INR 2-3  - Continue cardizem 180 mg  - s/p 5mg PO vitamin K on 10/13  for reversal, recheck in am     Hypertension  Stable. Patient has recorded history of hypertension. Currently not on any home anti-hypertensives. SBPs in 170's early in admission, now improved to 130's.   - Monitor blood pressures, no new antihypertensives added at this time  - Patient is new to [place] and will need a primary care provider.     Diet: Regular Diet Adult    Fluids: none  Lines: PIV  DVT Prophylaxis: Warfarin  Olvera Catheter: not present  Code Status: Full Code      Disposition Plan   Expected discharge: 2 - 3 days, recommended to prior living arrangement once antibiotic plan established.  Entered: Ki Weems 10/17/2019, 10:57 AM       The patient's care was discussed with the Attending Physician, Dr. Garcia.    Ki Weems  Medical Student  [facility] 1 Service  [facility], [place]  Pager: [phone]  Please see sticky note for cross cover information  ______________________________________________________________________    Interval History   No acute events overnight. Patient reports his leg pain and swelling has improved significantly, and he was able to stand on his left leg with no pain for the first time since admission. Still unable to walk or work with PT due to pain. Mild shortness of breath overnight that improved with elevating his head. Good urine output. No abdominal pain, nausea, vomiting or fevers.     Data reviewed today: I reviewed all medications, new labs and imaging results over the last 24 hours. I personally reviewed no images or EKG's today.    Physical Exam   Vital Signs: Temp: 98.7  F (37.1  C) Temp src: Oral BP: 125/66 Pulse: 75 Heart Rate: 76 Resp: 18 SpO2: 95 % O2 Device: None (Room air)    Weight: 190 lbs 3.2 oz  General Appearance:  Awake, alert, cooperative, no apparent distress  Respiratory: No increased work of  breathing, clear to auscultation bilaterally, no crackles or wheezes  Cardiovascular: Irregularly irregular rhythm, normal S1 and S2 with no murmurs  GI: Abdomen is soft, non-distended, non-tender, no masses, no hepatosplenomegaly  Skin: Marked erythema and warmth in LLE, focused in the anterior shin slightly above the ankle to slightly below the knee.  Focal 3cm x 3cm area of swelling on anterior portion of shin with some tenderness to palpation, with nearby two small areas of skin breakdown with overlying crust, no active bleeding or drainage. +1 PE of the LLE, none in RLE. No pain to palpation in calf of RLE, erythema improving in posterior portion.  Neurologic: Oriented to name, place and time.     Data   Recent Labs   Lab 10/17/19  0740 10/16/19  0705 10/15/19  0702  10/11/19  1413   WBC 6.5 5.3 6.5   < > 9.9   HGB 10.0* 9.8* 10.2*   < > 10.9*   MCV 93 93 94   < > 93    263 274   < > 334   INR 1.48* 1.50* 1.59*   < > 4.49*    142 141   < > 136   POTASSIUM 3.3* 3.1* 3.4   < > 4.7   CHLORIDE 111* 111* 112*   < > 103   CO2 22 21 20   < > 24   BUN 36* 42* 44*   < > 61*   CR 3.21* 3.66* 3.74*   < > 5.47*   ANIONGAP 9 10 9   < > 9   ELEAZAR 9.2 9.0 8.9   < > 8.7   GLC 95 86 88   < > 94   ALBUMIN  --   --   --   --  3.3*   PROTTOTAL  --   --   --   --  7.0   BILITOTAL  --   --   --   --  1.8*   ALKPHOS  --   --   --   --  230*   ALT  --   --   --   --  21   AST  --   --   --   --  16    < > = values in this interval not displayed.

## 2019-10-17 NOTE — PLAN OF CARE
A&O. VSS- hypertensive at baseline. Pt reports shortness of breath, no chest pain. Continuous pulse ox in place. Pain controlled with scheduled tylenol. L PIV x 1 saline locked. Spontaneously voiding, urinal at bedside. Passing gas, no BM overnight. Assist x1 with walker/gait belt. LLE with ace wraps and elevated- erythema unchanged within markings. Regular diet- denies nausea. Continue with plan of care.

## 2019-10-17 NOTE — PROGRESS NOTES
CLINICAL NUTRITION SERVICES - ASSESSMENT NOTE     Nutrition Prescription    RECOMMENDATIONS FOR MDs/PROVIDERS TO ORDER:  None at this time    Malnutrition Status:    Non-severe malnutrition in the context of acute on chronic disease and illness    Recommendations already ordered by Registered Dietitian (RD):  None at this time    Future/Additional Recommendations:  Continue current regular diet.  Offer supplements or scheduled snacks if appetite declines and eating <75% of meals TID consistently     REASON FOR ASSESSMENT  Matheus Agudelo Cindy Sr. is a/an 79 year old male assessed by the dietitian for LOS    NUTRITION HISTORY  -Pt has not been seen by nutrition service before  -Per MD's note, pt was admitted with cellulitis after an acute injury and DEB on CKD on 10/11. He has CKD3 s/p DDKT, HTN, Afib on warfarin and diltiazem. Pt went to urgent care on 10/09 for leg injury.   -Per pt, appetite has been okay PTA, but decreased food intake after leg injury. Pt is a poor historian. Could not obtain detailed nutrition history.     CURRENT NUTRITION ORDERS  Diet: Regular  Intake/Tolerance:   -% food intake  -1 bowel movement every other day  -Per pt, no nausea, vomiting, or abdominal pain. Pt orders 2-3 times/d. Pt states feeling weak after admit.     LABS  Labs reviewed  BUN 36 (H), Cr 3.2 (H), GFR 17 (L) pt with DEB on CKD    MEDICATIONS  Medications reviewed    ANTHROPOMETRICS  Height: 1.8 m (5'906'')  Most Recent Weight: 86.3 kg (190 lb 3.2 oz)    IBW: 72.9 kg  %IBW: 118%  BMI: 26.53 kg/m2 Overweight BMI 25-29.9  Weight History: Pt wt has been stable in October. Pt has gained 6.4 kg since 09/18  Wt Readings from Last 10 Encounters:   10/16/19 86.3 kg (190 lb 3.2 oz)   10/11/19 85.1 kg (187 lb 9.8 oz)   10/08/19 85.3 kg   09/18/19 79.9 kg   08/02/19 84 kg   07/17/19 80.3kg       Dosing Weight: 86 kg (based on lowest wt of 86.3 kg on 10/16)    ASSESSED NUTRITION NEEDS  Estimated Energy Needs: 0566-2398  kcals/day (25 - 30 kcals/kg)  Justification: Maintenance  Estimated Protein Needs:  grams protein/day (1 - 1.2 grams of pro/kg)  Justification: Increased needs, due to acute injury  Estimated Fluid Needs: 5522-7465 mL/day (1 mL/kcal)   Justification: Maintenance    PHYSICAL FINDINGS  See malnutrition section below.  Trace to moderate edema on 10/18    MALNUTRITION  % Intake: < 75% for > 7 days (non-severe)  % Weight Loss: None noted  Subcutaneous Fat Loss: Facial region:  mild  Muscle Loss: Temporal:  mild, Thoracic region (clavicle):  moderate, Upper arm (bicep, tricep):  moderate, Upper leg (quadricep, hamstring):  moderate and Posterior calf: moderate (suspect age related)  Fluid Accumulation/Edema: trace to moderate  Malnutrition Diagnosis: Non-severe malnutrition in the context of acute on chronic disease and illness    NUTRITION DIAGNOSIS  Increased nutrient needs (protein) related to increased estimated needs with acute illness as evidenced by <75 % for 7 days and increased estimated protein needs of  grams protein/day     INTERVENTIONS  Implementation  Nutrition Education: explained to pt the reason why he needs a nutrition assessment      Goals  Patient to consume % of nutritionally adequate meal trays TID, or the equivalent with supplements/snacks.     Monitoring/Evaluation  Progress toward goals will be monitored and evaluated per protocol.    Moon Louis  Dietetic Intern    I have read and agree with the above nutrition note, recs, and interventions  Yaritza Guillen RD, LD  [address]C RD pager: [phone]

## 2019-10-17 NOTE — PROGRESS NOTES
[facility], [place]   Transplant Nephrology Progress Note  Date of Admission:  10/11/2019  Today's Date: 10/16/2019    Assessment & Plan     # DDKT: DEB with creatinine peaked at 5.4 from 1.6 mg / dL in August 2019. Slowly improving. Working diagnosis is DEB in the settings of cellulitis. Creatinine is slowly improving               - Baseline Cr ~ 1.5-1.8 mg /dL              - Proteinuria: Not checked recently              - Date DSA Last Checked: not checked recently              - BK Viremia: Not checked recently              - Kidney Tx Biopsy: No      # Immunosuppression: Cyclosporine (goal 50-75) and Mycophenolate mofetil (goal not followed)              - Changes: discussed resuming his home regimen and checking his levels.      Follow up cyclo trough this am.      # Hypertension: Controlled; Goal BP: < 130/80              - Volume status: Euvolemic              - Changes: No     # 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: Hgb: Stable      AISHWARYA: No              - Iron studies: Not checked recently     # Mineral Bone Disorder:   - Secondary renal hyperparathyroidism; PTH level: Not checked recently  - Vitamin D; level: Not checked recently  - Calcium; level: Normal  - Phosphorus; level: Normal     # Electrolytes:   - Potassium; level: low advised gentle replacement   - Magnesium; level: Not checked recently  - Bicarbonate; level: Normal  - Sodium; level: Normal     # LE cellulitis: Currently on vancomycin and Ancef. Please consider transplant ID consult. Await aspiration results.      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      Josias lAlen  MD Phil  Pager: [phone]    Interval History     Matheus was seen after his procedure today. He tolerated ok but is in pain. No NVD. Eating and drinking well.     Review of Systems   4 point ROS was obtained and negative except as noted in the Interval History.    MEDICATIONS:  Current Facility-Administered Medications   Medication     acetaminophen (TYLENOL) tablet 1,000 mg     ceFEPIme (MAXIPIME) 1g vial to attach to  ml bag for ADULTS or NS 50 ml bag for PEDS     cycloSPORINE modified (GENERIC EQUIVALENT) capsule 50 mg     diltiazem ER COATED BEADS (CARDIZEM CD/CARTIA XT) 24 hr capsule 180 mg     gabapentin (NEURONTIN) capsule 300 mg     influenza Vac Split High-Dose (FLUZONE) injection 0.5 mL     lidocaine (LMX4) cream     lidocaine 1 % 0.1-1 mL     melatonin tablet 1 mg     mycophenolate (GENERIC EQUIVALENT) tablet 500 mg     naloxone (NARCAN) injection 0.1-0.4 mg     oxyCODONE (ROXICODONE) tablet 5 mg     sodium chloride (PF) 0.9% PF flush 3 mL     sodium chloride (PF) 0.9% PF flush 3 mL     vancomycin place mckee - receiving intermittent dosing     Warfarin Therapy Reminder (Check START DATE - warfarin may be starting in the FUTURE)       Physical Exam   Temp  Av.6  F (36.4  C)  Min: 96.3  F (35.7  C)  Max: 98.1  F (36.7  C)      Pulse  Av.5  Min: 66  Max: 124 Resp  Av.9  Min: 16  Max: 19  SpO2  Av.7 %  Min: 94 %  Max: 98 %     BP (!) 160/77 (BP Location: Right arm)   Pulse 82   Temp 97.2  F (36.2  C) (Oral)   Resp 16   Wt 86.3 kg (190 lb 3.2 oz)   SpO2 96%   BMI 26.53 kg/m     Date 10/15/19 0700 - 10/16/19 0659   Shift 3008-7949 2512-8362 4631-2980 24 Hour Total   INTAKE   Shift Total(mL/kg)       OUTPUT   Urine 150   150   Shift Total(mL/kg) 150(1.7)   150(1.7)   Weight (kg) 88.31 88.31 88.31 88.31      Admit Weight: 86.3 kg (190 lb 3.2 oz)     GENERAL APPEARANCE: alert and no distress  HENT: mouth without ulcers or lesions  LYMPHATICS: no cervical or supraclavicular  nodes  RESP: lungs clear to auscultation - no rales, rhonchi or wheezes  CV: regular rhythm, normal rate, no rub, no murmur  EDEMA: Left leg with significant erythema and area of swelling over the mid shin area.  Tenderness was noted and warmth on exam.  ABDOMEN: soft, nondistended, nontender, bowel sounds normal  MS: extremities normal - no gross deformities noted, no evidence of inflammation in joints, no muscle tenderness  SKIN: no rash    Data   All labs reviewed by me.  CMP  Recent Labs   Lab 10/16/19  0705 10/15/19  0702 10/14/19  0704 10/13/19  0727  10/11/19  1413    141 141 139   < > 136   POTASSIUM 3.1* 3.4 3.4 3.5   < > 4.7   CHLORIDE 111* 112* 112* 108   < > 103   CO2 21 20 20 22   < > 24   ANIONGAP 10 9 8 9   < > 9   GLC 86 88 92 86   < > 94   BUN 42* 44* 51* 50*   < > 61*   CR 3.66* 3.74* 4.23* 4.38*   < > 5.47*   GFRESTIMATED 15* 14* 12* 12*   < > 9*   GFRESTBLACK 17* 17* 14* 14*   < > 10*   ELEAZAR 9.0 8.9 8.4* 8.4*   < > 8.7   PROTTOTAL  --   --   --   --   --  7.0   ALBUMIN  --   --   --   --   --  3.3*   BILITOTAL  --   --   --   --   --  1.8*   ALKPHOS  --   --   --   --   --  230*   AST  --   --   --   --   --  16   ALT  --   --   --   --   --  21    < > = values in this interval not displayed.     CBC  Recent Labs   Lab 10/16/19  0705 10/15/19  0702 10/14/19  0704 10/13/19  0727   HGB 9.8* 10.2* 9.6* 10.0*   WBC 5.3 6.5 6.3 6.8   RBC 3.33* 3.42* 3.29* 3.41*   HCT 30.9* 32.1* 30.9* 31.7*   MCV 93 94 94 93   MCH 29.4 29.8 29.2 29.3   MCHC 31.7 31.8 31.1* 31.5   RDW 14.2 14.2 14.1 14.1    274 294 292     INR  Recent Labs   Lab 10/16/19  0705 10/15/19  0702 10/14/19  0704 10/13/19  1132   INR 1.50* 1.59* 2.05* 4.21*     ABGNo lab results found in last 7 days.   Urine Studies  Recent Labs   Lab Test 10/16/19  1204 10/11/19  1516   COLOR Yellow Yellow   APPEARANCE Clear Clear   URINEGLC 30* Negative   URINEBILI Negative Negative   URINEKETONE Negative Negative   SG 1.010 1.014   UBLD  Moderate* Large*   URINEPH 6.5 7.0   PROTEIN 30* 50*   NITRITE Negative Negative   LEUKEST Trace* Moderate*   RBCU 5* 88*   WBCU 3 30*     Recent Labs   Lab Test 10/13/19  2140   UTPG 1.08*     PTH  No lab results found.  Iron Studies  No lab results found.    IMAGING:  All imaging studies reviewed by me.

## 2019-10-17 NOTE — PLAN OF CARE
HTN within parameters. OVSS on RA. Lungs coarse, instructed in IS. Pain managed with scheduled tylenol. LLE elevated, ace wraps on. Puncture site with bandage in place, CDI. Erythema decreasing slightly from markings. Tolerating regular diet. Voiding spont, adequate urine volumes. Up with 1 assist and walker. Pt resting comfortably. Continue POC.

## 2019-10-18 LAB
ANION GAP SERPL CALCULATED.3IONS-SCNC: 11 MMOL/L (ref 3–14)
BUN SERPL-MCNC: 36 MG/DL (ref 7–30)
CALCIUM SERPL-MCNC: 9.2 MG/DL (ref 8.5–10.1)
CHLORIDE SERPL-SCNC: 111 MMOL/L (ref 94–109)
CO2 SERPL-SCNC: 19 MMOL/L (ref 20–32)
CREAT SERPL-MCNC: 3.2 MG/DL (ref 0.66–1.25)
ERYTHROCYTE [DISTWIDTH] IN BLOOD BY AUTOMATED COUNT: 14.3 % (ref 10–15)
GFR SERPL CREATININE-BSD FRML MDRD: 17 ML/MIN/{1.73_M2}
GLUCOSE SERPL-MCNC: 91 MG/DL (ref 70–99)
HCT VFR BLD AUTO: 31.7 % (ref 40–53)
HGB BLD-MCNC: 10.3 G/DL (ref 13.3–17.7)
INR PPP: 1.53 (ref 0.86–1.14)
MCH RBC QN AUTO: 29.5 PG (ref 26.5–33)
MCHC RBC AUTO-ENTMCNC: 32.5 G/DL (ref 31.5–36.5)
MCV RBC AUTO: 91 FL (ref 78–100)
PLATELET # BLD AUTO: 258 10E9/L (ref 150–450)
POTASSIUM SERPL-SCNC: 3.4 MMOL/L (ref 3.4–5.3)
RBC # BLD AUTO: 3.49 10E12/L (ref 4.4–5.9)
SODIUM SERPL-SCNC: 141 MMOL/L (ref 133–144)
VANCOMYCIN SERPL-MCNC: 14.6 MG/L
WBC # BLD AUTO: 6.4 10E9/L (ref 4–11)

## 2019-10-18 PROCEDURE — 25000128 H RX IP 250 OP 636: Performed by: STUDENT IN AN ORGANIZED HEALTH CARE EDUCATION/TRAINING PROGRAM

## 2019-10-18 PROCEDURE — 80048 BASIC METABOLIC PNL TOTAL CA: CPT | Performed by: STUDENT IN AN ORGANIZED HEALTH CARE EDUCATION/TRAINING PROGRAM

## 2019-10-18 PROCEDURE — 36415 COLL VENOUS BLD VENIPUNCTURE: CPT | Performed by: STUDENT IN AN ORGANIZED HEALTH CARE EDUCATION/TRAINING PROGRAM

## 2019-10-18 PROCEDURE — 85610 PROTHROMBIN TIME: CPT | Performed by: STUDENT IN AN ORGANIZED HEALTH CARE EDUCATION/TRAINING PROGRAM

## 2019-10-18 PROCEDURE — 25000132 ZZH RX MED GY IP 250 OP 250 PS 637: Mod: GY | Performed by: STUDENT IN AN ORGANIZED HEALTH CARE EDUCATION/TRAINING PROGRAM

## 2019-10-18 PROCEDURE — 25000131 ZZH RX MED GY IP 250 OP 636 PS 637: Mod: GY | Performed by: STUDENT IN AN ORGANIZED HEALTH CARE EDUCATION/TRAINING PROGRAM

## 2019-10-18 PROCEDURE — 25800030 ZZH RX IP 258 OP 636: Performed by: STUDENT IN AN ORGANIZED HEALTH CARE EDUCATION/TRAINING PROGRAM

## 2019-10-18 PROCEDURE — 80202 ASSAY OF VANCOMYCIN: CPT | Performed by: STUDENT IN AN ORGANIZED HEALTH CARE EDUCATION/TRAINING PROGRAM

## 2019-10-18 PROCEDURE — 12000001 ZZH R&B MED SURG/OB UMMC

## 2019-10-18 PROCEDURE — 85027 COMPLETE CBC AUTOMATED: CPT | Performed by: STUDENT IN AN ORGANIZED HEALTH CARE EDUCATION/TRAINING PROGRAM

## 2019-10-18 PROCEDURE — 99233 SBSQ HOSP IP/OBS HIGH 50: CPT | Mod: GC | Performed by: STUDENT IN AN ORGANIZED HEALTH CARE EDUCATION/TRAINING PROGRAM

## 2019-10-18 RX ORDER — BISACODYL 5 MG
5 TABLET, DELAYED RELEASE (ENTERIC COATED) ORAL DAILY PRN
Status: DISCONTINUED | OUTPATIENT
Start: 2019-10-18 | End: 2019-10-22 | Stop reason: HOSPADM

## 2019-10-18 RX ORDER — WARFARIN SODIUM 4 MG/1
4 TABLET ORAL
Status: COMPLETED | OUTPATIENT
Start: 2019-10-18 | End: 2019-10-18

## 2019-10-18 RX ORDER — POLYETHYLENE GLYCOL 3350 17 G/17G
17 POWDER, FOR SOLUTION ORAL DAILY PRN
Status: DISCONTINUED | OUTPATIENT
Start: 2019-10-18 | End: 2019-10-22 | Stop reason: HOSPADM

## 2019-10-18 RX ORDER — BISACODYL 5 MG
10 TABLET, DELAYED RELEASE (ENTERIC COATED) ORAL DAILY PRN
Status: DISCONTINUED | OUTPATIENT
Start: 2019-10-18 | End: 2019-10-22 | Stop reason: HOSPADM

## 2019-10-18 RX ORDER — BISACODYL 5 MG
15 TABLET, DELAYED RELEASE (ENTERIC COATED) ORAL DAILY PRN
Status: DISCONTINUED | OUTPATIENT
Start: 2019-10-18 | End: 2019-10-22 | Stop reason: HOSPADM

## 2019-10-18 RX ORDER — POTASSIUM CHLORIDE 750 MG/1
40 TABLET, EXTENDED RELEASE ORAL ONCE
Status: COMPLETED | OUTPATIENT
Start: 2019-10-18 | End: 2019-10-18

## 2019-10-18 RX ADMIN — CEFEPIME HYDROCHLORIDE 1 G: 1 INJECTION, POWDER, FOR SOLUTION INTRAMUSCULAR; INTRAVENOUS at 13:30

## 2019-10-18 RX ADMIN — POTASSIUM CHLORIDE 40 MEQ: 750 TABLET, EXTENDED RELEASE ORAL at 08:41

## 2019-10-18 RX ADMIN — DILTIAZEM HYDROCHLORIDE 180 MG: 180 CAPSULE, COATED, EXTENDED RELEASE ORAL at 08:26

## 2019-10-18 RX ADMIN — MYCOPHENOLATE MOFETIL 500 MG: 500 TABLET ORAL at 19:20

## 2019-10-18 RX ADMIN — CYCLOSPORINE 50 MG: 25 CAPSULE, LIQUID FILLED ORAL at 19:20

## 2019-10-18 RX ADMIN — WARFARIN SODIUM 4 MG: 4 TABLET ORAL at 19:21

## 2019-10-18 RX ADMIN — CYCLOSPORINE 50 MG: 25 CAPSULE, LIQUID FILLED ORAL at 08:26

## 2019-10-18 RX ADMIN — GABAPENTIN 300 MG: 300 CAPSULE ORAL at 19:19

## 2019-10-18 RX ADMIN — MYCOPHENOLATE MOFETIL 500 MG: 500 TABLET ORAL at 08:25

## 2019-10-18 RX ADMIN — VANCOMYCIN HYDROCHLORIDE 1250 MG: 10 INJECTION, POWDER, LYOPHILIZED, FOR SOLUTION INTRAVENOUS at 10:23

## 2019-10-18 ASSESSMENT — ACTIVITIES OF DAILY LIVING (ADL)
ADLS_ACUITY_SCORE: 14
ADLS_ACUITY_SCORE: 15
ADLS_ACUITY_SCORE: 14

## 2019-10-18 NOTE — PROGRESS NOTES
Resident/Fellow Attestation   I, Alison M. Lerman, was present with the medical student who participated in the service and in the documentation of the note.  I have verified the history and personally performed the physical exam and medical decision making.  I agree with the assessment and plan of care as documented in the note.      LLE overall improved today. Pain improved. Creatinine trending down. IV antibiotic regimen continuing, likely de-escalating today.    Alison M. Lerman, MD  PGY4  Date of Service (when I saw the patient): 10/18/19    [facility], [place]    Progress Note - [facility] [place] 1 Service        Date of Admission:  10/11/2019    Assessment & Plan   Changes Today:      - Following fluid cultures from aspiration done 10/15  - Transplant ID following, continue cefepime 1g q24h (renally dosed per pharmacy) and vancomycin 1250 mg IV  - Creatinine stable from yesterday  - K+ replacement  - Encouraging PT  - Continue Tylenol 1000 mg TID and PRN oxycodone 5 mg q6h for pain management  - Monitoring INR, restarted warfarin 10/16 s/p vitamin K given 10/13 for reversal of supratherapeutic INR       Cellulitis of Left Lower Extremity  Hematoma  WBC 6.4. CRP 29.   Stable to mildly improving on IV broad coverage antibiotics including MRSA and pseudomonas. Slow improvement in edema and erythema from ankle to mid-shin, tenderness over the focal area of swelling on anterior shin is decreasing. Injury to LLE 1 week prior to admission, assessed at urgent care, XR negative for fracture, topical bacitracin for wound given and discharged. S/p one dose Zosyn in ED and one time renally-dosed vancomycin, switched to PO doxycycline and cephalexin on 10/12.  Per surgery evaluation, no indication for incision and drainage at this time due to the risk of open wound in a patient with delayed healing. IR evaluated, 3 ml of fluid drained, most likely consistent with a hematoma, fluid cultures negative so  far.  - Transplant ID consulted, appreciate recs  - Continue vancomycin 1250 mg IV and cefepime 1g q24h IV, per transplant ID low suspicion for MRSA, consider switching to ampicillin-sulbactam if substantial improvement tomorrow  - Continue Tylenol 1000mg TID and PRN oxycodone 5mg q6h for pain management  - Recommend working with PT     Dyspnea   Pulmonary Edema  Currently stable. Patient became dyspneic and tachypneic yesterday with O2 sat 96%, started on 2 L O2 via nasal cannula and respiratory status improved. CXR 10/16 showed bilateral opacities, consistent with pulmonary edema and a right sided pleural effusion. Etiology is likely related to volume overload from IV fluids, would expect to improve with post-ATN diuresis however given no improvement in Cr in the past day may consider diuretic.   - Per nephrology can consider IV lasix 20 mg  - if respiratory status worsens, consider BIPAP     DEB on CKD3   Donor Kidney Transplant  Microhematuria  Stable but improved from admission. BUN 42; Cr 5.4 on presentation, down to 3.20 today 10/17.  Good urine output. DDKT on 2009. Baseline creatinine 1.5 (last creatinine from 2019 = 1.41). Dry weight 175 lbs; now 190 lbs. Cyclosporine level within therapeutic range. Repeat UA 10/16 with with 5 RBC and 3 WBC. K+ 3.3 today. Renal ultrasound showed mild hydronephrosis. Nephrology following, most concerned for pre-renal etiology or ATN, and less likely glomerulonephritis, urinary tract infection, or other obstruction considering degree of hydronephrosis. Supratherapeutic INR may have contributed to microhematuria at admission.  - Nephrology following, appreciate recs  - K+ replacement 40 mEq PO  - Trend BMP  - Continue PTA immunosuppression              - 50mg cyclosporin BID              - 500mg mycophenolate BID  - Monitor vancomycin levels (troughs)  - Avoid nephrotoxins     Atrial Fibrillation  INR 1.50. Patient has had diagnosis of Afib  since atleast 2009 diagnosed by Holter monitor. On warfarin, presented with supratherapeutic INR. Tracing shows atrial fibrillation with PVCs. Pulse has remained <130 and will monitor symptoms and vitals.  - Restarted warfarin 10/17 at 3 mg (pharmacy dosing) with goal INR 2-3  - Continue cardizem 180 mg  - s/p 5mg PO vitamin K on 10/13  for reversal, recheck in am     Hypertension  Stable. Patient has recorded history of hypertension. Currently not on any home anti-hypertensives. SBPs in 170's early in admission, now improved to 130's.   - Monitor blood pressures, no new antihypertensives added at this time  - Patient is new to [place] and will need a primary care provider.     Diet: Regular Diet Adult    Fluids: none  Lines: PIV  DVT Prophylaxis: Warfarin  Olvera Catheter: not present  Code Status: Full Code      Disposition Plan   Expected discharge: 2 - 3 days, recommended to prior living arrangement once adequate pain management/ antibiotic plan established.  Entered: Ki Weems 10/18/2019, 8:05 AM       The patient's care was discussed with the Attending Physician, Dr. Garcia.    Ki Weems  Medical Student  [facility] 1 Service  [facility], [place]  Pager: [phone]  Please see sticky note for cross cover information  ______________________________________________________________________    Interval History   No acute events overnight. He reports his leg continues to feel better, and that he was able to stand for several seconds on the leg, and walk for short distances last night using a walker. He has no leg pain at rest. He has a new dry cough that was worse last night, and reports some shortness of breath that worsened with movement and talking. Good urine output overnight, tolerating PO intake well.     Data reviewed today: I reviewed all medications, new labs and imaging results over the last 24 hours. I personally reviewed no images or EKG's today.    Physical Exam   Vital  Signs: Temp: 99  F (37.2  C) Temp src: Oral BP: (!) 154/92 Pulse: 73 Heart Rate: 77 Resp: 15 SpO2: 94 % O2 Device: None (Room air)    Weight: 190 lbs 3.2 oz  General Appearance:  Awake, alert, cooperative, no apparent distress  Respiratory: No increased work of breathing, clear to auscultation bilaterally, no crackles or wheezes  Cardiovascular: Irregularly irregular rhythm, normal S1 and S2 with no murmurs  GI: Abdomen is soft, non-distended, non-tender, no masses, no hepatosplenomegaly  Skin: Improving erythema and warmth in LLE, focused in the anterior shin slightly above the ankle to slightly below the knee.  Focal 3cm x 3cm area of swelling on anterior portion of shin with some tenderness to palpation, with nearby two small areas of skin breakdown with overlying crust, no active bleeding or drainage. +1 PE of the LLE, none in RLE. No pain to palpation in calf of RLE, erythema improving in posterior portion.  Neurologic: Oriented to name, place and time.     Data   Recent Labs   Lab 10/18/19  0814 10/18/19  0703 10/17/19  0740 10/16/19  0705   WBC 6.4  --  6.5 5.3   HGB 10.3*  --  10.0* 9.8*   MCV 91  --  93 93     --  255 263   INR  --  1.53* 1.48* 1.50*     --  142 142   POTASSIUM 3.4  --  3.3* 3.1*   CHLORIDE 111*  --  111* 111*   CO2 19*  --  22 21   BUN 36*  --  36* 42*   CR 3.20*  --  3.21* 3.66*   ANIONGAP 11  --  9 10   ELEAZAR 9.2  --  9.2 9.0   GLC 91  --  95 86

## 2019-10-18 NOTE — PLAN OF CARE
[id number]: VSS on RA. A&Ox4. LLE elevated on pillow and wrapped with ACE wrap, reddened. Only painful with movement, patient declined scheduled Tylenol. Up SBA. Voiding adequately. No BM. Tolerating regular diet.   Continue IV ABX and monitor infection.  Will continue to monitor and follow POC.

## 2019-10-18 NOTE — PROGRESS NOTES
"  Care Coordinator - Discharge Planning    Admission Date/Time:  10/11/2019  Attending MD:  Mell Garcia MD     Data  Date of initial CC assessment:  10/18/19  Chart reviewed, discussed with interdisciplinary team.   Patient was admitted for: No diagnosis found.     Assessment   Concerns with insurance coverage for discharge needs: None.  Current Living Situation: Patient lives alone but has a roommate (she works full time and is in college, not home much)  Support System: Supportive and Involved; son  Services Involved: none  Transportation at Discharge: son  Transportation to Medical Appointments:  - Name of caregiver: son and self  Barriers to Discharge: medical needs      Coordination of Care  D: Plan of care discussed with Medical Team at Interdisciplinary Rounds, plan for patient to discharge likely Sunday or Monday depending on improvement of cellulitis.     I/A: Chart reviewed; met with patient and son at bedside to confirm home support, living arrangements and transportation. Patient adamantly denies home needs. Patient reports, \"that kind of stuff is for old people.\"     Patient was interested in a cane to help him get around until his leg doesn't hurt so much. Provided education to patient that Physical Therapy can assist with getting a walker or cane but he needs to agree to work with them next time the therapist comes. Patient verbally agreed to work with PT tomorrow when they come.    No additional RNCC discharge needs identified.     P: Care Coordinator will remain available for discharge needs that may arise.      Referrals: Provided patient/family with options for none.        Plan  Anticipated Discharge Date:  Sunday or Monday  Anticipated Discharge Plan:  Home      Steffi Cobos RN, BSN, PHN  Internal Medicine Care Coordinator  [facility]  Desk Phone: [phone]  Pager: [phone]    To contact Weekend RNCC, dial [phone] and enter job code [phone] at prompt. "   This pager can not be contacted by text page or outside line.

## 2019-10-18 NOTE — PLAN OF CARE
A&O. VSS- hypertensive at baseline. Pain controlled with scheduled tylenol. L PIV x 1 saline locked. Spontaneously voiding, urinal at bedside. Denies passing gas, no BM overnight. Assist x1 with walker. LLE with ace wraps and elevated- erythema unchanged within markings. Regular diet- denies nausea. Continue with plan of care.

## 2019-10-18 NOTE — PLAN OF CARE
BP (!) 147/74 (BP Location: Left arm)   Pulse 73   Temp 97.8  F (36.6  C) (Oral)   Resp 16   Wt 86.3 kg (190 lb 3.2 oz)   SpO2 96%   BMI 26.53 kg/m      [id number] A&Ox4. -150/80-90's. Reports LLE pain with activity, but doesn't want to take sched tylenol or prn oxycodone prior to activity. Refused PT. MD's aware. Tolerating diet. Voiding per urinal. No BM. Family at bedside.

## 2019-10-18 NOTE — PLAN OF CARE
PT Cancel: Pt declining therapy today d/t continuing to experience significant L LE pain. Therapist provided significant encouragement for pt to participate in therapy and discussed need to assess ambulation prior to discharge. Pt is insisting on discharging home and states he will be agreeable to therapy tomorrow if pain is better. Will reschedule for 10/19.

## 2019-10-18 NOTE — PROVIDER NOTIFICATION
Pt refused Physical Therapy for 2 days b/c he's in pain, yet he refused to take his sched Tylenol or prn Oxycodone when offers, said he has pain when he gets up.

## 2019-10-18 NOTE — PHARMACY-VANCOMYCIN DOSING SERVICE
Pharmacy Vancomycin Note  Date of Service 2019  Patient's  1940   79 year old, male    Indication: Abscess  Goal Trough Level: 10-15 mg/L  Day of Therapy: 5  Current Vancomycin regimen:  Intermittent dosing, last received 1250mg IV on 10/16    Current estimated CrCl = CrCl cannot be calculated (Unknown ideal weight.).    Creatinine for last 3 days  10/16/2019:  7:05 AM Creatinine 3.66 mg/dL  10/17/2019:  7:40 AM Creatinine 3.21 mg/dL  10/18/2019:  8:14 AM Creatinine 3.20 mg/dL    Recent Vancomycin Levels (past 3 days)  10/16/2019:  7:05 AM Vancomycin Level 11.7 mg/L  10/18/2019:  8:14 AM Vancomycin Level 14.6 mg/L    Vancomycin IV Administrations (past 72 hours)                   vancomycin 1250 mg in 0.9% NaCl 250 mL intermittent infusion 1,250 mg (mg) 1,250 mg Given 10/16/19 0929                Nephrotoxins and other renal medications (From now, onward)    Start     Dose/Rate Route Frequency Ordered Stop    10/18/19 1000  vancomycin 1250 mg in 0.9% NaCl 250 mL intermittent infusion 1,250 mg      1,250 mg  over 90 Minutes Intravenous ONCE 10/18/19 0942      10/14/19 1800  cycloSPORINE modified (GENERIC EQUIVALENT) capsule 50 mg      50 mg Oral 2 TIMES DAILY. 10/14/19 1454      10/14/19 1008  vancomycin place mckee - receiving intermittent dosing      1 each Intravenous SEE ADMIN INSTRUCTIONS 10/14/19 1009               Contrast Orders - past 72 hours (72h ago, onward)    None          Interpretation of levels and current regimen:  Trough level is  Therapeutic    Has serum creatinine changed > 50% in last 72 hours: No    Urine output:  good urine output    Renal Function: Improving (baseline SCr ~1.4)    Plan:  1.  Continue intermittent dosing based on levels as renal function improves. Will re-dose today vancomycin 1250mg IV x1  2.  Pharmacy will check trough levels as appropriate in 1-3 Days.    3. Serum creatinine levels will be ordered daily for the first week of therapy and at least twice  weekly for subsequent weeks.      Nedra Baez [facility]        .

## 2019-10-18 NOTE — PLAN OF CARE
Afebrile, VSS on RA.  Alert and oriented x4.  Pt reports some pain to LLE, yet declined scheduled tylenol.  Denies nausea.  Tolerating regular diet, appetite good.  Left leg dressing CDI, wrapped with ace bandage, erythema unchanged.  Passing gas, no BM this shift.  Voiding adequate amount. Up with assist of 1 and walker.  Encouraged to ambulate, yet refused. Continue plan of care.

## 2019-10-19 ENCOUNTER — APPOINTMENT (OUTPATIENT)
Dept: PHYSICAL THERAPY | Facility: CLINIC | Age: 79
DRG: 698 | End: 2019-10-19
Attending: INTERNAL MEDICINE
Payer: MEDICARE

## 2019-10-19 LAB
ANION GAP SERPL CALCULATED.3IONS-SCNC: 7 MMOL/L (ref 3–14)
BUN SERPL-MCNC: 33 MG/DL (ref 7–30)
CALCIUM SERPL-MCNC: 8.8 MG/DL (ref 8.5–10.1)
CHLORIDE SERPL-SCNC: 110 MMOL/L (ref 94–109)
CO2 SERPL-SCNC: 24 MMOL/L (ref 20–32)
CREAT SERPL-MCNC: 2.91 MG/DL (ref 0.66–1.25)
ERYTHROCYTE [DISTWIDTH] IN BLOOD BY AUTOMATED COUNT: 14.3 % (ref 10–15)
GFR SERPL CREATININE-BSD FRML MDRD: 19 ML/MIN/{1.73_M2}
GLUCOSE SERPL-MCNC: 102 MG/DL (ref 70–99)
HCT VFR BLD AUTO: 31 % (ref 40–53)
HGB BLD-MCNC: 9.8 G/DL (ref 13.3–17.7)
INR PPP: 1.77 (ref 0.86–1.14)
MCH RBC QN AUTO: 29.3 PG (ref 26.5–33)
MCHC RBC AUTO-ENTMCNC: 31.6 G/DL (ref 31.5–36.5)
MCV RBC AUTO: 93 FL (ref 78–100)
PLATELET # BLD AUTO: 212 10E9/L (ref 150–450)
POTASSIUM SERPL-SCNC: 3.3 MMOL/L (ref 3.4–5.3)
RBC # BLD AUTO: 3.35 10E12/L (ref 4.4–5.9)
SODIUM SERPL-SCNC: 140 MMOL/L (ref 133–144)
WBC # BLD AUTO: 5.2 10E9/L (ref 4–11)

## 2019-10-19 PROCEDURE — 80048 BASIC METABOLIC PNL TOTAL CA: CPT | Performed by: STUDENT IN AN ORGANIZED HEALTH CARE EDUCATION/TRAINING PROGRAM

## 2019-10-19 PROCEDURE — 25000132 ZZH RX MED GY IP 250 OP 250 PS 637: Mod: GY | Performed by: STUDENT IN AN ORGANIZED HEALTH CARE EDUCATION/TRAINING PROGRAM

## 2019-10-19 PROCEDURE — 85610 PROTHROMBIN TIME: CPT | Performed by: STUDENT IN AN ORGANIZED HEALTH CARE EDUCATION/TRAINING PROGRAM

## 2019-10-19 PROCEDURE — 36415 COLL VENOUS BLD VENIPUNCTURE: CPT | Performed by: STUDENT IN AN ORGANIZED HEALTH CARE EDUCATION/TRAINING PROGRAM

## 2019-10-19 PROCEDURE — 97530 THERAPEUTIC ACTIVITIES: CPT | Mod: GP

## 2019-10-19 PROCEDURE — 97116 GAIT TRAINING THERAPY: CPT | Mod: GP

## 2019-10-19 PROCEDURE — 85027 COMPLETE CBC AUTOMATED: CPT | Performed by: STUDENT IN AN ORGANIZED HEALTH CARE EDUCATION/TRAINING PROGRAM

## 2019-10-19 PROCEDURE — 99233 SBSQ HOSP IP/OBS HIGH 50: CPT | Mod: GC | Performed by: STUDENT IN AN ORGANIZED HEALTH CARE EDUCATION/TRAINING PROGRAM

## 2019-10-19 PROCEDURE — 97161 PT EVAL LOW COMPLEX 20 MIN: CPT | Mod: GP

## 2019-10-19 PROCEDURE — 12000001 ZZH R&B MED SURG/OB UMMC

## 2019-10-19 PROCEDURE — 25000131 ZZH RX MED GY IP 250 OP 636 PS 637: Mod: GY | Performed by: STUDENT IN AN ORGANIZED HEALTH CARE EDUCATION/TRAINING PROGRAM

## 2019-10-19 PROCEDURE — 25000128 H RX IP 250 OP 636: Performed by: STUDENT IN AN ORGANIZED HEALTH CARE EDUCATION/TRAINING PROGRAM

## 2019-10-19 RX ORDER — DOXYCYCLINE 100 MG/1
100 CAPSULE ORAL EVERY 12 HOURS SCHEDULED
Status: DISCONTINUED | OUTPATIENT
Start: 2019-10-19 | End: 2019-10-22 | Stop reason: HOSPADM

## 2019-10-19 RX ORDER — POTASSIUM CHLORIDE 20MEQ/15ML
40 LIQUID (ML) ORAL DAILY
Status: DISCONTINUED | OUTPATIENT
Start: 2019-10-19 | End: 2019-10-19

## 2019-10-19 RX ORDER — POTASSIUM CHLORIDE 750 MG/1
40 TABLET, EXTENDED RELEASE ORAL ONCE
Status: COMPLETED | OUTPATIENT
Start: 2019-10-19 | End: 2019-10-19

## 2019-10-19 RX ORDER — WARFARIN SODIUM 3 MG/1
3 TABLET ORAL
Status: COMPLETED | OUTPATIENT
Start: 2019-10-19 | End: 2019-10-19

## 2019-10-19 RX ADMIN — ACETAMINOPHEN 1000 MG: 500 TABLET, FILM COATED ORAL at 07:35

## 2019-10-19 RX ADMIN — ACETAMINOPHEN 1000 MG: 500 TABLET, FILM COATED ORAL at 20:32

## 2019-10-19 RX ADMIN — GABAPENTIN 300 MG: 300 CAPSULE ORAL at 20:32

## 2019-10-19 RX ADMIN — WARFARIN SODIUM 3 MG: 3 TABLET ORAL at 17:22

## 2019-10-19 RX ADMIN — CEFEPIME HYDROCHLORIDE 1 G: 1 INJECTION, POWDER, FOR SOLUTION INTRAMUSCULAR; INTRAVENOUS at 11:58

## 2019-10-19 RX ADMIN — MYCOPHENOLATE MOFETIL 500 MG: 500 TABLET ORAL at 07:35

## 2019-10-19 RX ADMIN — POTASSIUM CHLORIDE 40 MEQ: 750 TABLET, EXTENDED RELEASE ORAL at 15:40

## 2019-10-19 RX ADMIN — CYCLOSPORINE 50 MG: 25 CAPSULE, LIQUID FILLED ORAL at 17:22

## 2019-10-19 RX ADMIN — DILTIAZEM HYDROCHLORIDE 180 MG: 180 CAPSULE, COATED, EXTENDED RELEASE ORAL at 07:35

## 2019-10-19 RX ADMIN — DOXYCYCLINE 100 MG: 100 CAPSULE ORAL at 20:32

## 2019-10-19 RX ADMIN — MYCOPHENOLATE MOFETIL 500 MG: 500 TABLET ORAL at 17:22

## 2019-10-19 RX ADMIN — ACETAMINOPHEN 1000 MG: 500 TABLET, FILM COATED ORAL at 14:26

## 2019-10-19 RX ADMIN — CYCLOSPORINE 50 MG: 25 CAPSULE, LIQUID FILLED ORAL at 07:34

## 2019-10-19 ASSESSMENT — ACTIVITIES OF DAILY LIVING (ADL)
ADLS_ACUITY_SCORE: 14

## 2019-10-19 ASSESSMENT — PAIN DESCRIPTION - DESCRIPTORS: DESCRIPTORS: TENDER

## 2019-10-19 NOTE — PLAN OF CARE
PT -   Discharge Planner PT   Patient plan for discharge: home with assist  Current status: Evaluation completed and treatment initiated. Pt performs bed mobility with mod I. Performs sit<>stand transfers without AD with standby assist. Pt ambulates 100ft with FWW, 400ft without AD with standby assist. Pt ascends/descends flight of stairs with use of L rail with standby assist. Pt limited by pain and motivation for participation in therapy, as he declined multiple days in a row. Fatigues and gets short of breath with activity.  Barriers to return to prior living situation: medical status, pain  Recommendations for discharge: Home with assist  Rationale for recommendations: Pt is standby assist for all functional mobility, and will likely continue to progress if agreeable to participate in therapy during remaining LOS. Pt will benefit from having assistance from stepdaughter for ADLs/IADLs as needed d/t pt's pain.  Entered by: Aylin Hancock 10/19/2019 8:55 AM

## 2019-10-19 NOTE — PLAN OF CARE
Neuro: A&Ox4.   Cardiac: VSS. Afebrile./78   Pulse 73   Temp 96.9  F (36.1  C) (Oral)   Resp 18   Wt 86.3 kg (190 lb 3.2 oz)   SpO2 96%   BMI 26.53 kg/m      Respiratory: Sating >92% on RA. Pt had one episode of difficulty breathing/chest tightness/smelling weird smell, MD aware. If pt becomes more short of breath, MD will order IV Lasix.  GI/: Adequate urine output. No BM today  Diet/appetite: Tolerating regular diet. Eating fair amount.  Activity:  Assist of 1 with walker, up in halls.   Pain: At acceptable level on current regimen, using scheduled Tylenol.   Skin: Left leg swelling/erythema improving, right arm swelling decreasing. Continue to elevate both on pillows.  LDA's: PIV SL    Plan: Continue with POC--IV cefepime and oral doxycycline as ordered. Monitor breathing and left shin erythema/wound and right arm swelling. Notify primary team with changes.

## 2019-10-19 NOTE — PLAN OF CARE
BPs elevated, but stable. All other VSS. Pt denies pain, except with ambulation. Redness to LLE slightly improved, wound is scabbed, edema surrounding, re-wrapped with ace wrap after shower. CMS intact to LLE. Edema/redness to RUE, encouraged elevating on pillows while in bed. Tolerating reg diet, BS active x4, pt reports passing gas. Pt up with SBA & walker, voiding adequately. States he will work with PT tomorrow (previously refusing).

## 2019-10-19 NOTE — PROGRESS NOTES
[facility]  Transplant Infectious Disease Progress Note     Patient:  Matheus White Sr., Date of birth 1940, Medical record number [id number]  Date of Visit:  10/18/2019         Assessment and Recommendations:   Recommendations:  - Continue cefepime and vancomycin for now until he has had a sustained improvement and ambulating bettter    Transplant Infectious Disease will continue to follow with you.      Transplant ID will continue to follow with you.  Dr. Ambrose Blanco is on call for the weekend on pager [phone].    Assessment:  Matheus White is a 79 year old man with s/p DDKT in 2009, HTN, Afib on warfarin and diltiazem, who presents with cellulitis and hematoma after an acute injury and found to have DEB on CKD.      # LLE cellulitis with associated hematoma, suspected infected: He developed a cellulitis and hematoma at the site of trauma as of his right shin.  He received initial treatment with piperacillin tazobactam and vancomycin in the emergency department but then was transitioned to Keflex and doxycycline oral therapy.  He had worsening of his local symptoms  while on this regimen.  There are several possible factors that may contribute to this.  First, he may have needed additional IV therapy to achieve high sustained drug levels.  Second he may have needed additional source control.  Aspiration of the fluid collection revealed a hematoma with elevated white count to suggest that may have been infected.  His failure to improve may have related to inadequate antibiotic penetration into this collection.  Third possibility is the spectrum of antibiotics chosen.  His have have recently been broadened to include both pseudomonal and MRSA coverage and he shown some clinical response on this.  We did favor continuing him on this regimen for the time being waiting a longer period of sustained clinical improvement while we await his cultures.     - QTc interval: 465 msec on  10/11/2019  - PCP prophylaxis: None  - Viral serostatus & prophylaxis: CMV D-R+, EBV D+, none  - Gamma globulin status: not checked  - Isolation status: Good hand hygiene.     Staffed with Dr. Allan.     Dinorah Castillo MD, PhD  Adult & Pediatric Infectious          Interval History:   Last seen by ID on 10/17.  Afebrile with Tmax 99.  Still barely walking due to pain in leg.  Was independently ambulatory at baseline.  Good oral intake.  No vomiting or diarrhea.  No respiratory issues.    Antibiotics  Cefepime 10/16-present  Vancomycin 10/11, 10/14, 10/16     Past  Pip-tazo 10/11  Cephalexin 10/12-10/14  Cefazolin 10/14-10/15  Doxycycline 10/12-10/14      Transplants:  3/5/2009 (Kidney), Postoperative day:  3879.  Coordinator Paris Leon    Review of Systems:  CONSTITUTIONAL:  No fevers or chills  EYES: negative for icterus or acute vision changes  ENT:  negative for acute hearing loss, tinnitus, sore throat  RESPIRATORY:  negative for cough, sputum or dyspnea  CARDIOVASCULAR:  negative for chest pain, palpitations  GASTROINTESTINAL:  negative for nausea, vomiting, diarrhea or constipation  GENITOURINARY:  negative for dysuria or hematuria  HEME:  No easy bruising or bleeding  INTEGUMENT:  Ongoing redness and pain over left leg.  NEURO:  Negative for headache or tremor         Current Medications & Allergies:       acetaminophen  1,000 mg Oral TID     ceFEPIme (MAXIPIME) IV  1 g Intravenous Q24H     cycloSPORINE modified  50 mg Oral BID IS     diltiazem ER COATED BEADS  180 mg Oral Daily     gabapentin  300 mg Oral At Bedtime     influenza Vac Split High-Dose  0.5 mL Intramuscular Prior to discharge     mycophenolate  500 mg Oral BID IS     sodium chloride (PF)  3 mL Intracatheter Q8H     vancomycin place mckee - receiving intermittent dosing  1 each Intravenous See Admin Instructions       Infusions/Drips:    Warfarin Therapy Reminder         No Known Allergies         Physical Exam:   Vitals were  reviewed.    Ranges for vital signs:  Temp:  [97.8  F (36.6  C)-99  F (37.2  C)] 97.8  F (36.6  C)  Pulse:  [73] 73  Heart Rate:  [77-92] 92  Resp:  [15-16] 16  BP: (147-157)/(74-92) 147/74  SpO2:  [94 %-97 %] 96 %  Vitals:    10/13/19 1927 10/14/19 1600 10/16/19 1344   Weight: 87.2 kg (192 lb 4.8 oz) 88.3 kg (194 lb 11.2 oz) 86.3 kg (190 lb 3.2 oz)       Physical Examination:  GENERAL:  well-developed, well-nourished, in bed in no acute distress.  HEENT:  Head is normocephalic, atraumatic   EYES:  Eyes have anicteric sclerae without conjunctival injection  ENT:  Oropharynx is moist without exudates or ulcers. Tongue is midline  NECK:  Supple. No cervical lymphadenopathy  LUNGS:  Clear to auscultation bilateral.   CARDIOVASCULAR:  Regular rate and rhythm with no murmurs, gallops or rubs.  ABDOMEN:  Normal bowel sounds, soft, nontender. No appreciable hepatosplenomegaly.  SKIN:  Left leg with two dry lacerations and small puncture would at site of prior aspiration.  There is still a visible hematoma that slightly less tender to touch.  The surrounding skin is warm (see photo below).  MSK: Right arm swollen with significant ecchymoses at site of previous IV infiltration.  NEUROLOGIC:  Grossly nonfocal. Active x4 extremities         Laboratory Data:     Inflammatory Markers    Recent Labs   Lab Test 10/15/19  0702   CRP 29.0*     Metabolic Studies       Recent Labs   Lab Test 10/18/19  0814 10/17/19  0740  10/12/19  0924  10/11/19  1414    142   < > 138   < >  --    POTASSIUM 3.4 3.3*   < > 3.6   < >  --    CHLORIDE 111* 111*   < > 107   < >  --    CO2 19* 22   < > 23   < >  --    ANIONGAP 11 9   < > 8   < >  --    BUN 36* 36*   < > 58*   < >  --    CR 3.20* 3.21*   < > 5.03*   < >  --    GFRESTIMATED 17* 17*   < > 10*   < >  --    GLC 91 95   < > 88   < >  --    ELEAZAR 9.2 9.2   < > 8.3*   < >  --    LACT  --   --   --   --   --  0.9   CKT  --   --   --  64  --   --     < > = values in this interval not displayed.        Hepatic Studies    Recent Labs   Lab Test 10/11/19  1413   BILITOTAL 1.8*   ALKPHOS 230*   PROTTOTAL 7.0   ALBUMIN 3.3*   AST 16   ALT 21     Hematology Studies      Recent Labs   Lab Test 10/18/19  0814 10/17/19  0740 10/16/19  0705 10/15/19  0702 10/14/19  0704 10/13/19  0727  10/11/19  1413   WBC 6.4 6.5 5.3 6.5 6.3 6.8   < > 9.9   ANEU  --   --   --   --   --  5.3  --  8.7*   ALYM  --   --   --   --   --  0.4*  --  0.3*   GREG  --   --   --   --   --  0.8  --  0.7   AEOS  --   --   --   --   --  0.3  --  0.1   HGB 10.3* 10.0* 9.8* 10.2* 9.6* 10.0*   < > 10.9*   HCT 31.7* 31.9* 30.9* 32.1* 30.9* 31.7*   < > 34.3*    255 263 274 294 292   < > 334    < > = values in this interval not displayed.     Urine Studies     Recent Labs   Lab Test 10/16/19  1204 10/11/19  1516   URINEPH 6.5 7.0   NITRITE Negative Negative   LEUKEST Trace* Moderate*   WBCU 3 30*     Microbiology:     Serum CrAg               10/15/19 negative     Blood               10/11/19 NGTD     Urine               10/11/19 negative     Left leg wound               10/15/19 GS with many WBCs and RBCs.  No organisms seen.  Culture NGTD       Imaging:  10/16/19 CXR  Bilateral mixed opacities and pulmonary vascular  prominence suggestive of pulmonary edema. Small right pleural  Effusion.     10/16/19 CT leg  1. Left pretibial fluid collection with mild surrounding soft tissue  edema, considerations include hematoma versus phlegmon/abscess. Soft  tissue tumor much less likely, follow-up to document resolution.  2.Extensive vascular calcifications.

## 2019-10-19 NOTE — PROGRESS NOTES
[facility], [place]    Progress Note - [facility] Service        Date of Admission:  10/11/2019    Assessment & Plan   Changes Today:      - Following fluid cultures from aspiration done 10/15  - Stop Vanco  - Start doxy  - Encouraging PT       Cellulitis of Left Lower Extremity  Hematoma  Mildly improving on IV broad spectrum antibiotics including MRSA and pseudomonas coverage. Slow improvement in edema and erythema from ankle to mid-shin, tenderness over the focal area of swelling on anterior shin is decreasing. Injury to LLE 1 week prior to admission, assessed at urgent care, XR negative for fracture, topical bacitracin for wound given and discharged. S/p one dose Zosyn in ED and one time renally-dosed vancomycin, switched to PO doxycycline and cephalexin on 10/12, then broadened to vanc/cefepime due to worsening. Per surgery evaluation, no indication for incision and drainage at this time due to the risk of open wound in a patient with delayed healing. IR evaluated, 3 ml of fluid drained, most likely consistent with a hematoma, fluid cultures negative so far.  - Transplant ID consulted, appreciate recs  - Continue cefepime 1g q24h IV  - Stop vanco  - Start doxy  - If does well with above, consider transitioning Cefepime to PO option tomorrow  - Scheduled APAP and prn oxycodone   - PT     Dyspnea, improving  Pulmonary Edema  Etiology is likely related to volume overload from IV fluids.    - Avoid diuretics due to DEB and because respiratory status is stable  - if respiratory status worsens, consider 20mg IV Lasix and BIPAP if needed     DEB on CKD3, improving   Donor Kidney Transplant  Microhematuria  DDKT on 2009. Baseline creatinine 1.5 (last creatinine from 2019 = 1.41). Dry weight 175lbs. Cyclosporine level within therapeutic range.Renal ultrasound showed mild hydronephrosis. Nephrology following, most concerned for pre-renal etiology or ATN, and less likely  glomerulonephritis, urinary tract infection, or other obstruction considering degree of hydronephrosis. Supratherapeutic INR may have contributed to microhematuria at admission.  - Nephrology following, appreciate recs  - Trend BMP  - Continue PTA immunosuppression              - 50mg cyclosporin BID              - 500mg mycophenolate BID  - Avoid nephrotoxins     Atrial Fibrillation  Patient has had diagnosis of Afib since atleast 2009 diagnosed by Holter monitor. On warfarin, presented with supratherapeutic INR. Warfarin initially held, now restarted on 10/17.   - Pharmacy to dose warfarin, goal INR 2-3  - Continue cardizem 180 mg     Hypertension  Currently not on any home anti-hypertensives. SBPs in 170's early in admission, now improved to 130's.   - Monitor blood pressures, no new antihypertensives added at this time  - Patient is new to [place] and will need a primary care provider.     Diet: Regular Diet Adult    Fluids: none  Lines: PIV  DVT Prophylaxis: Warfarin  Olvera Catheter: not present  Code Status: Full Code      Disposition Plan   Expected discharge: 2 - 3 days, recommended to prior living arrangement once adequate pain management/ antibiotic plan established.  Entered: Mell Garcia MD 10/19/2019, 5:54 PM         Mell Garcia MD  Staff Hospitalist  [facility] Service  [facility], [place]  Please see sticky note for cross cover information  ______________________________________________________________________    Interval History   No acute events overnight. Leg pain continues to improve. Was able to get up and walk with PT this morning. No walker needed. Feeling better every day. Denies fevers/chills. No dysuria. No SOB or cough. Looking forward to getting home.     Data reviewed today: I reviewed all medications, new labs and imaging results over the last 24 hours. I personally reviewed no images or EKG's today.    Physical Exam   Vital Signs: Temp: 96.9  F  (36.1  C) Temp src: Oral BP: 137/78   Heart Rate: 72 Resp: 18 SpO2: 96 % O2 Device: None (Room air)    Weight: 190 lbs 3.2 oz  General Appearance: Sitting up in bed, no distress. Respiratory: No increased work of breathing, clear to auscultation bilaterally, no crackles or wheezes  Cardiovascular: Irregularly irregular rhythm, normal S1 and S2 with no murmurs  GI: Abdomen is soft, non-distended, non-tender, no masses, no hepatosplenomegaly  Skin: Improving erythema and warmth in LLE, focused in the anterior shin slightly above the ankle to slightly below the knee.  Focal 3cm x 3cm area of swelling on anterior portion of shin with some tenderness to palpation, with nearby two small areas of skin breakdown with overlying crust, no active bleeding or drainage. +1 edema of the LLE, none in RLE. No pain to palpation in calf of LLE, erythema improving in posterior portion.  Neurologic: AOx3, CN's intact. Moves all extremities equally.     Data   Recent Labs   Lab 10/19/19  0949 10/18/19  0814 10/18/19  0703 10/17/19  0740   WBC 5.2 6.4  --  6.5   HGB 9.8* 10.3*  --  10.0*   MCV 93 91  --  93    258  --  255   INR 1.77*  --  1.53* 1.48*    141  --  142   POTASSIUM 3.3* 3.4  --  3.3*   CHLORIDE 110* 111*  --  111*   CO2 24 19*  --  22   BUN 33* 36*  --  36*   CR 2.91* 3.20*  --  3.21*   ANIONGAP 7 11  --  9   ELEAZAR 8.8 9.2  --  9.2   * 91  --  95

## 2019-10-19 NOTE — PROGRESS NOTES
[facility], [place]   Transplant Nephrology Progress Note  Date of Admission:  10/11/2019  Today's Date: 10/18/2019    Assessment & Plan     # DDKT: DEB with creatinine peaked at 5.4 from 1.6 mg / dL in August 2019. Slowly improving. Working diagnosis is DEB in the settings of cellulitis. Creatinine is slowly improving               - Baseline Cr ~ 1.5-1.8 mg /dL              - Proteinuria: Not checked recently              - Date DSA Last Checked: not checked recently              - BK Viremia: Not checked recently              - Kidney Tx Biopsy: No      # Immunosuppression: Cyclosporine (goal 50-75) and Mycophenolate mofetil (goal not followed)              - Changes: discussed resuming his home regimen and checking his levels.      # Hypertension: Controlled; Goal BP: < 130/80              - Volume status: Euvolemic              - Changes: No     # 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: Hgb: Stable      AISHWARYA: No              - Iron studies: Not checked recently     # Electrolytes:   - Potassium; level: low advised gentle replacement   - Magnesium; level: Not checked recently  - Bicarbonate; level: Normal  - Sodium; level: Normal     # LE cellulitis: appreciate ID input      # Transplant History:  Etiology of Kidney Failure: Unknown etiology   Tx: DDKT  Transplant: 3/5/2009 (Kidney)  Donor Type: Donation after Brain Death        Donor Class: Expanded Criteria Donor  Crossmatch at time of Tx: negative  Significant changes in immunosuppression: None  Significant transplant-related complications: None    Recommendations were communicated to the primary team verbally.      Josias Villarreal MD  Pager: [phone]    Interval History     Matheus was seen this morning. Feels slightly better but his cellulitis is not improving thus far.     Review of Systems   4 point ROS was obtained and negative except as noted in the Interval History.    MEDICATIONS:  Current Facility-Administered Medications    Medication     acetaminophen (TYLENOL) tablet 1,000 mg     bisacodyl (DULCOLAX) EC tablet 5 mg    Or     bisacodyl (DULCOLAX) EC tablet 10 mg    Or     bisacodyl (DULCOLAX) EC tablet 15 mg     ceFEPIme (MAXIPIME) 1g vial to attach to  ml bag for ADULTS or NS 50 ml bag for PEDS     cycloSPORINE modified (GENERIC EQUIVALENT) capsule 50 mg     diltiazem ER COATED BEADS (CARDIZEM CD/CARTIA XT) 24 hr capsule 180 mg     gabapentin (NEURONTIN) capsule 300 mg     influenza Vac Split High-Dose (FLUZONE) injection 0.5 mL     lidocaine (LMX4) cream     lidocaine 1 % 0.1-1 mL     melatonin tablet 1 mg     mycophenolate (GENERIC EQUIVALENT) tablet 500 mg     naloxone (NARCAN) injection 0.1-0.4 mg     oxyCODONE (ROXICODONE) tablet 5 mg     polyethylene glycol (MIRALAX/GLYCOLAX) Packet 17 g     sodium chloride (PF) 0.9% PF flush 3 mL     sodium chloride (PF) 0.9% PF flush 3 mL     vancomycin place mckee - receiving intermittent dosing     Warfarin Therapy Reminder (Check START DATE - warfarin may be starting in the FUTURE)       Physical Exam   Temp  Av.6  F (36.4  C)  Min: 96.3  F (35.7  C)  Max: 98.1  F (36.7  C)      Pulse  Av.5  Min: 66  Max: 124 Resp  Av.9  Min: 16  Max: 19  SpO2  Av.7 %  Min: 94 %  Max: 98 %     BP (!) 147/74 (BP Location: Left arm)   Pulse 73   Temp 97.8  F (36.6  C) (Oral)   Resp 16   Wt 86.3 kg (190 lb 3.2 oz)   SpO2 96%   BMI 26.53 kg/m     Date 10/15/19 0700 - 10/16/19 0659   Shift 2614-9446 8404-0170 0708-0532 24 Hour Total   INTAKE   Shift Total(mL/kg)       OUTPUT   Urine 150   150   Shift Total(mL/kg) 150(1.7)   150(1.7)   Weight (kg) 88.31 88.31 88.31 88.31      Admit Weight: 86.3 kg (190 lb 3.2 oz)     GENERAL APPEARANCE: alert and no distress  HENT: mouth without ulcers or lesions  LYMPHATICS: no cervical or supraclavicular nodes  RESP: lungs clear to auscultation - no rales, rhonchi or wheezes  CV: irregular rhythm, normal rate, no rub, no murmur  EDEMA: Left  leg with significant erythema and area of swelling over the mid shin area.  Tenderness was noted and warmth on exam (largely unchanged)  ABDOMEN: soft, nondistended, nontender, bowel sounds normal  MS: extremities normal - no gross deformities noted, no evidence of inflammation in joints, no muscle tenderness  SKIN: no rash    Data   All labs reviewed by me.  CMP  Recent Labs   Lab 10/18/19  0814 10/17/19  0740 10/16/19  0705 10/15/19  0702    142 142 141   POTASSIUM 3.4 3.3* 3.1* 3.4   CHLORIDE 111* 111* 111* 112*   CO2 19* 22 21 20   ANIONGAP 11 9 10 9   GLC 91 95 86 88   BUN 36* 36* 42* 44*   CR 3.20* 3.21* 3.66* 3.74*   GFRESTIMATED 17* 17* 15* 14*   GFRESTBLACK 20* 20* 17* 17*   ELEAZAR 9.2 9.2 9.0 8.9     CBC  Recent Labs   Lab 10/18/19  0814 10/17/19  0740 10/16/19  0705 10/15/19  0702   HGB 10.3* 10.0* 9.8* 10.2*   WBC 6.4 6.5 5.3 6.5   RBC 3.49* 3.42* 3.33* 3.42*   HCT 31.7* 31.9* 30.9* 32.1*   MCV 91 93 93 94   MCH 29.5 29.2 29.4 29.8   MCHC 32.5 31.3* 31.7 31.8   RDW 14.3 14.2 14.2 14.2    255 263 274     INR  Recent Labs   Lab 10/18/19  0703 10/17/19  0740 10/16/19  0705 10/15/19  0702   INR 1.53* 1.48* 1.50* 1.59*     ABGNo lab results found in last 7 days.   Urine Studies  Recent Labs   Lab Test 10/16/19  1204 10/11/19  1516   COLOR Yellow Yellow   APPEARANCE Clear Clear   URINEGLC 30* Negative   URINEBILI Negative Negative   URINEKETONE Negative Negative   SG 1.010 1.014   UBLD Moderate* Large*   URINEPH 6.5 7.0   PROTEIN 30* 50*   NITRITE Negative Negative   LEUKEST Trace* Moderate*   RBCU 5* 88*   WBCU 3 30*     Recent Labs   Lab Test 10/13/19  2140   UTPG 1.08*     PTH  No lab results found.  Iron Studies  No lab results found.    IMAGING:  All imaging studies reviewed by me.

## 2019-10-19 NOTE — PROGRESS NOTES
10/19/19 0800   Quick Adds   Type of Visit Initial PT Evaluation   Living Environment   Lives With child(edilberto), adult   Living Arrangements house   Home Accessibility stairs to enter home;stairs within home   Number of Stairs, Main Entrance other (see comments)  (~12)   Stair Railings, Main Entrance railing on right side (ascending)   Number of Stairs, Within Home, Primary other (see comments)  (12 steps, 2 levels)   Stair Railings, Within Home, Primary railing on right side (ascending)   Transportation Anticipated family or friend will provide;car, drives self   Living Environment Comment Pt lives in 2 story home with stepdaughter   Self-Care   Usual Activity Tolerance good   Current Activity Tolerance good   Regular Exercise Yes   Activity/Exercise Type walking  (gardens/does yard work)   Exercise Amount/Frequency 3-5 times/wk   Equipment Currently Used at Home none;crutches   Activity/Exercise/Self-Care Comment Pt was active previously. Enjoys exercising and doing yard work   Functional Level Prior   Ambulation 0-->independent   Transferring 0-->independent   Toileting 0-->independent   Bathing 0-->independent   Communication 0-->understands/communicates without difficulty   Swallowing 0-->swallows foods/liquids without difficulty   Cognition 0 - no cognition issues reported   Fall history within last six months yes   Number of times patient has fallen within last six months 1   Which of the above functional risks had a recent onset or change? transferring;ambulation   Prior Functional Level Comment Pt was independent. Plans to return to work as a . Was driving himself   General Information   Onset of Illness/Injury or Date of Surgery - Date 10/11/19   Referring Physician Chilo Rolon MD   Patient/Family Goals Statement Return home   Pertinent History of Current Problem (include personal factors and/or comorbidities that impact the POC) Per chart, Matheus White is a 79 year old man with CKD3 s/p  DDKT, HTN, Afib on warfarin and diltiazem, who was admitted 6 days ago with cellulitis of the left leg after an acute injury and was found to have DEB on CKD.    Precautions/Limitations fall precautions   Weight-Bearing Status - LUE full weight-bearing   Weight-Bearing Status - RUE full weight-bearing   Weight-Bearing Status - LLE full weight-bearing   Weight-Bearing Status - RLE full weight-bearing   General Info Comments Activity: up ad merlyn   Cognitive Status Examination   Orientation orientation to person, place and time   Level of Consciousness alert   Follows Commands and Answers Questions 100% of the time   Personal Safety and Judgment intact   Memory intact   Integumentary/Edema   Integumentary/Edema Comments redness of L LE   Posture    Posture Forward head position;Protracted shoulders   Range of Motion (ROM)   ROM Comment WFL   Strength   Strength Comments Grossly 4/5 LE strength   Bed Mobility   Bed Mobility Comments mod I   Transfer Skills   Transfer Comments SBA   Gait   Gait Comments SBA 400ft   Balance   Balance Comments not formally assessed   General Therapy Interventions   Planned Therapy Interventions balance training;gait training;neuromuscular re-education;strengthening;progressive activity/exercise   Clinical Impression   Criteria for Skilled Therapeutic Intervention yes, treatment indicated   PT Diagnosis Impaired functional mobility   Influenced by the following impairments Pain, decreased activity tolerance, fatigue   Functional limitations due to impairments Ambulation, stairs, driving   Clinical Presentation Stable/Uncomplicated   Clinical Presentation Rationale Chart review and clinical judgment   Clinical Decision Making (Complexity) Low complexity   Therapy Frequency 4x/week   Predicted Duration of Therapy Intervention (days/wks) 5 days   Anticipated Equipment Needs at Discharge other (see comments)  (none)   Anticipated Discharge Disposition Home with Assist   Risk & Benefits of  "therapy have been explained Yes   Patient, Family & other staff in agreement with plan of care Yes   [facility] AM-PAC  \"6 Clicks\" V.2 Basic Mobility Inpatient Short Form   1. Turning from your back to your side while in a flat bed without using bedrails? 4 - None   2. Moving from lying on your back to sitting on the side of a flat bed without using bedrails? 4 - None   3. Moving to and from a bed to a chair (including a wheelchair)? 4 - None   4. Standing up from a chair using your arms (e.g., wheelchair, or bedside chair)? 4 - None   5. To walk in hospital room? 3 - A Little   6. Climbing 3-5 steps with a railing? 3 - A Little   Basic Mobility Raw Score (Score out of 24.Lower scores equate to lower levels of function) 22   Total Evaluation Time   Total Evaluation Time (Minutes) 7     "

## 2019-10-19 NOTE — PROGRESS NOTES
[facility], [place]   Transplant Nephrology Progress Note  Date of Admission:  10/11/2019  Today's Date: 10/19/2019    Assessment & Plan     # DDKT: DEB with creatinine peaked at 5.4 from 1.6 mg / dL in August 2019. Slowly improving. Working diagnosis is DEB in the settings of cellulitis. Creatinine is slowly improving               - Baseline Cr ~ 1.5-1.8 mg /dL              - Proteinuria: Not checked recently              - Date DSA Last Checked: not checked recently              - BK Viremia: Not checked recently              - Kidney Tx Biopsy: No      # Immunosuppression: Cyclosporine (goal 50-75) and Mycophenolate mofetil (goal not followed)              - Changes: please check drug levels tomorrow or Monday      # Hypertension: Controlled; Goal BP: < 130/80              - Volume status: Euvolemic              - Changes: No     # 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: Hgb: Stable      AISHWARYA: No              - Iron studies: Not checked recently     # Electrolytes:   - Potassium; level: low advised gentle replacement   - Magnesium; level: Not checked recently  - Bicarbonate; level: Normal  - Sodium; level: Normal     # LE cellulitis: appreciate ID input, currently patient is on vancomycin and cefepime.      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      Josias Villarreal MD  Pager: [phone]    Interval History     Matheus was seen this morning. Feels slightly better but his cellulitis is not improving thus far.     Review of Systems   4 point ROS was obtained and negative except as noted in the Interval History.    MEDICATIONS:  Current  Facility-Administered Medications   Medication     acetaminophen (TYLENOL) tablet 1,000 mg     bisacodyl (DULCOLAX) EC tablet 5 mg    Or     bisacodyl (DULCOLAX) EC tablet 10 mg    Or     bisacodyl (DULCOLAX) EC tablet 15 mg     ceFEPIme (MAXIPIME) 1g vial to attach to  ml bag for ADULTS or NS 50 ml bag for PEDS     cycloSPORINE modified (GENERIC EQUIVALENT) capsule 50 mg     diltiazem ER COATED BEADS (CARDIZEM CD/CARTIA XT) 24 hr capsule 180 mg     doxycycline hyclate (VIBRAMYCIN) capsule 100 mg     gabapentin (NEURONTIN) capsule 300 mg     influenza Vac Split High-Dose (FLUZONE) injection 0.5 mL     lidocaine (LMX4) cream     lidocaine 1 % 0.1-1 mL     melatonin tablet 1 mg     mycophenolate (GENERIC EQUIVALENT) tablet 500 mg     naloxone (NARCAN) injection 0.1-0.4 mg     oxyCODONE (ROXICODONE) tablet 5 mg     polyethylene glycol (MIRALAX/GLYCOLAX) Packet 17 g     sodium chloride (PF) 0.9% PF flush 3 mL     sodium chloride (PF) 0.9% PF flush 3 mL     Warfarin Therapy Reminder (Check START DATE - warfarin may be starting in the FUTURE)       Physical Exam   Temp  Av.6  F (36.4  C)  Min: 96.3  F (35.7  C)  Max: 98.1  F (36.7  C)      Pulse  Av.5  Min: 66  Max: 124 Resp  Av.9  Min: 16  Max: 19  SpO2  Av.7 %  Min: 94 %  Max: 98 %     /78   Pulse 73   Temp 96.9  F (36.1  C) (Oral)   Resp 18   Wt 86.3 kg (190 lb 3.2 oz)   SpO2 96%   BMI 26.53 kg/m     Date 10/15/19 0700 - 10/16/19 0659   Shift 6134-0492 1318-6975 9999-6814 24 Hour Total   INTAKE   Shift Total(mL/kg)       OUTPUT   Urine 150   150   Shift Total(mL/kg) 150(1.7)   150(1.7)   Weight (kg) 88.31 88.31 88.31 88.31      Admit Weight: 86.3 kg (190 lb 3.2 oz)     GENERAL APPEARANCE: alert and no distress  HENT: mouth without ulcers or lesions  LYMPHATICS: no cervical or supraclavicular nodes  RESP: lungs clear to auscultation - no rales, rhonchi or wheezes  CV: irregular rhythm, normal rate, no rub, no murmur  EDEMA: Left leg  with significant erythema and area of swelling over the mid shin area.  Tenderness was noted and warmth on exam (largely unchanged)  ABDOMEN: soft, nondistended, nontender, bowel sounds normal  MS: extremities normal - no gross deformities noted, no evidence of inflammation in joints, no muscle tenderness  SKIN: no rash    Data   All labs reviewed by me.  CMP  Recent Labs   Lab 10/19/19  0949 10/18/19  0814 10/17/19  0740 10/16/19  0705    141 142 142   POTASSIUM 3.3* 3.4 3.3* 3.1*   CHLORIDE 110* 111* 111* 111*   CO2 24 19* 22 21   ANIONGAP 7 11 9 10   * 91 95 86   BUN 33* 36* 36* 42*   CR 2.91* 3.20* 3.21* 3.66*   GFRESTIMATED 19* 17* 17* 15*   GFRESTBLACK 22* 20* 20* 17*   ELEAZAR 8.8 9.2 9.2 9.0     CBC  Recent Labs   Lab 10/19/19  0949 10/18/19  0814 10/17/19  0740 10/16/19  0705   HGB 9.8* 10.3* 10.0* 9.8*   WBC 5.2 6.4 6.5 5.3   RBC 3.35* 3.49* 3.42* 3.33*   HCT 31.0* 31.7* 31.9* 30.9*   MCV 93 91 93 93   MCH 29.3 29.5 29.2 29.4   MCHC 31.6 32.5 31.3* 31.7   RDW 14.3 14.3 14.2 14.2    258 255 263     INR  Recent Labs   Lab 10/19/19  0949 10/18/19  0703 10/17/19  0740 10/16/19  0705   INR 1.77* 1.53* 1.48* 1.50*     ABGNo lab results found in last 7 days.   Urine Studies  Recent Labs   Lab Test 10/16/19  1204 10/11/19  1516   COLOR Yellow Yellow   APPEARANCE Clear Clear   URINEGLC 30* Negative   URINEBILI Negative Negative   URINEKETONE Negative Negative   SG 1.010 1.014   UBLD Moderate* Large*   URINEPH 6.5 7.0   PROTEIN 30* 50*   NITRITE Negative Negative   LEUKEST Trace* Moderate*   RBCU 5* 88*   WBCU 3 30*     Recent Labs   Lab Test 10/13/19  2140   UTPG 1.08*     PTH  No lab results found.  Iron Studies  No lab results found.    IMAGING:  All imaging studies reviewed by me.

## 2019-10-19 NOTE — PLAN OF CARE
VSS. Slightly hypertensive, within parameters. A&Ox4. Pt denies pain. Denies nausea. LLE with ace wrap, elevated with pillows. Voiding with bedside urinal, adequate urine volumes. Up with SBA and walker. Pt resting comfortably. Continue POC.

## 2019-10-20 LAB
ANION GAP SERPL CALCULATED.3IONS-SCNC: 7 MMOL/L (ref 3–14)
BACTERIA SPEC CULT: NO GROWTH
BUN SERPL-MCNC: 31 MG/DL (ref 7–30)
CALCIUM SERPL-MCNC: 9.2 MG/DL (ref 8.5–10.1)
CHLORIDE SERPL-SCNC: 110 MMOL/L (ref 94–109)
CO2 SERPL-SCNC: 24 MMOL/L (ref 20–32)
CREAT SERPL-MCNC: 2.75 MG/DL (ref 0.66–1.25)
ERYTHROCYTE [DISTWIDTH] IN BLOOD BY AUTOMATED COUNT: 14.4 % (ref 10–15)
GFR SERPL CREATININE-BSD FRML MDRD: 21 ML/MIN/{1.73_M2}
GLUCOSE SERPL-MCNC: 86 MG/DL (ref 70–99)
HCT VFR BLD AUTO: 33.5 % (ref 40–53)
HGB BLD-MCNC: 10.7 G/DL (ref 13.3–17.7)
INR PPP: 1.93 (ref 0.86–1.14)
MCH RBC QN AUTO: 29.7 PG (ref 26.5–33)
MCHC RBC AUTO-ENTMCNC: 31.9 G/DL (ref 31.5–36.5)
MCV RBC AUTO: 93 FL (ref 78–100)
PLATELET # BLD AUTO: 215 10E9/L (ref 150–450)
POTASSIUM SERPL-SCNC: 3.4 MMOL/L (ref 3.4–5.3)
RBC # BLD AUTO: 3.6 10E12/L (ref 4.4–5.9)
SODIUM SERPL-SCNC: 141 MMOL/L (ref 133–144)
SPECIMEN SOURCE: NORMAL
WBC # BLD AUTO: 5.7 10E9/L (ref 4–11)

## 2019-10-20 PROCEDURE — 80048 BASIC METABOLIC PNL TOTAL CA: CPT | Performed by: STUDENT IN AN ORGANIZED HEALTH CARE EDUCATION/TRAINING PROGRAM

## 2019-10-20 PROCEDURE — 85027 COMPLETE CBC AUTOMATED: CPT | Performed by: STUDENT IN AN ORGANIZED HEALTH CARE EDUCATION/TRAINING PROGRAM

## 2019-10-20 PROCEDURE — 99238 HOSP IP/OBS DSCHRG MGMT 30/<: CPT | Mod: GC | Performed by: INTERNAL MEDICINE

## 2019-10-20 PROCEDURE — 25000131 ZZH RX MED GY IP 250 OP 636 PS 637: Mod: GY | Performed by: STUDENT IN AN ORGANIZED HEALTH CARE EDUCATION/TRAINING PROGRAM

## 2019-10-20 PROCEDURE — 99233 SBSQ HOSP IP/OBS HIGH 50: CPT | Performed by: STUDENT IN AN ORGANIZED HEALTH CARE EDUCATION/TRAINING PROGRAM

## 2019-10-20 PROCEDURE — 25000128 H RX IP 250 OP 636: Performed by: STUDENT IN AN ORGANIZED HEALTH CARE EDUCATION/TRAINING PROGRAM

## 2019-10-20 PROCEDURE — 25000132 ZZH RX MED GY IP 250 OP 250 PS 637: Mod: GY | Performed by: STUDENT IN AN ORGANIZED HEALTH CARE EDUCATION/TRAINING PROGRAM

## 2019-10-20 PROCEDURE — 85610 PROTHROMBIN TIME: CPT | Performed by: STUDENT IN AN ORGANIZED HEALTH CARE EDUCATION/TRAINING PROGRAM

## 2019-10-20 PROCEDURE — 36415 COLL VENOUS BLD VENIPUNCTURE: CPT | Performed by: STUDENT IN AN ORGANIZED HEALTH CARE EDUCATION/TRAINING PROGRAM

## 2019-10-20 PROCEDURE — 12000001 ZZH R&B MED SURG/OB UMMC

## 2019-10-20 RX ORDER — LEVOFLOXACIN 250 MG/1
500 TABLET, FILM COATED ORAL DAILY
Status: CANCELLED | OUTPATIENT
Start: 2019-10-20

## 2019-10-20 RX ORDER — FUROSEMIDE 20 MG
20 TABLET ORAL DAILY
Status: DISCONTINUED | OUTPATIENT
Start: 2019-10-21 | End: 2019-10-22 | Stop reason: HOSPADM

## 2019-10-20 RX ORDER — LEVOFLOXACIN 250 MG/1
500 TABLET, FILM COATED ORAL DAILY
Status: DISCONTINUED | OUTPATIENT
Start: 2019-10-20 | End: 2019-10-21

## 2019-10-20 RX ORDER — WARFARIN SODIUM 3 MG/1
3 TABLET ORAL
Status: COMPLETED | OUTPATIENT
Start: 2019-10-20 | End: 2019-10-20

## 2019-10-20 RX ADMIN — MYCOPHENOLATE MOFETIL 500 MG: 500 TABLET ORAL at 07:59

## 2019-10-20 RX ADMIN — GABAPENTIN 300 MG: 300 CAPSULE ORAL at 19:57

## 2019-10-20 RX ADMIN — CYCLOSPORINE 50 MG: 25 CAPSULE, LIQUID FILLED ORAL at 07:59

## 2019-10-20 RX ADMIN — CEFEPIME HYDROCHLORIDE 1 G: 1 INJECTION, POWDER, FOR SOLUTION INTRAMUSCULAR; INTRAVENOUS at 12:05

## 2019-10-20 RX ADMIN — ACETAMINOPHEN 1000 MG: 500 TABLET, FILM COATED ORAL at 13:52

## 2019-10-20 RX ADMIN — DOXYCYCLINE 100 MG: 100 CAPSULE ORAL at 19:57

## 2019-10-20 RX ADMIN — CYCLOSPORINE 50 MG: 25 CAPSULE, LIQUID FILLED ORAL at 17:35

## 2019-10-20 RX ADMIN — LEVOFLOXACIN 500 MG: 250 TABLET, FILM COATED ORAL at 20:30

## 2019-10-20 RX ADMIN — DILTIAZEM HYDROCHLORIDE 180 MG: 180 CAPSULE, COATED, EXTENDED RELEASE ORAL at 08:00

## 2019-10-20 RX ADMIN — ACETAMINOPHEN 1000 MG: 500 TABLET, FILM COATED ORAL at 08:00

## 2019-10-20 RX ADMIN — WARFARIN SODIUM 3 MG: 3 TABLET ORAL at 17:36

## 2019-10-20 RX ADMIN — MYCOPHENOLATE MOFETIL 500 MG: 500 TABLET ORAL at 17:36

## 2019-10-20 RX ADMIN — DOXYCYCLINE 100 MG: 100 CAPSULE ORAL at 07:59

## 2019-10-20 ASSESSMENT — ACTIVITIES OF DAILY LIVING (ADL)
ADLS_ACUITY_SCORE: 14

## 2019-10-20 NOTE — DISCHARGE SUMMARY
[facility], [place]  Discharge Summary - Medicine & Pediatrics       Date of Admission:  10/11/2019  Date of Discharge:  10/22/2019   Discharging Provider: Dr. Bella Lema  Discharge Service: [facility] 1    Discharge Diagnoses     Left Lower Extremity Cellulitis  Chronic Immunosuppression  DEB on CKD 3 s/p DDKT in 1993  Atrial Fibrillation on warfarin    Follow-ups Needed After Discharge   Follow up INR, BMP for evaluation of therapuetic anticoagulation and improvement in kidney function on Thursday    Unresulted Labs Ordered in the Past 30 Days of this Admission     Date and Time Order Name Status Description    10/15/2019 1354 Anaerobic bacterial culture Preliminary       These results will be followed up by Primary Care Provider    Discharge Disposition   Discharged to home  Condition at discharge: Stable    Hospital Course   Matheus White Sr. was admitted on 10/11/2019 for concerns of left leg cellulitis.  The following problems were addressed during his hospitalization:    Non-Purulent Cellulitis of left lower extremity  -Pt started on zosyn then moved to vancomycin for antibiotic coverage of leg wound. He was initially transitioned to oral doxycycline and cephalexin on 10/12. Surgery evaluated the leg and determined no procedure had to be done. A LLE ultrasound was negative for DVT, only showing some fluid collection over the fluctuant area in left anterior lower extremity. Patient was moved back to IV vancomycin and cefazolin then cefepime for better management after multiple febrile episodes were noted. IR consultled and aspirated 3mL of  Fluid resembling a hematoma from the LLE on 10/15. That night pt presented with dyspnea and tachypnea that was result of flash pulmonary edema which slowly improved. Transplant Infectious disease was consulted and provided additional guidance for antibiotic management. Patient cultures did not grow any bacteria, pt switched to PO  antibiotics started on 10/19 to 10/20. Patient will plan to continue oral antibiotics of levofloxacin 500 mg PO daily and doxycycline 100mg PO BID until 10/27/19 with outpatient follow up as well.     Acute Kidney Injury on CKD Stage 3 s/p DDKT (2009)  - Patient presented with creatinine elevated around 5.4 with a baseline of 1.5. He was noted to have significant hematuria on UA. Renal ultrasound showed mild hydronephrosis. Patient's INR was elevated to 4.5 as well. Transplant nephrology was consulted. Patient was continued on home immunosuppression of 25 mg cyclosporin BID and 1000mg mycophenolate BID. Pt's cyclosporine remained within expected range, he was started on gentle hydration, and his creatinine was slowly trending down. Pt was noted to be hypokalemic and given additional potassium supplements during his stay in the hospital. His creatinine trended down to 2.7 and his warfarin was held due to his microhematuria. His INR goal dropped to within therapeutic range. He was stable for discharge with his creatinine slowly improving and INR within therapeutic range.     Atrial Fibrillation on Warfarin  - controlled with cardizem 180mg ER and on warfarin  - warfarin held with elevated INR up to 5, but downtrended to wnl normal limits, stable and follow up outpatient.         Consultations This Hospital Stay   ADVANCE DIRECTIVE IP CONSULT  PHARMACY TO DOSE VANCO  VASCULAR ACCESS CARE ADULT IP CONSULT  NEPHROLOGY KIDNEY/PANCREAS TRANSPLANT ADULT IP CONSULT  SURGERY GENERAL ADULT IP CONSULT  PHARMACY TO DOSE VANCO  VASCULAR ACCESS CARE ADULT IP CONSULT  MEDICATION HISTORY IP PHARMACY CONSULT  PHARMACY TO DOSE WARFARIN  INTERVENTIONAL RADIOLOGY ADULT/PEDS IP CONSULT  PHYSICAL THERAPY ADULT IP CONSULT  VASCULAR ACCESS CARE ADULT IP CONSULT  PHARMACY TO DOSE WARFARIN  VASCULAR ACCESS CARE ADULT IP CONSULT  INFECTIOUS DISEASE TRANSPLANT SOT ADULT IP CONSULT  PHARMACY IP CONSULT  PHARMACY TO DOSE MEDICATION    Code Status    Full Code       The patient was discussed with Dr. Torey Ramirez DO PGY3  [place] 1 Service  [facility], [place]  Pager: [phone]  ______________________________________________________________________    Physical Exam   Vital Signs: Temp: 97.6  F (36.4  C) Temp src: Oral BP: (!) 159/87   Heart Rate: 89 Resp: 18 SpO2: 97 % O2 Device: None (Room air)    Weight: 190 lbs 3.2 oz  Constitutional: awake, alert, cooperative, no apparent distress, and appears stated age and cyanotic  Respiratory: No increased work of breathing, good air exchange, clear to auscultation bilaterally, no crackles or wheezing  Cardiovascular: Normal apical impulse, irregular rate and rhythm, normal S1 and S2, no S3 or S4, and no murmur noted  GI: No scars, normal bowel sounds, soft, non-distended, non-tender, no masses palpated, no hepatosplenomegally  Skin: ecchymosis on left lower leg(s)  Musculoskeletal: left lower extremity tone normal, erythema along shin  Neurologic: Awake, alert, oriented to name, place and time.  Cranial nerves II-XII are grossly intact.  Motor is 5 out of 5 bilaterally.  Cerebellar finger to nose, heel to shin intact.  Sensory is intact.  Babinski down going, Romberg negative, and gait is normal.        Primary Care Physician   [facility]    Discharge Orders      Full Code       Significant Results and Procedures   Most Recent 3 CBC's:  Recent Labs   Lab Test 10/22/19  0755 10/21/19  0659 10/20/19  0734   WBC 5.3 5.4 5.7   HGB 10.4* 10.1* 10.7*   MCV 92 93 93    209 215     Most Recent 3 BMP's:  Recent Labs   Lab Test 10/22/19  0755 10/21/19  0659 10/20/19  0734    141 141   POTASSIUM 3.0* 3.2* 3.4   CHLORIDE 108 110* 110*   CO2 25 25 24   BUN 30 32* 31*   CR 2.69* 2.72* 2.75*   ANIONGAP 8 7 7   ELEAZAR 8.9 8.9 9.2   GLC 88 86 86     Most Recent 2 LFT's:  Recent Labs   Lab Test 10/11/19  1413   AST 16   ALT 21   ALKPHOS 230*   BILITOTAL 1.8*     Most Recent 3  INR's:  Recent Labs   Lab Test 10/22/19  0755 10/21/19  0659 10/20/19  0734   INR 2.19* 2.32* 1.93*       Discharge Medications   Current Discharge Medication List      CONTINUE these medications which have NOT CHANGED    Details   cycloSPORINE modified (GENERIC EQUIVALENT) 25 MG capsule Take 50 mg by mouth 2 times daily      diltiazem ER COATED BEADS (CARDIZEM CD/CARTIA XT) 180 MG 24 hr capsule Take 180 mg by mouth daily      gabapentin (NEURONTIN) 300 MG capsule Take 300 mg by mouth daily      mycophenolate (GENERIC EQUIVALENT) 500 MG tablet Take 500 mg by mouth 2 times daily      potassium citrate 15 MEQ (1620 MG) TBCR Take 1 tablet by mouth 2 times daily      warfarin ANTICOAGULANT (COUMADIN) 3 MG tablet Take 3 mg by mouth daily           Allergies   No Known Allergies

## 2019-10-20 NOTE — PROGRESS NOTES
[facility]  Transplant Infectious Disease Progress Note -- Sign Off    Patient:  Matheus White Sr., Date of birth 1940, Medical record number [id number]  Date of Visit:  10/20/2019         Assessment and Recommendations:   Recommendations:  - Continue the oral doxycycline 100 mg PO BID  - Switch IV cefepime to PO levofloxacin 500 mg PO daily (high dose for renal function) this afternoon.  - Watch his left leg inpatient for another 48 hours (minimum of a good 24+) before discharging on the PO antibiotics to make certain the cellulitis is continuing to resolve (given his complicated cellulitis course so far).  - Treat with both the levofloxacin and the doxycycline for another seven days through 10/27/19, with an outpatient clinic leg check by his primary physician before discontinuing the antibiotics.    Case discussed with the [place] [id number] Medicine team.  With the above Recommendations, Transplant ID will sign off now.  Thanks for allowing us to participate in the care of this gentleman.  Please page if additional ID questions or concerns arise.    Ambrose Blanco MD  Pager [phone]    Assessment:  An 79 year old gentleman with s/p DDKT in 2009, HTN, Afib on warfarin and diltiazem, who presented with left leg cellulitis and hematoma after an acute injury and found to have DEB on CKD.  After 10/15/19 drainage and empirically escalated IV antibiotics, his left leg wound / infection is now steadily improving.    ID issues:     # LLE cellulitis with associated hematoma, suspected infected: He developed a cellulitis and hematoma at the site of trauma on his right shin.  He received initial treatment with piperacillin / tazobactam and IV vancomycin in the emergency department on 10/11/19 but then was transitioned to Keflex and doxycycline oral therapy starting 10/12/19.  His local symptoms worsened while on this regimen.  There were several possible factors that may have contributed  to this:  First, most likely, he may have needed additional IV therapy to achieve high sustained drug levels, second, he may have needed additional source control (aspiration of the fluid collection on 10/15/19 revealed a hematoma with elevated WBC, suggesting that may have been infected) with a failure to improve perhaps due to inadequate antibiotic penetration into the fluid collection, and third, the spectrum of antibiotics may have been insufficiently broad.  His 10/15/19 abscess drainage cultures remain without growth.  His antibiotics were empirically broadened on 10/14 - 16/19 to include both pseudomonal and MRSA coverage -- he subsequently improved and has continued to improve steadily over the past three days, since 10/17/19.  With that improvement, since we continue to lack obvious evidence that he harbors resistant organisms (and suspect that the prior oral antibiotics were simply too early and insufficiently penetrating), the IV vancomycin was de-escalated to oral doxycycline (which has good oral bioavailability) on 10/19/19.  With ongoing clinical improvement, the empiric cefepime is being de-escalated to oral levofloxacin  (which also has good oral bioavailability) today, 10/20/19.  He will need to be watched on this oral levefloxacin / doxycycline step-down regimen for at least 24 - 48 hours before discharge to make sure his infection dose not back-track.    # DEB on CKI:  His creatinine is improving since discharge, although not yet at baseline.  There was no admission evidence of rhabdomyolysis.    Other ID issues:  - QTc interval: 465 msec on 10/11/2019  - PCP prophylaxis: None  - Viral serostatus & prophylaxis: CMV D-R+, EBV D+, none  - Gamma globulin status: not checked  - Isolation status: Good hand hygiene.         Interval History:   Mr. White remains consistently afebrile (T max 99.0 degrees) over recent days without chills, sweats, or other constitutional symptoms.  His WBC is stable at  5.7 today.  He generally feels much better.  His left leg pain and erythema / edema is substantially improved versus two days ago, and continues to look better today despite the switch from IV vancomycin (last dosed on 10/18/19) to PO doxycycline yesterday.  Unlike several days ago, he is now able to bear weight and walk without significant discomfort -- has been walking a fair amount today.  The plan is to switch his IV cefepime (last dosed at Noon today) to PO levofloxacin this afternoon.  He is tolerating the antibiotics well so far.  He lacks decreased appetite on a regular diet, new rash, nausea, abdominal discomfort, diarrhea, cough, dyspnea, chest pain, EENT symptoms, decreased urine output, headache or other new complaints today.  His creatinine has decreased to 2.75 today (versus an admission peak of 5.47 on 10/11/19), with a past baseline of 1.6 - 1.8.    Antibiotics  Cefepime 10/16-20/19, being switched to PO levofloxacin on 10/20/19.  Vancomycin 10/11, 10/14, 10/16 -- switched to PO doxycycline on 10/19/19.     Past  Pip-tazo 10/11  Cephalexin 10/12-10/14  Cefazolin 10/14-10/15  Doxycycline 10/12-10/14      Transplants:  3/5/2009 (Kidney), Postoperative day:  3881.  Coordinator Paris Leon    Review of Systems:  CONSTITUTIONAL:  No fevers or chills  EYES: negative for icterus or acute vision changes  ENT:  negative for acute hearing loss, tinnitus, sore throat  RESPIRATORY:  negative for cough, sputum or dyspnea  CARDIOVASCULAR:  negative for chest pain, palpitations  GASTROINTESTINAL:  negative for nausea, vomiting, diarrhea or constipation  GENITOURINARY:  negative for dysuria or hematuria  HEME:  No easy bruising or bleeding  INTEGUMENT:  Ongoing redness and pain over left leg, now notably improved.  NEURO:  Negative for headache or tremor.    Social History:  Retired .         Current Medications & Allergies:       acetaminophen  1,000 mg Oral TID     ceFEPIme (MAXIPIME) IV  1 g  Intravenous Q24H     cycloSPORINE modified  50 mg Oral BID IS     diltiazem ER COATED BEADS  180 mg Oral Daily     doxycycline hyclate  100 mg Oral Q12H HECTOR     gabapentin  300 mg Oral At Bedtime     influenza Vac Split High-Dose  0.5 mL Intramuscular Prior to discharge     mycophenolate  500 mg Oral BID IS     sodium chloride (PF)  3 mL Intracatheter Q8H     warfarin ANTICOAGULANT  3 mg Oral ONCE at 18:00     Infusions/Drips:    Warfarin Therapy Reminder       No Known Allergies         Physical Exam:   Vitals were reviewed.    Ranges for vital signs over the past 24 hours:    Temp:  [97.6  F (36.4  C)-97.9  F (36.6  C)] 97.6  F (36.4  C)  Heart Rate:  [77-89] 89  Resp:  [18] 18  BP: (155-159)/(87-93) 159/87  SpO2:  [96 %-97 %] 97 %  Vitals:    10/14/19 1600 10/16/19 1344 10/20/19 1105   Weight: 88.3 kg (194 lb 11.2 oz) 86.3 kg (190 lb 3.2 oz) 84.1 kg (185 lb 6.5 oz)     Intake/Output Summary (Last 24 hours) at 10/20/2019 1502  Last data filed at 10/20/2019 1500  Gross per 24 hour   Intake 820 ml   Output 1200 ml   Net -380 ml     Physical Examination:  GENERAL:  A pleasant, conversant, WDWN 80 yo man in NAD.  HEAD:  NCAT.  EYES:  EOMI, anicteric sclerae.  ENT:  No otorrhea.  No supplemental oxygen.  No anterior oral lesion.  NECK:  Supple. No cervical lymphadenopathy  LUNGS:  Clear to auscultation bilaterally.   CARDIOVASCULAR:  RRR, no murmur, gallop, or rub.  ABDOMEN:  Normal bowel sounds, soft, nontender.  EXTREMITIES:  Left anterior shin edema, warmth, and erythema are improved although extant, with the edge of erythema several centimeters receded from the previously marked margins.  The hematoma is a less angry and is the only residual site of mild tenderness to palpation -- beyond the hematoma, there is no longer tenderness.  Much healed two dry lacerations and a small puncture would at prior aspiration site.  SKIN:  Right arm ecchymoses at site of previous IV infiltration.  Current peripheral IV line site  lacks inflammation.  NEURO:  Alert, oriented, moves extremities x 4.    Prior 10/17/19 picture         Laboratory Data:     Inflammatory Markers    Recent Labs   Lab Test 10/15/19  0702   CRP 29.0*     Metabolic Studies       Recent Labs   Lab Test 10/20/19  0734 10/19/19  0949  10/12/19  0924  10/11/19  1414    140   < > 138   < >  --    POTASSIUM 3.4 3.3*   < > 3.6   < >  --    CHLORIDE 110* 110*   < > 107   < >  --    CO2 24 24   < > 23   < >  --    ANIONGAP 7 7   < > 8   < >  --    BUN 31* 33*   < > 58*   < >  --    CR 2.75* 2.91*   < > 5.03*   < >  --    GFRESTIMATED 21* 19*   < > 10*   < >  --    GLC 86 102*   < > 88   < >  --    ELEAZAR 9.2 8.8   < > 8.3*   < >  --    LACT  --   --   --   --   --  0.9   CKT  --   --   --  64  --   --     < > = values in this interval not displayed.     Hepatic Studies    Recent Labs   Lab Test 10/11/19  1413   BILITOTAL 1.8*   ALKPHOS 230*   PROTTOTAL 7.0   ALBUMIN 3.3*   AST 16   ALT 21     Hematology Studies      Recent Labs   Lab Test 10/20/19  0734 10/19/19  0949 10/18/19  0814 10/17/19  0740 10/16/19  0705 10/15/19  0702  10/13/19  0727  10/11/19  1413   WBC 5.7 5.2 6.4 6.5 5.3 6.5   < > 6.8   < > 9.9   ANEU  --   --   --   --   --   --   --  5.3  --  8.7*   ALYM  --   --   --   --   --   --   --  0.4*  --  0.3*   GREG  --   --   --   --   --   --   --  0.8  --  0.7   AEOS  --   --   --   --   --   --   --  0.3  --  0.1   HGB 10.7* 9.8* 10.3* 10.0* 9.8* 10.2*   < > 10.0*   < > 10.9*   HCT 33.5* 31.0* 31.7* 31.9* 30.9* 32.1*   < > 31.7*   < > 34.3*    212 258 255 263 274   < > 292   < > 334    < > = values in this interval not displayed.     Urine Studies     Recent Labs   Lab Test 10/16/19  1204 10/11/19  1516   URINEPH 6.5 7.0   NITRITE Negative Negative   LEUKEST Trace* Moderate*   WBCU 3 30*     Microbiology:     Serum CrAg               10/15/19 negative     Blood               10/11/19 NGTD     Urine               10/11/19 negative     Left leg wound                10/15/19 GS with many WBCs and RBCs.  No organisms seen.  Culture NGTD.    Imaging:  10/16/19 CXR  Bilateral mixed opacities and pulmonary vascular  prominence suggestive of pulmonary edema. Small right pleural  Effusion.     10/16/19 CT leg  1. Left pretibial fluid collection with mild surrounding soft tissue  edema, considerations include hematoma versus phlegmon/abscess. Soft  tissue tumor much less likely, follow-up to document resolution.  2.Extensive vascular calcifications.

## 2019-10-20 NOTE — PROGRESS NOTES
[facility], [place]   Transplant Nephrology Progress Note  Date of Admission:  10/11/2019  Today's Date: 10/20/2019    Assessment & Plan     # DDKT: DEB with creatinine peaked at 5.4 from 1.6 mg / dL in August 2019. Slowly improving. Working diagnosis is DEB in the settings of cellulitis. Creatinine is slowly improving. Will start gentle diuresis 20 mg daily              - Baseline Cr ~ 1.5-1.8 mg /dL              - Proteinuria: Not checked recently              - Date DSA Last Checked: not checked recently              - BK Viremia: Not checked recently              - Kidney Tx Biopsy: No      # Immunosuppression: Cyclosporine (goal 50-75) and Mycophenolate mofetil (goal not followed)              - Changes: please check drug levels tomorrow or Monday      # Hypertension: Controlled; Goal BP: < 130/80              - Volume status: Euvolemic              - Changes: No     # 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: Hgb: Stable      AISHWARAY: No              - Iron studies: Not checked recently     # Electrolytes:   - Potassium; level: low advised gentle replacement   - Magnesium; level: Not checked recently  - Bicarbonate; level: Normal  - Sodium; level: Normal     # LE cellulitis: appreciate ID input, currently patient is on vancomycin and cefepime.      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      Josias Villarreal MD  Pager: [phone]    Interval History     Matheus was seen this morning. Feels slightly better able to bear weight now. His weight is up 10 Lb     Review of Systems   4 point ROS was obtained and negative except as noted in the Interval  History.    MEDICATIONS:  Current Facility-Administered Medications   Medication     acetaminophen (TYLENOL) tablet 1,000 mg     bisacodyl (DULCOLAX) EC tablet 5 mg    Or     bisacodyl (DULCOLAX) EC tablet 10 mg    Or     bisacodyl (DULCOLAX) EC tablet 15 mg     ceFEPIme (MAXIPIME) 1g vial to attach to  ml bag for ADULTS or NS 50 ml bag for PEDS     cycloSPORINE modified (GENERIC EQUIVALENT) capsule 50 mg     diltiazem ER COATED BEADS (CARDIZEM CD/CARTIA XT) 24 hr capsule 180 mg     doxycycline hyclate (VIBRAMYCIN) capsule 100 mg     gabapentin (NEURONTIN) capsule 300 mg     influenza Vac Split High-Dose (FLUZONE) injection 0.5 mL     lidocaine (LMX4) cream     lidocaine 1 % 0.1-1 mL     melatonin tablet 1 mg     mycophenolate (GENERIC EQUIVALENT) tablet 500 mg     naloxone (NARCAN) injection 0.1-0.4 mg     oxyCODONE (ROXICODONE) tablet 5 mg     polyethylene glycol (MIRALAX/GLYCOLAX) Packet 17 g     sodium chloride (PF) 0.9% PF flush 3 mL     sodium chloride (PF) 0.9% PF flush 3 mL     Warfarin Therapy Reminder (Check START DATE - warfarin may be starting in the FUTURE)       Physical Exam   Temp  Av.6  F (36.4  C)  Min: 96.3  F (35.7  C)  Max: 98.1  F (36.7  C)      Pulse  Av.5  Min: 66  Max: 124 Resp  Av.9  Min: 16  Max: 19  SpO2  Av.7 %  Min: 94 %  Max: 98 %     BP (!) 159/87 (BP Location: Left arm)   Pulse 73   Temp 97.6  F (36.4  C) (Oral)   Resp 18   Wt 84.1 kg (185 lb 6.5 oz)   SpO2 97%   BMI 25.86 kg/m     Date 10/15/19 0700 - 10/16/19 0659   Shift 7009-8634 9147-7312 4677-7989 24 Hour Total   INTAKE   Shift Total(mL/kg)       OUTPUT   Urine 150   150   Shift Total(mL/kg) 150(1.7)   150(1.7)   Weight (kg) 88.31 88.31 88.31 88.31      Admit Weight: 86.3 kg (190 lb 3.2 oz)     GENERAL APPEARANCE: alert and no distress  HENT: mouth without ulcers or lesions  LYMPHATICS: no cervical or supraclavicular nodes  RESP: lungs clear to auscultation - no rales, rhonchi or wheezes  CV:  irregular rhythm, normal rate, no rub, no murmur  EDEMA: Left leg with significant erythema and area of swelling over the mid shin area.  Tenderness was noted and warmth on exam (largely unchanged)  ABDOMEN: soft, nondistended, nontender, bowel sounds normal  MS: extremities normal - no gross deformities noted, no evidence of inflammation in joints, no muscle tenderness  SKIN: no rash    Data   All labs reviewed by me.  CMP  Recent Labs   Lab 10/20/19  0734 10/19/19  0949 10/18/19  0814 10/17/19  0740    140 141 142   POTASSIUM 3.4 3.3* 3.4 3.3*   CHLORIDE 110* 110* 111* 111*   CO2 24 24 19* 22   ANIONGAP 7 7 11 9   GLC 86 102* 91 95   BUN 31* 33* 36* 36*   CR 2.75* 2.91* 3.20* 3.21*   GFRESTIMATED 21* 19* 17* 17*   GFRESTBLACK 24* 22* 20* 20*   ELEAZAR 9.2 8.8 9.2 9.2     CBC  Recent Labs   Lab 10/20/19  0734 10/19/19  0949 10/18/19  0814 10/17/19  0740   HGB 10.7* 9.8* 10.3* 10.0*   WBC 5.7 5.2 6.4 6.5   RBC 3.60* 3.35* 3.49* 3.42*   HCT 33.5* 31.0* 31.7* 31.9*   MCV 93 93 91 93   MCH 29.7 29.3 29.5 29.2   MCHC 31.9 31.6 32.5 31.3*   RDW 14.4 14.3 14.3 14.2    212 258 255     INR  Recent Labs   Lab 10/20/19  0734 10/19/19  0949 10/18/19  0703 10/17/19  0740   INR 1.93* 1.77* 1.53* 1.48*     ABGNo lab results found in last 7 days.   Urine Studies  Recent Labs   Lab Test 10/16/19  1204 10/11/19  1516   COLOR Yellow Yellow   APPEARANCE Clear Clear   URINEGLC 30* Negative   URINEBILI Negative Negative   URINEKETONE Negative Negative   SG 1.010 1.014   UBLD Moderate* Large*   URINEPH 6.5 7.0   PROTEIN 30* 50*   NITRITE Negative Negative   LEUKEST Trace* Moderate*   RBCU 5* 88*   WBCU 3 30*     Recent Labs   Lab Test 10/13/19  2140   UTPG 1.08*     PTH  No lab results found.  Iron Studies  No lab results found.    IMAGING:  All imaging studies reviewed by me.

## 2019-10-20 NOTE — PROGRESS NOTES
Physician Attestation   I, Mell Garcia MD, personally examined and evaluated this patient.  I discussed the patient with the resident/fellow and care team, and agree with the assessment and plan of care as documented in the note of 10/20/19.      I personally reviewed vital signs, medications and labs.    Mell Garcia MD  Date of Service (when I saw the patient): 10/20/2019    Resident/Fellow Attestation   I, Alison M. Lerman, was present with the medical student who participated in the service and in the documentation of the note.  I have verified the history and personally performed the physical exam and medical decision making.  I agree with the assessment and plan of care as documented in the note.      Leg continuing to improve on therapy. Intermittent SOB episodes that are becoming less bothersome to patient and continue to self resolve. Renal function continuing to improve.    Alison M. Lerman, MD  PGY4  Date of Service (when I saw the patient): 10/20/19    [facility], [place]    Progress Note - [place] 1 Service        Date of Admission:  10/11/2019    Assessment & Plan   Matheus White Sr. is a 79 year old male with a history of ESRD s/p DDKT in 2009, on tacrolimus and mycophenylate, admitted on 10/11/2019 with cellulitis of the left lower extremity, found to have a supratherapeutic INR and DEB most likely consistent with ATN, currently improving on IV antibiotics.      Changes Today:     - Continue doxycycline and cefepime, start PO levofloxacin 500 mg in evening and discontinue cefepime  - Encouraging PT  - Following fluid cultures from aspiration done 10/15     Cellulitis of Left Lower Extremity  Hematoma  Improving on IV broad spectrum antibiotics including MRSA and pseudomonas coverage. The edema and erythema from ankle to mid-shin continues to improve, with decreasing size of the focal area of tenderness and swelling on anterior shin.  Injury to LLE 1 week  prior to admission, assessed at urgent care, XR negative for fracture, topical bacitracin for wound given and discharged. S/p one dose Zosyn in ED and one time renally-dosed vancomycin, switched to PO doxycycline and cephalexin on 10/12, then broadened to vanc/cefepime due to worsening. Currently on doxy/cefepime. Was evaluated by surgery; incision and drainage was not indicated due to the risk of open wound in a patient with delayed healing. IR evaluated, 3 ml of fluid drained, most likely consistent with a hematoma, fluid cultures with WBCs but no growth.    - Transplant ID following and appreciate recs  - Last dose of cefepime 1g q24h IV today  - Start levofloxacin 500 mg daily for a one week course, plan to monitor for 24-48 hours   - Continue doxycycline 100 mg q12h  - Scheduled APAP and prn oxycodone      Dyspnea, improving  Pulmonary Edema  Etiology is likely related to volume overload from IV fluids.      - Avoid diuretics due to DEB and because respiratory status is stable  - If respiratory status worsens, consider 20mg IV Lasix and BIPAP if needed     DEB on CKD3, improving   Donor Kidney Transplant  Microhematuria  Improving. Cr 2.75 today from 5.47 at admisson  DDKT on 2009. Baseline creatinine 1.5 (last creatinine from 2019 = 1.41). Dry weight 175lbs. Cyclosporine level within therapeutic range.  Nephrology following, most concerning for ATN/pre-renal etiology     - Nephrology following, appreciate recs  - Trend BMP  - Continue PTA immunosuppression              - 50mg cyclosporin BID              - 500mg mycophenolate BID  - Avoid nephrotoxins     Atrial Fibrillation  INR 1.93.  Patient diagnosed with Afib in  by Holter monitor. On warfarin, presented with supratherapeutic INR. Warfarin initially held, restarted on 10/17.     - Pharmacy dosing warfarin, goal INR 2-3  - Continue cardizem 180 mg     Hypertension  Currently not on any home anti-hypertensives. SBPs in 170's early in  admission, now improved to 130's.     - Monitor blood pressures, no new antihypertensives added at this time  - Recommend follow up with PCP after discharge     Non-severe malnutrition in the context of acute on chronic disease and illness  - Supplements prn      Diet: Regular Diet Adult    Fluids: none  Lines: PIV  DVT Prophylaxis: Warfarin  Olvera Catheter: not present  Code Status: Full Code      Disposition Plan   Expected discharge: Tomorrow, recommended to prior living arrangement once antibiotic plan established. Entered: Ki Weems 10/20/2019, 10:12 AM       The patient's care was discussed with the Attending Physician, Dr. Garcia.    Ki Weems  Medical Student  [place] 1 Service  [facility], [place]  Pager: [phone]  Please see sticky note for cross cover information  ___________________________________________________________________    Interval History   No acute events overnight. Continues to feel that his leg is improving. He has no pain at rest and was able to walk on his own and with PT yesterday. Has not needed a walker. Denies any fevers, chills, dysuria, or abdominal pain. No cough or chest pain. He continues to feel shortness of breath with exertion and when laying flat at night.     Data reviewed today: I reviewed all medications, new labs and imaging results over the last 24 hours. I personally reviewed no images or EKG's today.    Physical Exam   Vital Signs: Temp: 97.6  F (36.4  C) Temp src: Oral BP: (!) 159/87   Heart Rate: 89 Resp: 18 SpO2: 97 % O2 Device: None (Room air)    Weight: 190 lbs 3.2 oz     General Appearance: Awake, sitting up in bed, not in acute distress, alert and oriented to name, place, and time  Respiratory: Lungs clear to auscultation bilaterally, no crackles or wheezes  Cardiovascular: Irregularly irregular rhythm, normal S1 and S2 with no murmurs  GI: Abdomen is soft, non-distended, non-tender to palpation, with no masses or  hepatosplenomegaly  Skin: Improving erythema and edema in LLE, focused in the anterior shin slightly above the ankle to slightly below the knee.  Focal 2 cm x 2 cm area of swelling on anterior portion of shin with some tenderness to palpation, with nearby two small areas of skin breakdown with overlying crust, no active bleeding or drainage. +1 edema of the LLE, none in RLE. No pain to palpation in calf of LLE, erythema improving in posterior portion.      Data   Recent Labs   Lab 10/20/19  0734 10/19/19  0949 10/18/19  0814 10/18/19  0703   WBC 5.7 5.2 6.4  --    HGB 10.7* 9.8* 10.3*  --    MCV 93 93 91  --     212 258  --    INR 1.93* 1.77*  --  1.53*    140 141  --    POTASSIUM 3.4 3.3* 3.4  --    CHLORIDE 110* 110* 111*  --    CO2 24 24 19*  --    BUN 31* 33* 36*  --    CR 2.75* 2.91* 3.20*  --    ANIONGAP 7 7 11  --    ELEAZAR 9.2 8.8 9.2  --    GLC 86 102* 91  --

## 2019-10-20 NOTE — PLAN OF CARE
AVSS. Denies pain and nausea, on a regular diet. Passing flatus and had a BM this morning. Up with assist of one and a walker. Left leg with erythema and edema, erythema and edema has improved. Plan to discharge to prior living arrangement in 1-2 days pending antibiotic plan and pain control. Continue to monitor pain, s/s of infection and GI/ function.

## 2019-10-20 NOTE — PLAN OF CARE
PT Cancel: Pt about to take shower upon first attempt and declining therapy at second attempt d/t wanting to watch [employer] game. States he had already walked today, declines therapy despite significant encouragement from therapist. Will reschedule.

## 2019-10-20 NOTE — PLAN OF CARE
Assumed care of pt at 1900 on 10/19/19. Pt c/o pain managed using scheduled tylenol and gabapentin. Pt tolerating regular diet; denies c/o N/V. +gas/last BM 10/18. UOP adequate via urinal.  PIV SL. Pt up ad merlyn. LLE DEBORAH and elevated on pillows- no change in erythema; scabs remain intact.  PLAN: continue POC, continue plan to transition onto PO antibiotics for discharge home.

## 2019-10-20 NOTE — PLAN OF CARE
BP (!) 159/87 (BP Location: Left arm)   Pulse 73   Temp 97.6  F (36.4  C) (Oral)   Resp 18   Wt 84.1 kg (185 lb 6.5 oz)   SpO2 97%   BMI 25.86 kg/m      Neuro: A&Ox4.   Cardiac: VSS.   Respiratory: Sating 97% on RA.  GI/: Adequate urine output. Reports 1 BM   Diet/appetite: Tolerating diet. Eating well.  Activity:  Assist of 1 when up. Took a shower. Worked with PT  Pain: LLE pain managed with sched tylenol   Skin: Left leg cellulitis red and elevated   LDA's: PIV TKO for abx    Plan: Continue with POC. Notify primary team with changes.

## 2019-10-21 ENCOUNTER — APPOINTMENT (OUTPATIENT)
Dept: PHYSICAL THERAPY | Facility: CLINIC | Age: 79
DRG: 698 | End: 2019-10-21
Attending: INTERNAL MEDICINE
Payer: MEDICARE

## 2019-10-21 LAB
ANION GAP SERPL CALCULATED.3IONS-SCNC: 7 MMOL/L (ref 3–14)
BUN SERPL-MCNC: 32 MG/DL (ref 7–30)
CALCIUM SERPL-MCNC: 8.9 MG/DL (ref 8.5–10.1)
CHLORIDE SERPL-SCNC: 110 MMOL/L (ref 94–109)
CO2 SERPL-SCNC: 25 MMOL/L (ref 20–32)
CREAT SERPL-MCNC: 2.72 MG/DL (ref 0.66–1.25)
CYCLOSPORINE BLD LC/MS/MS-MCNC: 69 UG/L (ref 50–400)
ERYTHROCYTE [DISTWIDTH] IN BLOOD BY AUTOMATED COUNT: 14.5 % (ref 10–15)
GFR SERPL CREATININE-BSD FRML MDRD: 21 ML/MIN/{1.73_M2}
GLUCOSE SERPL-MCNC: 86 MG/DL (ref 70–99)
HCT VFR BLD AUTO: 31.7 % (ref 40–53)
HGB BLD-MCNC: 10.1 G/DL (ref 13.3–17.7)
INR PPP: 2.32 (ref 0.86–1.14)
MCH RBC QN AUTO: 29.6 PG (ref 26.5–33)
MCHC RBC AUTO-ENTMCNC: 31.9 G/DL (ref 31.5–36.5)
MCV RBC AUTO: 93 FL (ref 78–100)
PLATELET # BLD AUTO: 209 10E9/L (ref 150–450)
POTASSIUM SERPL-SCNC: 3.2 MMOL/L (ref 3.4–5.3)
RBC # BLD AUTO: 3.41 10E12/L (ref 4.4–5.9)
SODIUM SERPL-SCNC: 141 MMOL/L (ref 133–144)
TME LAST DOSE: NORMAL H
WBC # BLD AUTO: 5.4 10E9/L (ref 4–11)

## 2019-10-21 PROCEDURE — 85610 PROTHROMBIN TIME: CPT | Performed by: STUDENT IN AN ORGANIZED HEALTH CARE EDUCATION/TRAINING PROGRAM

## 2019-10-21 PROCEDURE — 25000132 ZZH RX MED GY IP 250 OP 250 PS 637: Mod: GY | Performed by: STUDENT IN AN ORGANIZED HEALTH CARE EDUCATION/TRAINING PROGRAM

## 2019-10-21 PROCEDURE — 12000001 ZZH R&B MED SURG/OB UMMC

## 2019-10-21 PROCEDURE — 97530 THERAPEUTIC ACTIVITIES: CPT | Mod: GP

## 2019-10-21 PROCEDURE — 85027 COMPLETE CBC AUTOMATED: CPT | Performed by: STUDENT IN AN ORGANIZED HEALTH CARE EDUCATION/TRAINING PROGRAM

## 2019-10-21 PROCEDURE — 80048 BASIC METABOLIC PNL TOTAL CA: CPT | Performed by: STUDENT IN AN ORGANIZED HEALTH CARE EDUCATION/TRAINING PROGRAM

## 2019-10-21 PROCEDURE — 97116 GAIT TRAINING THERAPY: CPT | Mod: GP

## 2019-10-21 PROCEDURE — 36415 COLL VENOUS BLD VENIPUNCTURE: CPT | Performed by: STUDENT IN AN ORGANIZED HEALTH CARE EDUCATION/TRAINING PROGRAM

## 2019-10-21 PROCEDURE — 25000131 ZZH RX MED GY IP 250 OP 636 PS 637: Mod: GY | Performed by: STUDENT IN AN ORGANIZED HEALTH CARE EDUCATION/TRAINING PROGRAM

## 2019-10-21 PROCEDURE — 25000132 ZZH RX MED GY IP 250 OP 250 PS 637: Mod: GY | Performed by: INTERNAL MEDICINE

## 2019-10-21 PROCEDURE — 80158 DRUG ASSAY CYCLOSPORINE: CPT | Performed by: INTERNAL MEDICINE

## 2019-10-21 PROCEDURE — 99233 SBSQ HOSP IP/OBS HIGH 50: CPT | Mod: GC | Performed by: STUDENT IN AN ORGANIZED HEALTH CARE EDUCATION/TRAINING PROGRAM

## 2019-10-21 PROCEDURE — 80158 DRUG ASSAY CYCLOSPORINE: CPT | Performed by: STUDENT IN AN ORGANIZED HEALTH CARE EDUCATION/TRAINING PROGRAM

## 2019-10-21 RX ORDER — GABAPENTIN 300 MG/1
300 CAPSULE ORAL DAILY
Status: DISCONTINUED | OUTPATIENT
Start: 2019-10-21 | End: 2019-10-22 | Stop reason: HOSPADM

## 2019-10-21 RX ORDER — POTASSIUM CHLORIDE 750 MG/1
20 TABLET, EXTENDED RELEASE ORAL ONCE
Status: COMPLETED | OUTPATIENT
Start: 2019-10-21 | End: 2019-10-21

## 2019-10-21 RX ORDER — LEVOFLOXACIN 250 MG/1
250 TABLET, FILM COATED ORAL DAILY
Status: DISCONTINUED | OUTPATIENT
Start: 2019-10-21 | End: 2019-10-22 | Stop reason: HOSPADM

## 2019-10-21 RX ORDER — WARFARIN SODIUM 2 MG/1
2 TABLET ORAL
Status: COMPLETED | OUTPATIENT
Start: 2019-10-21 | End: 2019-10-21

## 2019-10-21 RX ADMIN — CYCLOSPORINE 50 MG: 25 CAPSULE, LIQUID FILLED ORAL at 18:56

## 2019-10-21 RX ADMIN — DILTIAZEM HYDROCHLORIDE 180 MG: 180 CAPSULE, COATED, EXTENDED RELEASE ORAL at 08:41

## 2019-10-21 RX ADMIN — DOXYCYCLINE 100 MG: 100 CAPSULE ORAL at 08:42

## 2019-10-21 RX ADMIN — ACETAMINOPHEN 1000 MG: 500 TABLET, FILM COATED ORAL at 08:42

## 2019-10-21 RX ADMIN — GABAPENTIN 300 MG: 300 CAPSULE ORAL at 18:56

## 2019-10-21 RX ADMIN — ACETAMINOPHEN 1000 MG: 500 TABLET, FILM COATED ORAL at 15:05

## 2019-10-21 RX ADMIN — LEVOFLOXACIN 250 MG: 250 TABLET, FILM COATED ORAL at 20:32

## 2019-10-21 RX ADMIN — MYCOPHENOLATE MOFETIL 500 MG: 500 TABLET ORAL at 08:41

## 2019-10-21 RX ADMIN — DOXYCYCLINE 100 MG: 100 CAPSULE ORAL at 20:32

## 2019-10-21 RX ADMIN — POTASSIUM CHLORIDE 20 MEQ: 750 TABLET, EXTENDED RELEASE ORAL at 15:05

## 2019-10-21 RX ADMIN — CYCLOSPORINE 50 MG: 25 CAPSULE, LIQUID FILLED ORAL at 08:45

## 2019-10-21 RX ADMIN — MYCOPHENOLATE MOFETIL 500 MG: 500 TABLET ORAL at 18:56

## 2019-10-21 RX ADMIN — WARFARIN SODIUM 2 MG: 2 TABLET ORAL at 18:56

## 2019-10-21 RX ADMIN — FUROSEMIDE 20 MG: 20 TABLET ORAL at 08:42

## 2019-10-21 ASSESSMENT — ACTIVITIES OF DAILY LIVING (ADL)
ADLS_ACUITY_SCORE: 15

## 2019-10-21 NOTE — PLAN OF CARE
BP (!) 153/78 (BP Location: Left arm)   Pulse 73   Temp 97.5  F (36.4  C) (Oral)   Resp 16   Wt 84.1 kg (185 lb 6.5 oz)   SpO2 96%   BMI 25.86 kg/m       AVSS with HTN, on room air. A&O x4. Denies pain and nausea, on a regular diet. Passing flatus, last BM on 10/20. Left leg with erythema and edema. Patient wanted leg wrapped with ace bandage so he wouldn't scratch it open if he fell asleep. Plan to discharge to prior living arrangement in 1-2 days pending antibiotic plan and pain control. Resting between cares. Continue with POC.     Zena Lr, student nurse

## 2019-10-21 NOTE — PLAN OF CARE
[id number] Pt alert and oriented, VSS on RA. Up with SBA and walker in room. Regular diet and tolerating well. Pt switched to oral antibiotics- Doxycycline and Levofloxacin. LLE elevated, improvement in redness and swelling. Left PIV saline locked. Possible discharge in 1-2 days with antibiotics, continue with POC.

## 2019-10-21 NOTE — PROGRESS NOTES
[facility], [place]   Transplant Nephrology Progress Note  Date of Admission:  10/11/2019  Today's Date: 10/21/2019    Assessment & Plan     # Non-oliguric DEB:  DEB with Cr peak at 5.4, now 2.7 and slow recovery. UOP appropriate and responding to lasix 20mg daily. Etiology of DEB may be from cellulitis. Continue diuresis    # DDKT: - Baseline Cr ~ 1.5-1.8 mg /dL                  - Proteinuria: Not checked recently                  - Date DSA Last Checked: not checked recently                  - BK Viremia: Not checked recently                  - Kidney Tx Biopsy: No                  - Get labs M/Th as an outpatient      # Immunosuppression: Cyclosporine (goal 50-75) and Mycophenolate mofetil (goal not followed)              - Changes: Check CSA level today     # Hypertension: Controlled; Goal BP: < 130/80              - Volume status: Euvolemic              - Changes: No     # 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: Hgb: Stable      AISHWARYA: No              - Iron studies: Not checked recently     # Electrolytes:   - Potassium; level: Low, replace to keep >3.6  - Magnesium; level: Not checked recently  - Bicarbonate; level: Normal  - Sodium; level: Normal     # LE cellulitis: appreciate ID input, currently patient is transitioned to levofloxacin + doxycycline (until 10/27)     # Transplant History:  Etiology of Kidney Failure: Unknown etiology   Tx: DDKT  Transplant: 3/5/2009 (Kidney)  Donor Type: Donation after Brain Death        Donor Class: Expanded Criteria Donor  Crossmatch at time of Tx: negative  Significant changes in immunosuppression: None  Significant transplant-related complications: None    Recommendations were communicated to the primary team verbally.    Beto Rodrigez MD  Pager: [phone]    Interval History     Patient was seen and examined this morning. He denies any overnight issues, and denies SOB or CP. LE edema stable, and patient has improved LE erythema.     Review of Systems  "  4 point ROS was obtained and negative except as noted in the Interval History.    MEDICATIONS:  Current Facility-Administered Medications   Medication     acetaminophen (TYLENOL) tablet 1,000 mg     bisacodyl (DULCOLAX) EC tablet 5 mg    Or     bisacodyl (DULCOLAX) EC tablet 10 mg    Or     bisacodyl (DULCOLAX) EC tablet 15 mg     cycloSPORINE modified (GENERIC EQUIVALENT) capsule 50 mg     diltiazem ER COATED BEADS (CARDIZEM CD/CARTIA XT) 24 hr capsule 180 mg     doxycycline hyclate (VIBRAMYCIN) capsule 100 mg     furosemide (LASIX) tablet 20 mg     gabapentin (NEURONTIN) capsule 300 mg     influenza Vac Split High-Dose (FLUZONE) injection 0.5 mL     levofloxacin (LEVAQUIN) tablet 250 mg     lidocaine (LMX4) cream     lidocaine 1 % 0.1-1 mL     melatonin tablet 1 mg     mycophenolate (GENERIC EQUIVALENT) tablet 500 mg     naloxone (NARCAN) injection 0.1-0.4 mg     oxyCODONE (ROXICODONE) tablet 5 mg     polyethylene glycol (MIRALAX/GLYCOLAX) Packet 17 g     sodium chloride (PF) 0.9% PF flush 3 mL     sodium chloride (PF) 0.9% PF flush 3 mL     warfarin ANTICOAGULANT (COUMADIN) tablet 2 mg     Warfarin Therapy Reminder (Check START DATE - warfarin may be starting in the FUTURE)       Physical Exam    BP (!) 150/87 (BP Location: Left arm)   Pulse 73   Temp 97.6  F (36.4  C) (Oral)   Resp 18   Ht 1.803 m (5' 10.98\")   Wt 84.1 kg (185 lb 6.5 oz)   SpO2 95%   BMI 25.87 kg/m       GENERAL APPEARANCE: alert and no distress  HENT: mouth without ulcers or lesions  LYMPHATICS: no cervical or supraclavicular nodes  RESP: lungs clear to auscultation - no rales, rhonchi or wheezes  CV: irregular rhythm, normal rate, no rub, no murmur  EDEMA: Left leg with significant erythema and area of swelling over the mid shin area.  Tenderness was noted and warmth on exam (largely unchanged)  ABDOMEN: soft, nondistended, nontender, bowel sounds normal  MS: extremities normal - no gross deformities noted, no evidence of inflammation " in joints, no muscle tenderness  SKIN: no rash    Data   All labs reviewed by me.    CMP  Recent Labs   Lab 10/21/19  0659 10/20/19  0734 10/19/19  0949 10/18/19  0814    141 140 141   POTASSIUM 3.2* 3.4 3.3* 3.4   CHLORIDE 110* 110* 110* 111*   CO2 25 24 24 19*   ANIONGAP 7 7 7 11   GLC 86 86 102* 91   BUN 32* 31* 33* 36*   CR 2.72* 2.75* 2.91* 3.20*   GFRESTIMATED 21* 21* 19* 17*   GFRESTBLACK 24* 24* 22* 20*   ELEAZAR 8.9 9.2 8.8 9.2     CBC  Recent Labs   Lab 10/21/19  0659 10/20/19  0734 10/19/19  0949 10/18/19  0814   HGB 10.1* 10.7* 9.8* 10.3*   WBC 5.4 5.7 5.2 6.4   RBC 3.41* 3.60* 3.35* 3.49*   HCT 31.7* 33.5* 31.0* 31.7*   MCV 93 93 93 91   MCH 29.6 29.7 29.3 29.5   MCHC 31.9 31.9 31.6 32.5   RDW 14.5 14.4 14.3 14.3    215 212 258     INR  Recent Labs   Lab 10/21/19  0659 10/20/19  0734 10/19/19  0949 10/18/19  0703   INR 2.32* 1.93* 1.77* 1.53*     ABGNo lab results found in last 7 days.   Urine Studies  Recent Labs   Lab Test 10/16/19  1204 10/11/19  1516   COLOR Yellow Yellow   APPEARANCE Clear Clear   URINEGLC 30* Negative   URINEBILI Negative Negative   URINEKETONE Negative Negative   SG 1.010 1.014   UBLD Moderate* Large*   URINEPH 6.5 7.0   PROTEIN 30* 50*   NITRITE Negative Negative   LEUKEST Trace* Moderate*   RBCU 5* 88*   WBCU 3 30*     Recent Labs   Lab Test 10/13/19  2140   UTPG 1.08*     PTH  No lab results found.  Iron Studies  No lab results found.    IMAGING:  All imaging studies reviewed by me.   Patient was seen and evaluated by me, Josias Villarreal MD. I have reviewed the note and agree with the the plan of care as documented by the fellow.

## 2019-10-21 NOTE — PLAN OF CARE
Discharge Planner PT   Patient plan for discharge: home  Current status: Pt needs increased education and encouragement to participate in session, however is IND w/ bed mobility, sti<>stand and ambulation for 500' x1. Pt performs stairs per home set up mod I. Pt noted to be steady with dynamic balance as well. Pt declining performance of LE exercises. Pain noted to be limiting factor in previous sessions, however pt reports no pain today. Educated on frequent ambulation in hallway to maintain strength throughout hospital stay, okay to be IND in hallway. RN notified.   Barriers to return to prior living situation: medical  Recommendations for discharge: home w/ assist PRN  Rationale for recommendations: Pt is mobilizing well and has met all PT inpatient goals. Will sign off.     Physical Therapy Discharge Summary    Reason for therapy discharge:    Discharged to home.    Progress towards therapy goal(s). See goals on Care Plan in [facility] electronic health record for goal details.  Goals met    Therapy recommendation(s):    No further therapy is recommended.           Entered by: Roxi Smith 10/21/2019 3:04 PM

## 2019-10-21 NOTE — PLAN OF CARE
A&O. Tolerating regular diet. Denies pain. Up with SBA. Left leg with erythema and edema. Erythema and edema has significantly improved. Voiding adequate amounts of urine.  Plan to discharge tomorrow.

## 2019-10-21 NOTE — PROGRESS NOTES
[facility], [place]    Progress Note - [place] 1 Service        Date of Admission:  10/11/2019    Assessment & Plan   Matheus White Sr. is a 79 year old male admitted on 10/11/2019. He has a history of ESRD s/p DDKT in  (on tacro and cellcepht) with CKD stage 3, atrial fibrillation on warfarin, HTN  and is admitted for cellulitis of LLE and discovered to have a supratherapeutic INR and DEB.    Cellulitis of Left Lower Extremity  - improved on IV abx, now erythema from ankle to mid-shin improved and decreasing size of area  - XR negative for fracture, was started with PO abx then IV vanc and cefepime  - Surgery consulted- no acute need for incision/drainage at this time  - IR evaluated, 3ml drained, hematoma noted, culture ngtd  - transplant ID following and appreciate recs  - completed cefepime IV yesterday, started on levofloxacin 500mg daily for one week, plan monitor for 24 hours until juvencio.  - c/w doxycycline 100mg q12H  - c/w monitor symptoms, vitals, prn oxycodone for discomfort    DEB on CKD3, improving   Donor Kidney Transplant  - suspect ATN/pre-renal etiology  -Creatinine improved to 2.72, was 5.47 at admission, baseline creatinine around 1.4-1.5  - DDKT on 3/5/2009, cyclosporine level within range  - Nephro consulted, following, appreciate recs  - trend BMP, start low dose diuresis at 20mg furosemide  - C/w PTA immunosuppression (50 mg cyclosporin BID and 500mg cellcepht BID  - avoid nephrotoxins    Atrial Fibrillation  INR 2.32.  Patient diagnosed with Afib in  by Holter monitor. On warfarin, presented with supratherapeutic INR. Warfarin initially held, restarted on 10/17.   - Pharmacy dosing warfarin, goal INR 2-3  - Continue cardizem 180 mg     Hypertension  Currently not on any home anti-hypertensives. SBPs in 170's early in admission, now improved to 130's.      - Monitor blood pressures, no new antihypertensives added at this time  - Recommend  follow up with PCP after discharge          Diet: Regular Diet Adult    Fluids: None  Lines: Peripheral line  DVT Prophylaxis: Warfarin  Olvera Catheter: not present  Code Status: Full Code      Disposition Plan   Expected discharge: Tomorrow, recommended to prior living arrangement once antibiotic plan established.  Entered: Mitchell Ramirez DO 10/21/2019, 2:48 PM       The patient's care was discussed with the Attending Physician, Dr. Garcia.    Mitchell Ramirez DO  [facility], [place] 1 Service  [facility], [place]  Pager: [phone]  Please see sticky note for cross cover information  ______________________________________________________________________    Interval History   Patient had no events overnight, feels well, nervous about going home with worries he will return quickly to the hospital. Deneis shortness of breath, chest pain, fever or chills. Denies any headaches, vision or hearing changes.    Data reviewed today: I reviewed all medications, new labs and imaging results over the last 24 hours. I personally reviewed no images or EKG's today.    Physical Exam   Vital Signs: Temp: 97.6  F (36.4  C) Temp src: Oral BP: (!) 150/87   Heart Rate: 74 Resp: 18 SpO2: 95 % O2 Device: None (Room air)    Weight: 185 lbs 6.51 oz  Constitutional: awake, alert, cooperative, no apparent distress, and appears stated age  ENT: Normocephalic, without obvious abnormality, atraumatic, sinuses nontender on palpation, external ears without lesions, oral pharynx with moist mucous membranes, tonsils without erythema or exudates, gums normal and good dentition.  Respiratory: No increased work of breathing, good air exchange, clear to auscultation bilaterally, no crackles or wheezing  Cardiovascular: normal apical pulses , irregularly irregular rhythm, normal S1 and S2 and edema appreciated along lower extremities bilaterally  GI: No scars, normal bowel sounds, soft, non-distended, non-tender, no masses palpated, no  hepatosplenomegally  Musculoskeletal: full range of motion noted  motor strength is 5 out of 5 all extremities bilaterally  tone is normal  RIGHT ELBOW:  redness present  swelling present  Neurologic: Awake, alert, oriented to name, place and time.  Cranial nerves II-XII are grossly intact.  Motor is 5 out of 5 bilaterally.  Cerebellar finger to nose, heel to shin intact.  Sensory is intact.  Babinski down going, Romberg negative, and gait is normal.  Skin: erythema appreciated along left lower extremity, small in nautre than initial presentation.    Data   Recent Labs   Lab 10/21/19  0659 10/20/19  0734 10/19/19  0949   WBC 5.4 5.7 5.2   HGB 10.1* 10.7* 9.8*   MCV 93 93 93    215 212   INR 2.32* 1.93* 1.77*    141 140   POTASSIUM 3.2* 3.4 3.3*   CHLORIDE 110* 110* 110*   CO2 25 24 24   BUN 32* 31* 33*   CR 2.72* 2.75* 2.91*   ANIONGAP 7 7 7   ELEAZAR 8.9 9.2 8.8   GLC 86 86 102*

## 2019-10-22 VITALS
SYSTOLIC BLOOD PRESSURE: 155 MMHG | WEIGHT: 177.1 LBS | HEIGHT: 71 IN | DIASTOLIC BLOOD PRESSURE: 84 MMHG | BODY MASS INDEX: 24.79 KG/M2 | TEMPERATURE: 97.4 F | OXYGEN SATURATION: 96 % | RESPIRATION RATE: 18 BRPM | HEART RATE: 84 BPM

## 2019-10-22 LAB
ANION GAP SERPL CALCULATED.3IONS-SCNC: 8 MMOL/L (ref 3–14)
BACTERIA SPEC CULT: NORMAL
BASOPHILS # BLD AUTO: 0 10E9/L (ref 0–0.2)
BASOPHILS NFR BLD AUTO: 0.8 %
BUN SERPL-MCNC: 30 MG/DL (ref 7–30)
CALCIUM SERPL-MCNC: 8.9 MG/DL (ref 8.5–10.1)
CHLORIDE SERPL-SCNC: 108 MMOL/L (ref 94–109)
CO2 SERPL-SCNC: 25 MMOL/L (ref 20–32)
CREAT SERPL-MCNC: 2.69 MG/DL (ref 0.66–1.25)
DIFFERENTIAL METHOD BLD: ABNORMAL
EOSINOPHIL # BLD AUTO: 0.3 10E9/L (ref 0–0.7)
EOSINOPHIL NFR BLD AUTO: 4.7 %
ERYTHROCYTE [DISTWIDTH] IN BLOOD BY AUTOMATED COUNT: 14.4 % (ref 10–15)
GFR SERPL CREATININE-BSD FRML MDRD: 21 ML/MIN/{1.73_M2}
GLUCOSE SERPL-MCNC: 88 MG/DL (ref 70–99)
HCT VFR BLD AUTO: 32.4 % (ref 40–53)
HGB BLD-MCNC: 10.4 G/DL (ref 13.3–17.7)
IMM GRANULOCYTES # BLD: 0 10E9/L (ref 0–0.4)
IMM GRANULOCYTES NFR BLD: 0.4 %
INR PPP: 2.19 (ref 0.86–1.14)
LYMPHOCYTES # BLD AUTO: 0.5 10E9/L (ref 0.8–5.3)
LYMPHOCYTES NFR BLD AUTO: 9.5 %
Lab: NORMAL
MAGNESIUM SERPL-MCNC: 1.6 MG/DL (ref 1.6–2.3)
MCH RBC QN AUTO: 29.5 PG (ref 26.5–33)
MCHC RBC AUTO-ENTMCNC: 32.1 G/DL (ref 31.5–36.5)
MCV RBC AUTO: 92 FL (ref 78–100)
MONOCYTES # BLD AUTO: 0.6 10E9/L (ref 0–1.3)
MONOCYTES NFR BLD AUTO: 11.9 %
NEUTROPHILS # BLD AUTO: 3.8 10E9/L (ref 1.6–8.3)
NEUTROPHILS NFR BLD AUTO: 72.7 %
NRBC # BLD AUTO: 0 10*3/UL
NRBC BLD AUTO-RTO: 0 /100
PLATELET # BLD AUTO: 203 10E9/L (ref 150–450)
POTASSIUM SERPL-SCNC: 3 MMOL/L (ref 3.4–5.3)
RBC # BLD AUTO: 3.52 10E12/L (ref 4.4–5.9)
SODIUM SERPL-SCNC: 142 MMOL/L (ref 133–144)
SPECIMEN SOURCE: NORMAL
WBC # BLD AUTO: 5.3 10E9/L (ref 4–11)

## 2019-10-22 PROCEDURE — 83735 ASSAY OF MAGNESIUM: CPT | Performed by: STUDENT IN AN ORGANIZED HEALTH CARE EDUCATION/TRAINING PROGRAM

## 2019-10-22 PROCEDURE — 85025 COMPLETE CBC W/AUTO DIFF WBC: CPT | Performed by: STUDENT IN AN ORGANIZED HEALTH CARE EDUCATION/TRAINING PROGRAM

## 2019-10-22 PROCEDURE — 25000131 ZZH RX MED GY IP 250 OP 636 PS 637: Mod: GY | Performed by: STUDENT IN AN ORGANIZED HEALTH CARE EDUCATION/TRAINING PROGRAM

## 2019-10-22 PROCEDURE — 25000132 ZZH RX MED GY IP 250 OP 250 PS 637: Mod: GY | Performed by: STUDENT IN AN ORGANIZED HEALTH CARE EDUCATION/TRAINING PROGRAM

## 2019-10-22 PROCEDURE — 85610 PROTHROMBIN TIME: CPT | Performed by: STUDENT IN AN ORGANIZED HEALTH CARE EDUCATION/TRAINING PROGRAM

## 2019-10-22 PROCEDURE — 80048 BASIC METABOLIC PNL TOTAL CA: CPT | Performed by: STUDENT IN AN ORGANIZED HEALTH CARE EDUCATION/TRAINING PROGRAM

## 2019-10-22 PROCEDURE — 25000132 ZZH RX MED GY IP 250 OP 250 PS 637: Mod: GY | Performed by: INTERNAL MEDICINE

## 2019-10-22 PROCEDURE — 36415 COLL VENOUS BLD VENIPUNCTURE: CPT | Performed by: STUDENT IN AN ORGANIZED HEALTH CARE EDUCATION/TRAINING PROGRAM

## 2019-10-22 PROCEDURE — 40000893 ZZH STATISTIC PT IP EVAL DEFER: Performed by: REHABILITATION PRACTITIONER

## 2019-10-22 RX ORDER — FUROSEMIDE 20 MG
20 TABLET ORAL DAILY
Qty: 30 TABLET | Refills: 1 | Status: SHIPPED | OUTPATIENT
Start: 2019-10-23 | End: 2019-10-31

## 2019-10-22 RX ORDER — WARFARIN SODIUM 3 MG/1
3 TABLET ORAL
Status: DISCONTINUED | OUTPATIENT
Start: 2019-10-22 | End: 2019-10-22 | Stop reason: HOSPADM

## 2019-10-22 RX ORDER — POTASSIUM CHLORIDE 750 MG/1
40 TABLET, EXTENDED RELEASE ORAL 2 TIMES DAILY
Status: DISCONTINUED | OUTPATIENT
Start: 2019-10-22 | End: 2019-10-22 | Stop reason: HOSPADM

## 2019-10-22 RX ORDER — DOXYCYCLINE 100 MG/1
100 CAPSULE ORAL EVERY 12 HOURS
Qty: 10 CAPSULE | Refills: 0 | Status: SHIPPED | OUTPATIENT
Start: 2019-10-22 | End: 2019-10-27

## 2019-10-22 RX ORDER — LEVOFLOXACIN 250 MG/1
250 TABLET, FILM COATED ORAL DAILY
Qty: 5 TABLET | Refills: 0 | Status: SHIPPED | OUTPATIENT
Start: 2019-10-22 | End: 2019-12-23

## 2019-10-22 RX ADMIN — ACETAMINOPHEN 1000 MG: 500 TABLET, FILM COATED ORAL at 09:11

## 2019-10-22 RX ADMIN — DOXYCYCLINE 100 MG: 100 CAPSULE ORAL at 09:11

## 2019-10-22 RX ADMIN — CYCLOSPORINE 50 MG: 25 CAPSULE, LIQUID FILLED ORAL at 09:11

## 2019-10-22 RX ADMIN — POTASSIUM CHLORIDE 40 MEQ: 750 TABLET, EXTENDED RELEASE ORAL at 11:16

## 2019-10-22 RX ADMIN — MYCOPHENOLATE MOFETIL 500 MG: 500 TABLET ORAL at 09:11

## 2019-10-22 RX ADMIN — DILTIAZEM HYDROCHLORIDE 180 MG: 180 CAPSULE, COATED, EXTENDED RELEASE ORAL at 09:11

## 2019-10-22 ASSESSMENT — ACTIVITIES OF DAILY LIVING (ADL)
ADLS_ACUITY_SCORE: 13
ADLS_ACUITY_SCORE: 15
ADLS_ACUITY_SCORE: 15
ADLS_ACUITY_SCORE: 13
ADLS_ACUITY_SCORE: 15

## 2019-10-22 ASSESSMENT — PAIN DESCRIPTION - DESCRIPTORS: DESCRIPTORS: ACHING;DISCOMFORT

## 2019-10-22 ASSESSMENT — MIFFLIN-ST. JEOR: SCORE: 1535.19

## 2019-10-22 NOTE — PLAN OF CARE
A&Ox4. VSS. On reg diet, denied N/V. Denies pain throughout shift. Up independently, uses urinal at bedside. Left leg with erythema, edema and scabbing. Patient stated erythema and edema has significantly improved.  Continue with plan of care. Plan to discharge today.

## 2019-10-22 NOTE — PLAN OF CARE
VSS on RA, except occasional baseline HTN. Reports good pain control with PO Tylenol. Tolerating regular diet. 40 mEq of potassium administered as ordered. Last BM 10/21. Voiding with good output. LLE with erythema and scabbing, open to air. PIV saline locked. Up ad merlyn, cane provided by PT. Plan to likely discharge to home this evening (team will update pt once ready). Continue with POC.

## 2019-10-22 NOTE — PROGRESS NOTES
[facility], [place]   Transplant Nephrology Progress Note  Date of Admission:  10/11/2019  Today's Date: 10/22/2019    Assessment & Plan     # Non-oliguric DEB:  DEB with Cr peak at 5.4, now 2.7 and stable with a slow recovery. UOP appropriate and responding to lasix 20mg daily. Etiology of DEB may be from cellulitis. Continue diuresis, he can follow with nephrology as an outpatient.    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: Hgb: Stable      AISHWARYA: No              - Iron studies: Not checked recently     # Electrolytes:   - Potassium; level: Low, replace to keep >3.6  - Magnesium; level: Not checked recently  - Bicarbonate; level: Normal  - Sodium; level: Normal     # LE cellulitis: appreciate ID input, currently patient is transitioned to levofloxacin + doxycycline (until 10/27)     # Transplant History:  Etiology of Kidney Failure: Unknown etiology   Tx: DDKT  Transplant: 3/5/2009 (Kidney)  Donor Type: Donation after Brain Death        Donor Class: Expanded Criteria Donor  Crossmatch at time of Tx: negative  Significant changes in immunosuppression: None  Significant transplant-related complications: None    Recommendations were communicated to the primary team verbally.    Beto Rodrigez MD  Pager: [phone]    Interval History     Patient was seen and examined this morning. He had no overnight issues, and denies any recent fevers  "or chills. He reports a good appetite with no nausea or vomiting. He has no SOB of chest pain. He reports no LE edema.     Review of Systems   4 point ROS was obtained and negative except as noted in the Interval History.    MEDICATIONS:  Current Facility-Administered Medications   Medication     acetaminophen (TYLENOL) tablet 1,000 mg     bisacodyl (DULCOLAX) EC tablet 5 mg    Or     bisacodyl (DULCOLAX) EC tablet 10 mg    Or     bisacodyl (DULCOLAX) EC tablet 15 mg     cycloSPORINE modified (GENERIC EQUIVALENT) capsule 50 mg     diltiazem ER COATED BEADS (CARDIZEM CD/CARTIA XT) 24 hr capsule 180 mg     doxycycline hyclate (VIBRAMYCIN) capsule 100 mg     furosemide (LASIX) tablet 20 mg     gabapentin (NEURONTIN) capsule 300 mg     influenza Vac Split High-Dose (FLUZONE) injection 0.5 mL     levofloxacin (LEVAQUIN) tablet 250 mg     lidocaine (LMX4) cream     lidocaine 1 % 0.1-1 mL     melatonin tablet 1 mg     mycophenolate (GENERIC EQUIVALENT) tablet 500 mg     naloxone (NARCAN) injection 0.1-0.4 mg     oxyCODONE (ROXICODONE) tablet 5 mg     polyethylene glycol (MIRALAX/GLYCOLAX) Packet 17 g     potassium chloride ER (K-DUR/KLOR-CON M) CR tablet 40 mEq     sodium chloride (PF) 0.9% PF flush 3 mL     sodium chloride (PF) 0.9% PF flush 3 mL     warfarin ANTICOAGULANT (COUMADIN) tablet 3 mg     Warfarin Therapy Reminder (Check START DATE - warfarin may be starting in the FUTURE)       Physical Exam    BP (!) 155/84 (BP Location: Left arm)   Pulse 84   Temp 97.4  F (36.3  C) (Oral)   Resp 18   Ht 1.803 m (5' 10.98\")   Wt 80.3 kg (177 lb 1.6 oz)   SpO2 96%   BMI 24.71 kg/m       GENERAL APPEARANCE: alert and no distress  HENT: mouth without ulcers or lesions  LYMPHATICS: no cervical or supraclavicular nodes  RESP: lungs clear to auscultation - no rales, rhonchi or wheezes  CV: irregular rhythm, normal rate, no rub, no murmur  EDEMA: Left leg with significant erythema and area of swelling over the mid shin area.  " Tenderness was noted and warmth on exam (largely unchanged)  ABDOMEN: soft, nondistended, nontender, bowel sounds normal  MS: extremities normal - no gross deformities noted, no evidence of inflammation in joints, no muscle tenderness  SKIN: Left lower extremity with erythema and demarcation of cellulitic pattern. Non-painful, no discharge.    Data   All labs reviewed by me.    CMP  Recent Labs   Lab 10/22/19  0755 10/21/19  0659 10/20/19  0734 10/19/19  0949    141 141 140   POTASSIUM 3.0* 3.2* 3.4 3.3*   CHLORIDE 108 110* 110* 110*   CO2 25 25 24 24   ANIONGAP 8 7 7 7   GLC 88 86 86 102*   BUN 30 32* 31* 33*   CR 2.69* 2.72* 2.75* 2.91*   GFRESTIMATED 21* 21* 21* 19*   GFRESTBLACK 25* 24* 24* 22*   ELEAZAR 8.9 8.9 9.2 8.8   MAG 1.6  --   --   --      CBC  Recent Labs   Lab 10/22/19  0755 10/21/19  0659 10/20/19  0734 10/19/19  0949   HGB 10.4* 10.1* 10.7* 9.8*   WBC 5.3 5.4 5.7 5.2   RBC 3.52* 3.41* 3.60* 3.35*   HCT 32.4* 31.7* 33.5* 31.0*   MCV 92 93 93 93   MCH 29.5 29.6 29.7 29.3   MCHC 32.1 31.9 31.9 31.6   RDW 14.4 14.5 14.4 14.3    209 215 212     INR  Recent Labs   Lab 10/22/19  0755 10/21/19  0659 10/20/19  0734 10/19/19  0949   INR 2.19* 2.32* 1.93* 1.77*     ABGNo lab results found in last 7 days.   Urine Studies  Recent Labs   Lab Test 10/16/19  1204 10/11/19  1516   COLOR Yellow Yellow   APPEARANCE Clear Clear   URINEGLC 30* Negative   URINEBILI Negative Negative   URINEKETONE Negative Negative   SG 1.010 1.014   UBLD Moderate* Large*   URINEPH 6.5 7.0   PROTEIN 30* 50*   NITRITE Negative Negative   LEUKEST Trace* Moderate*   RBCU 5* 88*   WBCU 3 30*     Recent Labs   Lab Test 10/13/19  2140   UTPG 1.08*     PTH  No lab results found.  Iron Studies  No lab results found.    IMAGING:  All imaging studies reviewed by me.     I have seen and examined this patient with the fellow  This note reflects our joint assessment and plan.     Jazmine Medrano MD

## 2019-10-22 NOTE — PLAN OF CARE
"PT-7C- Pt discharged from PT yesterday, ambulating 500 feet IND per chart review, however pt anticipates discharge today and requested cane for discharge. PT educated pt on 2 types of canes available from [facility] for discharge, pt requested bronze cane with foam , upon completion of electronic order and paperwork, pt reports he has changed his mind and would like a cane that \"stands on it's own\", ie a \"hurry cane\". Pt declined cane that hospital provides and would like to take electronic order that will be printed on discharge orders to medical supply store and obtain a cane on his own.   "

## 2019-10-22 NOTE — PLAN OF CARE
Discharge instructions reviewed w/ pt by day RNTamy. Pt discharged off of unit via transport in a wheelchair around 1630. Pt's family member carried all of pt's belongings. Pt to  his prescriptions from home pharmacy.

## 2019-10-22 NOTE — PROGRESS NOTES
Care Coordinator - Discharge Planning    Admission Date/Time:  10/11/2019  Attending MD:  Bella Lema,*     Data  Chart reviewed, discussed with interdisciplinary team.   Patient was admitted for:   1. Cellulitis of left lower extremity         Assessment   Concerns with insurance coverage for discharge needs: None.  Current Living Situation: Patient lives with family.  Support System: Supportive and Involved  Services Involved: None  Transportation at Discharge: Car and Family or friend will provide  Transportation to Medical Appointments:    - Name of caregiver: Self  Barriers to Discharge: Medical stability      Pt status reviewed/discussed during care team rounds.  Per provider team pt is requesting a new PCP closer to his home.  Pt will need INR drawn on Friday 10/25.    Met with pt.  Introduced RNCC role.  Per discussion with pt he does not wish to change his PCP.  Pt would like to have f/u in place prior to his discharge.  Per pt he moved to [place] a couple of months ago to be closer to his family.  Pt has established care with a PCP at the [facility].  Patient also has a nephrologist through the [facility].  Pt is concerned that his nephrology f/u is not until February 2020.  Per pt he is waiting on [facility] regarding when he should f/u with his outpatient nephrologist.  Discussed INR and post hospital f/u appointments.  Pt would like assistance in scheduling PCP f/u.    PCP appointment confirmed for Friday 10/25 at 10 a.m. with Dr. Lewis at the [facility].  Discharge orders updated.     Coordination of Care and Referrals: INR and PCP options.      Plan  Anticipated Discharge Date:  10/22/2019  Anticipated Discharge Plan:  Home      Nathalie Bates RN BSN, PHN RN Care Coordinator  Internal Medicine   [phone]  Pager: [phone]  Weekend RN Care Coordinator job code * * * [id number]  [facility]  10/22/2019 12:25 PM

## 2019-10-22 NOTE — DISCHARGE INSTRUCTIONS
Primary Care Appointment/INR Appointment  [facility]  [address], [place] [address]  [phone]  Friday October 25th at 10 a.m. with Dr. Lewis

## 2019-10-22 NOTE — PLAN OF CARE
"BP (!) 157/79 (BP Location: Left arm)   Pulse 73   Temp 96.1  F (35.6  C) (Oral)   Resp 17   Ht 1.803 m (5' 10.98\")   Wt 84.1 kg (185 lb 6.5 oz)   SpO2 96%   BMI 25.87 kg/m      A&Ox4. VSS on RA ex baseline HTN. Denies pain. Declined scheduled Tylenol. Up with SBA. Voids spont into bedside urinal with adequate UOP. Tolerating reg diet with no N/V. +BM and flatus. LLE with blanchable erythema and scabbing. Edema present in arms. PIV SL. Pt resting comfortably between cares. Continue POC.   "

## 2019-10-23 ENCOUNTER — PATIENT OUTREACH (OUTPATIENT)
Dept: CARE COORDINATION | Facility: CLINIC | Age: 79
End: 2019-10-23

## 2019-10-23 NOTE — PROGRESS NOTES
[facility]: Post-Discharge Note  SITUATION                                                      Admission:    Admission Date: 10/11/19   Reason for Admission: Left Lower Extremity Cellulitis  Discharge:   Discharge Date: 10/22/19  Discharge Diagnosis: Left Lower Extremity Cellulitis  Discharge Service: Medicine and Pediatrics     BACKGROUND                                                      Matheus White Sr. was admitted on 10/11/2019 for concerns of left leg cellulitis.  The following problems were addressed during his hospitalization:     Non-Purulent Cellulitis of left lower extremity  -Pt started on zosyn then moved to vancomycin for antibiotic coverage of leg wound. He was initially transitioned to oral doxycycline and cephalexin on 10/12. Surgery evaluated the leg and determined no procedure had to be done. A LLE ultrasound was negative for DVT, only showing some fluid collection over the fluctuant area in left anterior lower extremity. Patient was moved back to IV vancomycin and cefazolin then cefepime for better management after multiple febrile episodes were noted. IR consultled and aspirated 3mL of  Fluid resembling a hematoma from the LLE on 10/15. That night pt presented with dyspnea and tachypnea that was result of flash pulmonary edema which slowly improved. Transplant Infectious disease was consulted and provided additional guidance for antibiotic management. Patient cultures did not grow any bacteria, pt switched to PO antibiotics started on 10/19 to 10/20. Patient will plan to continue oral antibiotics of levofloxacin 500 mg PO daily and doxycycline 100mg PO BID until 10/27/19 with outpatient follow up as well.      Acute Kidney Injury on CKD Stage 3 s/p DDKT (2009)  - Patient presented with creatinine elevated around 5.4 with a baseline of 1.5. He was noted to have significant hematuria on UA. Renal ultrasound showed mild hydronephrosis. Patient's INR was elevated to  4.5 as well. Transplant nephrology was consulted. Patient was continued on home immunosuppression of 25 mg cyclosporin BID and 1000mg mycophenolate BID. Pt's cyclosporine remained within expected range, he was started on gentle hydration, and his creatinine was slowly trending down. Pt was noted to be hypokalemic and given additional potassium supplements during his stay in the hospital. His creatinine trended down to 2.7 and his warfarin was held due to his microhematuria. His INR goal dropped to within therapeutic range. He was stable for discharge with his creatinine slowly improving and INR within therapeutic range.      Atrial Fibrillation on Warfarin  - controlled with cardizem 180mg ER and on warfarin  - warfarin held with elevated INR up to 5, but downtrended to wnl normal limits, stable and follow up outpatient.     ASSESSMENT      Discharge Assessment  Patient reports symptoms are: Unchanged  Does the patient have all of their medications?: Yes  Does patient know what their new medications are for?: Not applicable  Does patient have a follow-up appointment scheduled?: Yes(Friday with PCP)  Does patient have any other questions or concerns?: No    Post-op  Did the patient have surgery or a procedure: No  Fever: No  Chills: No  Eating & Drinking: eating and drinking without complaints/concerns  PO Intake: regular diet  Bowel Function: normal  Urinary Status: voiding without complaint/concerns    PLAN                                                      Outpatient Plan:      Follow up INR, BMP for evaluation of therapuetic anticoagulation and improvement in kidney function on Thursday    No future appointments.        Leeanna Brock, CMA

## 2019-10-31 ENCOUNTER — OFFICE VISIT (OUTPATIENT)
Dept: NEPHROLOGY | Facility: CLINIC | Age: 79
End: 2019-10-31
Attending: INTERNAL MEDICINE
Payer: MEDICARE

## 2019-10-31 VITALS
TEMPERATURE: 97.7 F | OXYGEN SATURATION: 97 % | BODY MASS INDEX: 24.4 KG/M2 | HEART RATE: 77 BPM | SYSTOLIC BLOOD PRESSURE: 147 MMHG | WEIGHT: 174.9 LBS | DIASTOLIC BLOOD PRESSURE: 48 MMHG

## 2019-10-31 DIAGNOSIS — M79.89 LEG SWELLING: Primary | ICD-10-CM

## 2019-10-31 PROCEDURE — G0463 HOSPITAL OUTPT CLINIC VISIT: HCPCS | Mod: ZF

## 2019-10-31 RX ORDER — MUPIROCIN CALCIUM 20 MG/G
CREAM TOPICAL
COMMUNITY
Start: 2019-10-25 | End: 2019-12-12

## 2019-10-31 RX ORDER — MUPIROCIN 20 MG/G
OINTMENT TOPICAL
Refills: 1 | COMMUNITY
Start: 2019-10-28 | End: 2019-12-12

## 2019-10-31 ASSESSMENT — PAIN SCALES - GENERAL: PAINLEVEL: MILD PAIN (3)

## 2019-10-31 NOTE — NURSING NOTE
"Chief Complaint   Patient presents with     RECHECK     Follow up Kidney TX     Vital signs:  Temp: 97.7  F (36.5  C)   BP: (!) 147/48 Pulse: 77     SpO2: 97 %       Weight: 79.3 kg (174 lb 14.4 oz)  Estimated body mass index is 24.4 kg/m  as calculated from the following:    Height as of 10/21/19: 1.803 m (5' 10.98\").    Weight as of this encounter: 79.3 kg (174 lb 14.4 oz).      Ricarda iSmon, CMA    "

## 2019-10-31 NOTE — PROGRESS NOTES
CHRONIC TRANSPLANT NEPHROLOGY VISIT    Assessment & Plan   # DDKT: Increased   - Baseline Cr ~ 1.4-1.6 now with DEB and slow to decline DEB    - Proteinuria: some on UA    - Date DSA Last Checked: Not Known      Latest DSA: Not checked recently due to time from transplant   - BK Viremia: Not checked recently   - Kidney Tx Biopsy: No    # Immunosuppression: Cyclosporine (goal 50-75) and Mycophenolate mofetil (goal not followed)   - Changes: No    # Infection Prophylaxis:   - PJP: Sulfa/TMP (Bactrim)    # Hypertension: Controlled;  Goal BP: < 130/80   - Changes: No    # 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: Hgb: Stable      AISHWARYA: No   - Iron studies: Not checked recently    # Mineral Bone Disorder:   - Secondary renal hyperparathyroidism; PTH level: Not checked recently  - Vitamin D; level: Not checked recently        On Supplement: No  - Calcium; level: Normal        On Supplement: No  - Phosphorus; level: Not checked recently        On Supplement: No    # Electrolytes:   - Potassium; level: Low        On Supplement: Yes  - Magnesium; level: Normal        On Supplement: No  - Bicarbonate; level: Normal        On Supplement: No  - Sodium; level: Normal        On Supplement: No    # Left leg cellulitis/hematoma: patient was hospitalized recently with cellulitis and hematoma. This was tapped but no drains were placed. He was recently seen by his internist and was started on doxycycline. At this point, I will proceed with MRI evaluation to make sure that the infection did not spread to bone. Will discuss with ID once results are available.     # Skin Cancer Risk:    - Discussed sun protection and recommend regular follow up with Dermatology.    # Medical Compliance: Yes    # Transplant History:  Etiology of Kidney Failure: Unknown etiology  Tx: DDKT  Transplant: 3/5/2009 (Kidney)  Donor Type: Donation after Brain Death Donor Class: Expanded Criteria Donor  Significant changes in immunosuppression: None  Significant  transplant-related complications: None    Transplant Office Phone Number: [phone]    Assessment and plan was discussed with the patient and he voiced his understanding and agreement.    Return visit: No follow-ups on file.    Chief Complaint   Mr. White is a 79 year old here for routine follow up.    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 He has not previously been seen in [facility] Nephrology clinic. He was last seen on 8/2/19 by Dr. Parkinson at [facility]; please see that note for further details.     Mr. Shah is here for follow-up after his recent hospitalization.  Since he was seen last, he followed up with his primary care doctor who started him on doxycycline for suspected ongoing cellulitis of his left lower extremity.  His 2 small openings had healed over his tibia but the area still swollen and red.  Additionally the area felt slightly warm and was indurated.  He had no systemic fevers or chills.  He is able to hydrate himself well.  Recently his diuretics were reduced since it was thought to raise the creatinine.  He is doing fine otherwise.  He is doing his labs regularly and is taking his medications as prescribed.  He is following up with his primary care doctor.  We discussed the current status and the need for an MRI to make sure that the infection did not spread to the bones and perhaps to revisit the idea of having a clot in that leg.  He will complete his testing locally and he will follow-up with his primary.  Further plans will be determined based on the results of the imaging.    Recent Hospitalizations:  [] No [x] Yes For cellulitis / left leg hematoma    New Medical Issues: [x] No [] Yes    Decreased energy: [x] No [] Yes    Chest pain or SOB with exertion:  [x] No [] Yes    Appetite change or weight change: [x] No [] Yes    Nausea, vomiting or diarrhea:   [x] No [] Yes    Fever, sweats or chills: [x] No [] Yes    Leg swelling: [] No [x] Yes      Home BP: controlled     Review of Systems   A comprehensive review of systems was obtained and negative, except as noted in the HPI or PMH.    Problem List   Patient Active Problem List   Diagnosis     Acute kidney injury superimposed on chronic kidney disease (H)     Anticoagulation monitoring, INR range 2-3     History of renal transplant     HTN (hypertension)     Permanent atrial fibrillation     Cellulitis of left lower extremity       Social History   Social History     Tobacco Use     Smoking status: Never Smoker     Smokeless tobacco: Never Used   Substance Use Topics     Alcohol use: Yes     Frequency: Monthly or less     Drinks per session: 1 or 2     Drug use: Not on file       Allergies   No Known Allergies    Medications   Current Outpatient Medications   Medication Sig     cycloSPORINE modified (GENERIC EQUIVALENT) 25 MG capsule Take 50 mg by mouth 2 times daily     diltiazem ER COATED BEADS (CARDIZEM CD/CARTIA XT) 180 MG 24 hr capsule Take 180 mg by mouth daily     furosemide (LASIX) 20 MG tablet Take 1 tablet (20 mg) by mouth daily     gabapentin (NEURONTIN) 300 MG capsule Take 300 mg by mouth daily     mycophenolate (GENERIC EQUIVALENT) 500 MG tablet Take 500 mg by mouth 2 times daily     order for DME Equipment being ordered: Cane ()  Treatment Diagnosis: Gait Instability     potassium citrate 15 MEQ (1620 MG) TBCR Take 1 tablet by mouth 2 times daily     warfarin ANTICOAGULANT (COUMADIN) 3 MG tablet Take 3 mg by mouth daily     No current facility-administered medications for this visit.      There are no discontinued medications.    Physical Exam   Vital Signs: BP (!) 147/48 (BP Location: Right arm)   Pulse 77   Temp 97.7  F (36.5  C)   Wt 79.3 kg (174 lb 14.4 oz)   SpO2 97%   BMI 24.40 kg/m      GENERAL APPEARANCE: alert and no distress  HENT: mouth without ulcers or lesions  LYMPHATICS: no  cervical or supraclavicular nodes  RESP: lungs clear to auscultation - no rales, rhonchi or wheezes  CV: regular rhythm, normal rate, no rub, no murmur  EDEMA: no LE edema bilaterally  ABDOMEN: soft, nondistended, nontender, bowel sounds normal  MS: extremities normal - no gross deformities noted, no evidence of inflammation in joints, no muscle tenderness  SKIN: no rash      Data     Renal Latest Ref Rng & Units 10/22/2019 10/21/2019 10/20/2019   Na 133 - 144 mmol/L 142 141 141   Na (external) 136 - 145 mmol/L - - -   K 3.4 - 5.3 mmol/L 3.0(L) 3.2(L) 3.4   K (external) 3.5 - 5.1 mmol/L - - -   Cl 94 - 109 mmol/L 108 110(H) 110(H)   Cl (external) 98 - 107 mmol/L - - -   CO2 20 - 32 mmol/L 25 25 24   CO2 (external) 23 - 29 mmol/L - - -   BUN 7 - 30 mg/dL 30 32(H) 31(H)   BUN (external) 7 - 25 mg/dL - - -   Cr 0.66 - 1.25 mg/dL 2.69(H) 2.72(H) 2.75(H)   Cr (external) 0.70 - 1.30 mg/dL - - -   Glucose 70 - 99 mg/dL 88 86 86   Glucose (external) 70 - 105 mg/dL - - -   Ca  8.5 - 10.1 mg/dL 8.9 8.9 9.2   Ca (external) 8.6 - 10.3 mg/dL - - -   Mg 1.6 - 2.3 mg/dL 1.6 - -   Mg (external) 1.9 - 2.7 mg/dL - - -     Bone Health Latest Ref Rng & Units 2/9/2018 7/17/2009 7/7/2009   Phos 2.5 - 4.5 mg/dL - 2.2(L) 2.0(L)   Phos (external) 2.5 - 5.0 mg/dL 2.2(L) - -   PTHi 12 - 72 pg/mL - - -   Vit D Def (external) >=29 ng/mL 55 - -     Heme Latest Ref Rng & Units 10/22/2019 10/21/2019 10/20/2019   WBC 4.0 - 11.0 10e9/L 5.3 5.4 5.7   WBC (external) 4.1 - 10.6 x10e3/uL - - -   Hgb 13.3 - 17.7 g/dL 10.4(L) 10.1(L) 10.7(L)   Hgb (external) 13.7 - 17.0 gm/dL - - -   Plt 150 - 450 10e9/L 203 209 215   Plt (external) 140 - 420 x10e3/uL - - -     Liver Latest Ref Rng & Units 10/11/2019 2/9/2018 3/5/2009   AP 40 - 150 U/L 230(H) - 87   AP (external) 34 - 104 U/L - 118(H) -   TBili 0.2 - 1.3 mg/dL 1.8(H) - 0.4   TBili (external) 0.3 - 1.0 mg/dL - 1.6(H) -   ALT 0 - 70 U/L 21 - 8   ALT (external) 7 - 52 U/L - 12 -   AST 0 - 45 U/L 16 - 29    AST (external) 13 - 39 U/L - 25 -   Tot Protein 6.8 - 8.8 g/dL 7.0 - 6.8   Tot Protein (external) 6.4 - 8.9 g/dL - 7.3 -   Albumin 3.4 - 5.0 g/dL 3.3(L) - 3.9   Albumin (external) 3.5 - 5.7 gm/dL - 4.4 -     Pancreas Latest Ref Rng & Units 7/28/2008   Lipase 20 - 250 U/L 56     Iron studies Latest Ref Rng & Units 4/9/2009 3/11/2009   Iron 35 - 180 ug/dL 94 47     [facility] Txp Virology Latest Ref Rng & Units 3/5/2009 11/19/2007   CMV IgG EU/mL 0.0 1.1   EBV IgG - 2.31 6.19   Hep B Core NEG - Negative   Hep B Surf - 142.0 >1000.0   HIV 1&2 NEG Negative Negative     Scribe Disclosure:  I, Linda Alba, am serving as a scribe to document services personally performed by Josias Villarreal M.D. at this visit, based upon the provider's statements to me. All documentation has been reviewed by the aforementioned provider prior to being entered into the official medical record.     I, Linda Alba, a scribe, prepared the chart for today's encounter.     I reviewed the MRI and the imaging.  I discussed the case with Dr. laureano from infectious diseases at this point,.  The recommendation is to pursue hematoma drainage by orthopedic surgery.  On this account, I asked the coordinator to refer the patient for an orthopedic evaluation.  He will continue his doxycycline as prescribed by his primary for now.

## 2019-10-31 NOTE — LETTER
10/31/2019    RE: Matheus Agudelo Cindy Sr.  [address] [place] [address]     CHRONIC TRANSPLANT NEPHROLOGY VISIT    Assessment & Plan   # DDKT: Increased   - Baseline Cr ~ 1.4-1.6 now with DEB and slow to decline DEB    - Proteinuria: some on UA    - Date DSA Last Checked: Not Known      Latest DSA: Not checked recently due to time from transplant   - BK Viremia: Not checked recently   - Kidney Tx Biopsy: No    # Immunosuppression: Cyclosporine (goal 50-75) and Mycophenolate mofetil (goal not followed)   - Changes: No    # Infection Prophylaxis:   - PJP: Sulfa/TMP (Bactrim)    # Hypertension: Controlled;  Goal BP: < 130/80   - Changes: No    # 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: Hgb: Stable      AISHWARYA: No   - Iron studies: Not checked recently    # Mineral Bone Disorder:   - Secondary renal hyperparathyroidism; PTH level: Not checked recently  - Vitamin D; level: Not checked recently        On Supplement: No  - Calcium; level: Normal        On Supplement: No  - Phosphorus; level: Not checked recently        On Supplement: No    # Electrolytes:   - Potassium; level: Low        On Supplement: Yes  - Magnesium; level: Normal        On Supplement: No  - Bicarbonate; level: Normal        On Supplement: No  - Sodium; level: Normal        On Supplement: No    # Left leg cellulitis/hematoma: patient was hospitalized recently with cellulitis and hematoma. This was tapped but no drains were placed. He was recently seen by his internist and was started on doxycycline. At this point, I will proceed with MRI evaluation to make sure that the infection did not spread to bone. Will discuss with ID once results are available.     # Skin Cancer Risk:    - Discussed sun protection and recommend regular follow up with Dermatology.    # Medical Compliance: Yes    # Transplant History:  Etiology of Kidney Failure: Unknown etiology  Tx: DDKT  Transplant: 3/5/2009 (Kidney)  Donor Type: Donation after Brain Death Donor Class:  Expanded Criteria Donor  Significant changes in immunosuppression: None  Significant transplant-related complications: None    Transplant Office Phone Number: [phone]    Assessment and plan was discussed with the patient and he voiced his understanding and agreement.    Return visit: No follow-ups on file.    Chief Complaint   Mr. White is a 79 year old here for routine follow up.    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 He has not previously been seen in [facility] Nephrology clinic. He was last seen on 8/2/19 by Dr. Parkinson at [facility]; please see that note for further details.     Mr. Shah is here for follow-up after his recent hospitalization.  Since he was seen last, he followed up with his primary care doctor who started him on doxycycline for suspected ongoing cellulitis of his left lower extremity.  His 2 small openings had healed over his tibia but the area still swollen and red.  Additionally the area felt slightly warm and was indurated.  He had no systemic fevers or chills.  He is able to hydrate himself well.  Recently his diuretics were reduced since it was thought to raise the creatinine.  He is doing fine otherwise.  He is doing his labs regularly and is taking his medications as prescribed.  He is following up with his primary care doctor.  We discussed the current status and the need for an MRI to make sure that the infection did not spread to the bones and perhaps to revisit the idea of having a clot in that leg.  He will complete his testing locally and he will follow-up with his primary.  Further plans will be determined based on the results of the imaging.    Recent Hospitalizations:  [] No [x] Yes For cellulitis / left leg hematoma    New Medical Issues: [x] No [] Yes    Decreased energy: [x] No [] Yes    Chest pain or SOB with exertion:  [x] No [] Yes     Appetite change or weight change: [x] No [] Yes    Nausea, vomiting or diarrhea:  [x] No [] Yes    Fever, sweats or chills: [x] No [] Yes    Leg swelling: [] No [x] Yes      Home BP: controlled     Review of Systems   A comprehensive review of systems was obtained and negative, except as noted in the HPI or PMH.    Problem List   Patient Active Problem List   Diagnosis     Acute kidney injury superimposed on chronic kidney disease (H)     Anticoagulation monitoring, INR range 2-3     History of renal transplant     HTN (hypertension)     Permanent atrial fibrillation     Cellulitis of left lower extremity       Social History   Social History     Tobacco Use     Smoking status: Never Smoker     Smokeless tobacco: Never Used   Substance Use Topics     Alcohol use: Yes     Frequency: Monthly or less     Drinks per session: 1 or 2     Drug use: Not on file       Allergies   No Known Allergies    Medications   Current Outpatient Medications   Medication Sig     cycloSPORINE modified (GENERIC EQUIVALENT) 25 MG capsule Take 50 mg by mouth 2 times daily     diltiazem ER COATED BEADS (CARDIZEM CD/CARTIA XT) 180 MG 24 hr capsule Take 180 mg by mouth daily     furosemide (LASIX) 20 MG tablet Take 1 tablet (20 mg) by mouth daily     gabapentin (NEURONTIN) 300 MG capsule Take 300 mg by mouth daily     mycophenolate (GENERIC EQUIVALENT) 500 MG tablet Take 500 mg by mouth 2 times daily     order for DME Equipment being ordered: Cane ()  Treatment Diagnosis: Gait Instability     potassium citrate 15 MEQ (1620 MG) TBCR Take 1 tablet by mouth 2 times daily     warfarin ANTICOAGULANT (COUMADIN) 3 MG tablet Take 3 mg by mouth daily     No current facility-administered medications for this visit.      There are no discontinued medications.    Physical Exam   Vital Signs: BP (!) 147/48 (BP Location: Right arm)   Pulse 77   Temp 97.7  F (36.5  C)   Wt 79.3 kg (174 lb 14.4 oz)   SpO2 97%   BMI 24.40 kg/m       GENERAL  APPEARANCE: alert and no distress  HENT: mouth without ulcers or lesions  LYMPHATICS: no cervical or supraclavicular nodes  RESP: lungs clear to auscultation - no rales, rhonchi or wheezes  CV: regular rhythm, normal rate, no rub, no murmur  EDEMA: no LE edema bilaterally  ABDOMEN: soft, nondistended, nontender, bowel sounds normal  MS: extremities normal - no gross deformities noted, no evidence of inflammation in joints, no muscle tenderness  SKIN: no rash      Data     Renal Latest Ref Rng & Units 10/22/2019 10/21/2019 10/20/2019   Na 133 - 144 mmol/L 142 141 141   Na (external) 136 - 145 mmol/L - - -   K 3.4 - 5.3 mmol/L 3.0(L) 3.2(L) 3.4   K (external) 3.5 - 5.1 mmol/L - - -   Cl 94 - 109 mmol/L 108 110(H) 110(H)   Cl (external) 98 - 107 mmol/L - - -   CO2 20 - 32 mmol/L 25 25 24   CO2 (external) 23 - 29 mmol/L - - -   BUN 7 - 30 mg/dL 30 32(H) 31(H)   BUN (external) 7 - 25 mg/dL - - -   Cr 0.66 - 1.25 mg/dL 2.69(H) 2.72(H) 2.75(H)   Cr (external) 0.70 - 1.30 mg/dL - - -   Glucose 70 - 99 mg/dL 88 86 86   Glucose (external) 70 - 105 mg/dL - - -   Ca  8.5 - 10.1 mg/dL 8.9 8.9 9.2   Ca (external) 8.6 - 10.3 mg/dL - - -   Mg 1.6 - 2.3 mg/dL 1.6 - -   Mg (external) 1.9 - 2.7 mg/dL - - -     Bone Health Latest Ref Rng & Units 2/9/2018 7/17/2009 7/7/2009   Phos 2.5 - 4.5 mg/dL - 2.2(L) 2.0(L)   Phos (external) 2.5 - 5.0 mg/dL 2.2(L) - -   PTHi 12 - 72 pg/mL - - -   Vit D Def (external) >=29 ng/mL 55 - -     Heme Latest Ref Rng & Units 10/22/2019 10/21/2019 10/20/2019   WBC 4.0 - 11.0 10e9/L 5.3 5.4 5.7   WBC (external) 4.1 - 10.6 x10e3/uL - - -   Hgb 13.3 - 17.7 g/dL 10.4(L) 10.1(L) 10.7(L)   Hgb (external) 13.7 - 17.0 gm/dL - - -   Plt 150 - 450 10e9/L 203 209 215   Plt (external) 140 - 420 x10e3/uL - - -     Liver Latest Ref Rng & Units 10/11/2019 2/9/2018 3/5/2009   AP 40 - 150 U/L 230(H) - 87   AP (external) 34 - 104 U/L - 118(H) -   TBili 0.2 - 1.3 mg/dL 1.8(H) - 0.4   TBili (external) 0.3 - 1.0 mg/dL - 1.6(H) -    ALT 0 - 70 U/L 21 - 8   ALT (external) 7 - 52 U/L - 12 -   AST 0 - 45 U/L 16 - 29   AST (external) 13 - 39 U/L - 25 -   Tot Protein 6.8 - 8.8 g/dL 7.0 - 6.8   Tot Protein (external) 6.4 - 8.9 g/dL - 7.3 -   Albumin 3.4 - 5.0 g/dL 3.3(L) - 3.9   Albumin (external) 3.5 - 5.7 gm/dL - 4.4 -     Pancreas Latest Ref Rng & Units 7/28/2008   Lipase 20 - 250 U/L 56     Iron studies Latest Ref Rng & Units 4/9/2009 3/11/2009   Iron 35 - 180 ug/dL 94 47     [facility] Txp Virology Latest Ref Rng & Units 3/5/2009 11/19/2007   CMV IgG EU/mL 0.0 1.1   EBV IgG - 2.31 6.19   Hep B Core NEG - Negative   Hep B Surf - 142.0 >1000.0   HIV 1&2 NEG Negative Negative     Scribe Disclosure:  I, Linda Alba, am serving as a scribe to document services personally performed by Josias Villarreal M.D. at this visit, based upon the provider's statements to me. All documentation has been reviewed by the aforementioned provider prior to being entered into the official medical record.     Linda SNIDER, a scribe, prepared the chart for today's encounter.     I reviewed the MRI and the imaging.  I discussed the case with Dr. laureano from infectious diseases at this point,.  The recommendation is to pursue hematoma drainage by orthopedic surgery.  On this account, I asked the coordinator to refer the patient for an orthopedic evaluation.  He will continue his doxycycline as prescribed by his primary for now.      Kidney/Pancreas Recipient

## 2019-10-31 NOTE — NURSING NOTE
Matheus White Sr. was seen today in clinic by this writer. Medications, lab orders, lab frequency, and necessary follow up discussed with patient. Patient was provided with a copy of the current lab letter. Patient voiced understanding and agreement of education and plan.   Matheus will complete weekly labs x 4.     Paris Leon RN

## 2019-11-01 DIAGNOSIS — M79.89 LEG SWELLING: Primary | ICD-10-CM

## 2019-11-01 NOTE — PROGRESS NOTES
Per Dr. Villarreal: He should not get contrast due to his poor renal functions   NO CONTRAST.   Ok to order ankle and tibia fibula but NO CONTRAST due to poor renal functions   All imaging without contrast    Order placed.     Kym Persaud RN

## 2019-11-04 ENCOUNTER — HOSPITAL ENCOUNTER (OUTPATIENT)
Dept: MRI IMAGING | Facility: CLINIC | Age: 79
Discharge: HOME | End: 2019-11-04
Attending: INTERNAL MEDICINE | Admitting: INTERNAL MEDICINE
Payer: MEDICARE

## 2019-11-04 ENCOUNTER — HOSPITAL ENCOUNTER (OUTPATIENT)
Dept: ULTRASOUND IMAGING | Facility: CLINIC | Age: 79
Discharge: HOME | End: 2019-11-04
Attending: INTERNAL MEDICINE | Admitting: INTERNAL MEDICINE
Payer: MEDICARE

## 2019-11-04 DIAGNOSIS — M79.89 LEG SWELLING: ICD-10-CM

## 2019-11-04 PROCEDURE — 73718 MRI LOWER EXTREMITY W/O DYE: CPT | Mod: LT

## 2019-11-04 PROCEDURE — 93971 EXTREMITY STUDY: CPT | Mod: LT

## 2019-11-04 PROCEDURE — 73721 MRI JNT OF LWR EXTRE W/O DYE: CPT | Mod: LT

## 2019-11-06 ENCOUNTER — TELEPHONE (OUTPATIENT)
Dept: TRANSPLANT | Facility: CLINIC | Age: 79
End: 2019-11-06

## 2019-11-06 DIAGNOSIS — R93.89 ABNORMAL MRI: ICD-10-CM

## 2019-11-06 DIAGNOSIS — Z94.0 KIDNEY TRANSPLANTED: Primary | ICD-10-CM

## 2019-11-07 ENCOUNTER — TELEPHONE (OUTPATIENT)
Dept: TRANSPLANT | Facility: CLINIC | Age: 79
End: 2019-11-07

## 2019-11-07 NOTE — TELEPHONE ENCOUNTER
Patient Call:  Matheus called to inform us the referral to [facility] did not go through. (per Dr. JOSE Villarreal)  [facility] fax# [phone]    Matheus is schedule to see Dr. Chowdary at the [facility] [place] office DR. Chowdary  11/11/2019.    [facility] still needs referral sent to them F# [phone]    Call back needed? No

## 2019-11-07 NOTE — LETTER
PHYSICIAN ORDERS      DATE & TIME ISSUED: 2019 10:27 AM  PATIENT NAME: Matheus White Sr.   : 1940     [facility] MR# [if applicable]: [id number]     DIAGNOSIS:  Kidney transplant, Immunosuppressed status, Abnormal MRI  ICD-10 CODE: Z94.0,  D89.9, R93.89     Please accept this referral for ADULT ORTHOPEDICS  Patient is immunosuppressed, s/p kidney transplant in . Abnormal findings on left lower leg/ankle. Please evaluate and treat.   MRI with the following results:    1. 5 cm elongated hemorrhagic left pretibial fluid collection.  2. Left medial tibial plateau edema. This may represent contusion,  stress reaction, or reactive edema.  3. Peroneus brevis longitudinal split tearing.  4. Nonspecific subcutaneous and muscle edema       Any questions please call: Transplant Office [phone] option # 5  Fax:   [phone].      Josias Villarreal

## 2019-11-08 NOTE — TELEPHONE ENCOUNTER
Call returned to pt. He states he does have appt scheduled for Monday, but was told referral was not received. Will re-send.

## 2019-11-13 ENCOUNTER — TELEPHONE (OUTPATIENT)
Dept: NEPHROLOGY | Facility: CLINIC | Age: 79
End: 2019-11-13

## 2019-11-13 NOTE — TELEPHONE ENCOUNTER
Health Call Center    Phone Message    May a detailed message be left on voicemail: no    Reason for Call: Requesting Results   Name/type of test: Phil orders  Date of test: 11/4/19  Was test done at a location other than [facility] (Please fill in the location if not [facility])?: Yes: [facility]      Action Taken: Message routed to:  [facility] ([facility] Nephrology

## 2019-11-13 NOTE — TELEPHONE ENCOUNTER
Patient is very upset that he is not get called regarding his elevated creatinine levels. Patient would like to hear from the nephrologist or the coordinator regarding his labs.

## 2019-11-13 NOTE — TELEPHONE ENCOUNTER
RNCC reviewed labs, DEB, swelling at length with Matheus White. Cr stable after DEB. He is not on lasix now. He did see ortho this week and they were not concerned about swelling and MRI (per Matheus's report). Plan will be to recheck labs next week and continue to monitor Cr. Matheus reports swelling is decreasing.

## 2019-11-18 ENCOUNTER — TELEPHONE (OUTPATIENT)
Dept: TRANSPLANT | Facility: CLINIC | Age: 79
End: 2019-11-18

## 2019-11-18 ENCOUNTER — HOSPITAL ENCOUNTER (OUTPATIENT)
Dept: LAB | Facility: CLINIC | Age: 79
Discharge: HOME | End: 2019-11-18
Attending: INTERNAL MEDICINE | Admitting: INTERNAL MEDICINE
Payer: MEDICARE

## 2019-11-18 DIAGNOSIS — Z94.0 KIDNEY REPLACED BY TRANSPLANT: Primary | ICD-10-CM

## 2019-11-18 LAB
ANION GAP SERPL CALCULATED.3IONS-SCNC: 7 MMOL/L (ref 3–14)
BUN SERPL-MCNC: 27 MG/DL (ref 7–30)
CALCIUM SERPL-MCNC: 9 MG/DL (ref 8.5–10.1)
CHLORIDE SERPL-SCNC: 108 MMOL/L (ref 94–109)
CO2 SERPL-SCNC: 27 MMOL/L (ref 20–32)
CREAT SERPL-MCNC: 2.26 MG/DL (ref 0.66–1.25)
CYCLOSPORINE BLD LC/MS/MS-MCNC: 73 UG/L (ref 50–400)
ERYTHROCYTE [DISTWIDTH] IN BLOOD BY AUTOMATED COUNT: 15.2 % (ref 10–15)
GFR SERPL CREATININE-BSD FRML MDRD: 26 ML/MIN/{1.73_M2}
GLUCOSE SERPL-MCNC: 93 MG/DL (ref 70–99)
HCT VFR BLD AUTO: 32.1 % (ref 40–53)
HGB BLD-MCNC: 10.2 G/DL (ref 13.3–17.7)
MCH RBC QN AUTO: 30.1 PG (ref 26.5–33)
MCHC RBC AUTO-ENTMCNC: 31.8 G/DL (ref 31.5–36.5)
MCV RBC AUTO: 95 FL (ref 78–100)
PLATELET # BLD AUTO: 194 10E9/L (ref 150–450)
POTASSIUM SERPL-SCNC: 3.7 MMOL/L (ref 3.4–5.3)
RBC # BLD AUTO: 3.39 10E12/L (ref 4.4–5.9)
SODIUM SERPL-SCNC: 142 MMOL/L (ref 133–144)
TME LAST DOSE: NORMAL H
WBC # BLD AUTO: 5.9 10E9/L (ref 4–11)

## 2019-11-18 PROCEDURE — 85027 COMPLETE CBC AUTOMATED: CPT | Performed by: INTERNAL MEDICINE

## 2019-11-18 PROCEDURE — 36415 COLL VENOUS BLD VENIPUNCTURE: CPT | Performed by: INTERNAL MEDICINE

## 2019-11-18 PROCEDURE — 80048 BASIC METABOLIC PNL TOTAL CA: CPT | Performed by: INTERNAL MEDICINE

## 2019-11-18 PROCEDURE — 80158 DRUG ASSAY CYCLOSPORINE: CPT | Performed by: INTERNAL MEDICINE

## 2019-11-18 NOTE — TELEPHONE ENCOUNTER
Phil, MD Thomas Jones Meghan M, RN             Will need to arrange for a biopsy. Patient will need to hold coumadin for a 5 days before bx check INR prior to to scheduling on day 4. IR bx for Friday      Cr 2.2 today. Matheus White Sr would like to repeat labs again next week before proceeding with biopsy. Will update Dr. Villarreal.

## 2019-11-25 ENCOUNTER — TELEPHONE (OUTPATIENT)
Dept: TRANSPLANT | Facility: CLINIC | Age: 79
End: 2019-11-25

## 2019-11-25 ENCOUNTER — HOSPITAL ENCOUNTER (OUTPATIENT)
Dept: LAB | Facility: CLINIC | Age: 79
Discharge: HOME | End: 2019-11-25
Attending: INTERNAL MEDICINE | Admitting: INTERNAL MEDICINE
Payer: MEDICARE

## 2019-11-25 DIAGNOSIS — Z94.0 KIDNEY REPLACED BY TRANSPLANT: ICD-10-CM

## 2019-11-25 LAB
ANION GAP SERPL CALCULATED.3IONS-SCNC: 7 MMOL/L (ref 3–14)
BUN SERPL-MCNC: 33 MG/DL (ref 7–30)
CALCIUM SERPL-MCNC: 8.6 MG/DL (ref 8.5–10.1)
CHLORIDE SERPL-SCNC: 108 MMOL/L (ref 94–109)
CO2 SERPL-SCNC: 26 MMOL/L (ref 20–32)
CREAT SERPL-MCNC: 2.56 MG/DL (ref 0.66–1.25)
CYCLOSPORINE BLD LC/MS/MS-MCNC: 73 UG/L (ref 50–400)
ERYTHROCYTE [DISTWIDTH] IN BLOOD BY AUTOMATED COUNT: 15.2 % (ref 10–15)
GFR SERPL CREATININE-BSD FRML MDRD: 23 ML/MIN/{1.73_M2}
GLUCOSE SERPL-MCNC: 92 MG/DL (ref 70–99)
HCT VFR BLD AUTO: 30 % (ref 40–53)
HGB BLD-MCNC: 9.3 G/DL (ref 13.3–17.7)
MCH RBC QN AUTO: 29.2 PG (ref 26.5–33)
MCHC RBC AUTO-ENTMCNC: 31 G/DL (ref 31.5–36.5)
MCV RBC AUTO: 94 FL (ref 78–100)
PLATELET # BLD AUTO: 205 10E9/L (ref 150–450)
POTASSIUM SERPL-SCNC: 3.3 MMOL/L (ref 3.4–5.3)
RBC # BLD AUTO: 3.18 10E12/L (ref 4.4–5.9)
SODIUM SERPL-SCNC: 141 MMOL/L (ref 133–144)
TME LAST DOSE: NORMAL H
WBC # BLD AUTO: 5.6 10E9/L (ref 4–11)

## 2019-11-25 PROCEDURE — 80158 DRUG ASSAY CYCLOSPORINE: CPT | Performed by: INTERNAL MEDICINE

## 2019-11-25 PROCEDURE — 36415 COLL VENOUS BLD VENIPUNCTURE: CPT | Performed by: INTERNAL MEDICINE

## 2019-11-25 PROCEDURE — 80048 BASIC METABOLIC PNL TOTAL CA: CPT | Performed by: INTERNAL MEDICINE

## 2019-11-25 PROCEDURE — 85027 COMPLETE CBC AUTOMATED: CPT | Performed by: INTERNAL MEDICINE

## 2019-11-29 NOTE — TELEPHONE ENCOUNTER
Call placed to patient. Patient v\u to continue all medications as ordered and repeat labs on Monday.

## 2019-11-29 NOTE — TELEPHONE ENCOUNTER
Per Dr. Villarreal, no changes with meds or diuretics for now.  Please repeat labs again on Monday.    LPN task:    Please call Matheus White to review recommendation above.

## 2019-12-02 ENCOUNTER — HOSPITAL ENCOUNTER (OUTPATIENT)
Dept: LAB | Facility: CLINIC | Age: 79
Discharge: HOME | End: 2019-12-02
Attending: INTERNAL MEDICINE | Admitting: INTERNAL MEDICINE
Payer: MEDICARE

## 2019-12-02 ENCOUNTER — TELEPHONE (OUTPATIENT)
Dept: TRANSPLANT | Facility: CLINIC | Age: 79
End: 2019-12-02

## 2019-12-02 DIAGNOSIS — Z94.0 KIDNEY TRANSPLANTED: Primary | ICD-10-CM

## 2019-12-02 DIAGNOSIS — Z94.0 KIDNEY REPLACED BY TRANSPLANT: ICD-10-CM

## 2019-12-02 DIAGNOSIS — Z79.01 ANTICOAGULATED: ICD-10-CM

## 2019-12-02 LAB
ANION GAP SERPL CALCULATED.3IONS-SCNC: 7 MMOL/L (ref 3–14)
BUN SERPL-MCNC: 28 MG/DL (ref 7–30)
CALCIUM SERPL-MCNC: 9 MG/DL (ref 8.5–10.1)
CHLORIDE SERPL-SCNC: 108 MMOL/L (ref 94–109)
CO2 SERPL-SCNC: 25 MMOL/L (ref 20–32)
CREAT SERPL-MCNC: 2.22 MG/DL (ref 0.66–1.25)
CYCLOSPORINE BLD LC/MS/MS-MCNC: 78 UG/L (ref 50–400)
ERYTHROCYTE [DISTWIDTH] IN BLOOD BY AUTOMATED COUNT: 15.3 % (ref 10–15)
GFR SERPL CREATININE-BSD FRML MDRD: 27 ML/MIN/{1.73_M2}
GLUCOSE SERPL-MCNC: 87 MG/DL (ref 70–99)
HCT VFR BLD AUTO: 31.5 % (ref 40–53)
HGB BLD-MCNC: 9.8 G/DL (ref 13.3–17.7)
MCH RBC QN AUTO: 29.4 PG (ref 26.5–33)
MCHC RBC AUTO-ENTMCNC: 31.1 G/DL (ref 31.5–36.5)
MCV RBC AUTO: 95 FL (ref 78–100)
PLATELET # BLD AUTO: 234 10E9/L (ref 150–450)
POTASSIUM SERPL-SCNC: 3.3 MMOL/L (ref 3.4–5.3)
RBC # BLD AUTO: 3.33 10E12/L (ref 4.4–5.9)
SODIUM SERPL-SCNC: 140 MMOL/L (ref 133–144)
TME LAST DOSE: NORMAL H
WBC # BLD AUTO: 6 10E9/L (ref 4–11)

## 2019-12-02 PROCEDURE — 80158 DRUG ASSAY CYCLOSPORINE: CPT | Performed by: INTERNAL MEDICINE

## 2019-12-02 PROCEDURE — 36415 COLL VENOUS BLD VENIPUNCTURE: CPT | Performed by: INTERNAL MEDICINE

## 2019-12-02 PROCEDURE — 80048 BASIC METABOLIC PNL TOTAL CA: CPT | Performed by: INTERNAL MEDICINE

## 2019-12-02 PROCEDURE — 85027 COMPLETE CBC AUTOMATED: CPT | Performed by: INTERNAL MEDICINE

## 2019-12-02 NOTE — TELEPHONE ENCOUNTER
Issue:  Cr 2.2  K 3.3    Plan:  Call placed to check in, left detailed VM. Asked if he's using lasix? How is swelling?  RNCC recommended increasing K in diet.    Addendum on 12/4:  Reviewed with Dr. Villarreal. Matheus will be in clinic next week, will schedule biopsy for Friday after clinic visit.

## 2019-12-04 NOTE — TELEPHONE ENCOUNTER
Transplant Coordinator Renal Biopsy Communication    Call placed to Matheus White Sr. to discuss indication for kidney transplant biopsy per Dr. Villarreal.     Indication for transplant renal biopsy: elevated cr    Laterality: right  Date of biopsy: 12/13    Patient location within 70 miles of [facility]: Yes.  If no, must stay overnight locally.     Matheus White Sr.'s medication list was reviewed.   Anticoagulant: Coumadin   Ibuprofen: No.  Fish Oil:  No.  Medications held: Matheus will hold coumadin on 12/8.    Recent blood pressure readings are WNL or not applicable. Instructed to take medication, especially blood pressure medications, before [facility] at 0600 day of procedure.     Procedure expectations and duration of stay discussed. Expressed pt can expect a phone call from LPN/MA to confirm biopsy date/time/location/directions/review of medications.   Patient verbalized understanding they will need to arrive by 6 a.m. on 12/13 and plan to stay 4-5 hours post biopsy for monitoring. Pt has no additional questions at this time. Transplant Office phone number given to pt for future questions.      Paris Leon RN  Kidney/Pancreas Transplant Coordinator  [phone] option [phone]

## 2019-12-05 NOTE — TELEPHONE ENCOUNTER
Please connect with the pt when available regarding questions regarding his biopsy appointment when available

## 2019-12-06 ENCOUNTER — HOSPITAL ENCOUNTER (OUTPATIENT)
Facility: AMBULATORY SURGERY CENTER | Age: 79
End: 2019-12-06
Attending: INTERNAL MEDICINE
Payer: MEDICARE

## 2019-12-06 DIAGNOSIS — Z94.0 KIDNEY TRANSPLANTED: Primary | ICD-10-CM

## 2019-12-06 NOTE — TELEPHONE ENCOUNTER
Instructed patient to keep clinic appointment 12/12 for evaluation and labs, potential things will have improved and biopsy can be cancelled on 12/13. Lab orders placed for 12/12/19.

## 2019-12-06 NOTE — TELEPHONE ENCOUNTER
LPN/MA  Renal Biopsy Communication    Call to pt to confirm renal biopsy procedure. Biopsy orders entered and patient aware of date 12/13/19, in [facility] and to arrive at 6:00AM.     No need to be NPO.      Discussed anticoagulants (i.e. fish oil, ASA, Plavix, Coumadin, Ibuprofen). Pt confirms use of anticoagulants. Patient will hold coumadin starting 12/8/19    Take all medicine before arrival for biopsy and bring all medicine bottles with.      Report to 1st floor lab, then 5th floor for biopsy.      Use [employer] and bring form of entertainment.     Discussed with patient need to stay overnight locally evening of procedure if live more than 70 miles away.    Patient verbalized understanding they will need to stay 4-5 hours post biopsy for monitoring.     Call placed to scheduling at [phone] to schedule and confirm biopsy date/time    Lab appointment scheduled for morning of biopsy.

## 2019-12-09 ENCOUNTER — TELEPHONE (OUTPATIENT)
Dept: TRANSPLANT | Facility: CLINIC | Age: 79
End: 2019-12-09

## 2019-12-09 ENCOUNTER — HOSPITAL ENCOUNTER (OUTPATIENT)
Dept: LAB | Facility: CLINIC | Age: 79
Discharge: HOME | End: 2019-12-09
Attending: INTERNAL MEDICINE | Admitting: INTERNAL MEDICINE
Payer: MEDICARE

## 2019-12-09 DIAGNOSIS — Z94.0 KIDNEY REPLACED BY TRANSPLANT: ICD-10-CM

## 2019-12-09 DIAGNOSIS — Z79.01 ANTICOAGULATED: ICD-10-CM

## 2019-12-09 DIAGNOSIS — Z94.0 KIDNEY TRANSPLANTED: ICD-10-CM

## 2019-12-09 LAB
ANION GAP SERPL CALCULATED.3IONS-SCNC: 7 MMOL/L (ref 3–14)
BUN SERPL-MCNC: 27 MG/DL (ref 7–30)
CALCIUM SERPL-MCNC: 8.9 MG/DL (ref 8.5–10.1)
CHLORIDE SERPL-SCNC: 108 MMOL/L (ref 94–109)
CO2 SERPL-SCNC: 27 MMOL/L (ref 20–32)
CREAT SERPL-MCNC: 2.28 MG/DL (ref 0.66–1.25)
CREAT UR-MCNC: 74 MG/DL
CYCLOSPORINE BLD LC/MS/MS-MCNC: 95 UG/L (ref 50–400)
ERYTHROCYTE [DISTWIDTH] IN BLOOD BY AUTOMATED COUNT: 15 % (ref 10–15)
GFR SERPL CREATININE-BSD FRML MDRD: 26 ML/MIN/{1.73_M2}
GLUCOSE SERPL-MCNC: 94 MG/DL (ref 70–99)
HCT VFR BLD AUTO: 31.1 % (ref 40–53)
HGB BLD-MCNC: 10.1 G/DL (ref 13.3–17.7)
INR PPP: 1.47 (ref 0.86–1.14)
MCH RBC QN AUTO: 30 PG (ref 26.5–33)
MCHC RBC AUTO-ENTMCNC: 32.5 G/DL (ref 31.5–36.5)
MCV RBC AUTO: 92 FL (ref 78–100)
PLATELET # BLD AUTO: 209 10E9/L (ref 150–450)
POTASSIUM SERPL-SCNC: 3.2 MMOL/L (ref 3.4–5.3)
PROT UR-MCNC: 0.7 G/L
PROT/CREAT 24H UR: 0.95 G/G CR (ref 0–0.2)
RBC # BLD AUTO: 3.37 10E12/L (ref 4.4–5.9)
SODIUM SERPL-SCNC: 142 MMOL/L (ref 133–144)
TME LAST DOSE: NORMAL H
WBC # BLD AUTO: 5.4 10E9/L (ref 4–11)

## 2019-12-09 PROCEDURE — 80158 DRUG ASSAY CYCLOSPORINE: CPT | Performed by: INTERNAL MEDICINE

## 2019-12-09 PROCEDURE — 84156 ASSAY OF PROTEIN URINE: CPT | Performed by: INTERNAL MEDICINE

## 2019-12-09 PROCEDURE — 80048 BASIC METABOLIC PNL TOTAL CA: CPT | Performed by: INTERNAL MEDICINE

## 2019-12-09 PROCEDURE — 85610 PROTHROMBIN TIME: CPT | Performed by: INTERNAL MEDICINE

## 2019-12-09 PROCEDURE — 36415 COLL VENOUS BLD VENIPUNCTURE: CPT | Performed by: INTERNAL MEDICINE

## 2019-12-09 PROCEDURE — 85027 COMPLETE CBC AUTOMATED: CPT | Performed by: INTERNAL MEDICINE

## 2019-12-09 NOTE — TELEPHONE ENCOUNTER
Issue:    K 3.2    Plan/LPN task:    Please call Matheus White Sr., instruct him to increase potassium in diet.

## 2019-12-12 ENCOUNTER — ANCILLARY PROCEDURE (OUTPATIENT)
Dept: CARDIOLOGY | Facility: CLINIC | Age: 79
End: 2019-12-12
Attending: INTERNAL MEDICINE
Payer: MEDICARE

## 2019-12-12 ENCOUNTER — OFFICE VISIT (OUTPATIENT)
Dept: NEPHROLOGY | Facility: CLINIC | Age: 79
End: 2019-12-12
Attending: INTERNAL MEDICINE
Payer: MEDICARE

## 2019-12-12 VITALS
BODY MASS INDEX: 26.05 KG/M2 | OXYGEN SATURATION: 95 % | DIASTOLIC BLOOD PRESSURE: 84 MMHG | WEIGHT: 186.7 LBS | HEART RATE: 69 BPM | TEMPERATURE: 98.2 F | SYSTOLIC BLOOD PRESSURE: 158 MMHG

## 2019-12-12 DIAGNOSIS — I50.810 RIGHT-SIDED HEART FAILURE, UNSPECIFIED HF CHRONICITY (H): ICD-10-CM

## 2019-12-12 DIAGNOSIS — I15.1 HYPERTENSION SECONDARY TO OTHER RENAL DISORDERS: ICD-10-CM

## 2019-12-12 DIAGNOSIS — I50.810 RIGHT-SIDED HEART FAILURE, UNSPECIFIED HF CHRONICITY (H): Primary | ICD-10-CM

## 2019-12-12 PROCEDURE — G0463 HOSPITAL OUTPT CLINIC VISIT: HCPCS | Mod: ZF

## 2019-12-12 RX ORDER — HYDRALAZINE HYDROCHLORIDE 25 MG/1
25 TABLET, FILM COATED ORAL 2 TIMES DAILY
Qty: 60 TABLET | Refills: 3 | Status: SHIPPED | OUTPATIENT
Start: 2019-12-12 | End: 2019-12-23

## 2019-12-12 ASSESSMENT — PAIN SCALES - GENERAL: PAINLEVEL: NO PAIN (0)

## 2019-12-12 NOTE — LETTER
12/12/2019      RE: Matheus Agudelo Cindy Sr.  [id number] [facility] [address]       CHRONIC TRANSPLANT NEPHROLOGY VISIT    Assessment & Plan   # DDKT: Increased   - Baseline Cr ~ 1.4-1.6 now with DEB and slow to decline DEB    - Proteinuria: some on UA    - Date DSA Last Checked: Not Known      Latest DSA: Not checked recently due to time from transplant   - BK Viremia: Not checked recently   - Kidney Tx Biopsy: No    # Immunosuppression: Cyclosporine (goal 50-75) and Mycophenolate mofetil (goal not followed)   - Changes: No    # Infection Prophylaxis:   - PJP: Sulfa/TMP (Bactrim)    # Hypertension: Borderline control;  Goal BP: < 130/80   - Changes: will add torsemide to help manage the fluid overload and hydralazine     # 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: Hgb: Stable      AISHWARYA: No   - Iron studies: Not checked recently    # Mineral Bone Disorder:   - Secondary renal hyperparathyroidism; PTH level: Not checked recently  - Vitamin D; level: Not checked recently        On Supplement: No  - Calcium; level: Normal        On Supplement: No  - Phosphorus; level: Not checked recently        On Supplement: No    # Electrolytes:   - Potassium; level: Low        On Supplement: Yes  - Magnesium; level: Normal        On Supplement: No  - Bicarbonate; level: Normal        On Supplement: No  - Sodium; level: Normal        On Supplement: No    # Left leg cellulitis/hematoma: improved completed several abx course and was seen by the orthopedics. Still swollen but no cellulitic     # Possible Cardiorenal: will diurese slowly and will hold of on biopsy unless cr improves with diuresis.   # Skin Cancer Risk:    - Discussed sun protection and recommend regular follow up with Dermatology.    # Medical Compliance: Yes    # Transplant History:  Etiology of Kidney Failure: Unknown etiology  Tx: DDKT  Transplant: 3/5/2009 (Kidney)  Donor Type: Donation after Brain Death Donor Class: Expanded Criteria Donor  Significant changes in  immunosuppression: None  Significant transplant-related complications: None    Transplant Office Phone Number: [phone]    Assessment and plan was discussed with the patient and he voiced his understanding and agreement.    Return visit: No follow-ups on file.    Chief Complaint   Mr. White is a 79 year old here for routine follow up.    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 He has not previously been seen in [facility] Nephrology clinic. He was last seen on 8/2/19 by Dr. Parkinson at [facility]; please see that note for further details.     He is here for follow up and discuss obtaining biopsy. We also reviewed his weight trends and his previous echo. We discussed potential etiologies including cardiorenal specially with his reduced RV function. We decided on diuresing him first and repeat his labs next.     His blood pressure his been elevated and we discussed changes in regimens to help improve the BP control.      Recent Hospitalizations:  [x] No [] Yes    New Medical Issues: [x] No [] Yes    Decreased energy: [x] No [] Yes    Chest pain or SOB with exertion:  [x] No [] Yes    Appetite change or weight change: [x] No [] Yes    Nausea, vomiting or diarrhea:  [x] No [] Yes    Fever, sweats or chills: [x] No [] Yes    Leg swelling: [] No [x] Yes      Home BP: controlled     Review of Systems   A comprehensive review of systems was obtained and negative, except as noted in the HPI or PMH.    Problem List   Patient Active Problem List   Diagnosis     Acute kidney injury superimposed on chronic kidney disease (H)     Anticoagulation monitoring, INR range 2-3     History of renal transplant     HTN (hypertension)     Permanent atrial fibrillation     Cellulitis of left lower extremity       Social History   Social History     Tobacco Use     Smoking status: Never Smoker     Smokeless  tobacco: Never Used   Substance Use Topics     Alcohol use: Yes     Frequency: Monthly or less     Drinks per session: 1 or 2     Drug use: None       Allergies   No Known Allergies    Medications   Current Outpatient Medications   Medication Sig     cycloSPORINE modified (GENERIC EQUIVALENT) 25 MG capsule Take 50 mg by mouth 2 times daily     diltiazem ER COATED BEADS (CARDIZEM CD/CARTIA XT) 180 MG 24 hr capsule Take 180 mg by mouth daily     gabapentin (NEURONTIN) 300 MG capsule Take 300 mg by mouth daily     hydrALAZINE (APRESOLINE) 25 MG tablet Take 1 tablet (25 mg) by mouth 2 times daily     mycophenolate (GENERIC EQUIVALENT) 500 MG tablet Take 500 mg by mouth 2 times daily     order for DME Equipment being ordered: Cane ()  Treatment Diagnosis: Gait Instability     potassium citrate 15 MEQ (1620 MG) TBCR Take 1 tablet by mouth 2 times daily     torsemide (DEMADEX) 20 MG tablet Take 2 tablets (40 mg) by mouth every morning. May also take 1 tablet (20 mg) every evening as needed (if not on track for net negative 2 Lb/Day).     warfarin ANTICOAGULANT (COUMADIN) 3 MG tablet Take 3 mg by mouth daily     No current facility-administered medications for this visit.      Medications Discontinued During This Encounter   Medication Reason     mupirocin (BACTROBAN) 2 % external cream      mupirocin (BACTROBAN) 2 % external ointment        Physical Exam   Vital Signs: BP (!) 158/84 (BP Location: Right arm, Cuff Size: Adult Large)   Pulse 69   Temp 98.2  F (36.8  C)   Wt 84.7 kg (186 lb 11.2 oz)   SpO2 95%   BMI 26.05 kg/m       GENERAL APPEARANCE: alert and no distress  HENT: mouth without ulcers or lesions  LYMPHATICS: no cervical or supraclavicular nodes  RESP: lungs clear to auscultation - no rales, rhonchi or wheezes  CV: regular rhythm, normal rate, no rub, no murmur  EDEMA: +1-2 LE edema bilaterally  ABDOMEN: soft, nondistended, nontender, bowel sounds normal  MS: extremities normal - no gross deformities  noted, no evidence of inflammation in joints, no muscle tenderness  SKIN: no rash      Data     Renal Latest Ref Rng & Units 12/20/2019 12/17/2019 12/9/2019   Na 133 - 144 mmol/L 142 142 142   Na (external) 135 - 145 mmol/L - - -   K 3.4 - 5.3 mmol/L 3.5 3.3(L) 3.2(L)   K (external) 3.5 - 5.0 mmol/L - - -   Cl 94 - 109 mmol/L 106 104 108   Cl (external) 98 - 110 mmol/L - - -   CO2 20 - 32 mmol/L 28 29 27   CO2 (external) 21 - 31 mmol/L - - -   BUN 7 - 30 mg/dL 41(H) 31(H) 27   BUN (external) 8 - 25 mg/dL - - -   Cr 0.66 - 1.25 mg/dL 3.30(H) 2.68(H) 2.28(H)   Cr (external) 0.72 - 1.25 mg/dL - - -   Glucose 70 - 99 mg/dL 105(H) 95 94   Glucose (external) 65 - 100 mg/dL - - -   Ca  8.5 - 10.1 mg/dL 8.7 9.1 8.9   Ca (external) 8.5 - 10.5 mg/dL - - -   Mg 1.6 - 2.3 mg/dL - - -   Mg (external) 1.9 - 2.7 mg/dL - - -     Bone Health Latest Ref Rng & Units 2/9/2018 7/17/2009 7/7/2009   Phos 2.5 - 4.5 mg/dL - 2.2(L) 2.0(L)   Phos (external) 2.5 - 5.0 mg/dL 2.2(L) - -   PTHi 12 - 72 pg/mL - - -   Vit D Def (external) >=29 ng/mL 55 - -     Heme Latest Ref Rng & Units 12/20/2019 12/17/2019 12/9/2019   WBC 4.0 - 11.0 10e9/L 5.6 5.2 5.4   WBC (external) 4.5 - 11.0 thou/cu mm - - -   Hgb 13.3 - 17.7 g/dL 9.5(L) 9.5(L) 10.1(L)   Hgb (external) 13.5 - 17.5 g/dL - - -   Plt 150 - 450 10e9/L 220 202 209   Plt (external) 140 - 440 thou/cu mm - - -     Liver Latest Ref Rng & Units 10/11/2019 2/9/2018 3/5/2009   AP 40 - 150 U/L 230(H) - 87   AP (external) 34 - 104 U/L - 118(H) -   TBili 0.2 - 1.3 mg/dL 1.8(H) - 0.4   TBili (external) 0.3 - 1.0 mg/dL - 1.6(H) -   ALT 0 - 70 U/L 21 - 8   ALT (external) 7 - 52 U/L - 12 -   AST 0 - 45 U/L 16 - 29   AST (external) 13 - 39 U/L - 25 -   Tot Protein 6.8 - 8.8 g/dL 7.0 - 6.8   Tot Protein (external) 6.4 - 8.9 g/dL - 7.3 -   Albumin 3.4 - 5.0 g/dL 3.3(L) - 3.9   Albumin (external) 3.5 - 5.7 gm/dL - 4.4 -     Pancreas Latest Ref Rng & Units 7/28/2008   Lipase 20 - 250 U/L 56     Iron studies Latest  Ref Rng & Units 4/9/2009 3/11/2009   Iron 35 - 180 ug/dL 94 47       [facility]ney/Pancreas 3

## 2019-12-12 NOTE — NURSING NOTE
"Chief Complaint   Patient presents with     RECHECK     Kidney TX and biopsy in a.m.     Vital signs:  Temp: 98.2  F (36.8  C)   BP: (!) 158/84 Pulse: 69     SpO2: 95 %       Weight: 84.7 kg (186 lb 11.2 oz)  Estimated body mass index is 26.05 kg/m  as calculated from the following:    Height as of 10/21/19: 1.803 m (5' 10.98\").    Weight as of this encounter: 84.7 kg (186 lb 11.2 oz).      Ricarda Simon, [facility]    "

## 2019-12-13 RX ORDER — TORSEMIDE 20 MG/1
TABLET ORAL
Qty: 60 TABLET | Refills: 0 | Status: SHIPPED | OUTPATIENT
Start: 2019-12-13 | End: 2019-12-23

## 2019-12-15 ENCOUNTER — NURSE TRIAGE (OUTPATIENT)
Dept: NURSING | Facility: CLINIC | Age: 79
End: 2019-12-15

## 2019-12-15 NOTE — TELEPHONE ENCOUNTER
torsemide (DEMADEX) 20 MG tablet 60 tablet 0 12/13/2019 1/12/2020 --   Sig - Route: Take 2 tablets (40 mg) by mouth every morning. May also take 1 tablet (20 mg) every evening as needed (if not on track for net negative 2 Lb/Day). - Oral   Class: Local Print   Order: [id number]     Josias Villarreal MD  I called [facility] at [phone] and left a voice message to call back with questions on the prescription.  Joelle Medeiros RN-[facility]

## 2019-12-16 ENCOUNTER — TELEPHONE (OUTPATIENT)
Dept: TRANSPLANT | Facility: CLINIC | Age: 79
End: 2019-12-16

## 2019-12-16 DIAGNOSIS — I48.91 A-FIB (H): ICD-10-CM

## 2019-12-16 DIAGNOSIS — Z94.0 KIDNEY TRANSPLANTED: Primary | ICD-10-CM

## 2019-12-16 DIAGNOSIS — R79.89 ELEVATED SERUM CREATININE: ICD-10-CM

## 2019-12-16 DIAGNOSIS — R35.89 DIURESIS: ICD-10-CM

## 2019-12-16 NOTE — TELEPHONE ENCOUNTER
Issue:    Kidney tx biopsy cancelled last week.    Plan per Dr. Villarreal:  Needs labs this week due to increase in diuretics. Needs cardiology referral.  Matheus White Sr v/u of plan. He will repeat labs tomorrow. Referral placed, will have referral team contact Ronel

## 2019-12-17 ENCOUNTER — HOSPITAL ENCOUNTER (OUTPATIENT)
Dept: LAB | Facility: CLINIC | Age: 79
Discharge: HOME | End: 2019-12-17
Attending: INTERNAL MEDICINE | Admitting: INTERNAL MEDICINE
Payer: MEDICARE

## 2019-12-17 DIAGNOSIS — Z94.0 KIDNEY TRANSPLANTED: ICD-10-CM

## 2019-12-17 DIAGNOSIS — R35.89 DIURESIS: ICD-10-CM

## 2019-12-17 LAB
ANION GAP SERPL CALCULATED.3IONS-SCNC: 9 MMOL/L (ref 3–14)
BUN SERPL-MCNC: 31 MG/DL (ref 7–30)
CALCIUM SERPL-MCNC: 9.1 MG/DL (ref 8.5–10.1)
CHLORIDE SERPL-SCNC: 104 MMOL/L (ref 94–109)
CO2 SERPL-SCNC: 29 MMOL/L (ref 20–32)
CREAT SERPL-MCNC: 2.68 MG/DL (ref 0.66–1.25)
CYCLOSPORINE BLD LC/MS/MS-MCNC: 90 UG/L (ref 50–400)
ERYTHROCYTE [DISTWIDTH] IN BLOOD BY AUTOMATED COUNT: 14.6 % (ref 10–15)
GFR SERPL CREATININE-BSD FRML MDRD: 21 ML/MIN/{1.73_M2}
GLUCOSE SERPL-MCNC: 95 MG/DL (ref 70–99)
HCT VFR BLD AUTO: 30.5 % (ref 40–53)
HGB BLD-MCNC: 9.5 G/DL (ref 13.3–17.7)
MCH RBC QN AUTO: 29.4 PG (ref 26.5–33)
MCHC RBC AUTO-ENTMCNC: 31.1 G/DL (ref 31.5–36.5)
MCV RBC AUTO: 94 FL (ref 78–100)
PLATELET # BLD AUTO: 202 10E9/L (ref 150–450)
POTASSIUM SERPL-SCNC: 3.3 MMOL/L (ref 3.4–5.3)
RBC # BLD AUTO: 3.23 10E12/L (ref 4.4–5.9)
SODIUM SERPL-SCNC: 142 MMOL/L (ref 133–144)
TME LAST DOSE: NORMAL H
WBC # BLD AUTO: 5.2 10E9/L (ref 4–11)

## 2019-12-17 PROCEDURE — 80158 DRUG ASSAY CYCLOSPORINE: CPT | Performed by: INTERNAL MEDICINE

## 2019-12-17 PROCEDURE — 36415 COLL VENOUS BLD VENIPUNCTURE: CPT | Performed by: INTERNAL MEDICINE

## 2019-12-17 PROCEDURE — 80048 BASIC METABOLIC PNL TOTAL CA: CPT | Performed by: INTERNAL MEDICINE

## 2019-12-17 PROCEDURE — 85027 COMPLETE CBC AUTOMATED: CPT | Performed by: INTERNAL MEDICINE

## 2019-12-18 NOTE — TELEPHONE ENCOUNTER
RECORDS RECEIVED FROM: Internal/Care Everywhere   DATE RECEIVED: 1-13-20   NOTES STATUS DETAILS   OFFICE NOTE from referring provider    Internal SOT   OFFICE NOTE from other cardiologist    Care Everywhere 8-21-19   DISCHARGE SUMMARY from hospital    N/A    DISCHARGE REPORT from the ER   N/A    OPERATIVE REPORT    N/A    MEDICATION LIST   Internal    LABS     BMP   Internal 12-17-19   CBC   Internal 12-17-19   CMP   Internal 10-11-19   Lipids   Care Everywhere 8-21-19   TSH   N/A    DIAGNOSTIC PROCEDURES     EKG   Internal 10-11-19   Monitor Reports   N/A    IMAGING (DISC & REPORT)      Echo   Internal 12-12-19   Stress Tests   Care Everywhere 7-24-19   Cath   N/A    MRI/MRA   N/A    CT/CTA   N/A

## 2019-12-19 ENCOUNTER — DOCUMENTATION ONLY (OUTPATIENT)
Dept: TRANSPLANT | Facility: CLINIC | Age: 79
End: 2019-12-19

## 2019-12-19 NOTE — PROGRESS NOTES
Per Dr. Villarreal, coumadin was held for biopsy. It was cancelled.  Dr. Villarreal okay to add tomorrow he is okay to do patient #3.   Otherwise will need to schedule with IR.  Message sent to Paris Ramos.

## 2019-12-19 NOTE — TELEPHONE ENCOUNTER
Per Dr. Villarreal's request, attempted to call Matheus White Sr x 5 this afternoon/evening. Unable to reach him to add him to tomorrow's biopsy schedule. Also attempted to contact Matheus's son, phone number not valid.     Writer is unclear if Matheus White Sr has restarted coumadin.

## 2019-12-20 ENCOUNTER — TELEPHONE (OUTPATIENT)
Dept: TRANSPLANT | Facility: CLINIC | Age: 79
End: 2019-12-20

## 2019-12-20 ENCOUNTER — HOSPITAL ENCOUNTER (OUTPATIENT)
Dept: LAB | Facility: CLINIC | Age: 79
Discharge: HOME | End: 2019-12-20
Attending: INTERNAL MEDICINE | Admitting: INTERNAL MEDICINE
Payer: MEDICARE

## 2019-12-20 DIAGNOSIS — Z79.899 OTHER LONG TERM (CURRENT) DRUG THERAPY: ICD-10-CM

## 2019-12-20 DIAGNOSIS — Z94.0 KIDNEY REPLACED BY TRANSPLANT: Primary | ICD-10-CM

## 2019-12-20 DIAGNOSIS — I48.91 A-FIB (H): ICD-10-CM

## 2019-12-20 DIAGNOSIS — R35.89 DIURESIS: ICD-10-CM

## 2019-12-20 DIAGNOSIS — R79.89 ELEVATED SERUM CREATININE: ICD-10-CM

## 2019-12-20 DIAGNOSIS — Z94.0 KIDNEY TRANSPLANTED: Primary | ICD-10-CM

## 2019-12-20 DIAGNOSIS — Z94.0 KIDNEY TRANSPLANTED: ICD-10-CM

## 2019-12-20 LAB
ANION GAP SERPL CALCULATED.3IONS-SCNC: 8 MMOL/L (ref 3–14)
BUN SERPL-MCNC: 41 MG/DL (ref 7–30)
CALCIUM SERPL-MCNC: 8.7 MG/DL (ref 8.5–10.1)
CHLORIDE SERPL-SCNC: 106 MMOL/L (ref 94–109)
CO2 SERPL-SCNC: 28 MMOL/L (ref 20–32)
CREAT SERPL-MCNC: 3.3 MG/DL (ref 0.66–1.25)
ERYTHROCYTE [DISTWIDTH] IN BLOOD BY AUTOMATED COUNT: 15 % (ref 10–15)
GFR SERPL CREATININE-BSD FRML MDRD: 17 ML/MIN/{1.73_M2}
GLUCOSE SERPL-MCNC: 105 MG/DL (ref 70–99)
HCT VFR BLD AUTO: 30.4 % (ref 40–53)
HGB BLD-MCNC: 9.5 G/DL (ref 13.3–17.7)
INR PPP: 1.27 (ref 0.86–1.14)
MCH RBC QN AUTO: 30.1 PG (ref 26.5–33)
MCHC RBC AUTO-ENTMCNC: 31.3 G/DL (ref 31.5–36.5)
MCV RBC AUTO: 96 FL (ref 78–100)
PLATELET # BLD AUTO: 220 10E9/L (ref 150–450)
POTASSIUM SERPL-SCNC: 3.5 MMOL/L (ref 3.4–5.3)
RBC # BLD AUTO: 3.16 10E12/L (ref 4.4–5.9)
SODIUM SERPL-SCNC: 142 MMOL/L (ref 133–144)
WBC # BLD AUTO: 5.6 10E9/L (ref 4–11)

## 2019-12-20 PROCEDURE — 80048 BASIC METABOLIC PNL TOTAL CA: CPT | Performed by: INTERNAL MEDICINE

## 2019-12-20 PROCEDURE — 36415 COLL VENOUS BLD VENIPUNCTURE: CPT | Performed by: INTERNAL MEDICINE

## 2019-12-20 PROCEDURE — 85610 PROTHROMBIN TIME: CPT | Performed by: INTERNAL MEDICINE

## 2019-12-20 PROCEDURE — 85027 COMPLETE CBC AUTOMATED: CPT | Performed by: INTERNAL MEDICINE

## 2019-12-20 NOTE — TELEPHONE ENCOUNTER
Transplant Coordinator Renal Biopsy Communication    Call placed to Matheus White Sr. to discuss indication for kidney transplant biopsy per Dr. Villarreal.     Indication for transplant renal biopsy: elevated creatinine    Laterality: right  Date of biopsy: 12/23/19    Patient location within 70 miles of [facility]: Yes.  If no, must stay overnight locally. Pt verbalizes staying at home.     Matheus White Sr.'s medication list was reviewed.   Anticoagulant: Coumadin   Ibuprofen: No.  Fish Oil:  No.  Medications held: Coumadin held starting 12/20. INR on 12/20 1.27, per Dr Villarreal ok to schedule pt for biopsy on Monday 12/23    Recent blood pressure readings are WNL or not applicable. Instructed to take medication, especially blood pressure medications, before [facility] at 0600 day of procedure.     Procedure expectations and duration of stay discussed. Expressed pt can expect a phone call from LPN/MA to confirm biopsy date/time/location/directions/review of medications.   Patient verbalized understanding they will need to arrive by 6 a.m. on 12/23 and plan to stay 4-5 hours post biopsy for monitoring. Pt has no additional questions at this time. Transplant Office phone number given to pt for future questions.      Kyara Lemus RN  Kidney/Pancreas Transplant Coordinator  [phone] option 5

## 2019-12-20 NOTE — TELEPHONE ENCOUNTER
Josias Villarreal MD Lavelle Peterson, Meghan M, GALLITO; Kyara Lemus, RN             Spoke with Matheus. He resumed his coumadin   He is over diuresed. We reduced his diuretics. He will check labs tomorrow.   If Cr is above 2.8 will need to schedule a biopsy next week.   He will need to be off coumadin 4-5 days   He needs an INR tomorrow. He will hold coumadin tomorrow until he hears from you about when the biopsy, if needed, will be. If you cant schedule it until after fiona resume the coumadin and hold 4-5 days before.   Thanks   SR      Labs ordered for tomorrow morning.

## 2019-12-20 NOTE — TELEPHONE ENCOUNTER
ISSUE:   Cr 3.3  INR 1.27    PLAN:  Josias Villarreal MD Lavelle Peterson, Meghan M, RN; Kyara Lemus, RN              Spoke with Matheus. He resumed his coumadin   He is over diuresed. We reduced his diuretics. He will check labs tomorrow.   If Cr is above 2.8 will need to schedule a biopsy next week.   He will need to be off coumadin 4-5 days   He needs an INR tomorrow. He will hold coumadin tomorrow until he hears from you about when the biopsy, if needed, will be. If you cant schedule it until after finoa resume the coumadin and hold 4-5 days before.   Thanks   SR      OUTCOME:   Call placed to patient to discuss kidney biopsy and plan for coumadin. No answer, VM full, unable to leave message.     Discussed plan for biopsy with Dr Villarreal. Ok to proceed with biopsy in [facility] on Monday 12/23 given INR today is only 1.27. Pt to continue to hold dose starting today and through the biopsy.   Per Dr Villarreal, pt to also hold Torsemide until Monday. To discuss with nephrologist doing biopsy what dose torsemide should be resumed at (likely 20 mg daily)

## 2019-12-21 NOTE — TELEPHONE ENCOUNTER
LPN/MA  Renal Biopsy Communication    Call to pt to confirm renal biopsy procedure. Biopsy orders entered and patient aware of date 12-, in [facility] and to arrive at 6:00AM.     No need to be NPO.      Discussed anticoagulants (i.e. fish oil, ASA, Plavix, Coumadin, Ibuprofen). Pt confirms use of anticoagulants.     Take all medicine before arrival for biopsy and bring all medicine bottles with.      Report to 1st floor lab, then 5th floor for biopsy.      Use [employer] and bring form of entertainment.     Discussed with patient need to stay overnight locally evening of procedure if live more than 70 miles away.    Patient verbalized understanding they will need to stay 4-5 hours post biopsy for monitoring.     Call placed to scheduling at [phone] to schedule and confirm biopsy date/time    Lab appointment scheduled for morning of biopsy.      Orders placed. Biopsy scheduled.

## 2019-12-23 ENCOUNTER — HOSPITAL ENCOUNTER (OUTPATIENT)
Facility: AMBULATORY SURGERY CENTER | Age: 79
End: 2019-12-23
Attending: INTERNAL MEDICINE
Payer: MEDICARE

## 2019-12-23 ENCOUNTER — RESULTS ONLY (OUTPATIENT)
Dept: OTHER | Facility: CLINIC | Age: 79
End: 2019-12-23

## 2019-12-23 ENCOUNTER — ANCILLARY PROCEDURE (OUTPATIENT)
Dept: RADIOLOGY | Facility: AMBULATORY SURGERY CENTER | Age: 79
End: 2019-12-23
Attending: INTERNAL MEDICINE
Payer: MEDICARE

## 2019-12-23 VITALS
SYSTOLIC BLOOD PRESSURE: 129 MMHG | HEART RATE: 72 BPM | HEIGHT: 71 IN | BODY MASS INDEX: 24.5 KG/M2 | TEMPERATURE: 98.6 F | WEIGHT: 175 LBS | RESPIRATION RATE: 16 BRPM | DIASTOLIC BLOOD PRESSURE: 77 MMHG | OXYGEN SATURATION: 93 %

## 2019-12-23 DIAGNOSIS — Z79.899 OTHER LONG TERM (CURRENT) DRUG THERAPY: ICD-10-CM

## 2019-12-23 DIAGNOSIS — Z94.0 KIDNEY TRANSPLANTED: ICD-10-CM

## 2019-12-23 DIAGNOSIS — Z94.0 STATUS POST KIDNEY TRANSPLANT: ICD-10-CM

## 2019-12-23 DIAGNOSIS — Z94.0 KIDNEY REPLACED BY TRANSPLANT: ICD-10-CM

## 2019-12-23 DIAGNOSIS — R79.89 ELEVATED SERUM CREATININE: ICD-10-CM

## 2019-12-23 DIAGNOSIS — Z94.0 KIDNEY TRANSPLANTED: Primary | ICD-10-CM

## 2019-12-23 LAB
ABO + RH BLD: NORMAL
ABO + RH BLD: NORMAL
ALBUMIN UR-MCNC: 100 MG/DL
ANION GAP SERPL CALCULATED.3IONS-SCNC: 5 MMOL/L (ref 3–14)
APPEARANCE UR: CLEAR
BASOPHILS # BLD AUTO: 0 10E9/L (ref 0–0.2)
BASOPHILS NFR BLD AUTO: 0.7 %
BILIRUB UR QL STRIP: NEGATIVE
BLD GP AB SCN SERPL QL: NORMAL
BLOOD BANK CMNT PATIENT-IMP: NORMAL
BUN SERPL-MCNC: 42 MG/DL (ref 7–30)
CALCIUM SERPL-MCNC: 9.2 MG/DL (ref 8.5–10.1)
CHLORIDE SERPL-SCNC: 107 MMOL/L (ref 94–109)
CO2 SERPL-SCNC: 31 MMOL/L (ref 20–32)
COLOR UR AUTO: YELLOW
CREAT SERPL-MCNC: 2.8 MG/DL (ref 0.66–1.25)
CREAT UR-MCNC: 111 MG/DL
CYCLOSPORINE BLD LC/MS/MS-MCNC: 90 UG/L (ref 50–400)
DIFFERENTIAL METHOD BLD: ABNORMAL
DONOR IDENTIFICATION: NORMAL
DSA COMMENTS: NORMAL
DSA PRESENT: NO
DSA TEST METHOD: NORMAL
EOSINOPHIL # BLD AUTO: 0.3 10E9/L (ref 0–0.7)
EOSINOPHIL NFR BLD AUTO: 4.7 %
ERYTHROCYTE [DISTWIDTH] IN BLOOD BY AUTOMATED COUNT: 14.8 % (ref 10–15)
GFR SERPL CREATININE-BSD FRML MDRD: 20 ML/MIN/{1.73_M2}
GLUCOSE SERPL-MCNC: 96 MG/DL (ref 70–99)
GLUCOSE UR STRIP-MCNC: NEGATIVE MG/DL
HCT VFR BLD AUTO: 31.7 % (ref 40–53)
HGB BLD-MCNC: 9.7 G/DL (ref 13.3–17.7)
HGB BLD-MCNC: 9.9 G/DL (ref 13.3–17.7)
HGB UR QL STRIP: NEGATIVE
IMM GRANULOCYTES # BLD: 0 10E9/L (ref 0–0.4)
IMM GRANULOCYTES NFR BLD: 0.3 %
INR PPP: 1.3 (ref 0.86–1.14)
KETONES UR STRIP-MCNC: NEGATIVE MG/DL
LEUKOCYTE ESTERASE UR QL STRIP: ABNORMAL
LYMPHOCYTES # BLD AUTO: 0.6 10E9/L (ref 0.8–5.3)
LYMPHOCYTES NFR BLD AUTO: 10.1 %
MCH RBC QN AUTO: 30 PG (ref 26.5–33)
MCHC RBC AUTO-ENTMCNC: 31.2 G/DL (ref 31.5–36.5)
MCV RBC AUTO: 96 FL (ref 78–100)
MICROALBUMIN UR-MCNC: 272 MG/L
MICROALBUMIN/CREAT UR: 245.04 MG/G CR (ref 0–17)
MONOCYTES # BLD AUTO: 0.6 10E9/L (ref 0–1.3)
MONOCYTES NFR BLD AUTO: 9.7 %
NEUTROPHILS # BLD AUTO: 4.3 10E9/L (ref 1.6–8.3)
NEUTROPHILS NFR BLD AUTO: 74.5 %
NITRATE UR QL: NEGATIVE
NRBC # BLD AUTO: 0 10*3/UL
NRBC BLD AUTO-RTO: 0 /100
ORGAN: NORMAL
PH UR STRIP: 7 PH (ref 5–7)
PLATELET # BLD AUTO: 213 10E9/L (ref 150–450)
POTASSIUM SERPL-SCNC: 3.5 MMOL/L (ref 3.4–5.3)
PROT UR-MCNC: 0.62 G/L
PROT/CREAT 24H UR: 0.56 G/G CR (ref 0–0.2)
RBC # BLD AUTO: 3.3 10E12/L (ref 4.4–5.9)
RBC #/AREA URNS AUTO: <1 /HPF (ref 0–2)
SA1 CELL: NORMAL
SA1 COMMENTS: NORMAL
SA1 HI RISK ABY: NORMAL
SA1 MOD RISK ABY: NORMAL
SA1 TEST METHOD: NORMAL
SA2 CELL: NORMAL
SA2 COMMENTS: NORMAL
SA2 HI RISK ABY UA: NORMAL
SA2 MOD RISK ABY: NORMAL
SA2 TEST METHOD: NORMAL
SODIUM SERPL-SCNC: 143 MMOL/L (ref 133–144)
SOURCE: ABNORMAL
SP GR UR STRIP: 1.01 (ref 1–1.03)
SPECIMEN EXP DATE BLD: NORMAL
TME LAST DOSE: 2100 H
UNACCEPTABLE ANTIGEN: NORMAL
UNOS CPRA: 0
UROBILINOGEN UR STRIP-MCNC: 2 MG/DL (ref 0–2)
WBC # BLD AUTO: 5.8 10E9/L (ref 4–11)
WBC #/AREA URNS AUTO: 7 /HPF (ref 0–5)

## 2019-12-23 PROCEDURE — 87799 DETECT AGENT NOS DNA QUANT: CPT | Mod: XU | Performed by: INTERNAL MEDICINE

## 2019-12-23 PROCEDURE — 88348 ELECTRON MICROSCOPY DX: CPT | Performed by: INTERNAL MEDICINE

## 2019-12-23 PROCEDURE — 87086 URINE CULTURE/COLONY COUNT: CPT | Performed by: INTERNAL MEDICINE

## 2019-12-23 PROCEDURE — 80158 DRUG ASSAY CYCLOSPORINE: CPT | Performed by: INTERNAL MEDICINE

## 2019-12-23 PROCEDURE — 40000694 ZZHCL STATISTIC STAT SERVICE TX TESTING: Performed by: INTERNAL MEDICINE

## 2019-12-23 PROCEDURE — 81375 HLA II TYPING AG EQUIV LR: CPT | Performed by: INTERNAL MEDICINE

## 2019-12-23 PROCEDURE — 86832 HLA CLASS I HIGH DEFIN QUAL: CPT | Performed by: INTERNAL MEDICINE

## 2019-12-23 PROCEDURE — 88350 IMFLUOR EA ADDL 1ANTB STN PX: CPT | Performed by: INTERNAL MEDICINE

## 2019-12-23 PROCEDURE — 88313 SPECIAL STAINS GROUP 2: CPT | Performed by: INTERNAL MEDICINE

## 2019-12-23 PROCEDURE — 86833 HLA CLASS II HIGH DEFIN QUAL: CPT | Performed by: INTERNAL MEDICINE

## 2019-12-23 PROCEDURE — 88305 TISSUE EXAM BY PATHOLOGIST: CPT | Performed by: INTERNAL MEDICINE

## 2019-12-23 PROCEDURE — 88346 IMFLUOR 1ST 1ANTB STAIN PX: CPT | Performed by: INTERNAL MEDICINE

## 2019-12-23 PROCEDURE — 81376 HLA II TYPING 1 LOCUS LR: CPT | Performed by: INTERNAL MEDICINE

## 2019-12-23 ASSESSMENT — MIFFLIN-ST. JEOR: SCORE: 1525.92

## 2019-12-23 NOTE — PROGRESS NOTES
Kidney Transplant Biopsy Procedure Note    Indication/Diagnosis:  Kidney transplant with kidney transplant and elevated creatinine    Procedure:  The indications and risks of the kidney biopsy, including bleeding severe enough to require hospitalization, transfusion, surgery, or even loss of the kidney or death, were explained, understood and agreed.  Consent was signed.  The kidney, located in the right lower quadrant, was visualized under ultrasound and biopsy site marked.  Patient was prepped and draped in the usual sterile manner.  Biopsy site was anesthetized with 1% lidocaine.  Biopsy consisted of 1 passes with a 18 ga needle under direct ultrasound guidance were made and tissue was sent to pathology.  There were no immediate complications.  Pressure was held over biopsy site and patient will be observed for signs of bleeding or other complications.  Patient remained hemodynamically stable during and early post procedure.

## 2019-12-24 LAB
BACTERIA SPEC CULT: NO GROWTH
DPA1* NMDP: NORMAL
DPA1*: NORMAL
DPB1* NMDP: NORMAL
DPB1*: NORMAL
DQA1*LOCUS: NORMAL
DQB1* LOCUS: NORMAL
DRB1* LOCUS: NORMAL
DRB5* LOCUS: NORMAL
DRSSO TEST METHOD: NORMAL
Lab: NORMAL
SPECIMEN SOURCE: NORMAL

## 2019-12-25 LAB
BKV DNA # SPEC NAA+PROBE: NORMAL COPIES/ML
BKV DNA SPEC NAA+PROBE-LOG#: NORMAL LOG COPIES/ML
SPECIMEN SOURCE: NORMAL

## 2019-12-25 NOTE — TELEPHONE ENCOUNTER
I called the patient regarding the results of his kidney transplant biopsy.  The biopsy was negative for any rejection.  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    The patient has a cell phone whose mailbox is full and therefore a message could not be left for this patient.    At this time we will make no changes to his immunosuppression.

## 2019-12-26 NOTE — TELEPHONE ENCOUNTER
Matheus White called transplant office to discuss biopsy results.  He v/u that he does not have rejection. He reports increased swelling/fluid retention. He has not been taking diuretic all week, however, due to increased swelling he took 20 mg of lasix this morning. He states lasix work better than torsemide did.      Matheus will repeat BMP tomorrow. He also v/u to go to ED if he experiences shortness of breath.

## 2019-12-27 ENCOUNTER — TELEPHONE (OUTPATIENT)
Dept: TRANSPLANT | Facility: CLINIC | Age: 79
End: 2019-12-27

## 2019-12-27 ENCOUNTER — HOSPITAL ENCOUNTER (OUTPATIENT)
Dept: LAB | Facility: CLINIC | Age: 79
Discharge: HOME | End: 2019-12-27
Attending: INTERNAL MEDICINE | Admitting: INTERNAL MEDICINE
Payer: MEDICARE

## 2019-12-27 DIAGNOSIS — I50.810 RIGHT-SIDED HEART FAILURE, UNSPECIFIED HF CHRONICITY (H): ICD-10-CM

## 2019-12-27 DIAGNOSIS — R35.89 DIURESIS: ICD-10-CM

## 2019-12-27 DIAGNOSIS — Z94.0 KIDNEY TRANSPLANTED: Primary | ICD-10-CM

## 2019-12-27 DIAGNOSIS — Z94.0 KIDNEY TRANSPLANTED: ICD-10-CM

## 2019-12-27 DIAGNOSIS — Z79.899 OTHER LONG TERM (CURRENT) DRUG THERAPY: ICD-10-CM

## 2019-12-27 LAB
ANION GAP SERPL CALCULATED.3IONS-SCNC: 6 MMOL/L (ref 3–14)
BUN SERPL-MCNC: 35 MG/DL (ref 7–30)
CALCIUM SERPL-MCNC: 8.9 MG/DL (ref 8.5–10.1)
CHLORIDE SERPL-SCNC: 107 MMOL/L (ref 94–109)
CO2 SERPL-SCNC: 31 MMOL/L (ref 20–32)
CREAT SERPL-MCNC: 2.44 MG/DL (ref 0.66–1.25)
GFR SERPL CREATININE-BSD FRML MDRD: 24 ML/MIN/{1.73_M2}
GLUCOSE SERPL-MCNC: 98 MG/DL (ref 70–99)
POTASSIUM SERPL-SCNC: 3.2 MMOL/L (ref 3.4–5.3)
SODIUM SERPL-SCNC: 143 MMOL/L (ref 133–144)

## 2019-12-27 PROCEDURE — 36415 COLL VENOUS BLD VENIPUNCTURE: CPT | Performed by: INTERNAL MEDICINE

## 2019-12-27 PROCEDURE — 80048 BASIC METABOLIC PNL TOTAL CA: CPT | Performed by: INTERNAL MEDICINE

## 2019-12-27 NOTE — TELEPHONE ENCOUNTER
ISSUE:   Potassium 3.2  Cr 2.44    PLAN:  Reviewed with Dr Ponce:   Confirm if pt is taking potassium citrate or not (on med list)  Start KCl 20 mEq daily    OUTCOME:   Call placed to patient. LVM to return call to transplant office.

## 2019-12-27 NOTE — TELEPHONE ENCOUNTER
Call placed to patient to review diuretics.     Per Dr Ponce, pt to take torsemide 20 mg BID.  Monitor weight daily.   If no change in weight, increase dose to 40 mg BID    Pt v/u of above plan.

## 2019-12-30 ENCOUNTER — DOCUMENTATION ONLY (OUTPATIENT)
Dept: CARE COORDINATION | Facility: CLINIC | Age: 79
End: 2019-12-30

## 2019-12-30 RX ORDER — TORSEMIDE 20 MG/1
20 TABLET ORAL 2 TIMES DAILY
Qty: 60 TABLET | Refills: 0 | Status: SHIPPED | OUTPATIENT
Start: 2019-12-30 | End: 2020-01-07

## 2019-12-30 NOTE — TELEPHONE ENCOUNTER
Call placed to check in with Matheus White Sr today. He states his weight has decreased from 180 lbs to 167 lbs since using diuretics last week. He has only been using torsemide 20 mg bid since Friday. Matheus also confirms he is using potassium citrate 15mEq and increasing high potassium foods in his diet. He will repeat bmp tomorrow.

## 2019-12-31 ENCOUNTER — HOSPITAL ENCOUNTER (OUTPATIENT)
Dept: LAB | Facility: CLINIC | Age: 79
Discharge: HOME | End: 2019-12-31
Attending: INTERNAL MEDICINE | Admitting: INTERNAL MEDICINE
Payer: MEDICARE

## 2019-12-31 ENCOUNTER — TELEPHONE (OUTPATIENT)
Dept: TRANSPLANT | Facility: CLINIC | Age: 79
End: 2019-12-31

## 2019-12-31 DIAGNOSIS — R35.89 DIURESIS: ICD-10-CM

## 2019-12-31 DIAGNOSIS — Z94.0 KIDNEY REPLACED BY TRANSPLANT: ICD-10-CM

## 2019-12-31 DIAGNOSIS — Z94.0 KIDNEY TRANSPLANTED: ICD-10-CM

## 2019-12-31 DIAGNOSIS — Z94.0 KIDNEY TRANSPLANTED: Primary | ICD-10-CM

## 2019-12-31 LAB
ANION GAP SERPL CALCULATED.3IONS-SCNC: 10 MMOL/L (ref 3–14)
BUN SERPL-MCNC: 32 MG/DL (ref 7–30)
CALCIUM SERPL-MCNC: 8.6 MG/DL (ref 8.5–10.1)
CHLORIDE SERPL-SCNC: 104 MMOL/L (ref 94–109)
CO2 SERPL-SCNC: 28 MMOL/L (ref 20–32)
CREAT SERPL-MCNC: 2.59 MG/DL (ref 0.66–1.25)
ERYTHROCYTE [DISTWIDTH] IN BLOOD BY AUTOMATED COUNT: 14.1 % (ref 10–15)
GFR SERPL CREATININE-BSD FRML MDRD: 22 ML/MIN/{1.73_M2}
GLUCOSE SERPL-MCNC: 89 MG/DL (ref 70–99)
HCT VFR BLD AUTO: 32.6 % (ref 40–53)
HGB BLD-MCNC: 10.1 G/DL (ref 13.3–17.7)
MCH RBC QN AUTO: 29.3 PG (ref 26.5–33)
MCHC RBC AUTO-ENTMCNC: 31 G/DL (ref 31.5–36.5)
MCV RBC AUTO: 95 FL (ref 78–100)
PLATELET # BLD AUTO: 267 10E9/L (ref 150–450)
POTASSIUM SERPL-SCNC: 3 MMOL/L (ref 3.4–5.3)
RBC # BLD AUTO: 3.45 10E12/L (ref 4.4–5.9)
SODIUM SERPL-SCNC: 142 MMOL/L (ref 133–144)
WBC # BLD AUTO: 5.9 10E9/L (ref 4–11)

## 2019-12-31 PROCEDURE — 36415 COLL VENOUS BLD VENIPUNCTURE: CPT | Performed by: INTERNAL MEDICINE

## 2019-12-31 PROCEDURE — 80158 DRUG ASSAY CYCLOSPORINE: CPT | Performed by: INTERNAL MEDICINE

## 2019-12-31 PROCEDURE — 85027 COMPLETE CBC AUTOMATED: CPT | Performed by: INTERNAL MEDICINE

## 2019-12-31 PROCEDURE — 80048 BASIC METABOLIC PNL TOTAL CA: CPT | Performed by: INTERNAL MEDICINE

## 2019-12-31 RX ORDER — POTASSIUM CITRATE 5 MEQ/1
5 TABLET, EXTENDED RELEASE ORAL DAILY
Qty: 30 TABLET | Refills: 3 | Status: SHIPPED | OUTPATIENT
Start: 2019-12-31 | End: 2020-02-10 | Stop reason: DRUGHIGH

## 2019-12-31 RX ORDER — POTASSIUM CITRATE 15 MEQ/1
5 TABLET, EXTENDED RELEASE ORAL DAILY
Qty: 30 TABLET | Refills: 3 | Status: SHIPPED | OUTPATIENT
Start: 2019-12-31 | End: 2020-02-10 | Stop reason: DRUGHIGH

## 2019-12-31 NOTE — LETTER
PHYSICIAN ORDERS      DATE & TIME ISSUED: 2019 9:51 AM  PATIENT NAME: Matheus White Sr.   : 1940     [facility] MR# [if applicable]: [id number]     DIAGNOSIS:  ***  ICD-10 CODE: ***     ***    Any questions please call: ***    Please fax these results to { TRANSPLANT DEPT FAXES:[id number]}    {[facility] TRANSPLANT MD SIGNATURE:[id number]}

## 2019-12-31 NOTE — RESULT ENCOUNTER NOTE
Routing to provider for review. Pt reports he has been taking Potassium citrate 15 mEq PO BID, Dr. Ponce wanted it 20 mEq daily per note on 12/27/19, I can resend Rx for increase, should he take a one time dose today? Plan for labs Friday. Thanks.

## 2019-12-31 NOTE — TELEPHONE ENCOUNTER
ISSUE:  K+ 3.0, Cr 2.59    PLAN:  Pt reports he is taking Potassium citrate 15 mEq PO daily, Plan to increase to 20 mEq PO daily per note from Dr. Ponce on 12/27/19. Instructed pt on potassium rich foods and to report to ED if he experiences any chest pain, SOB.       OUTCOME:  Pt verbalizes understanding. Will repeat labs Friday. Orders placed.

## 2020-01-01 LAB
CYCLOSPORINE BLD LC/MS/MS-MCNC: 90 UG/L (ref 50–400)
TME LAST DOSE: NORMAL H

## 2020-01-06 ENCOUNTER — HOSPITAL ENCOUNTER (OUTPATIENT)
Dept: LAB | Facility: CLINIC | Age: 80
Discharge: HOME | End: 2020-01-06
Attending: INTERNAL MEDICINE | Admitting: INTERNAL MEDICINE
Payer: MEDICARE

## 2020-01-06 ENCOUNTER — TELEPHONE (OUTPATIENT)
Dept: TRANSPLANT | Facility: CLINIC | Age: 80
End: 2020-01-06

## 2020-01-06 DIAGNOSIS — Z94.0 KIDNEY TRANSPLANTED: Primary | ICD-10-CM

## 2020-01-06 DIAGNOSIS — Z79.899 ENCOUNTER FOR LONG-TERM CURRENT USE OF MEDICATION: ICD-10-CM

## 2020-01-06 DIAGNOSIS — Z94.0 KIDNEY REPLACED BY TRANSPLANT: Primary | ICD-10-CM

## 2020-01-06 DIAGNOSIS — Z94.0 KIDNEY REPLACED BY TRANSPLANT: ICD-10-CM

## 2020-01-06 DIAGNOSIS — I50.810 RIGHT-SIDED HEART FAILURE, UNSPECIFIED HF CHRONICITY (H): ICD-10-CM

## 2020-01-06 DIAGNOSIS — Z48.298 AFTERCARE FOLLOWING ORGAN TRANSPLANT: ICD-10-CM

## 2020-01-06 LAB
ANION GAP SERPL CALCULATED.3IONS-SCNC: 7 MMOL/L (ref 3–14)
BUN SERPL-MCNC: 36 MG/DL (ref 7–30)
CALCIUM SERPL-MCNC: 9 MG/DL (ref 8.5–10.1)
CHLORIDE SERPL-SCNC: 105 MMOL/L (ref 94–109)
CO2 SERPL-SCNC: 31 MMOL/L (ref 20–32)
CREAT SERPL-MCNC: 2.66 MG/DL (ref 0.66–1.25)
ERYTHROCYTE [DISTWIDTH] IN BLOOD BY AUTOMATED COUNT: 13.7 % (ref 10–15)
GFR SERPL CREATININE-BSD FRML MDRD: 22 ML/MIN/{1.73_M2}
GLUCOSE SERPL-MCNC: 93 MG/DL (ref 70–99)
HCT VFR BLD AUTO: 31.5 % (ref 40–53)
HGB BLD-MCNC: 10.3 G/DL (ref 13.3–17.7)
MCH RBC QN AUTO: 29.9 PG (ref 26.5–33)
MCHC RBC AUTO-ENTMCNC: 32.7 G/DL (ref 31.5–36.5)
MCV RBC AUTO: 91 FL (ref 78–100)
PLATELET # BLD AUTO: 222 10E9/L (ref 150–450)
POTASSIUM SERPL-SCNC: 3.1 MMOL/L (ref 3.4–5.3)
RBC # BLD AUTO: 3.45 10E12/L (ref 4.4–5.9)
SODIUM SERPL-SCNC: 143 MMOL/L (ref 133–144)
WBC # BLD AUTO: 5.1 10E9/L (ref 4–11)

## 2020-01-06 PROCEDURE — 36415 COLL VENOUS BLD VENIPUNCTURE: CPT | Performed by: INTERNAL MEDICINE

## 2020-01-06 PROCEDURE — 80048 BASIC METABOLIC PNL TOTAL CA: CPT | Performed by: INTERNAL MEDICINE

## 2020-01-06 PROCEDURE — 85027 COMPLETE CBC AUTOMATED: CPT | Performed by: INTERNAL MEDICINE

## 2020-01-06 NOTE — LETTER
PHYSICIAN ORDERS      DATE & TIME ISSUED: 2020 10:39 AM  PATIENT NAME: Matheus White .   : 1940     [facility] MR# [if applicable]: [id number]     DIAGNOSIS:  Kidney Transplant  ICD-10 CODE: Z94.0     Please repeat the following lab next week:  BMP    Any questions please call: [phone]  Please fax lab results to [phone].

## 2020-01-06 NOTE — TELEPHONE ENCOUNTER
Issue:  Cr 2.6  K 3.1    Plan:  Call placed to check in on swelling.  Left VM asking for call back.    Reviewed with Dr. Villarreal.  Decrease torsemide to 20 mg daily.  Double potassium citrate to 40 mEq x 2 days, then resume 20 mEq.    Continue to monitor weight/swelling. Recheck BMP.    RNCC left detailed VM with plan. Asked for call back to discuss.    LPN task:    Please call with plan, update medlist. Thank you!!  Please enter bmp order for next week.

## 2020-01-06 NOTE — TELEPHONE ENCOUNTER
Provider Call: Transplant Lab/Orders  Route to LPN  Post Transplant Days: [id number]  When patient is less than 60 days post-transplant, route high priority  Reason for Call: needs updated lab order in [facility]  Liver patients reporting abnormal lab results: Route to RN and Page  Document lab facility information when provider is calling about annual lab orders. Delete facility wildcards when not needed.  Facility Name:[facility]  Facility Location:[place], [place]  Callback needed? If needed  Labs have drawn if something needs to be added.

## 2020-01-07 RX ORDER — TORSEMIDE 20 MG/1
20 TABLET ORAL DAILY
Qty: 30 TABLET | Refills: 2 | Status: SHIPPED | OUTPATIENT
Start: 2020-01-07 | End: 2020-04-13

## 2020-01-07 NOTE — TELEPHONE ENCOUNTER
Spoke to patient who confirms the following:  Decrease torsemide to 20 mg daily.  Double potassium citrate to 40 mEq x 2 days, then resume 20 mEq.   Continue to monitor weight/swelling. Recheck BMP

## 2020-01-13 ENCOUNTER — OFFICE VISIT (OUTPATIENT)
Dept: CARDIOLOGY | Facility: CLINIC | Age: 80
End: 2020-01-13
Attending: INTERNAL MEDICINE
Payer: MEDICARE

## 2020-01-13 ENCOUNTER — PRE VISIT (OUTPATIENT)
Dept: CARDIOLOGY | Facility: CLINIC | Age: 80
End: 2020-01-13

## 2020-01-13 VITALS
SYSTOLIC BLOOD PRESSURE: 142 MMHG | HEIGHT: 71 IN | DIASTOLIC BLOOD PRESSURE: 63 MMHG | BODY MASS INDEX: 26.29 KG/M2 | OXYGEN SATURATION: 97 % | WEIGHT: 187.8 LBS | HEART RATE: 83 BPM

## 2020-01-13 DIAGNOSIS — Z94.0 KIDNEY TRANSPLANTED: ICD-10-CM

## 2020-01-13 DIAGNOSIS — Z94.0 S/P CADAVER RENAL TRANSPLANT: ICD-10-CM

## 2020-01-13 DIAGNOSIS — I51.9 LEFT VENTRICULAR SYSTOLIC DYSFUNCTION: ICD-10-CM

## 2020-01-13 DIAGNOSIS — I48.21 PERMANENT ATRIAL FIBRILLATION (H): Primary | ICD-10-CM

## 2020-01-13 DIAGNOSIS — R35.89 DIURESIS: ICD-10-CM

## 2020-01-13 DIAGNOSIS — N18.4 CKD (CHRONIC KIDNEY DISEASE) STAGE 4, GFR 15-29 ML/MIN (H): ICD-10-CM

## 2020-01-13 PROCEDURE — 93010 ELECTROCARDIOGRAM REPORT: CPT | Mod: ZP | Performed by: INTERNAL MEDICINE

## 2020-01-13 PROCEDURE — G0463 HOSPITAL OUTPT CLINIC VISIT: HCPCS | Mod: 25,ZF

## 2020-01-13 PROCEDURE — 99204 OFFICE O/P NEW MOD 45 MIN: CPT | Mod: 25 | Performed by: INTERNAL MEDICINE

## 2020-01-13 PROCEDURE — 93005 ELECTROCARDIOGRAM TRACING: CPT | Mod: ZF

## 2020-01-13 ASSESSMENT — MIFFLIN-ST. JEOR: SCORE: 1583.99

## 2020-01-13 ASSESSMENT — PAIN SCALES - GENERAL: PAINLEVEL: NO PAIN (0)

## 2020-01-13 NOTE — NURSING NOTE
Chief Complaint   Patient presents with     New Patient     new - kidney transplant eval     Vitals were taken and medications were reconciled. EKG was performed.    Kenya Hinds  4:59 PM

## 2020-01-13 NOTE — LETTER
1/13/2020      RE: Matheus White Sr.  [address] [facility] [address]       Dear Colleague,    Thank you for the opportunity to participate in the care of your patient, Matheus White Sr., at the [facility] at [facility]. Please see a copy of my visit note below.    Referring provider: Josias Villarreal MD    Chief complaint: Lower extremity swelling    HPI: Mr. Matheus White Sr. is a 79 year old  male with PMH significant for s/p renal transplant 2009, CKD stage IV with recent DEB, permanent afib on warfarin, nonischemic cardiomyopathy with EF of 40 to 45% and HTN    Patient is being seen today for pretibial edema.    The patient was recently hospitalized on 10/11/2019 for lower extremity cellulitis.  The patient developed DEB on CKD stage III.  Patient's creatinine increased to 5 which gradually decreased to 2.6 now.  Patient reports significant swelling of lower legs when he was in the hospital.  He reports left lower extremity swelling for several years which is not new to him.  He continues to have right lower extremity swelling which is new since the hospital admission.  Patient has been on furosemide which was recently changed to torsemide by Dr. Villarreal.  Since then the patient reports improvement in swelling.    Patient denies chest pain, palpitations, or syncope.  He reports imbalance when he first gets up.  He reports PND and orthopnea when he had significant water retention which makes him sit up to sleep.  Patient is currently able to do his daily activities without significant shortness of breath.  He monitors his weight at home which is usually 165-170 pounds.    The patient has history of atrial fibrillation since 2009 on rate control and warfarin.  No history of stroke.  He underwent one-time DC cardioversion in 2009 which was not successful.  Since then he is on rate control.    No prior history of coronary artery disease.   The patient has recently had an echocardiogram on 12/12/2019 which I have personally reviewed.  LVEF appears 40 to 45% similar to 2009 echocardiogram.  RV function and size are normal.  Moderate tricuspid insufficiency and mild to moderate MR is noted.     I have reviewed patient's EKG in clinic today which shows atrial fibrillation heart rate well controlled at 75 bpm.    Medications, personal, family, and social history reviewed with patient and revised.    PAST MEDICAL HISTORY:  Past Medical History:   Diagnosis Date     Atrial fibrillation, permanent 04/2009     CKD (chronic kidney disease)      HTN (hypertension), benign        CURRENT MEDICATIONS:  Current Outpatient Medications   Medication Sig Dispense Refill     cycloSPORINE modified (GENERIC EQUIVALENT) 25 MG capsule Take 50 mg by mouth 2 times daily       diltiazem ER COATED BEADS (CARDIZEM CD/CARTIA XT) 180 MG 24 hr capsule Take 180 mg by mouth daily       gabapentin (NEURONTIN) 300 MG capsule Take 300 mg by mouth daily       mycophenolate (GENERIC EQUIVALENT) 500 MG tablet Take 500 mg by mouth 2 times daily       order for DME Equipment being ordered: Cane ()  Treatment Diagnosis: Gait Instability 1 each 0     potassium citrate (UROCIT-K) 5 MEQ (540 MG) CR tablet Take 1 tablet (5 mEq) by mouth daily Take with 15 mEq for total of 20 meQ daily. 30 tablet 3     potassium citrate 15 MEQ (1620 MG) TBCR Take 5 mEq by mouth daily Take with 15 mEq for total dose of 20 mEq daily 30 tablet 3     torsemide (DEMADEX) 20 MG tablet Take 1 tablet (20 mg) by mouth daily 30 tablet 2     warfarin ANTICOAGULANT (COUMADIN) 3 MG tablet Take 3 mg by mouth daily         PAST SURGICAL HISTORY:  Past Surgical History:   Procedure Laterality Date     AS TRANSPLANT,PREP CADAVER RENAL GRAFT Right 04/2009     IR FINE NEEDLE ASPIRATION W ULTRASOUND  10/15/2019     PERCUTANEOUS BIOPSY KIDNEY Right 12/23/2019    Procedure: Right Kidney Biopsy;  Surgeon: Josiah Ponce MD;   "Location: [facility] OR     TRANSPLANT         ALLERGIES:   No Known Allergies    FAMILY HISTORY:  Family History   Problem Relation Age of Onset     Emphysema Father          SOCIAL HISTORY:  Social History     Tobacco Use     Smoking status: Never Smoker     Smokeless tobacco: Never Used   Substance Use Topics     Alcohol use: Yes     Frequency: Monthly or less     Drinks per session: 1 or 2     Comment: 12  oz. beer/week     Drug use: Never       ROS:   Constitutional: No fever, chills, or sweats. Weight stable.   ENT: No visual disturbance, ear ache, epistaxis, sore throat.   Cardiovascular: As per HPI.   Respiratory: No cough, hemoptysis.    GI: No nausea, vomiting, hematemesis, melena, or hematochezia.   : No hematuria.   Integument: Negative.   Psychiatric: Negative.   Hematologic:  No easy bruising, no easy bleeding.  Neuro: Negative.   Endocrinology: No significant heat or cold intolerance   Musculoskeletal: No myalgia.    Exam:  BP (!) 142/63 (BP Location: Left arm, Patient Position: Chair, Cuff Size: Adult Regular)   Pulse 83   Ht 1.803 m (5' 11\")   Wt 85.2 kg (187 lb 12.8 oz)   SpO2 97%   BMI 26.19 kg/m     GENERAL APPEARANCE: alert and no distress  HEENT: no icterus, no central cyanosis  LYMPH/NECK: no adenopathy, no asymmetry, JVP not elevated, no carotid bruits.  RESPIRATORY: lungs clear to auscultation - no rales, rhonchi or wheezes, no use of accessory muscles, no retractions, respirations are unlabored, normal respiratory rate  CARDIOVASCULAR: irregular rhythm, normal S1, S2, no S3 or S4 and no murmur, click or rub, precordium quiet with normal PMI.  GI: soft, non tender  EXTREMITIES: Bilateral 1+ pretibial edema  NEURO: alert, normal speech,and affect   SKIN: no ecchymoses, no rashes     I have reviewed the labs and personally reviewed the imaging below and made my comment in the assessment and plan.    Labs:  CBC RESULTS:   Lab Results   Component Value Date    WBC 5.1 01/06/2020    RBC 3.45 (L) " 01/06/2020    HGB 10.3 (L) 01/06/2020    HCT 31.5 (L) 01/06/2020    MCV 91 01/06/2020    MCH 29.9 01/06/2020    MCHC 32.7 01/06/2020    RDW 13.7 01/06/2020     01/06/2020       BMP RESULTS:  Lab Results   Component Value Date     01/06/2020    POTASSIUM 3.1 (L) 01/06/2020    CHLORIDE 105 01/06/2020    CO2 31 01/06/2020    ANIONGAP 7 01/06/2020    GLC 93 01/06/2020    BUN 36 (H) 01/06/2020    CR 2.66 (H) 01/06/2020    GFRESTIMATED 22 (L) 01/06/2020    GFRESTBLACK 25 (L) 01/06/2020    ELEAZAR 9.0 01/06/2020        INR RESULTS:  Lab Results   Component Value Date    INR 1.30 (H) 12/23/2019    INR 1.27 (H) 12/20/2019    INR 1.47 (H) 12/09/2019    INR 2.19 (H) 10/22/2019       Echocardiogram 12/12/2019  The rhythm is atrial fibrillation.     There is mild diffuse global LV hypokinesis. The LVEF is difficult to assess  in the setting of atrial fibrillaion and variable R-R interval, though  visually estimated LVEF is 40-45%.  Mild to moderate concentric wall thickening consistent with left ventricular  hypertrophy is present. Consider infiltrative CM such as amyloid.  Global right ventricular function is mildly reduced.  Moderate tricuspid insufficiency is present.  Estimated pulmonary artery systolic pressure is suggestive of moderate  pulmonary hypertension.  The ascending aorta is mildly dilated measuring 4.1 cm (2.16 cm/m2 when  indexed to BSA).  No pericardial effusion.     In direct visual comparison to TTE dated 4/6/2009, the LVEF appears similar.  The estimated PASP is higher on the present study.      EKG 1/17/2020      Lexiscan stress test 8/12/2019 [facility] 8/12/2019  1.  Lexiscan stress EKG negative for ischemia.    2.  For interpretation of Myoview images, please see separate dictated   result.      Assessment and Plan:    Mr. Matheus Agudelo Cindy Sr. is a 79 year old  male with PMH significant for s/p renal transplant 2009, CKD stage IV with recent DEB, permanent afib on warfarin, nonischemic  cardiomyopathy with EF of 40 to 45% and HTN.    1.  Lower extremity edema: This is likely multifactorial being due to HFrEF, diltiazem and recently worsening kidney function which has recovered now. LE has improved since he switched from furosemide to torsemide.  On physical exam today he does have mild bilateral pretibial edema but otherwise no significant JVD or crackles.  I recommended no change in medical treatment and recommend to continue torsemide 20 mg twice daily. Patient's left ventricle ejection fraction is 40 to 45%.  If EF declines below 40% I will consider changing diltiazem to metoprolol.  EF on most recent echo from December 2019 is very similar to 2009 echocardiogram.  Therefore I did not make any change in medications today.  I recommended weighing himself daily, to call us if more than 3 pounds weight gain within 24 hours and to take extra torsemide 20 mg in case of more than 3 pound weight gain within 24 hours..    2.  Status post renal transplant with CKD stage TREVOR: Follows with nephrology.    3.  Permanent A. fib on warfarin and rate control: No prior history of stroke.  Patient had a lot of questions today regarding atrial fibrillation.  Patient was wondering if he is a candidate for cardioversion.  I explained to him in detail that his atrial fibrillation is permanent and neither ablation nor cardioversion will convert his rhythm to sinus rhythm.  He understood.  Recommended to continue current treatment.    No medication changes today.  Return to clinic 3 months.    A total of 30 minutes spent face-toface with greater than 50% of the time spent in counseling and coordinating cares of the issues above.     Please donot hesitate to contact me if you have any questions or concerns. Again, thank you for allowing me to participate in the care of your patient.    Cj CASTILLO MD  [facility] Division of Cardiology  Pager [phone]

## 2020-01-13 NOTE — PROGRESS NOTES
Referring provider: Josias Villarreal MD    Chief complaint: Lower extremity swelling    HPI: Mr. Matheus White Sr. is a 79 year old  male with PMH significant for s/p renal transplant 2009, CKD stage IV with recent DEB, permanent afib on warfarin, nonischemic cardiomyopathy with EF of 40 to 45% and HTN    Patient is being seen today for pretibial edema.    The patient was recently hospitalized on 10/11/2019 for lower extremity cellulitis.  The patient developed DEB on CKD stage III.  Patient's creatinine increased to 5 which gradually decreased to 2.6 now.  Patient reports significant swelling of lower legs when he was in the hospital.  He reports left lower extremity swelling for several years which is not new to him.  He continues to have right lower extremity swelling which is new since the hospital admission.  Patient has been on furosemide which was recently changed to torsemide by Dr. Villarreal.  Since then the patient reports improvement in swelling.    Patient denies chest pain, palpitations, or syncope.  He reports imbalance when he first gets up.  He reports PND and orthopnea when he had significant water retention which makes him sit up to sleep.  Patient is currently able to do his daily activities without significant shortness of breath.  He monitors his weight at home which is usually 165-170 pounds.    The patient has history of atrial fibrillation since 2009 on rate control and warfarin.  No history of stroke.  He underwent one-time DC cardioversion in 2009 which was not successful.  Since then he is on rate control.    No prior history of coronary artery disease.  The patient has recently had an echocardiogram on 12/12/2019 which I have personally reviewed.  LVEF appears 40 to 45% similar to 2009 echocardiogram.  RV function and size are normal.  Moderate tricuspid insufficiency and mild to moderate MR is noted.     I have reviewed patient's EKG in clinic today which shows atrial fibrillation  heart rate well controlled at 75 bpm.    Medications, personal, family, and social history reviewed with patient and revised.    PAST MEDICAL HISTORY:  Past Medical History:   Diagnosis Date     Atrial fibrillation, permanent 04/2009     CKD (chronic kidney disease)      HTN (hypertension), benign        CURRENT MEDICATIONS:  Current Outpatient Medications   Medication Sig Dispense Refill     cycloSPORINE modified (GENERIC EQUIVALENT) 25 MG capsule Take 50 mg by mouth 2 times daily       diltiazem ER COATED BEADS (CARDIZEM CD/CARTIA XT) 180 MG 24 hr capsule Take 180 mg by mouth daily       gabapentin (NEURONTIN) 300 MG capsule Take 300 mg by mouth daily       mycophenolate (GENERIC EQUIVALENT) 500 MG tablet Take 500 mg by mouth 2 times daily       order for DME Equipment being ordered: Cane ()  Treatment Diagnosis: Gait Instability 1 each 0     potassium citrate (UROCIT-K) 5 MEQ (540 MG) CR tablet Take 1 tablet (5 mEq) by mouth daily Take with 15 mEq for total of 20 meQ daily. 30 tablet 3     potassium citrate 15 MEQ (1620 MG) TBCR Take 5 mEq by mouth daily Take with 15 mEq for total dose of 20 mEq daily 30 tablet 3     torsemide (DEMADEX) 20 MG tablet Take 1 tablet (20 mg) by mouth daily 30 tablet 2     warfarin ANTICOAGULANT (COUMADIN) 3 MG tablet Take 3 mg by mouth daily         PAST SURGICAL HISTORY:  Past Surgical History:   Procedure Laterality Date     AS TRANSPLANT,PREP CADAVER RENAL GRAFT Right 04/2009     IR FINE NEEDLE ASPIRATION W ULTRASOUND  10/15/2019     PERCUTANEOUS BIOPSY KIDNEY Right 12/23/2019    Procedure: Right Kidney Biopsy;  Surgeon: Josiah Ponce MD;  Location: [facility] OR     TRANSPLANT         ALLERGIES:   No Known Allergies    FAMILY HISTORY:  Family History   Problem Relation Age of Onset     Emphysema Father          SOCIAL HISTORY:  Social History     Tobacco Use     Smoking status: Never Smoker     Smokeless tobacco: Never Used   Substance Use Topics     Alcohol use: Yes      "Frequency: Monthly or less     Drinks per session: 1 or 2     Comment: 12  oz. beer/week     Drug use: Never       ROS:   Constitutional: No fever, chills, or sweats. Weight stable.   ENT: No visual disturbance, ear ache, epistaxis, sore throat.   Cardiovascular: As per HPI.   Respiratory: No cough, hemoptysis.    GI: No nausea, vomiting, hematemesis, melena, or hematochezia.   : No hematuria.   Integument: Negative.   Psychiatric: Negative.   Hematologic:  No easy bruising, no easy bleeding.  Neuro: Negative.   Endocrinology: No significant heat or cold intolerance   Musculoskeletal: No myalgia.    Exam:  BP (!) 142/63 (BP Location: Left arm, Patient Position: Chair, Cuff Size: Adult Regular)   Pulse 83   Ht 1.803 m (5' 11\")   Wt 85.2 kg (187 lb 12.8 oz)   SpO2 97%   BMI 26.19 kg/m    GENERAL APPEARANCE: alert and no distress  HEENT: no icterus, no central cyanosis  LYMPH/NECK: no adenopathy, no asymmetry, JVP not elevated, no carotid bruits.  RESPIRATORY: lungs clear to auscultation - no rales, rhonchi or wheezes, no use of accessory muscles, no retractions, respirations are unlabored, normal respiratory rate  CARDIOVASCULAR: irregular rhythm, normal S1, S2, no S3 or S4 and no murmur, click or rub, precordium quiet with normal PMI.  GI: soft, non tender  EXTREMITIES: Bilateral 1+ pretibial edema  NEURO: alert, normal speech,and affect   SKIN: no ecchymoses, no rashes     I have reviewed the labs and personally reviewed the imaging below and made my comment in the assessment and plan.    Labs:  CBC RESULTS:   Lab Results   Component Value Date    WBC 5.1 01/06/2020    RBC 3.45 (L) 01/06/2020    HGB 10.3 (L) 01/06/2020    HCT 31.5 (L) 01/06/2020    MCV 91 01/06/2020    MCH 29.9 01/06/2020    MCHC 32.7 01/06/2020    RDW 13.7 01/06/2020     01/06/2020       BMP RESULTS:  Lab Results   Component Value Date     01/06/2020    POTASSIUM 3.1 (L) 01/06/2020    CHLORIDE 105 01/06/2020    CO2 31 01/06/2020 "    ANIONGAP 7 01/06/2020    GLC 93 01/06/2020    BUN 36 (H) 01/06/2020    CR 2.66 (H) 01/06/2020    GFRESTIMATED 22 (L) 01/06/2020    GFRESTBLACK 25 (L) 01/06/2020    ELEAZAR 9.0 01/06/2020        INR RESULTS:  Lab Results   Component Value Date    INR 1.30 (H) 12/23/2019    INR 1.27 (H) 12/20/2019    INR 1.47 (H) 12/09/2019    INR 2.19 (H) 10/22/2019       Echocardiogram 12/12/2019  The rhythm is atrial fibrillation.     There is mild diffuse global LV hypokinesis. The LVEF is difficult to assess  in the setting of atrial fibrillaion and variable R-R interval, though  visually estimated LVEF is 40-45%.  Mild to moderate concentric wall thickening consistent with left ventricular  hypertrophy is present. Consider infiltrative CM such as amyloid.  Global right ventricular function is mildly reduced.  Moderate tricuspid insufficiency is present.  Estimated pulmonary artery systolic pressure is suggestive of moderate  pulmonary hypertension.  The ascending aorta is mildly dilated measuring 4.1 cm (2.16 cm/m2 when  indexed to BSA).  No pericardial effusion.     In direct visual comparison to TTE dated 4/6/2009, the LVEF appears similar.  The estimated PASP is higher on the present study.      EKG 1/17/2020      Lexiscan stress test 8/12/2019 [facility] 8/12/2019  1.  Lexiscan stress EKG negative for ischemia.    2.  For interpretation of Myoview images, please see separate dictated   result.      Assessment and Plan:    Mr. Matheus Agudelo Cindy Sr. is a 79 year old  male with PMH significant for s/p renal transplant 2009, CKD stage IV with recent DEB, permanent afib on warfarin, nonischemic cardiomyopathy with EF of 40 to 45% and HTN.    1.  Lower extremity edema: This is likely multifactorial being due to HFrEF, diltiazem and recently worsening kidney function which has recovered now. LE has improved since he switched from furosemide to torsemide.  On physical exam today he does have mild bilateral pretibial edema but  otherwise no significant JVD or crackles.  I recommended no change in medical treatment and recommend to continue torsemide 20 mg twice daily. Patient's left ventricle ejection fraction is 40 to 45%.  If EF declines below 40% I will consider changing diltiazem to metoprolol.  EF on most recent echo from December 2019 is very similar to 2009 echocardiogram.  Therefore I did not make any change in medications today.  I recommended weighing himself daily, to call us if more than 3 pounds weight gain within 24 hours and to take extra torsemide 20 mg in case of more than 3 pound weight gain within 24 hours..    2.  Status post renal transplant with CKD stage TREVOR: Follows with nephrology.    3.  Permanent A. fib on warfarin and rate control: No prior history of stroke.  Patient had a lot of questions today regarding atrial fibrillation.  Patient was wondering if he is a candidate for cardioversion.  I explained to him in detail that his atrial fibrillation is permanent and neither ablation nor cardioversion will convert his rhythm to sinus rhythm.  He understood.  Recommended to continue current treatment.    No medication changes today.  Return to clinic 3 months.    A total of 30 minutes spent face-toface with greater than 50% of the time spent in counseling and coordinating cares of the issues above.     Please donot hesitate to contact me if you have any questions or concerns. Again, thank you for allowing me to participate in the care of your patient.    Cj CASTILLO MD  [facility] Division of Cardiology  Pager [phone]

## 2020-01-14 LAB — INTERPRETATION ECG - MUSE: NORMAL

## 2020-01-24 ENCOUNTER — TELEPHONE (OUTPATIENT)
Dept: TRANSPLANT | Facility: CLINIC | Age: 80
End: 2020-01-24

## 2020-01-24 DIAGNOSIS — Z94.0 KIDNEY TRANSPLANTED: Primary | ICD-10-CM

## 2020-01-24 NOTE — TELEPHONE ENCOUNTER
Issue:    Matheus White Sr. Due for repeat labs after diuretic and K adjustment this month.    Plan/LPN task:    Please call Matheus White Sr to have him check tx labs next week. Please place lab orders. Thanks.     Please encourage him contact cardiology clinic with changes in fluid/weight gain or loss. (He had cardiology appt this month).

## 2020-01-24 NOTE — LETTER
PHYSICIAN ORDERS      DATE & TIME ISSUED: 2020 6:16 PM  PATIENT NAME: Matheus White .   : 1940     [facility] MR# [if applicable]: [id number]     DIAGNOSIS:  Kidney transplant   ICD-10 CODE: Z94.0      Please complete the following labs   Cyclosporine level   BMP  CBC with platelets and differential    Any questions please call: [phone] option 5    Please fax results to [phone].      Josias Villarreal

## 2020-01-25 NOTE — TELEPHONE ENCOUNTER
Call placed to patient. Patient v\u to repeat labs next week and f\u with cardiology with any fluid/ lavweight changes. Order sent

## 2020-01-27 ENCOUNTER — HOSPITAL ENCOUNTER (OUTPATIENT)
Dept: LAB | Facility: CLINIC | Age: 80
Discharge: HOME | End: 2020-01-27
Attending: INTERNAL MEDICINE | Admitting: INTERNAL MEDICINE
Payer: MEDICARE

## 2020-01-27 DIAGNOSIS — Z79.899 ENCOUNTER FOR LONG-TERM CURRENT USE OF MEDICATION: ICD-10-CM

## 2020-01-27 DIAGNOSIS — Z48.298 AFTERCARE FOLLOWING ORGAN TRANSPLANT: ICD-10-CM

## 2020-01-27 DIAGNOSIS — Z94.0 KIDNEY REPLACED BY TRANSPLANT: ICD-10-CM

## 2020-01-27 LAB
ANION GAP SERPL CALCULATED.3IONS-SCNC: 6 MMOL/L (ref 3–14)
BUN SERPL-MCNC: 33 MG/DL (ref 7–30)
CALCIUM SERPL-MCNC: 9.2 MG/DL (ref 8.5–10.1)
CHLORIDE SERPL-SCNC: 106 MMOL/L (ref 94–109)
CO2 SERPL-SCNC: 31 MMOL/L (ref 20–32)
CREAT SERPL-MCNC: 2.66 MG/DL (ref 0.66–1.25)
CYCLOSPORINE BLD LC/MS/MS-MCNC: 110 UG/L (ref 50–400)
ERYTHROCYTE [DISTWIDTH] IN BLOOD BY AUTOMATED COUNT: 13 % (ref 10–15)
GFR SERPL CREATININE-BSD FRML MDRD: 22 ML/MIN/{1.73_M2}
GLUCOSE SERPL-MCNC: 96 MG/DL (ref 70–99)
HCT VFR BLD AUTO: 33.2 % (ref 40–53)
HGB BLD-MCNC: 10.4 G/DL (ref 13.3–17.7)
MCH RBC QN AUTO: 29.4 PG (ref 26.5–33)
MCHC RBC AUTO-ENTMCNC: 31.3 G/DL (ref 31.5–36.5)
MCV RBC AUTO: 94 FL (ref 78–100)
PLATELET # BLD AUTO: 200 10E9/L (ref 150–450)
POTASSIUM SERPL-SCNC: 3.8 MMOL/L (ref 3.4–5.3)
RBC # BLD AUTO: 3.54 10E12/L (ref 4.4–5.9)
SODIUM SERPL-SCNC: 143 MMOL/L (ref 133–144)
TME LAST DOSE: NORMAL H
WBC # BLD AUTO: 5 10E9/L (ref 4–11)

## 2020-01-27 PROCEDURE — 36415 COLL VENOUS BLD VENIPUNCTURE: CPT | Performed by: INTERNAL MEDICINE

## 2020-01-27 PROCEDURE — 85027 COMPLETE CBC AUTOMATED: CPT | Performed by: INTERNAL MEDICINE

## 2020-01-27 PROCEDURE — 80158 DRUG ASSAY CYCLOSPORINE: CPT | Performed by: INTERNAL MEDICINE

## 2020-01-27 PROCEDURE — 80048 BASIC METABOLIC PNL TOTAL CA: CPT | Performed by: INTERNAL MEDICINE

## 2020-02-05 NOTE — TELEPHONE ENCOUNTER
Patient Call: General  Route to LPN    Reason for call: Pt called for his lab results  Wonders if they were reviewed with Dr Villarreal  Are there any med changes  What are the next plans      Call back needed? Yes    Return Call Needed  Same as documented in contacts section  When to return call?: Greater than one day: Route standard priority

## 2020-02-06 NOTE — TELEPHONE ENCOUNTER
Matheus reports increased swelling in lower extremities, but no weight change.     Please review:    Can we increase diuretic?  He wants to consolidate K supplements and take one pill, can we change prescription?  Matheus states cardiology did not need further follow up, who do you want to manage diuretics moving forward?

## 2020-02-07 NOTE — TELEPHONE ENCOUNTER
Unable to review with MD this week. Matheus v/u, no med changes now. He will repeat bmp again next week and will reassess med changes.

## 2020-02-10 ENCOUNTER — TELEPHONE (OUTPATIENT)
Dept: TRANSPLANT | Facility: CLINIC | Age: 80
End: 2020-02-10

## 2020-02-10 ENCOUNTER — HOSPITAL ENCOUNTER (OUTPATIENT)
Dept: LAB | Facility: CLINIC | Age: 80
Discharge: HOME | End: 2020-02-10
Attending: INTERNAL MEDICINE | Admitting: INTERNAL MEDICINE
Payer: MEDICARE

## 2020-02-10 DIAGNOSIS — Z48.298 AFTERCARE FOLLOWING ORGAN TRANSPLANT: ICD-10-CM

## 2020-02-10 DIAGNOSIS — Z94.0 KIDNEY TRANSPLANTED: Primary | ICD-10-CM

## 2020-02-10 DIAGNOSIS — Z79.899 ENCOUNTER FOR LONG-TERM CURRENT USE OF MEDICATION: ICD-10-CM

## 2020-02-10 DIAGNOSIS — Z94.0 KIDNEY REPLACED BY TRANSPLANT: ICD-10-CM

## 2020-02-10 DIAGNOSIS — Z94.0 KIDNEY TRANSPLANTED: ICD-10-CM

## 2020-02-10 LAB
ANION GAP SERPL CALCULATED.3IONS-SCNC: 7 MMOL/L (ref 3–14)
BUN SERPL-MCNC: 31 MG/DL (ref 7–30)
CALCIUM SERPL-MCNC: 8.8 MG/DL (ref 8.5–10.1)
CHLORIDE SERPL-SCNC: 106 MMOL/L (ref 94–109)
CO2 SERPL-SCNC: 30 MMOL/L (ref 20–32)
CREAT SERPL-MCNC: 2.62 MG/DL (ref 0.66–1.25)
CYCLOSPORINE BLD LC/MS/MS-MCNC: 82 UG/L (ref 50–400)
ERYTHROCYTE [DISTWIDTH] IN BLOOD BY AUTOMATED COUNT: 13.2 % (ref 10–15)
GFR SERPL CREATININE-BSD FRML MDRD: 22 ML/MIN/{1.73_M2}
GLUCOSE SERPL-MCNC: 91 MG/DL (ref 70–99)
HCT VFR BLD AUTO: 32.3 % (ref 40–53)
HGB BLD-MCNC: 10.1 G/DL (ref 13.3–17.7)
MCH RBC QN AUTO: 29.2 PG (ref 26.5–33)
MCHC RBC AUTO-ENTMCNC: 31.3 G/DL (ref 31.5–36.5)
MCV RBC AUTO: 93 FL (ref 78–100)
PLATELET # BLD AUTO: 223 10E9/L (ref 150–450)
POTASSIUM SERPL-SCNC: 3 MMOL/L (ref 3.4–5.3)
RBC # BLD AUTO: 3.46 10E12/L (ref 4.4–5.9)
SODIUM SERPL-SCNC: 143 MMOL/L (ref 133–144)
TME LAST DOSE: NORMAL H
WBC # BLD AUTO: 5.4 10E9/L (ref 4–11)

## 2020-02-10 PROCEDURE — 80158 DRUG ASSAY CYCLOSPORINE: CPT | Performed by: INTERNAL MEDICINE

## 2020-02-10 PROCEDURE — 80048 BASIC METABOLIC PNL TOTAL CA: CPT | Performed by: INTERNAL MEDICINE

## 2020-02-10 PROCEDURE — 36415 COLL VENOUS BLD VENIPUNCTURE: CPT | Performed by: INTERNAL MEDICINE

## 2020-02-10 PROCEDURE — 85027 COMPLETE CBC AUTOMATED: CPT | Performed by: INTERNAL MEDICINE

## 2020-02-10 RX ORDER — POTASSIUM CITRATE 15 MEQ/1
30 TABLET, EXTENDED RELEASE ORAL DAILY
Qty: 60 TABLET | Refills: 3 | Status: SHIPPED | OUTPATIENT
Start: 2020-02-10 | End: 2023-01-01

## 2020-02-10 NOTE — TELEPHONE ENCOUNTER
ISSUE:  -Potassium 3.0  -see previous RNCC    PLAN:  -Reviewed with Dr. Villarreal who gave orders to increase potassium to 30 mEq daily.   -Per Dr. Villarreal, nephrology can managed diuretics  -lab recheck in 1-2 weeks    OUTCOME:  Message left on pt's VM with above instructions.   -Rx sent to pt's pharmacy  -lab orders sent to pt's lab

## 2020-02-10 NOTE — LETTER
PHYSICIAN ORDERS      DATE & TIME ISSUED: February 10, 2020 2:43 PM  PATIENT NAME: Matheus White .   : 1940     [facility] MR# [if applicable]: [id number]     DIAGNOSIS:  Kidney transplant   ICD-10 CODE: Z94.0      Please complete the following labs within 1-2 weeks  BMP      Any questions please call: [phone] option 5    Please fax results to [phone].      Josias Villarreal

## 2020-02-13 ENCOUNTER — CARE COORDINATION (OUTPATIENT)
Dept: NEPHROLOGY | Facility: CLINIC | Age: 80
End: 2020-02-13

## 2020-02-14 NOTE — TELEPHONE ENCOUNTER
Matheus confirms his potassium dose increase. He has started 30 mEq daily and will repeat labs Monday.

## 2020-02-14 NOTE — PROGRESS NOTES
Nephrology Note: Nursing Outreach Encounter    REASON FOR CALL:                                                      REASON FOR CALL: Care Coordination                                          SITUATION/BACKROUND:                                                    Patient is being treated for transplant, edema.      ASSESSMENT:                                                      Spoke with Matheus. His edema remains unchanged, despite use of torsemide and compression stockings. He would like to potentially increase torsemide, currently takes 20mg daily. Will update team.    He is feeling a bit overwhelmed with all of his doctors and doesn't know who to go to for what. He's going to try setting up an appointment with his local nephrologist Dr. Parkinson. He may try to go through her for kidney care, but doesn't know yet. He'll keep me in the loop. For now, he wants to continue with us managing edema until after he sees her.     PLAN:                                                      Follow Up:   Route to provider to further advise    Per Dr. Villarreal: It will be too much, let him stay the course. Continue on 20 mg daily    Patient verbalized understanding and will contact the clinic with any further questions or concerns.     Kym Persaud RN

## 2020-02-17 ENCOUNTER — HOSPITAL ENCOUNTER (OUTPATIENT)
Dept: LAB | Facility: CLINIC | Age: 80
Discharge: HOME | End: 2020-02-17
Attending: INTERNAL MEDICINE | Admitting: INTERNAL MEDICINE
Payer: MEDICARE

## 2020-02-17 DIAGNOSIS — Z94.0 KIDNEY TRANSPLANTED: ICD-10-CM

## 2020-02-17 LAB
ANION GAP SERPL CALCULATED.3IONS-SCNC: 5 MMOL/L (ref 3–14)
BUN SERPL-MCNC: 35 MG/DL (ref 7–30)
CALCIUM SERPL-MCNC: 9.2 MG/DL (ref 8.5–10.1)
CHLORIDE SERPL-SCNC: 106 MMOL/L (ref 94–109)
CO2 SERPL-SCNC: 30 MMOL/L (ref 20–32)
CREAT SERPL-MCNC: 2.99 MG/DL (ref 0.66–1.25)
GFR SERPL CREATININE-BSD FRML MDRD: 19 ML/MIN/{1.73_M2}
GLUCOSE SERPL-MCNC: 94 MG/DL (ref 70–99)
POTASSIUM SERPL-SCNC: 3.5 MMOL/L (ref 3.4–5.3)
SODIUM SERPL-SCNC: 141 MMOL/L (ref 133–144)

## 2020-02-17 PROCEDURE — 36415 COLL VENOUS BLD VENIPUNCTURE: CPT | Performed by: INTERNAL MEDICINE

## 2020-02-17 PROCEDURE — 80048 BASIC METABOLIC PNL TOTAL CA: CPT | Performed by: INTERNAL MEDICINE

## 2020-02-18 ENCOUNTER — TELEPHONE (OUTPATIENT)
Dept: TRANSPLANT | Facility: CLINIC | Age: 80
End: 2020-02-18

## 2020-02-18 NOTE — TELEPHONE ENCOUNTER
ISSUE:  -Creatinine 2.99  -increased potassium last week to 30 mEq daily (K+ 3.5 now)  -see previous notes regarding diuretics    PLAN:  -Call Matheus White Sr. Any illness? N/V/D? New medications? Changes to diuretics?  -review Cr bump with provider  -Hydrate, repeat labs      OUTCOME:  -call placed to pt, message left on VM for pt to return call to transplant office

## 2020-02-24 ENCOUNTER — TELEPHONE (OUTPATIENT)
Dept: NEPHROLOGY | Facility: CLINIC | Age: 80
End: 2020-02-24

## 2020-02-24 NOTE — TELEPHONE ENCOUNTER
Phil, MD Thomas Jones Meghan M, RN             Yes    Previous Messages      ----- Message -----   From: Paris Benoit, RN   Sent: 2/19/2020   9:10 AM CST   To: Josias Villarreal MD, Kym Persaud, GALLITO     Please confirm, do you want him back to 20 mg daily?         [facility] reviewed recommendation with Matheus White, huber v/u and will repeat labs next week.

## 2020-02-24 NOTE — TELEPHONE ENCOUNTER
Left message for patient stating that BMP results from 2/17/20 are available on EAR[facility].  Instructed patient return call to txp office with questions/concerns.

## 2020-02-24 NOTE — TELEPHONE ENCOUNTER
[facility] Call Center    Phone Message    May a detailed message be left on voicemail: yes     Reason for Call: Requesting Results   Name/type of test: lab results  Date of test: 2-17  Was test done at a location other than [facility] (Please fill in the location if not [facility])?: No      Action Taken: Message routed to:  [facility] ([facility]): Neph    Travel Screening: Not Applicable

## 2020-03-03 ENCOUNTER — CARE COORDINATION (OUTPATIENT)
Dept: NEPHROLOGY | Facility: CLINIC | Age: 80
End: 2020-03-03

## 2020-03-03 NOTE — PROGRESS NOTES
Nephrology Note: Nursing Outreach Encounter    REASON FOR CALL:                                                      REASON FOR CALL: Blood Pressure Follow Up                                          SITUATION/BACKROUND:                                                    Patient is being treated for HTN.      ASSESSMENT:                                                      Called Matheus to check in. He was able to see Dr. Parkinson (his local nephrologist) last week, and she increased his torsemide. She will be managing this going forward. He said he has not seen much improvement yet, but will be following up with her in a few weeks. Denied questions or concerns for us today.    Uremic Symptoms: Yes,  Edema: Yes;       PLAN:                                                      Follow Up:   Patient to follow up as scheduled at next apt  Patient to call/MyChart message with updates     Patient verbalized understanding and will contact the clinic with any further questions or concerns.     Kym Persaud RN

## 2020-03-16 ENCOUNTER — HOSPITAL ENCOUNTER (EMERGENCY)
Facility: CLINIC | Age: 80
Discharge: HOME | End: 2020-03-17
Attending: EMERGENCY MEDICINE | Admitting: EMERGENCY MEDICINE
Payer: MEDICARE

## 2020-03-16 VITALS
DIASTOLIC BLOOD PRESSURE: 91 MMHG | TEMPERATURE: 97.2 F | OXYGEN SATURATION: 97 % | HEART RATE: 80 BPM | BODY MASS INDEX: 25.8 KG/M2 | RESPIRATION RATE: 15 BRPM | SYSTOLIC BLOOD PRESSURE: 154 MMHG | WEIGHT: 185 LBS

## 2020-03-16 DIAGNOSIS — R60.0 PERIPHERAL EDEMA: ICD-10-CM

## 2020-03-16 LAB
ALBUMIN SERPL-MCNC: 3.9 G/DL (ref 3.4–5)
ALP SERPL-CCNC: 262 U/L (ref 40–150)
ALT SERPL W P-5'-P-CCNC: 17 U/L (ref 0–70)
ANION GAP SERPL CALCULATED.3IONS-SCNC: 7 MMOL/L (ref 3–14)
AST SERPL W P-5'-P-CCNC: 20 U/L (ref 0–45)
BASOPHILS # BLD AUTO: 0 10E9/L (ref 0–0.2)
BASOPHILS NFR BLD AUTO: 0.7 %
BILIRUB SERPL-MCNC: 1.3 MG/DL (ref 0.2–1.3)
BUN SERPL-MCNC: 34 MG/DL (ref 7–30)
CALCIUM SERPL-MCNC: 8.8 MG/DL (ref 8.5–10.1)
CHLORIDE SERPL-SCNC: 109 MMOL/L (ref 94–109)
CO2 SERPL-SCNC: 27 MMOL/L (ref 20–32)
CREAT SERPL-MCNC: 2.68 MG/DL (ref 0.66–1.25)
DIFFERENTIAL METHOD BLD: ABNORMAL
EOSINOPHIL # BLD AUTO: 0.2 10E9/L (ref 0–0.7)
EOSINOPHIL NFR BLD AUTO: 2.7 %
ERYTHROCYTE [DISTWIDTH] IN BLOOD BY AUTOMATED COUNT: 14.3 % (ref 10–15)
GFR SERPL CREATININE-BSD FRML MDRD: 21 ML/MIN/{1.73_M2}
GLUCOSE SERPL-MCNC: 103 MG/DL (ref 70–99)
HCT VFR BLD AUTO: 34.8 % (ref 40–53)
HGB BLD-MCNC: 10.7 G/DL (ref 13.3–17.7)
IMM GRANULOCYTES # BLD: 0 10E9/L (ref 0–0.4)
IMM GRANULOCYTES NFR BLD: 0.3 %
INR PPP: 2.64 (ref 0.86–1.14)
LYMPHOCYTES # BLD AUTO: 0.5 10E9/L (ref 0.8–5.3)
LYMPHOCYTES NFR BLD AUTO: 8.9 %
MCH RBC QN AUTO: 29.1 PG (ref 26.5–33)
MCHC RBC AUTO-ENTMCNC: 30.7 G/DL (ref 31.5–36.5)
MCV RBC AUTO: 95 FL (ref 78–100)
MONOCYTES # BLD AUTO: 0.7 10E9/L (ref 0–1.3)
MONOCYTES NFR BLD AUTO: 11.5 %
NEUTROPHILS # BLD AUTO: 4.4 10E9/L (ref 1.6–8.3)
NEUTROPHILS NFR BLD AUTO: 75.9 %
NRBC # BLD AUTO: 0 10*3/UL
NRBC BLD AUTO-RTO: 0 /100
NT-PROBNP SERPL-MCNC: 6952 PG/ML (ref 0–1800)
PLATELET # BLD AUTO: 233 10E9/L (ref 150–450)
POTASSIUM SERPL-SCNC: 3.6 MMOL/L (ref 3.4–5.3)
PROT SERPL-MCNC: 7.7 G/DL (ref 6.8–8.8)
RBC # BLD AUTO: 3.68 10E12/L (ref 4.4–5.9)
SODIUM SERPL-SCNC: 143 MMOL/L (ref 133–144)
TROPONIN I SERPL-MCNC: 0.04 UG/L (ref 0–0.04)
WBC # BLD AUTO: 5.8 10E9/L (ref 4–11)

## 2020-03-16 PROCEDURE — 85025 COMPLETE CBC W/AUTO DIFF WBC: CPT | Performed by: EMERGENCY MEDICINE

## 2020-03-16 PROCEDURE — 83880 ASSAY OF NATRIURETIC PEPTIDE: CPT | Performed by: EMERGENCY MEDICINE

## 2020-03-16 PROCEDURE — 93005 ELECTROCARDIOGRAM TRACING: CPT

## 2020-03-16 PROCEDURE — 84484 ASSAY OF TROPONIN QUANT: CPT | Performed by: EMERGENCY MEDICINE

## 2020-03-16 PROCEDURE — 93005 ELECTROCARDIOGRAM TRACING: CPT | Mod: 76

## 2020-03-16 PROCEDURE — 99285 EMERGENCY DEPT VISIT HI MDM: CPT | Mod: 25

## 2020-03-16 PROCEDURE — 80053 COMPREHEN METABOLIC PANEL: CPT | Performed by: EMERGENCY MEDICINE

## 2020-03-16 PROCEDURE — 85610 PROTHROMBIN TIME: CPT | Performed by: EMERGENCY MEDICINE

## 2020-03-16 ASSESSMENT — ENCOUNTER SYMPTOMS
FEVER: 0
SHORTNESS OF BREATH: 0
DIFFICULTY URINATING: 0

## 2020-03-16 NOTE — ED AVS SNAPSHOT
[facility] Emergency Department  [address]  [facility] [address]  Phone:  [phone]  Fax:  [phone]                                    Matheus White Sr.   MRN: [id number]    Department:  [facility] Emergency Department   Date of Visit:  3/16/2020           After Visit Summary Signature Page    I have received my discharge instructions, and my questions have been answered. I have discussed any challenges I see with this plan with the nurse or doctor.    ..........................................................................................................................................  Patient/Patient Representative Signature      ..........................................................................................................................................  Patient Representative Print Name and Relationship to Patient    ..................................................               ................................................  Date                                   Time    ..........................................................................................................................................  Reviewed by Signature/Title    ...................................................              ..............................................  Date                                               Time          22EPIC Rev 08/18

## 2020-03-17 LAB
INTERPRETATION ECG - MUSE: NORMAL

## 2020-03-17 NOTE — ED PROVIDER NOTES
History     Chief Complaint:    Leg Swelling    HPI   Matheus White Sr. is a 80 year old male with a history of hypertension and atrial fibrillation s/p right renal transplant on coumadin who presents with leg swelling. The patient states that he was admitted to the hospital on 10/11/19 after a bed frame fell onto his left lower extremity and was found to have cellulitis in the extremity. He was discharged on 10/22/19 and started on oral levofloxacin and doxycycline. He states that he has had gradually increasing swelling in his left lower extremity first, but then also started to have swelling in his right lower extremity as well. He reports that his swelling in his lower extremities has been getting worse and also noticed swelling in his penis and scrotum last night. He is still able to urinate without any problems. He denies any pain in his legs, shortness of breath, chest pain, fevers, or abdominal swelling. Of note, he reports that he has been helping his daughter move all day today and he did not take torsemide until this evening when he usually takes it in the morning.     Cardiac/PE/DVT Risk Factors:  The patient has a history of hypertension, but not hyperlipidemia, diabetes, or smoking. He reports a family history of emphysema. The patient denies any personal or familial history of PE, DVT, or clotting disorder. The patient reports no recent travel, surgery, or other immobilizations.     Allergies:  No Known Drug Allergies     Medications:    Cyclosporine modified  Cardizem CD  Gabapentin  Mycophenolate  Potassium citrate  Demadex   Coumadin   Clindamycin lotion   Torsemide     Past Medical History:    Atrial fibrillation  CKD  Hypertension  History of renal transplant  Acute kidney injury  Dysphagia  Skin cancer  Esophageal stricture   Cellulitis of left lower extremity     Past Surgical History:    As transplant, prep cadaver renal graft  Fine needle aspiration with ultrasound  Percutaneous  biopsy kidney   Kidney transplant, right  Right nephrectomy   Placement/revision arteriovenous shunt    Family History:    Emphysema - father  Emphysema - sister    Social History:  Negative for tobacco use.  Positive for alcohol use - 12 oz of beer/week.  Negative for drug use.  Marital Status:  Single [1]     Review of Systems   Constitutional: Negative for fever.   Respiratory: Negative for shortness of breath.    Cardiovascular: Positive for leg swelling (bilateral ). Negative for chest pain.   Gastrointestinal:        No abdominal swelling   Genitourinary: Positive for penile swelling and scrotal swelling. Negative for difficulty urinating.   All other systems reviewed and are negative.        Physical Exam     Patient Vitals for the past 24 hrs:   BP Temp Temp src Pulse Heart Rate Resp SpO2 Weight   03/16/20 2345 (!) 154/91 -- -- 80 87 15 97 % --   03/16/20 2320 (!) 160/104 -- -- -- 93 23 97 % --   03/16/20 2315 -- -- -- 87 101 19 95 % --   03/16/20 2300 (!) 168/103 -- -- 89 82 23 97 % --   03/16/20 2215 (!) 161/99 -- -- 77 -- -- 97 % --   03/16/20 2107 -- 97.2  F (36.2  C) Temporal 87 87 18 97 % 83.9 kg (185 lb)        Physical Exam  General: Patient is alert and interactive when I enter the room  Head:  The scalp, face, and head appear normal  Eyes:  Conjunctivae are normal  ENT:    The nose is normal    Pinnae are normal    External acoustic canals are normal  Neck:  Trachea midline  CV:  Pulses are normal, RRR    Resp:  No respiratory distress, CTAB   Abdomen:      Soft, non-tender, non-distended  Musc:  Normal muscular tone    No major joint effusions    No asymmetric leg swelling    3+ pitting edema to groin   Skin:  No rash or lesions noted  Neuro:  Speech is normal and fluent. Face is symmetric.     Moving all extremities well.   Psych: Awake. Alert.  Normal affect.  Appropriate interactions.    Emergency Department Course   ECG:  ECG taken at 2201, ECG read at 2208  Undetermined rhythm  Septal infarct  , age undetermined  ST & T wave abnormality, consider lateral ischemia  Abnormal ECG  Vent. rate 85 BPM  VT interval * ms  QRS duration 124 ms  QT/QTc 408/485 ms  P-R-T axes * 68 120    ECG:  ECG taken at 2341, ECG read at 2341  Atrial fibrillation with premature ventricular or aberrantly conducted complexes  Nonspecific intraventricular conduction delay  ST & T wave abnormality, consider lateral ischemia   Abnormal ECG  Rate 77 bpm. VT interval * ms. QRS duration 22 ms. QT/QTc 416/470 ms. P-R-T axes * 61 127.     Laboratory:  Laboratory findings were communicated with the patient who voiced understanding of the findings.    CBC:  WBC 5.8, HGB 10.7 (L), , o/w WNL     CMP: Glucose 103 (H), bun 34 (H), GFR 21 (L), alkphos 262 (H), o/w WNL (Creatinine: 2.68 (H))     INR: 2.64 (H)    Nt probnp inpatient: 6,952 (H)    Troponin(2143):  0.036    Emergency Department Course:  Past medical records, nursing notes, and vitals reviewed.    2200 I performed an exam of the patient as documented above.    IV was inserted and blood was drawn for laboratory testing, results above.      2350 Patient rechecked and updated.       Findings and plan explained to the Patient. Patient discharged home with instructions regarding supportive care, medications, and reasons to return. The importance of close follow-up was reviewed.       Impression & Plan     Medical Decision Making:  Matheus Sainzsheila Maldonado. is a 80 year old male with a history of HTN, ESRD s/p transplant and atrial fibrillation  who presents to the emergency department today with leg swelling.  Patient has had an issue with leg swelling in the past as he has a history of heart failure with preserved ejection fracture and progressive renal failure in the setting of a transplant.  He does have extensive swelling on exam.  After evaluation he told me he actually did not take his furosemide this morning and did not take it until about an hour before he came in.  During  his ED stay his swelling actually improved and he urinated multiple times.  His furosemide actually was increased to 40 mg daily last month.  He denies any respiratory symptoms and he otherwise appears well.  His blood work is unremarkable.  I discussed with him that we could bring him in in the hospital for IV diuresis however he prefer to go home.  We had a discussion about adjusting his furosemide but he feels like his swelling is actually much improved and feels like he could just go home and follow-up with his nephrologist as an appointment on Wednesday.  I feel like this is a reasonable plan so patient discharged.      Discharge Diagnosis:    ICD-10-CM    1. Peripheral edema  R60.9        Disposition:  The patient is discharged to home.    Scribe Disclosure:  I, Justyn Santoyo, am serving as a scribe at 10:25 PM on 3/16/2020 to document services personally performed by Aicha Silva MD based on my observations and the provider's statements to me.    3/16/2020   [facility] EMERGENCY DEPARTMENT       Aicha Silva MD  03/17/20 0451

## 2020-03-17 NOTE — DISCHARGE INSTRUCTIONS
Here torsemide as prescribed.  Follow-up with your nephrologist and cardiologist.  Return with any worsening shortness of breath or if you feel like the swelling is continuing to getting worse despite your medications.

## 2020-04-13 ENCOUNTER — VIRTUAL VISIT (OUTPATIENT)
Dept: CARDIOLOGY | Facility: CLINIC | Age: 80
End: 2020-04-13
Attending: INTERNAL MEDICINE
Payer: MEDICARE

## 2020-04-13 DIAGNOSIS — I51.9 LEFT VENTRICULAR SYSTOLIC DYSFUNCTION: Primary | ICD-10-CM

## 2020-04-13 DIAGNOSIS — N18.4 CHRONIC KIDNEY DISEASE, STAGE IV (SEVERE) (H): ICD-10-CM

## 2020-04-13 DIAGNOSIS — Z94.0 KIDNEY TRANSPLANTED: ICD-10-CM

## 2020-04-13 DIAGNOSIS — I48.21 PERMANENT ATRIAL FIBRILLATION (H): ICD-10-CM

## 2020-04-13 PROCEDURE — 99443 ZZC PHYSICIAN TELEPHONE EVALUATION 21-30 MIN: CPT | Performed by: INTERNAL MEDICINE

## 2020-04-13 RX ORDER — TORSEMIDE 20 MG/1
40 TABLET ORAL EVERY MORNING
Qty: 30 TABLET | Refills: 2 | COMMUNITY
Start: 2020-04-13

## 2020-04-23 LAB — COPATH REPORT: NORMAL

## 2020-10-07 NOTE — PROGRESS NOTES
"Matheus White Sr. is a 80 year old male who is being evaluated via a billable telephone visit.      The patient has been notified of following:     \"This telephone visit will be conducted via a call between you and your physician/provider. We have found that certain health care needs can be provided without the need for a physical exam.  This service lets us provide the care you need with a short phone conversation.  If a prescription is necessary we can send it directly to your pharmacy.  If lab work is needed we can place an order for that and you can then stop by our lab to have the test done at a later time.    Telephone visits are billed at different rates depending on your insurance coverage. During this emergency period, for some insurers they may be billed the same as an in-person visit.  Please reach out to your insurance provider with any questions.    If during the course of the call the physician/provider feels a telephone visit is not appropriate, you will not be charged for this service.\"    Patient has given verbal consent for Telephone visit?  Yes    HPI:  Mr. Matheus White Sr. is a 79 year old  male with PMH significant for s/p renal transplant 2009, CKD stage IV, permanent afib on warfarin, nonischemic cardiomyopathy with EF of 40 to 45% and HTN.    I have seen patient last time in January of this year due to mildly reduced LV systolic function at 40 to 45%.  Of note patient does not have prior history of CAD (no prior coronary angiogram).      Patient reports overall feeling well.  Denies chest pain, shortness of breath, cough, fever, palpitations, or lower extremity swelling.      Patient is on warfarin for permanent atrial fibrillation.  No bleeding side effect from warfarin.    Patient is currently on torsemide 40 mg twice daily, warfarin, diltiazem 180 daily, potassium, gabapentin cyclosporine and mycophenolate.    Reviewing patient's medications I have seen he is on a higher " [FreeTextEntry1] : LLQ abd pain, frequent stools- start probiotic, dicyclomine. CT abd/pelvis pending dose of torsemide now 40 mg twice daily.  Until a week ago he was also taking metolazone as well.  His current weight is 165 pounds.  Patient reports no swelling in legs and feeling better.    Patient follows with nephrology with regards to CKD.    I reviewed patient's labs which show mild anemia at 10.7 (normal platelet count), GFR 21 with creatinine of 2.6 (stable compared to prior results) and normal electrolytes.    Current Outpatient Medications   Medication     cycloSPORINE modified (GENERIC EQUIVALENT) 25 MG capsule     diltiazem ER COATED BEADS (CARDIZEM CD/CARTIA XT) 180 MG 24 hr capsule     gabapentin (NEURONTIN) 300 MG capsule     mycophenolate (GENERIC EQUIVALENT) 500 MG tablet     order for DME     potassium citrate 15 MEQ (1620 MG) TBCR     torsemide (DEMADEX) 20 MG tablet     warfarin ANTICOAGULANT (COUMADIN) 3 MG tablet     No current facility-administered medications for this visit.         Echocardiogram 12/12/2019  There is mild diffuse global LV hypokinesis. The LVEF is difficult to assess  in the setting of atrial fibrillaion and variable R-R interval, though  visually estimated LVEF is 40-45%.  Mild to moderate concentric wall thickening consistent with left ventricular  hypertrophy is present. Consider infiltrative CM such as amyloid.  Global right ventricular function is mildly reduced.  Moderate tricuspid insufficiency is present.  Estimated pulmonary artery systolic pressure is suggestive of moderate  pulmonary hypertension.  The ascending aorta is mildly dilated measuring 4.1 cm (2.16 cm/m2 when  indexed to BSA).  No pericardial effusion.     In direct visual comparison to TTE dated 4/6/2009, the LVEF appears similar.  The estimated PASP is higher on the present study.      Assessment and plan:  Patient appear euvolemic and asymptomatic from cardiac standpoint.  No cardiac symptoms at the moment.    I did not make any medication changes today.    With regards to LV systolic dysfunction  with EF of 40 to 45%, I recommend a follow-up in cardiology in a year.    Phone call duration:  10minutes    Cj CASTILLO MD  [facility] Division of Cardiology  Pager [phone]

## 2021-04-19 ENCOUNTER — OFFICE VISIT (OUTPATIENT)
Dept: CARDIOLOGY | Facility: CLINIC | Age: 81
End: 2021-04-19
Attending: INTERNAL MEDICINE
Payer: MEDICARE

## 2021-04-19 VITALS
OXYGEN SATURATION: 95 % | WEIGHT: 182 LBS | SYSTOLIC BLOOD PRESSURE: 138 MMHG | HEIGHT: 71 IN | DIASTOLIC BLOOD PRESSURE: 63 MMHG | HEART RATE: 57 BPM | BODY MASS INDEX: 25.48 KG/M2

## 2021-04-19 DIAGNOSIS — N18.9 HISTORY OF ANEMIA DUE TO CKD: ICD-10-CM

## 2021-04-19 DIAGNOSIS — N18.4 CHRONIC KIDNEY DISEASE, STAGE IV (SEVERE) (H): ICD-10-CM

## 2021-04-19 DIAGNOSIS — I50.20 HEART FAILURE WITH REDUCED EJECTION FRACTION, NYHA CLASS II (H): Primary | ICD-10-CM

## 2021-04-19 DIAGNOSIS — I48.21 PERMANENT ATRIAL FIBRILLATION (H): ICD-10-CM

## 2021-04-19 DIAGNOSIS — Z86.2 HISTORY OF ANEMIA DUE TO CKD: ICD-10-CM

## 2021-04-19 PROBLEM — N18.30 CHRONIC KIDNEY DISEASE, STAGE 3 (H): Status: ACTIVE | Noted: 2021-04-19

## 2021-04-19 PROCEDURE — 99215 OFFICE O/P EST HI 40 MIN: CPT | Performed by: INTERNAL MEDICINE

## 2021-04-19 PROCEDURE — G0463 HOSPITAL OUTPT CLINIC VISIT: HCPCS

## 2021-04-19 RX ORDER — MULTIVITAMIN
1 TABLET ORAL
COMMUNITY
Start: 2019-08-21 | End: 2023-01-01

## 2021-04-19 ASSESSMENT — MIFFLIN-ST. JEOR: SCORE: 1547.68

## 2021-04-19 ASSESSMENT — PAIN SCALES - GENERAL: PAINLEVEL: NO PAIN (0)

## 2021-04-19 NOTE — PROGRESS NOTES
Referring provider: Josias Villarreal MD    Chief complaint: Lower extremity swelling    HPI: Mr. Matheus White Sr. is a 79 year old  male with PMH significant for s/p renal transplant 2009, CKD stage IV with recent DEB, permanent afib on warfarin, nonischemic cardiomyopathy with EF of 40 to 45% and HTN    Patient is being seen today for pretibial edema.    The patient was recently hospitalized on 10/11/2019 for lower extremity cellulitis.  The patient developed DEB on CKD stage III.  Patient's creatinine increased to 5 which gradually decreased to 2.6 now.  Patient reports significant swelling of lower legs when he was in the hospital.  He reports left lower extremity swelling for several years which is not new to him.  He continues to have right lower extremity swelling which is new since the hospital admission.  Patient has been on furosemide which was recently changed to torsemide by Dr. Villarreal.  Since then the patient reports improvement in swelling.    Patient denies chest pain, palpitations, or syncope.  He reports imbalance when he first gets up.  He reports PND and orthopnea when he had significant water retention which makes him sit up to sleep.  Patient is currently able to do his daily activities without significant shortness of breath.  He monitors his weight at home which is usually 165-170 pounds.    The patient has history of atrial fibrillation since 2009 on rate control and warfarin.  No history of stroke.  He underwent one-time DC cardioversion in 2009 which was not successful.  Since then he is on rate control.    No prior history of coronary artery disease.  The patient has recently had an echocardiogram on 12/12/2019 which I have personally reviewed.  LVEF appears 40 to 45% similar to 2009 echocardiogram.  RV function and size are normal.  Moderate tricuspid insufficiency and mild to moderate MR is noted.     I have reviewed patient's EKG in clinic today which shows atrial fibrillation  heart rate well controlled at 75 bpm.    Medications, personal, family, and social history reviewed with patient and revised.    Interval history 4/19/2021:  Patient is being seen today for annual follow-up.  He tells me that he does not know why he is here.  Today patient reports how difficult it was for him to get to clinic.  He prefers to follow-up with cardiology close to his house in [place].  He tells me that he is overall feeling unchanged.  No chest pain.  He is able to do his ADLs.  He walks his dog.  He feels short of breath with stairs but this has been stable over the last 1 year.  No disabling dizziness, no syncope.  No PND or orthopnea.  Lower extremity edema is well controlled with torsemide.  Patient reports stable weight at home.  He follows with nephrology.  He is on torsemide.  He does not smoke.  No alcohol.    PAST MEDICAL HISTORY:  Past Medical History:   Diagnosis Date     Atrial fibrillation, permanent (H) 04/2009     Cellulitis of left forearm 07/10/2020     CKD (chronic kidney disease)      HTN (hypertension), benign        CURRENT MEDICATIONS:  Current Outpatient Medications   Medication Sig Dispense Refill     cycloSPORINE modified (GENERIC EQUIVALENT) 25 MG capsule Take 50 mg by mouth 2 times daily       diltiazem ER COATED BEADS (CARDIZEM CD/CARTIA XT) 180 MG 24 hr capsule Take 180 mg by mouth daily       gabapentin (NEURONTIN) 300 MG capsule Take 300 mg by mouth daily       mycophenolate (GENERIC EQUIVALENT) 500 MG tablet Take 500 mg by mouth 2 times daily       order for DME Equipment being ordered: Cane ()  Treatment Diagnosis: Gait Instability 1 each 0     potassium citrate 15 MEQ (1620 MG) TBCR Take 30 mEq by mouth daily 60 tablet 3     torsemide (DEMADEX) 20 MG tablet Take 2 tablets (40 mg) by mouth 2 times daily 30 tablet 2     warfarin ANTICOAGULANT (COUMADIN) 3 MG tablet Take 3 mg by mouth daily         PAST SURGICAL HISTORY:  Past Surgical History:   Procedure  "Laterality Date     AS TRANSPLANT,PREP CADAVER RENAL GRAFT Right 04/2009     IR FINE NEEDLE ASPIRATION W ULTRASOUND  10/15/2019     PERCUTANEOUS BIOPSY KIDNEY Right 12/23/2019    Procedure: Right Kidney Biopsy;  Surgeon: Josiah Ponce MD;  Location: [facility]     TRANSPLANT         ALLERGIES:   No Known Allergies    FAMILY HISTORY:  Family History   Problem Relation Age of Onset     Emphysema Father          SOCIAL HISTORY:  Social History     Tobacco Use     Smoking status: Never Smoker     Smokeless tobacco: Never Used   Substance Use Topics     Alcohol use: Yes     Frequency: Monthly or less     Drinks per session: 1 or 2     Comment: 12  oz. beer/week     Drug use: Never       ROS:   Constitutional: No fever, chills, or sweats. Weight stable.   ENT: No visual disturbance, ear ache, epistaxis, sore throat.   Cardiovascular: As per HPI.   Respiratory: No cough, hemoptysis.    GI: No nausea, vomiting, hematemesis, melena, or hematochezia.   : No hematuria.   Integument: Negative.   Hematologic:  No easy bruising, no easy bleeding.  Neuro: Negative.     Exam:  /63 (BP Location: Right arm, Patient Position: Chair, Cuff Size: Adult Regular)   Pulse 57   Ht 1.803 m (5' 11\")   Wt 82.6 kg (182 lb)   SpO2 95%   BMI 25.38 kg/m    GENERAL APPEARANCE: alert and no distress  HEENT: no icterus, no central cyanosis  LYMPH/NECK: no adenopathy, no asymmetry, JVP not elevated.  RESPIRATORY: lungs clear to auscultation - no rales, rhonchi or wheezes, no use of accessory muscles, no retractions, respirations are unlabored, normal respiratory rate  CARDIOVASCULAR: irregular rhythm, normal S1, S2, no S3 or S4, left parasternal holosystolic murmur   GI: soft, non tender  EXTREMITIES: Bilateral trace pretibial edema  NEURO: alert, normal speech,and affect   SKIN: no ecchymoses, no rashes     I have reviewed the labs and personally reviewed the imaging below and made my comment in the assessment and plan.    Labs:  CBC " RESULTS:   Lab Results   Component Value Date    WBC 5.8 03/16/2020    RBC 3.68 (L) 03/16/2020    HGB 10.7 (L) 03/16/2020    HCT 34.8 (L) 03/16/2020    MCV 95 03/16/2020    MCH 29.1 03/16/2020    MCHC 30.7 (L) 03/16/2020    RDW 14.3 03/16/2020     03/16/2020       BMP RESULTS:  Lab Results   Component Value Date     03/16/2020    POTASSIUM 3.6 03/16/2020    CHLORIDE 109 03/16/2020    CO2 27 03/16/2020    ANIONGAP 7 03/16/2020     (H) 03/16/2020    BUN 34 (H) 03/16/2020    CR 2.68 (H) 03/16/2020    GFRESTIMATED 21 (L) 03/16/2020    GFRESTBLACK 25 (L) 03/16/2020    ELEAZAR 8.8 03/16/2020        INR RESULTS:  Lab Results   Component Value Date    INR 2.64 (H) 03/16/2020    INR 1.30 (H) 12/23/2019    INR 1.27 (H) 12/20/2019    INR 1.47 (H) 12/09/2019       Echocardiogram 12/12/2019  The rhythm is atrial fibrillation.     There is mild diffuse global LV hypokinesis. The LVEF is difficult to assess  in the setting of atrial fibrillaion and variable R-R interval, though  visually estimated LVEF is 40-45%.  Mild to moderate concentric wall thickening consistent with left ventricular  hypertrophy is present. Consider infiltrative CM such as amyloid.  Global right ventricular function is mildly reduced.  Moderate tricuspid insufficiency is present.  Estimated pulmonary artery systolic pressure is suggestive of moderate  pulmonary hypertension.  The ascending aorta is mildly dilated measuring 4.1 cm (2.16 cm/m2 when  indexed to BSA).  No pericardial effusion.     In direct visual comparison to TTE dated 4/6/2009, the LVEF appears similar.  The estimated PASP is higher on the present study.      EKG 1/17/2020      Lexiscan stress test 8/12/2019 [facility] 8/12/2019  1.  Lexiscan stress EKG negative for ischemia.    2.  For interpretation of Myoview images, please see separate dictated   result.      Assessment and Plan:    Mr. Matheus Agudelo Cindy Maldonado. is a 79 year old  male with PMH significant for s/p  renal transplant 2009, CKD stage IV , permanent afib on warfarin, nonischemic cardiomyopathy with EF of 40 to 45% and HTN.    # Heart failure with reduced ejection fraction with EF of 40 to 45% (most recent echo 12/2019)  -Likely nonischemic given normal stress test in 2019.  -He is currently doing well.  -Continue diltiazem 180, warfarin and torsemide  -Patient prefers to follow-up with cardiology close by to his house for convenience.  He wants to establish care with [facility] [place].  -No further testing at this time in our clinic.  It would be appropriate to repeat a transthoracic echocardiogram when he establishes care.    # Status post renal transplant with CKD stage TREVOR  -Follows with nephrology.    # Permanent A. fib on warfarin and rate control:   -Continue rate control with diltiazem.  -Continue warfarin.  -Today patient was wondering if he can switch to direct oral anticoagulants.  I have recommended patient to discuss this with his transplant team.  Sometimes transplant nephrology prefers patient to be on warfarin rather than direct oral anticoagulants.  Therefore I did not make any medication changes today    # Anemia of chronic kidney disease  -Hemoglobin stable at 10.7.    Return to clinic as needed.    Total time spent 40 minutes including face-to-face clinic visit and medical documentation.    Please donot hesitate to contact me if you have any questions or concerns. Again, thank you for allowing me to participate in the care of your patient.    Cj CASTILLO MD  [facility] Division of Cardiology  Pager [phone]

## 2021-04-19 NOTE — PATIENT INSTRUCTIONS
Discuss potential change of anticoagulant with your transplant team if you want to switch from warfarin to something else.     Establish care in one year :    [facility]    [address]    [place] [address]    [phone]   Reece Dotson MD    [address] [facility] [place] [address] [phone] (Fax)

## 2021-04-19 NOTE — LETTER
4/19/2021      RE: Matheus White Sr.  [address] [address] [address]       Dear Colleague,    Thank you for the opportunity to participate in the care of your patient, Matheus White Sr., at the [facility] [place] [facility]. Please see a copy of my visit note below.    Referring provider: Josias Villarreal MD    Chief complaint: Lower extremity swelling    HPI: Mr. Matheus White Sr. is a 79 year old  male with PMH significant for s/p renal transplant 2009, CKD stage IV with recent DEB, permanent afib on warfarin, nonischemic cardiomyopathy with EF of 40 to 45% and HTN    Patient is being seen today for pretibial edema.    The patient was recently hospitalized on 10/11/2019 for lower extremity cellulitis.  The patient developed DEB on CKD stage III.  Patient's creatinine increased to 5 which gradually decreased to 2.6 now.  Patient reports significant swelling of lower legs when he was in the hospital.  He reports left lower extremity swelling for several years which is not new to him.  He continues to have right lower extremity swelling which is new since the hospital admission.  Patient has been on furosemide which was recently changed to torsemide by Dr. Villarreal.  Since then the patient reports improvement in swelling.    Patient denies chest pain, palpitations, or syncope.  He reports imbalance when he first gets up.  He reports PND and orthopnea when he had significant water retention which makes him sit up to sleep.  Patient is currently able to do his daily activities without significant shortness of breath.  He monitors his weight at home which is usually 165-170 pounds.    The patient has history of atrial fibrillation since 2009 on rate control and warfarin.  No history of stroke.  He underwent one-time DC cardioversion in 2009 which was not successful.  Since then he is on rate control.    No prior history  of coronary artery disease.  The patient has recently had an echocardiogram on 12/12/2019 which I have personally reviewed.  LVEF appears 40 to 45% similar to 2009 echocardiogram.  RV function and size are normal.  Moderate tricuspid insufficiency and mild to moderate MR is noted.     I have reviewed patient's EKG in clinic today which shows atrial fibrillation heart rate well controlled at 75 bpm.    Medications, personal, family, and social history reviewed with patient and revised.    Interval history 4/19/2021:  Patient is being seen today for annual follow-up.  He tells me that he does not know why he is here.  Today patient reports how difficult it was for him to get to clinic.  He prefers to follow-up with cardiology close to his house in [place].  He tells me that he is overall feeling unchanged.  No chest pain.  He is able to do his ADLs.  He walks his dog.  He feels short of breath with stairs but this has been stable over the last 1 year.  No disabling dizziness, no syncope.  No PND or orthopnea.  Lower extremity edema is well controlled with torsemide.  Patient reports stable weight at home.  He follows with nephrology.  He is on torsemide.  He does not smoke.  No alcohol.    PAST MEDICAL HISTORY:  Past Medical History:   Diagnosis Date     Atrial fibrillation, permanent (H) 04/2009     Cellulitis of left forearm 07/10/2020     CKD (chronic kidney disease)      HTN (hypertension), benign        CURRENT MEDICATIONS:  Current Outpatient Medications   Medication Sig Dispense Refill     cycloSPORINE modified (GENERIC EQUIVALENT) 25 MG capsule Take 50 mg by mouth 2 times daily       diltiazem ER COATED BEADS (CARDIZEM CD/CARTIA XT) 180 MG 24 hr capsule Take 180 mg by mouth daily       gabapentin (NEURONTIN) 300 MG capsule Take 300 mg by mouth daily       mycophenolate (GENERIC EQUIVALENT) 500 MG tablet Take 500 mg by mouth 2 times daily       order for DME Equipment being ordered: Cane ()  Treatment  "Diagnosis: Gait Instability 1 each 0     potassium citrate 15 MEQ (1620 MG) TBCR Take 30 mEq by mouth daily 60 tablet 3     torsemide (DEMADEX) 20 MG tablet Take 2 tablets (40 mg) by mouth 2 times daily 30 tablet 2     warfarin ANTICOAGULANT (COUMADIN) 3 MG tablet Take 3 mg by mouth daily         PAST SURGICAL HISTORY:  Past Surgical History:   Procedure Laterality Date     AS TRANSPLANT,PREP CADAVER RENAL GRAFT Right 04/2009     IR FINE NEEDLE ASPIRATION W ULTRASOUND  10/15/2019     PERCUTANEOUS BIOPSY KIDNEY Right 12/23/2019    Procedure: Right Kidney Biopsy;  Surgeon: Josiah Ponce MD;  Location: [facility] OR     TRANSPLANT         ALLERGIES:   No Known Allergies    FAMILY HISTORY:  Family History   Problem Relation Age of Onset     Emphysema Father          SOCIAL HISTORY:  Social History     Tobacco Use     Smoking status: Never Smoker     Smokeless tobacco: Never Used   Substance Use Topics     Alcohol use: Yes     Frequency: Monthly or less     Drinks per session: 1 or 2     Comment: 12  oz. beer/week     Drug use: Never       ROS:   Constitutional: No fever, chills, or sweats. Weight stable.   ENT: No visual disturbance, ear ache, epistaxis, sore throat.   Cardiovascular: As per HPI.   Respiratory: No cough, hemoptysis.    GI: No nausea, vomiting, hematemesis, melena, or hematochezia.   : No hematuria.   Integument: Negative.   Hematologic:  No easy bruising, no easy bleeding.  Neuro: Negative.     Exam:  /63 (BP Location: Right arm, Patient Position: Chair, Cuff Size: Adult Regular)   Pulse 57   Ht 1.803 m (5' 11\")   Wt 82.6 kg (182 lb)   SpO2 95%   BMI 25.38 kg/m    GENERAL APPEARANCE: alert and no distress  HEENT: no icterus, no central cyanosis  LYMPH/NECK: no adenopathy, no asymmetry, JVP not elevated.  RESPIRATORY: lungs clear to auscultation - no rales, rhonchi or wheezes, no use of accessory muscles, no retractions, respirations are unlabored, normal respiratory rate  CARDIOVASCULAR: " irregular rhythm, normal S1, S2, no S3 or S4, left parasternal holosystolic murmur   GI: soft, non tender  EXTREMITIES: Bilateral trace pretibial edema  NEURO: alert, normal speech,and affect   SKIN: no ecchymoses, no rashes     I have reviewed the labs and personally reviewed the imaging below and made my comment in the assessment and plan.    Labs:  CBC RESULTS:   Lab Results   Component Value Date    WBC 5.8 03/16/2020    RBC 3.68 (L) 03/16/2020    HGB 10.7 (L) 03/16/2020    HCT 34.8 (L) 03/16/2020    MCV 95 03/16/2020    MCH 29.1 03/16/2020    MCHC 30.7 (L) 03/16/2020    RDW 14.3 03/16/2020     03/16/2020       BMP RESULTS:  Lab Results   Component Value Date     03/16/2020    POTASSIUM 3.6 03/16/2020    CHLORIDE 109 03/16/2020    CO2 27 03/16/2020    ANIONGAP 7 03/16/2020     (H) 03/16/2020    BUN 34 (H) 03/16/2020    CR 2.68 (H) 03/16/2020    GFRESTIMATED 21 (L) 03/16/2020    GFRESTBLACK 25 (L) 03/16/2020    ELEAZAR 8.8 03/16/2020        INR RESULTS:  Lab Results   Component Value Date    INR 2.64 (H) 03/16/2020    INR 1.30 (H) 12/23/2019    INR 1.27 (H) 12/20/2019    INR 1.47 (H) 12/09/2019       Echocardiogram 12/12/2019  The rhythm is atrial fibrillation.     There is mild diffuse global LV hypokinesis. The LVEF is difficult to assess  in the setting of atrial fibrillaion and variable R-R interval, though  visually estimated LVEF is 40-45%.  Mild to moderate concentric wall thickening consistent with left ventricular  hypertrophy is present. Consider infiltrative CM such as amyloid.  Global right ventricular function is mildly reduced.  Moderate tricuspid insufficiency is present.  Estimated pulmonary artery systolic pressure is suggestive of moderate  pulmonary hypertension.  The ascending aorta is mildly dilated measuring 4.1 cm (2.16 cm/m2 when  indexed to BSA).  No pericardial effusion.     In direct visual comparison to TTE dated 4/6/2009, the LVEF appears similar.  The estimated PASP  is higher on the present study.      EKG 1/17/2020      Lexiscan stress test 8/12/2019 [facility] 8/12/2019  1.  Lexiscan stress EKG negative for ischemia.    2.  For interpretation of Myoview images, please see separate dictated   result.      Assessment and Plan:    Mr. Matheus White Sr. is a 79 year old  male with PMH significant for s/p renal transplant 2009, CKD stage IV , permanent afib on warfarin, nonischemic cardiomyopathy with EF of 40 to 45% and HTN.    # Heart failure with reduced ejection fraction with EF of 40 to 45% (most recent echo 12/2019)  -Likely nonischemic given normal stress test in 2019.  -He is currently doing well.  -Continue diltiazem 180, warfarin and torsemide  -Patient prefers to follow-up with cardiology close by to his house for convenience.  He wants to establish care with [facility] [place].  -No further testing at this time in our clinic.  It would be appropriate to repeat a transthoracic echocardiogram when he establishes care.    # Status post renal transplant with CKD stage TREVOR  -Follows with nephrology.    # Permanent A. fib on warfarin and rate control:   -Continue rate control with diltiazem.  -Continue warfarin.  -Today patient was wondering if he can switch to direct oral anticoagulants.  I have recommended patient to discuss this with his transplant team.  Sometimes transplant nephrology prefers patient to be on warfarin rather than direct oral anticoagulants.  Therefore I did not make any medication changes today    # Anemia of chronic kidney disease  -Hemoglobin stable at 10.7.    Return to clinic as needed.    Total time spent 40 minutes including face-to-face clinic visit and medical documentation.    Please donot hesitate to contact me if you have any questions or concerns. Again, thank you for allowing me to participate in the care of your patient.    Cj CASTILLO MD  [facility] Division of Cardiology  Pager  [phone]       pt will speak with the surgeon and the  anesthesiologist preop

## 2021-04-19 NOTE — NURSING NOTE
Chief Complaint   Patient presents with     Follow Up     1 year f/u     Vitals were taken and medication reconciled.    PAYAM Jj  3:42 PM

## 2021-12-28 PROBLEM — L03.114 CELLULITIS OF LEFT FOREARM: Status: ACTIVE | Noted: 2020-07-10

## 2022-01-01 ENCOUNTER — HOSPITAL ENCOUNTER (EMERGENCY)
Facility: CLINIC | Age: 82
End: 2022-01-01
Payer: MEDICARE

## 2022-01-01 ENCOUNTER — APPOINTMENT (OUTPATIENT)
Dept: CT IMAGING | Facility: CLINIC | Age: 82
End: 2022-01-01
Attending: EMERGENCY MEDICINE
Payer: MEDICARE

## 2022-01-01 ENCOUNTER — HOSPITAL ENCOUNTER (EMERGENCY)
Facility: CLINIC | Age: 82
Discharge: HOME | End: 2022-06-16
Attending: EMERGENCY MEDICINE | Admitting: EMERGENCY MEDICINE
Payer: MEDICARE

## 2022-01-01 ENCOUNTER — HOSPITAL ENCOUNTER (EMERGENCY)
Facility: CLINIC | Age: 82
Discharge: HOME | End: 2022-06-10
Attending: EMERGENCY MEDICINE | Admitting: EMERGENCY MEDICINE
Payer: MEDICARE

## 2022-01-01 ENCOUNTER — HOSPITAL ENCOUNTER (EMERGENCY)
Facility: CLINIC | Age: 82
Discharge: HOME | End: 2022-06-21
Attending: EMERGENCY MEDICINE | Admitting: EMERGENCY MEDICINE
Payer: MEDICARE

## 2022-01-01 VITALS
DIASTOLIC BLOOD PRESSURE: 76 MMHG | OXYGEN SATURATION: 96 % | RESPIRATION RATE: 18 BRPM | SYSTOLIC BLOOD PRESSURE: 141 MMHG | TEMPERATURE: 98.1 F | HEART RATE: 67 BPM

## 2022-01-01 VITALS
HEART RATE: 54 BPM | DIASTOLIC BLOOD PRESSURE: 72 MMHG | TEMPERATURE: 97.7 F | SYSTOLIC BLOOD PRESSURE: 145 MMHG | RESPIRATION RATE: 23 BRPM | OXYGEN SATURATION: 97 %

## 2022-01-01 VITALS
OXYGEN SATURATION: 100 % | SYSTOLIC BLOOD PRESSURE: 151 MMHG | DIASTOLIC BLOOD PRESSURE: 99 MMHG | HEART RATE: 72 BPM | TEMPERATURE: 98.3 F | RESPIRATION RATE: 20 BRPM

## 2022-01-01 DIAGNOSIS — R42 DIZZINESS: ICD-10-CM

## 2022-01-01 DIAGNOSIS — B02.9 HERPES ZOSTER WITHOUT COMPLICATION: ICD-10-CM
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
## 2022-01-01 DIAGNOSIS — N18.9 CHRONIC RENAL FAILURE, UNSPECIFIED CKD STAGE: ICD-10-CM

## 2022-01-01 DIAGNOSIS — R51.9 SCALP PAIN: ICD-10-CM

## 2022-01-01 DIAGNOSIS — L03.90 CELLULITIS, UNSPECIFIED CELLULITIS SITE: ICD-10-CM

## 2022-01-01 DIAGNOSIS — R79.1 SUPRATHERAPEUTIC INR: ICD-10-CM

## 2022-01-01 DIAGNOSIS — L53.9 REDNESS OF SKIN: ICD-10-CM

## 2022-01-01 LAB
ALBUMIN SERPL-MCNC: 3.4 G/DL (ref 3.4–5)
ALP SERPL-CCNC: 163 U/L (ref 40–150)
ALT SERPL W P-5'-P-CCNC: 21 U/L (ref 0–70)
ANION GAP SERPL CALCULATED.3IONS-SCNC: 8 MMOL/L (ref 3–14)
ANION GAP SERPL CALCULATED.3IONS-SCNC: 9 MMOL/L (ref 3–14)
AST SERPL W P-5'-P-CCNC: 16 U/L (ref 0–45)
ATRIAL RATE - MUSE: 68 BPM
BASOPHILS # BLD AUTO: 0 10E3/UL (ref 0–0.2)
BASOPHILS # BLD AUTO: 0 10E3/UL (ref 0–0.2)
BASOPHILS # BLD AUTO: 0.1 10E3/UL (ref 0–0.2)
BASOPHILS NFR BLD AUTO: 0 %
BASOPHILS NFR BLD AUTO: 0 %
BASOPHILS NFR BLD AUTO: 1 %
BILIRUB SERPL-MCNC: 1.1 MG/DL (ref 0.2–1.3)
BUN SERPL-MCNC: 51 MG/DL (ref 7–30)
BUN SERPL-MCNC: 67 MG/DL (ref 7–30)
CALCIUM SERPL-MCNC: 9.3 MG/DL (ref 8.5–10.1)
CALCIUM SERPL-MCNC: 9.4 MG/DL (ref 8.5–10.1)
CHLORIDE BLD-SCNC: 103 MMOL/L (ref 94–109)
CHLORIDE BLD-SCNC: 104 MMOL/L (ref 94–109)
CO2 SERPL-SCNC: 29 MMOL/L (ref 20–32)
CO2 SERPL-SCNC: 30 MMOL/L (ref 20–32)
CREAT SERPL-MCNC: 2.9 MG/DL (ref 0.66–1.25)
CREAT SERPL-MCNC: 3.03 MG/DL (ref 0.66–1.25)
DIASTOLIC BLOOD PRESSURE - MUSE: NORMAL MMHG
EOSINOPHIL # BLD AUTO: 0.1 10E3/UL (ref 0–0.7)
EOSINOPHIL # BLD AUTO: 0.2 10E3/UL (ref 0–0.7)
EOSINOPHIL # BLD AUTO: 0.4 10E3/UL (ref 0–0.7)
EOSINOPHIL NFR BLD AUTO: 2 %
EOSINOPHIL NFR BLD AUTO: 3 %
EOSINOPHIL NFR BLD AUTO: 6 %
ERYTHROCYTE [DISTWIDTH] IN BLOOD BY AUTOMATED COUNT: 14.6 % (ref 10–15)
ERYTHROCYTE [DISTWIDTH] IN BLOOD BY AUTOMATED COUNT: 15.4 % (ref 10–15)
ERYTHROCYTE [DISTWIDTH] IN BLOOD BY AUTOMATED COUNT: 15.9 % (ref 10–15)
GFR SERPL CREATININE-BSD FRML MDRD: 20 ML/MIN/1.73M2
GFR SERPL CREATININE-BSD FRML MDRD: 21 ML/MIN/1.73M2
GLUCOSE BLD-MCNC: 100 MG/DL (ref 70–99)
GLUCOSE BLD-MCNC: 103 MG/DL (ref 70–99)
HCT VFR BLD AUTO: 32.8 % (ref 40–53)
HCT VFR BLD AUTO: 33 % (ref 40–53)
HCT VFR BLD AUTO: 34.4 % (ref 40–53)
HGB BLD-MCNC: 10.3 G/DL (ref 13.3–17.7)
HGB BLD-MCNC: 10.4 G/DL (ref 13.3–17.7)
HGB BLD-MCNC: 10.7 G/DL (ref 13.3–17.7)
HOLD SPECIMEN: NORMAL
IMM GRANULOCYTES # BLD: 0 10E3/UL
IMM GRANULOCYTES NFR BLD: 0 %
IMM GRANULOCYTES NFR BLD: 0 %
IMM GRANULOCYTES NFR BLD: 1 %
INR PPP: 3.03 (ref 0.85–1.15)
INR PPP: 4.31 (ref 0.85–1.15)
INTERPRETATION ECG - MUSE: NORMAL
LYMPHOCYTES # BLD AUTO: 0.5 10E3/UL (ref 0.8–5.3)
LYMPHOCYTES # BLD AUTO: 0.5 10E3/UL (ref 0.8–5.3)
LYMPHOCYTES # BLD AUTO: 0.7 10E3/UL (ref 0.8–5.3)
LYMPHOCYTES NFR BLD AUTO: 9 %
MCH RBC QN AUTO: 28.3 PG (ref 26.5–33)
MCH RBC QN AUTO: 28.5 PG (ref 26.5–33)
MCH RBC QN AUTO: 28.8 PG (ref 26.5–33)
MCHC RBC AUTO-ENTMCNC: 31.1 G/DL (ref 31.5–36.5)
MCHC RBC AUTO-ENTMCNC: 31.2 G/DL (ref 31.5–36.5)
MCHC RBC AUTO-ENTMCNC: 31.7 G/DL (ref 31.5–36.5)
MCV RBC AUTO: 90 FL (ref 78–100)
MCV RBC AUTO: 91 FL (ref 78–100)
MCV RBC AUTO: 92 FL (ref 78–100)
MONOCYTES # BLD AUTO: 0.6 10E3/UL (ref 0–1.3)
MONOCYTES # BLD AUTO: 0.6 10E3/UL (ref 0–1.3)
MONOCYTES # BLD AUTO: 0.7 10E3/UL (ref 0–1.3)
MONOCYTES NFR BLD AUTO: 10 %
MONOCYTES NFR BLD AUTO: 11 %
MONOCYTES NFR BLD AUTO: 11 %
NEUTROPHILS # BLD AUTO: 4.1 10E3/UL (ref 1.6–8.3)
NEUTROPHILS # BLD AUTO: 4.3 10E3/UL (ref 1.6–8.3)
NEUTROPHILS # BLD AUTO: 5.5 10E3/UL (ref 1.6–8.3)
NEUTROPHILS NFR BLD AUTO: 73 %
NEUTROPHILS NFR BLD AUTO: 77 %
NEUTROPHILS NFR BLD AUTO: 78 %
NRBC # BLD AUTO: 0 10E3/UL
NRBC BLD AUTO-RTO: 0 /100
P AXIS - MUSE: NORMAL DEGREES
PLATELET # BLD AUTO: 182 10E3/UL (ref 150–450)
PLATELET # BLD AUTO: 228 10E3/UL (ref 150–450)
PLATELET # BLD AUTO: 244 10E3/UL (ref 150–450)
POTASSIUM BLD-SCNC: 3 MMOL/L (ref 3.4–5.3)
POTASSIUM BLD-SCNC: 3.1 MMOL/L (ref 3.4–5.3)
PR INTERVAL - MUSE: NORMAL MS
PROT SERPL-MCNC: 6.8 G/DL (ref 6.8–8.8)
QRS DURATION - MUSE: 148 MS
QT - MUSE: 448 MS
QTC - MUSE: 476 MS
R AXIS - MUSE: -31 DEGREES
RBC # BLD AUTO: 3.58 10E6/UL (ref 4.4–5.9)
RBC # BLD AUTO: 3.65 10E6/UL (ref 4.4–5.9)
RBC # BLD AUTO: 3.78 10E6/UL (ref 4.4–5.9)
SODIUM SERPL-SCNC: 140 MMOL/L (ref 133–144)
SODIUM SERPL-SCNC: 143 MMOL/L (ref 133–144)
SYSTOLIC BLOOD PRESSURE - MUSE: NORMAL MMHG
T AXIS - MUSE: 116 DEGREES
TROPONIN I SERPL HS-MCNC: 44 NG/L
VENTRICULAR RATE- MUSE: 68 BPM
WBC # BLD AUTO: 5.3 10E3/UL (ref 4–11)
WBC # BLD AUTO: 5.9 10E3/UL (ref 4–11)
WBC # BLD AUTO: 7.1 10E3/UL (ref 4–11)

## 2022-01-01 PROCEDURE — 82310 ASSAY OF CALCIUM: CPT | Performed by: EMERGENCY MEDICINE

## 2022-01-01 PROCEDURE — 250N000013 HC RX MED GY IP 250 OP 250 PS 637: Performed by: EMERGENCY MEDICINE

## 2022-01-01 PROCEDURE — 85025 COMPLETE CBC W/AUTO DIFF WBC: CPT | Performed by: EMERGENCY MEDICINE

## 2022-01-01 PROCEDURE — 36415 COLL VENOUS BLD VENIPUNCTURE: CPT | Performed by: EMERGENCY MEDICINE

## 2022-01-01 PROCEDURE — 85610 PROTHROMBIN TIME: CPT | Performed by: EMERGENCY MEDICINE

## 2022-01-01 PROCEDURE — 93005 ELECTROCARDIOGRAM TRACING: CPT

## 2022-01-01 PROCEDURE — 80053 COMPREHEN METABOLIC PANEL: CPT | Performed by: EMERGENCY MEDICINE

## 2022-01-01 PROCEDURE — 85004 AUTOMATED DIFF WBC COUNT: CPT | Performed by: EMERGENCY MEDICINE

## 2022-01-01 PROCEDURE — 99284 EMERGENCY DEPT VISIT MOD MDM: CPT | Mod: 25

## 2022-01-01 PROCEDURE — 99284 EMERGENCY DEPT VISIT MOD MDM: CPT

## 2022-01-01 PROCEDURE — 70450 CT HEAD/BRAIN W/O DYE: CPT

## 2022-01-01 PROCEDURE — 250N000009 HC RX 250: Performed by: EMERGENCY MEDICINE

## 2022-01-01 PROCEDURE — 84484 ASSAY OF TROPONIN QUANT: CPT | Performed by: EMERGENCY MEDICINE

## 2022-01-01 RX ORDER — TETRACAINE HYDROCHLORIDE 5 MG/ML
2 SOLUTION OPHTHALMIC ONCE
Status: COMPLETED | OUTPATIENT
Start: 2022-01-01 | End: 2022-01-01

## 2022-01-01 RX ORDER — VALACYCLOVIR HYDROCHLORIDE 1 G/1
1000 TABLET, FILM COATED ORAL 3 TIMES DAILY
Qty: 21 TABLET | Refills: 0 | Status: SHIPPED | OUTPATIENT
Start: 2022-01-01 | End: 2023-01-01

## 2022-01-01 RX ORDER — VALACYCLOVIR HYDROCHLORIDE 1 G/1
1000 TABLET, FILM COATED ORAL ONCE
Status: COMPLETED | OUTPATIENT
Start: 2022-01-01 | End: 2022-01-01

## 2022-01-01 RX ORDER — CEPHALEXIN 500 MG/1
500 CAPSULE ORAL 4 TIMES DAILY
Qty: 28 CAPSULE | Refills: 0 | Status: SHIPPED | OUTPATIENT
Start: 2022-01-01 | End: 2022-01-01

## 2022-01-01 RX ORDER — ACETAMINOPHEN 325 MG/1
650 TABLET ORAL ONCE
Status: COMPLETED | OUTPATIENT
Start: 2022-01-01 | End: 2022-01-01

## 2022-01-01 RX ADMIN — TETRACAINE HYDROCHLORIDE 2 DROP: 5 SOLUTION OPHTHALMIC at 08:41

## 2022-01-01 RX ADMIN — ACETAMINOPHEN 650 MG: 325 TABLET ORAL at 08:41

## 2022-01-01 RX ADMIN — VALACYCLOVIR HYDROCHLORIDE 1000 MG: 1 TABLET, FILM COATED ORAL at 08:41

## 2022-01-01 RX ADMIN — FLUORESCEIN SODIUM 1 STRIP: 1 STRIP OPHTHALMIC at 08:41

## 2022-01-01 ASSESSMENT — ENCOUNTER SYMPTOMS
LIGHT-HEADEDNESS: 0
FEVER: 0
FACIAL ASYMMETRY: 0
BRUISES/BLEEDS EASILY: 1
DIZZINESS: 1
DIZZINESS: 0
NUMBNESS: 0
PHOTOPHOBIA: 0
NECK PAIN: 0
EYE PAIN: 0
FATIGUE: 0
EYE REDNESS: 0
WEAKNESS: 0
NECK STIFFNESS: 0
CHILLS: 0
HEADACHES: 1
NUMBNESS: 0
HEADACHES: 0
COLOR CHANGE: 1
ABDOMINAL PAIN: 0
SHORTNESS OF BREATH: 0
EYE DISCHARGE: 0
WEAKNESS: 0

## 2022-03-15 ENCOUNTER — APPOINTMENT (OUTPATIENT)
Dept: URBAN - METROPOLITAN AREA CLINIC 253 | Age: 82
Setting detail: DERMATOLOGY
End: 2022-03-16

## 2022-03-15 VITALS — RESPIRATION RATE: 14 BRPM | WEIGHT: 180 LBS | HEIGHT: 70 IN

## 2022-03-15 DIAGNOSIS — L11.1 TRANSIENT ACANTHOLYTIC DERMATOSIS [GROVER]: ICD-10-CM

## 2022-03-15 DIAGNOSIS — L73.8 OTHER SPECIFIED FOLLICULAR DISORDERS: ICD-10-CM

## 2022-03-15 DIAGNOSIS — R60.0 LOCALIZED EDEMA: ICD-10-CM

## 2022-03-15 DIAGNOSIS — L82.1 OTHER SEBORRHEIC KERATOSIS: ICD-10-CM

## 2022-03-15 DIAGNOSIS — D22 MELANOCYTIC NEVI: ICD-10-CM

## 2022-03-15 DIAGNOSIS — Z71.89 OTHER SPECIFIED COUNSELING: ICD-10-CM

## 2022-03-15 DIAGNOSIS — Z85.828 PERSONAL HISTORY OF OTHER MALIGNANT NEOPLASM OF SKIN: ICD-10-CM

## 2022-03-15 DIAGNOSIS — L29.8 OTHER PRURITUS: ICD-10-CM

## 2022-03-15 DIAGNOSIS — L81.4 OTHER MELANIN HYPERPIGMENTATION: ICD-10-CM

## 2022-03-15 DIAGNOSIS — L70.8 OTHER ACNE: ICD-10-CM

## 2022-03-15 DIAGNOSIS — D18.0 HEMANGIOMA: ICD-10-CM

## 2022-03-15 DIAGNOSIS — L82.0 INFLAMED SEBORRHEIC KERATOSIS: ICD-10-CM

## 2022-03-15 PROBLEM — D22.71 MELANOCYTIC NEVI OF RIGHT LOWER LIMB, INCLUDING HIP: Status: ACTIVE | Noted: 2022-03-15

## 2022-03-15 PROBLEM — D18.01 HEMANGIOMA OF SKIN AND SUBCUTANEOUS TISSUE: Status: ACTIVE | Noted: 2022-03-15

## 2022-03-15 PROBLEM — L30.9 DERMATITIS, UNSPECIFIED: Status: ACTIVE | Noted: 2022-03-15

## 2022-03-15 PROBLEM — D22.5 MELANOCYTIC NEVI OF TRUNK: Status: ACTIVE | Noted: 2022-03-15

## 2022-03-15 PROCEDURE — OTHER PRESCRIPTION: OTHER

## 2022-03-15 PROCEDURE — OTHER COUNSELING: OTHER

## 2022-03-15 PROCEDURE — 99203 OFFICE O/P NEW LOW 30 MIN: CPT | Mod: 25

## 2022-03-15 PROCEDURE — OTHER ADDITIONAL NOTES: OTHER

## 2022-03-15 PROCEDURE — 17110 DESTRUCT B9 LESION 1-14: CPT

## 2022-03-15 PROCEDURE — OTHER MIPS QUALITY: OTHER

## 2022-03-15 PROCEDURE — OTHER LIQUID NITROGEN: OTHER

## 2022-03-15 PROCEDURE — OTHER PHOTO-DOCUMENTATION: OTHER

## 2022-03-15 RX ORDER — TRIAMCINOLONE ACETONIDE 1 MG/G
0.1% CREAM TOPICAL BID
Qty: 454 | Refills: 3 | Status: ERX | COMMUNITY
Start: 2022-03-15

## 2022-03-15 ASSESSMENT — LOCATION DETAILED DESCRIPTION DERM
LOCATION DETAILED: LEFT DISTAL PRETIBIAL REGION
LOCATION DETAILED: RIGHT DORSAL 2ND TOE
LOCATION DETAILED: INFERIOR THORACIC SPINE
LOCATION DETAILED: RIGHT DISTAL PRETIBIAL REGION
LOCATION DETAILED: LEFT MEDIAL FOREHEAD
LOCATION DETAILED: LEFT CENTRAL LATERAL NECK
LOCATION DETAILED: POSTERIOR MID-PARIETAL SCALP
LOCATION DETAILED: EPIGASTRIC SKIN
LOCATION DETAILED: RIGHT DORSAL 3RD TOE
LOCATION DETAILED: SUPERIOR MID FOREHEAD

## 2022-03-15 ASSESSMENT — LOCATION ZONE DERM
LOCATION ZONE: SCALP
LOCATION ZONE: TOE
LOCATION ZONE: FACE
LOCATION ZONE: NECK
LOCATION ZONE: TRUNK
LOCATION ZONE: LEG

## 2022-03-15 ASSESSMENT — LOCATION SIMPLE DESCRIPTION DERM
LOCATION SIMPLE: RIGHT 3RD TOE
LOCATION SIMPLE: NECK
LOCATION SIMPLE: ABDOMEN
LOCATION SIMPLE: UPPER BACK
LOCATION SIMPLE: LEFT FOREHEAD
LOCATION SIMPLE: LEFT PRETIBIAL REGION
LOCATION SIMPLE: POSTERIOR SCALP
LOCATION SIMPLE: RIGHT PRETIBIAL REGION
LOCATION SIMPLE: RIGHT 2ND TOE
LOCATION SIMPLE: SUPERIOR FOREHEAD

## 2022-03-15 NOTE — HPI: FULL BODY SKIN EXAMINATION
What Type Of Note Output Would You Prefer (Optional)?: Standard Output
What Is The Reason For Today's Visit?: Full Body Skin Examination
What Is The Reason For Today's Visit? (Being Monitored For X): the re-examination of lesions previously examined
Additional History: FBE. He gets them done every six months, but recently moved here from [place]. He has had skin cancer but is not sure what type. He is on an anti rejection med for a kidney transplant.

## 2022-03-15 NOTE — PROCEDURE: LIQUID NITROGEN
Show Topical Anesthesia Variable?: Yes
Spray Paint Text: The liquid nitrogen was applied to the skin utilizing a spray paint frosting technique.
Post-Care Instructions: I reviewed with the patient in detail post-care instructions. Patient is to wear sunprotection, and avoid picking at any of the treated lesions. Pt may apply Vaseline to crusted or scabbing areas.
Render Post-Care Instructions In Note?: no
Detail Level: Detailed
Consent: The patient's consent was obtained including but not limited to risks of crusting, scabbing, blistering, scarring, darker or lighter pigmentary change, recurrence, incomplete removal and infection.
Medical Necessity Information: It is in your best interest to select a reason for this procedure from the list below. All of these items fulfill various CMS LCD requirements except the new and changing color options.
Medical Necessity Clause: This procedure was medically necessary because the lesions that were treated were:

## 2022-03-15 NOTE — PROCEDURE: ADDITIONAL NOTES
Additional Notes: A clinical assistant was present for the exam. Care instructions of treated sites were explained to the patient in detail. Told patient to call with any concerns or questions. The patient verbalized understanding and agreement of the education provided and the treatment plan. Encouraged patient to schedule a follow up appointment right after visit. At the end of the visit, all questions had been answered and the patient was satisfied with the visit.
Detail Level: Simple
Render Risk Assessment In Note?: no
Additional Notes: Reassured pt scalp looks healthy. Recommended Neutrogena tsal shampoo. Wrote this down for pt
Additional Notes: Pt will follow up in 6 weeks to make lesion has resolved
Additional Notes: Pt will follow up in 6 weeks for recheck
Additional Notes: Recommended compression stockings and follow with pcp. He states it is from renal dz/ transplant

## 2022-06-10 NOTE — ED TRIAGE NOTES
Pt states injured right eyebrow/periorbital area and right temporal area approximately one week ago with a fall. Pt states seen at  for same and cleared to discharge to home. Pt expresses concern for possible infection of area as it has recently become red and increasingly tender. ABCs intact GCS 15     Triage Assessment     Row Name 06/10/22 0640       Triage Assessment (Adult)    Airway WDL WDL       Respiratory WDL    Respiratory WDL WDL       Skin Circulation/Temperature WDL    Skin Circulation/Temperature WDL WDL       Cardiac WDL    Cardiac WDL WDL       Peripheral/Neurovascular WDL    Peripheral Neurovascular WDL WDL       Cognitive/Neuro/Behavioral WDL    Cognitive/Neuro/Behavioral WDL WDL

## 2022-06-10 NOTE — ED PROVIDER NOTES
History   Chief Complaint:  Wound Check       The history is provided by the patient.      Matheus White Sr. is a 82 year old male with history of CHF, hypertension, CKD, atrial fibrillation, on Coumadin, and CKD, S/P renal transplant, who presents with wound check. A week or two ago, the patient slipped on the stairs and struck his right cheekbone. He was never seen in the peritrauma period for this fall but was  seen at Urgent Care at recently and did not feel he needed head imaging at that time. Since then, he has had ongoing pain and redness around his right eye as well as tenderness along the cheek bone. He is concerned for possible skin infection as he is immunosuppressed from his previous kidney transplant.       Review of Systems   Constitutional: Negative for chills, fatigue and fever.   HENT:        + right cheek pain   Eyes: Negative for photophobia, pain, discharge, redness and visual disturbance.   Respiratory: Negative for shortness of breath.    Cardiovascular: Negative for chest pain.   Gastrointestinal: Negative for abdominal pain.   Musculoskeletal: Negative for neck pain and neck stiffness.   Skin: Positive for color change.   Neurological: Negative for dizziness, syncope, facial asymmetry, weakness, light-headedness, numbness and headaches.   Hematological: Bruises/bleeds easily.   All other systems reviewed and are negative.        Allergies:  The patient has no known allergies.     Medications:  Cyclosporine  Diltiazem  Gabapentin   Mycophenolate  Torsemide  Coumadin   Potassium citrate   Calcitriol  Cellcept  Metolazone  Valtrex    Past Medical History:     Atrial fibrillation   Cellulitis of left forearm   CKD  Hypertension   Hyperparathyroidism   CHF  Anemia   Dysphagia   Skin cancer  Esophageal stricture    Past Surgical History:    Right nephrectomy   Kidney transplant  Fine needle aspiration      Family History:    Father: emphysema     Social History:  The patient presents to  the ED alone. His PCP is Dr. Ignacio Smith at the [facility].       Physical Exam     Patient Vitals for the past 24 hrs:   BP Temp Temp src Pulse Resp SpO2   06/10/22 0639 136/69 98.3  F (36.8  C) Oral 76 20 99 %       Physical Exam    Constitutional: Patient is well appearing. No distress. Head to toe trauma exam is normal except as noted below. Patient is ambulatory to the bathroom and around the room without difficulties.   Head: Atraumatic. No lacerations or abrasions. He has a slight ring of redness around the right preseptal eye. No swelling. No bony tenderness. Extraocular movement are in tact painlessly as is vision and pupil response.    Mouth/Throat: Oropharynx is clear and moist.  TMJ intact  Eyes: Conjunctivae and EOM are normal. No scleral icterus.  Neck: Normal range of motion. Neck supple. No midline cervical spine tenderness.  Cardiovascular: Normal rate, regular rhythm, normal heart sounds and intact distal perfusion.   Pulmonary/Chest: Breath sounds normal. No respiratory distress.  Abdominal: Soft. Bowel sounds are normal. No distension. No tenderness. No rebound or guarding.   Musculoskeletal: Normal range of motion. No edema or tenderness.   Neurological: Alert and orientated to person, place, and time. No observable focal neuro deficit  Skin: Warm and dry. No rash noted. Not diaphoretic.       Emergency Department Course     Imaging:  CT Head w/o Contrast   Final Result   IMPRESSION:   1.  No evidence of acute intracranial hemorrhage or mass effect.   2.  Mild nonspecific white matter changes.   3.  Mild brain parenchymal volume loss.      CECILIA CARRILLO MD            SYSTEM ID:  [id number]        Report per radiology    Laboratory:  Labs Ordered and Resulted from Time of ED Arrival to Time of ED Departure   INR - Abnormal       Result Value    INR 4.31 (*)    COMPREHENSIVE METABOLIC PANEL - Abnormal    Sodium 143      Potassium 3.0 (*)     Chloride 104      Carbon  Dioxide (CO2) 30      Anion Gap 9      Urea Nitrogen 67 (*)     Creatinine 2.90 (*)     Calcium 9.4      Glucose 103 (*)     Alkaline Phosphatase 163 (*)     AST 16      ALT 21      Protein Total 6.8      Albumin 3.4      Bilirubin Total 1.1      GFR Estimate 21 (*)    CBC WITH PLATELETS AND DIFFERENTIAL - Abnormal    WBC Count 5.9      RBC Count 3.65 (*)     Hemoglobin 10.4 (*)     Hematocrit 32.8 (*)     MCV 90      MCH 28.5      MCHC 31.7      RDW 14.6      Platelet Count 228      % Neutrophils 73      % Lymphocytes 9      % Monocytes 11      % Eosinophils 6      % Basophils 0      % Immature Granulocytes 1      NRBCs per 100 WBC 0      Absolute Neutrophils 4.3      Absolute Lymphocytes 0.5 (*)     Absolute Monocytes 0.6      Absolute Eosinophils 0.4      Absolute Basophils 0.0      Absolute Immature Granulocytes 0.0      Absolute NRBCs 0.0            Emergency Department Course:             Reviewed:  I reviewed nursing notes, vitals and past medical history    Assessments:  0915 I obtained history and examined the patient as noted above.   1144 I rechecked the patient and explained findings.     Disposition:  The patient was discharged to home.     Impression & Plan     CMS Diagnoses: None      Medical Decision Making:    Matheus White Sr. is a 82 year old male who presents for evaluation of a right preseptal redness and tenderness following a fall. The patient had a mechanical fall approximately 2 week ago and had been seen at Urgent Care in interim. They did not perform any head imaging at that time despite the patient being anticoagulated. Patient presents today with persisted facial pain and thus a head CT was obtained. This is negative for intracranial hemorrhage. Patient is also immunosuppressed due to kidney transplant and thus is concerned for infection. CBC and CMP are reassuring. INR is supratherapeutic. At this time, I feel patient is safe for discharge home. Patient was given a prescription  for Keflex for cellulitis. He will follow up with his PCP should his symptoms persist or return to the ED with any more worrisome signs of infection. Red flags discussed. Patient is in agreement with this plan.         Diagnosis:    ICD-10-CM    1. Supratherapeutic INR  R79.1    2. Chronic renal failure, unspecified CKD stage  N18.9    3. Fall, initial encounter  W19.XXXA    4. Cellulitis, unspecified cellulitis site  L03.90    5. Redness of skin  L53.9        Discharge Medications:  New Prescriptions    CEPHALEXIN (KEFLEX) 500 MG CAPSULE    Take 1 capsule (500 mg) by mouth 4 times daily for 7 days       Scribe Disclosure:  I, Arminda Sewell, am serving as a scribe at 9:04 AM on 6/10/2022 to document services personally performed by Mitchell Davis MD based on my observations and the provider's statements to me.              Mitchell Davis MD  06/10/22 5812

## 2022-06-16 NOTE — ED TRIAGE NOTES
"Pt presents with what he describes as scalp pain that began yesterday. The pain is described as a \"bee sting\" feeling with palpation to top of scalp. Pt states he fell and hit his head 1 week ago. Was examined. Is on coumadin. No cognitive changes.      Triage Assessment     Row Name 06/16/22 0804       Triage Assessment (Adult)    Airway WDL WDL       Cognitive/Neuro/Behavioral WDL    Cognitive/Neuro/Behavioral WDL WDL              "

## 2022-06-16 NOTE — ED PROVIDER NOTES
History   Chief Complaint:  Headache       HPI   Matheus White Sr. is a 82 year old male on Coumadin with history of hypertension, chronic kidney disease, and congestive heart failure, who presents with right scalp sensitivity the past few days. Patient presented to the ED six days ago with an injured cheek following a fall. He was diagnosed with cellulitis and prescribed Keflex, with the antibiotic course finishing tomorrow. He now presents with localized sensitivity to the right scalp that does not radiate elsewhere. He has never experienced pain like this before. Patient denies any other areas of pain. Denies vision disturbances. No weakness or tingling of the extremities. Patient's balance is normal. No history of new falls or trauma. Patient has not used any new soaps or shampoos until this morning. Patient wears glasses only when reading.  No history of shingles. Patient has never had a shingles vaccination. Last INR was checked two days ago and was found to be 4.2. He reports INR will be checked again in 10 days.     Review of Systems   Eyes: Negative for visual disturbance.   Neurological: Positive for headaches. Negative for weakness and numbness.   All other systems reviewed and are negative.    Allergies:  No Known Allergies    Medications:  Cephalexin  Cyclosporine  Diltiazem  Gabapentin  Mycophenolate  Torsemide  Warfarin  Potassium citrate  Calcitriol  Metolazone    Past Medical History:     Acute kidney injury  Hypertension  Atrial fibrillation  Cellulitis of left lower extremity  Chronic kidney disease, stage 3  Immunodeficiency  Congestive heart failure  Anemia  Skin cancer  Esophageal stricture    Past Surgical History:    Right nephrectomy  Renal transplant, right  Percutaneous kidney biopsy   Fine needle aspiration   Placement/revision     Family History:    Father: Emphysema  Sister: Emphysema    Social History:  Patient presents to the ED alone.     Physical Exam     Patient Vitals  for the past 24 hrs:   BP Temp Temp src Pulse Resp SpO2   06/16/22 0900 (!) 141/75 -- -- 64 -- 96 %   06/16/22 0830 (!) 143/71 -- -- 63 -- 96 %   06/16/22 0805 (!) 148/70 98.1  F (36.7  C) Temporal 68 18 99 %       Physical Exam  Gen: well appearing, in no acute distress  HENT:  mmm, no rhinorrhea  Eyes: periorbital tissues on the right and left periorbital erythema, soft examination anterior chamber normal, no dendritic lesions on fluorescein staining  Neck: supple, no abnormal swelling  Lungs:  CTAB,  no resp distress  CV: rrr, no m/r/g, ppi  Abd: soft, nontender, nondistended, no rebound/masses/guarding/hsm  Ext: no peripheral edema  Skin: warm, dry, well perfused, right scalp just posterior to the hairline there is 1 and half by 2 cm area of erythema that is quite sensitive to touch.  Questionable early vesicular change  Neuro: alert, MAEE, no gross motor or sensory deficits, gait stable  Psych: Normal mood, normal affect      Emergency Department Course       Imaging:  CT Head w/o Contrast   Preliminary Result   IMPRESSION: Diffuse cerebral volume loss and cerebral white matter   changes consistent with chronic small vessel ischemic disease. No   evidence for acute intracranial pathology.            Radiation dose for this scan was reduced using automated exposure   control, adjustment of the mA and/or kV according to patient size, or   iterative reconstruction technique           Report per radiology    Laboratory:  Labs Ordered and Resulted from Time of ED Arrival to Time of ED Departure   INR - Abnormal       Result Value    INR 3.03 (*)    CBC WITH PLATELETS AND DIFFERENTIAL - Abnormal    WBC Count 7.1      RBC Count 3.78 (*)     Hemoglobin 10.7 (*)     Hematocrit 34.4 (*)     MCV 91      MCH 28.3      MCHC 31.1 (*)     RDW 15.4 (*)     Platelet Count 244      % Neutrophils 77      % Lymphocytes 9      % Monocytes 10      % Eosinophils 3      % Basophils 1      % Immature Granulocytes 0      NRBCs per 100  WBC 0      Absolute Neutrophils 5.5      Absolute Lymphocytes 0.7 (*)     Absolute Monocytes 0.7      Absolute Eosinophils 0.2      Absolute Basophils 0.1      Absolute Immature Granulocytes 0.0      Absolute NRBCs 0.0          Emergency Department Course:         Reviewed:  I reviewed nursing notes, vitals, past medical history and Care Everywhere    Assessments:  814 I obtained history and examined the patient as noted above.     952 I rechecked the patient and explained findings.     Interventions:  841 Valtrex  1,000mg  PO  841 Pontocaine  2 drop Right eye  0841 Ful-donaldo  1 strip  Right eye  0841 Tylenol  650mg  PO    Disposition:  The patient was discharged to home.     Impression & Plan     Medical Decision Makin-year-old here with right-sided scalp pain recent fall injuring his right cheek  Last day of antibiotics after previous ED visit on Kel[place] 10.  Overall his presentation and examination here will be suspicious for shingles.  CT head negative.  No ocular involvement on select examination.  No evidence of uncontrolled bacterial soft tissue infection.  Start antivirals and discharged home.  Patient comfortable and agreeable to plan.      Diagnosis:    ICD-10-CM    1. Herpes zoster without complication  B02.9    2. Scalp pain  R51.9        Discharge Medications:  New Prescriptions    VALACYCLOVIR (VALTREX) 1000 MG TABLET    Take 1 tablet (1,000 mg) by mouth 3 times daily for 7 days       Scribe Disclosure:  IKrista, am serving as a scribe at 8:10 AM on 2022 to document services personally performed by Didier Gregorio MD based on my observations and the provider's statements to me.     I, Diego Keane, am serving as a scribe  at 10:09 AM on 2022 to document services personally performed by Didier Gregorio MD based on my observations and the provider's statements to me.              Didier Gregorio MD  22 1012

## 2022-06-21 NOTE — DISCHARGE INSTRUCTIONS
We have not found any major abnormallities on examining you and your lab work continues to show slight low potassium but otherwise normal. Please follow up with your regular physiican. Return with double vision, severe headache, or other concerns.

## 2022-06-21 NOTE — ED TRIAGE NOTES
Pt reports that he was diagnosed with shingles on 6/13 and given medications, since then he has had dizziness and concerned that this may have something to do with his kidney transplant medication reaction. PT Denies CP, SOB. VSS and ABC's intact

## 2022-06-21 NOTE — ED PROVIDER NOTES
"  History   Chief Complaint:  Dizziness       The history is provided by the patient.      Matheus White Sr. is a 82 year old male on Coumadin with history of atrial fibrillation who presents with dizziness. The patient reports that he takes a lot of medication for his kidney issues and was prescribed more medication when he was shingles here in the ED for a fall on 6/16/22. He reports the the dizziness feels like \"an alcohol level of 0.5\" and had onset yesterday. He states one other experience similar to this a few years ago and it was found to be due to new medication.     Review of Systems   Neurological: Positive for dizziness.   All other systems reviewed and are negative.        Allergies:  The patient has no known allergies.     Medications:  Coumadin   Valtrex  Demadex  Mycophenolate  Neurontin    Cyclosporine modified    Past Medical History:     Cellulitis of left forearm  CKD  Cellulitis of left lower extremity  Permanent atrial fibrillation   Hypertension   Renal transplant     Past Surgical History:    Right kidney biopsy  Transplant     Family History:    Emphysema    Social History:  The patient presents to the ED via car.  Never a smoker.    Physical Exam     Patient Vitals for the past 24 hrs:   BP Temp Temp src Pulse Resp SpO2   06/21/22 1527 (!) 144/67 97.7  F (36.5  C) Temporal 93 18 98 %       Physical Exam  HENT:      Head: Normocephalic.      Right Ear: Tympanic membrane normal.      Left Ear: Tympanic membrane normal.      Nose: Nose normal.   Eyes:      Extraocular Movements: Extraocular movements intact.      Pupils: Pupils are equal, round, and reactive to light.   Cardiovascular:      Rate and Rhythm: Normal rate and regular rhythm.   Abdominal:      General: Abdomen is flat.      Palpations: Abdomen is soft.   Musculoskeletal:         General: Normal range of motion.   Skin:     General: Skin is warm.      Capillary Refill: Capillary refill takes less than 2 seconds.   Neurological: "      General: No focal deficit present.      Mental Status: He is alert and oriented to person, place, and time. Mental status is at baseline.   Psychiatric:         Mood and Affect: Mood normal.           Emergency Department Course   ECG  ECG results from 06/21/22   EKG 12 lead     Value    Systolic Blood Pressure     Diastolic Blood Pressure     Ventricular Rate 68    Atrial Rate 68    SC Interval     QRS Duration 148        QTc 476    P Axis     R AXIS -31    T Axis 116    Interpretation ECG      Wide QRS rhythm with occasional Premature ventricular complexes  Left axis deviation  Left bundle branch block  Abnormal ECG  When compared with ECG of 16-MAR-2020 23:41,  Wide QRS rhythm has replaced Atrial fibrillation       Laboratory:  Labs Ordered and Resulted from Time of ED Arrival to Time of ED Departure   BASIC METABOLIC PANEL - Abnormal       Result Value    Sodium 140      Potassium 3.1 (*)     Chloride 103      Carbon Dioxide (CO2) 29      Anion Gap 8      Urea Nitrogen 51 (*)     Creatinine 3.03 (*)     Calcium 9.3      Glucose 100 (*)     GFR Estimate 20 (*)    CBC WITH PLATELETS AND DIFFERENTIAL - Abnormal    WBC Count 5.3      RBC Count 3.58 (*)     Hemoglobin 10.3 (*)     Hematocrit 33.0 (*)     MCV 92      MCH 28.8      MCHC 31.2 (*)     RDW 15.9 (*)     Platelet Count 182      % Neutrophils 78      % Lymphocytes 9      % Monocytes 11      % Eosinophils 2      % Basophils 0      % Immature Granulocytes 0      NRBCs per 100 WBC 0      Absolute Neutrophils 4.1      Absolute Lymphocytes 0.5 (*)     Absolute Monocytes 0.6      Absolute Eosinophils 0.1      Absolute Basophils 0.0      Absolute Immature Granulocytes 0.0      Absolute NRBCs 0.0     TROPONIN I - Normal    Troponin I High Sensitivity 44          Emergency Department Course:     Reviewed:  I reviewed nursing notes, vitals and past medical history    Assessments:  1617 I obtained history and examined the patient as noted above.   1643 I  rechecked the patient and explained findings.     Disposition:  The patient was discharged to home.     Impression & Plan   Medical Decision Making:  Patient presents with dizziness described as more either balance off or lightheadedness.  Patient is on multiple medications none of which were new.  Vital signs are stable neurological exam does not show any pathological nystagmus disconjugate gaze patient's able to drive himself to the hospital.  The cause of his dizziness is unclear.  [facility] EKG and lab work is unremarkable.  Patient was offered and considered MRI of the brain for central nervous system stroke the patient seemed improved with treated education and symptoms seem to defervesced suspect may be paroxysmal vertigo as a cause encouraged to follow-up with primary care return to the emergency room with worsening condition patient was discharged in stable condition      Diagnosis:    ICD-10-CM    1. Dizziness  R42        Discharge Medications:  Discharge Medication List as of 6/21/2022  5:15 PM          Scribe Disclosure:  I, Evan Scott, am serving as a scribe at 4:16 PM on 6/21/2022 to document services personally performed by Juan A Curtis MD based on my observations and the provider's statements to me.            Juan A Curtis MD  06/26/22 0186

## 2023-01-01 ENCOUNTER — APPOINTMENT (OUTPATIENT)
Dept: CARDIOLOGY | Facility: CLINIC | Age: 83
DRG: 280 | End: 2023-01-01
Attending: INTERNAL MEDICINE
Payer: MEDICARE

## 2023-01-01 ENCOUNTER — APPOINTMENT (OUTPATIENT)
Dept: OCCUPATIONAL THERAPY | Facility: CLINIC | Age: 83
DRG: 280 | End: 2023-01-01
Payer: MEDICARE

## 2023-01-01 ENCOUNTER — LAB REQUISITION (OUTPATIENT)
Dept: LAB | Facility: CLINIC | Age: 83
End: 2023-01-01
Payer: MEDICARE

## 2023-01-01 ENCOUNTER — DOCUMENTATION ONLY (OUTPATIENT)
Dept: OTHER | Facility: CLINIC | Age: 83
End: 2023-01-01
Payer: MEDICARE

## 2023-01-01 ENCOUNTER — HOSPITAL ENCOUNTER (EMERGENCY)
Facility: CLINIC | Age: 83
Discharge: HOME | DRG: 280 | End: 2023-01-07
Attending: EMERGENCY MEDICINE | Admitting: EMERGENCY MEDICINE
Payer: MEDICARE

## 2023-01-01 ENCOUNTER — APPOINTMENT (OUTPATIENT)
Dept: CT IMAGING | Facility: CLINIC | Age: 83
DRG: 280 | End: 2023-01-01
Attending: EMERGENCY MEDICINE
Payer: MEDICARE

## 2023-01-01 ENCOUNTER — HOSPITAL ENCOUNTER (INPATIENT)
Facility: CLINIC | Age: 83
LOS: 8 days | Discharge: NURSING, REHAB OR LONG-TERM CARE | DRG: 280 | End: 2023-01-16
Attending: EMERGENCY MEDICINE | Admitting: INTERNAL MEDICINE
Payer: MEDICARE

## 2023-01-01 ENCOUNTER — TRANSITIONAL CARE UNIT VISIT (OUTPATIENT)
Dept: GERIATRICS | Facility: CLINIC | Age: 83
End: 2023-01-01
Payer: MEDICARE

## 2023-01-01 ENCOUNTER — APPOINTMENT (OUTPATIENT)
Dept: CT IMAGING | Facility: CLINIC | Age: 83
DRG: 280 | End: 2023-01-01
Attending: INTERNAL MEDICINE
Payer: MEDICARE

## 2023-01-01 ENCOUNTER — APPOINTMENT (OUTPATIENT)
Dept: GENERAL RADIOLOGY | Facility: CLINIC | Age: 83
DRG: 280 | End: 2023-01-01
Attending: EMERGENCY MEDICINE
Payer: MEDICARE

## 2023-01-01 ENCOUNTER — APPOINTMENT (OUTPATIENT)
Dept: ULTRASOUND IMAGING | Facility: CLINIC | Age: 83
DRG: 280 | End: 2023-01-01
Attending: EMERGENCY MEDICINE
Payer: MEDICARE

## 2023-01-01 ENCOUNTER — HOSPITAL ENCOUNTER (OUTPATIENT)
Age: 83
End: 2023-01-01
Attending: STUDENT IN AN ORGANIZED HEALTH CARE EDUCATION/TRAINING PROGRAM | Admitting: STUDENT IN AN ORGANIZED HEALTH CARE EDUCATION/TRAINING PROGRAM
Payer: MEDICARE

## 2023-01-01 ENCOUNTER — APPOINTMENT (OUTPATIENT)
Dept: ULTRASOUND IMAGING | Facility: CLINIC | Age: 83
DRG: 280 | End: 2023-01-01
Attending: INTERNAL MEDICINE
Payer: MEDICARE

## 2023-01-01 ENCOUNTER — HOSPITAL ENCOUNTER (INPATIENT)
Facility: CLINIC | Age: 83
LOS: 1 days | Discharge: LEFT BEFORE DISCHARGE | DRG: 698 | End: 2023-04-01
Attending: EMERGENCY MEDICINE | Admitting: INTERNAL MEDICINE
Payer: MEDICARE

## 2023-01-01 ENCOUNTER — TELEPHONE (OUTPATIENT)
Dept: GERIATRICS | Facility: CLINIC | Age: 83
End: 2023-01-01
Payer: MEDICARE

## 2023-01-01 VITALS
WEIGHT: 164.6 LBS | TEMPERATURE: 98.2 F | RESPIRATION RATE: 18 BRPM | OXYGEN SATURATION: 95 % | HEIGHT: 70 IN | HEART RATE: 70 BPM | DIASTOLIC BLOOD PRESSURE: 74 MMHG | BODY MASS INDEX: 23.56 KG/M2 | SYSTOLIC BLOOD PRESSURE: 126 MMHG

## 2023-01-01 VITALS
HEIGHT: 70 IN | RESPIRATION RATE: 20 BRPM | TEMPERATURE: 98.1 F | BODY MASS INDEX: 21.83 KG/M2 | DIASTOLIC BLOOD PRESSURE: 61 MMHG | WEIGHT: 152.5 LBS | OXYGEN SATURATION: 95 % | SYSTOLIC BLOOD PRESSURE: 132 MMHG | HEART RATE: 62 BPM

## 2023-01-01 VITALS
SYSTOLIC BLOOD PRESSURE: 125 MMHG | DIASTOLIC BLOOD PRESSURE: 56 MMHG | WEIGHT: 153.6 LBS | BODY MASS INDEX: 21.99 KG/M2 | HEIGHT: 70 IN | RESPIRATION RATE: 18 BRPM | TEMPERATURE: 98.2 F | HEART RATE: 66 BPM | OXYGEN SATURATION: 95 %

## 2023-01-01 VITALS
OXYGEN SATURATION: 98 % | BODY MASS INDEX: 22.54 KG/M2 | HEART RATE: 72 BPM | HEIGHT: 70 IN | RESPIRATION RATE: 18 BRPM | SYSTOLIC BLOOD PRESSURE: 119 MMHG | WEIGHT: 157.4 LBS | DIASTOLIC BLOOD PRESSURE: 67 MMHG | TEMPERATURE: 97.8 F

## 2023-01-01 VITALS
HEIGHT: 70 IN | BODY MASS INDEX: 22.48 KG/M2 | TEMPERATURE: 97.9 F | HEART RATE: 63 BPM | OXYGEN SATURATION: 94 % | SYSTOLIC BLOOD PRESSURE: 120 MMHG | RESPIRATION RATE: 18 BRPM | WEIGHT: 157 LBS | DIASTOLIC BLOOD PRESSURE: 60 MMHG

## 2023-01-01 VITALS
DIASTOLIC BLOOD PRESSURE: 84 MMHG | BODY MASS INDEX: 23.48 KG/M2 | OXYGEN SATURATION: 95 % | SYSTOLIC BLOOD PRESSURE: 150 MMHG | RESPIRATION RATE: 18 BRPM | WEIGHT: 164 LBS | HEIGHT: 70 IN | HEART RATE: 64 BPM | TEMPERATURE: 97.9 F

## 2023-01-01 VITALS
RESPIRATION RATE: 18 BRPM | DIASTOLIC BLOOD PRESSURE: 60 MMHG | TEMPERATURE: 97.6 F | OXYGEN SATURATION: 97 % | HEIGHT: 70 IN | SYSTOLIC BLOOD PRESSURE: 133 MMHG | WEIGHT: 164.7 LBS | BODY MASS INDEX: 23.58 KG/M2 | HEART RATE: 62 BPM

## 2023-01-01 VITALS
SYSTOLIC BLOOD PRESSURE: 160 MMHG | DIASTOLIC BLOOD PRESSURE: 91 MMHG | RESPIRATION RATE: 18 BRPM | OXYGEN SATURATION: 95 % | WEIGHT: 160 LBS | HEART RATE: 86 BPM | HEIGHT: 70 IN | TEMPERATURE: 97.7 F | BODY MASS INDEX: 22.9 KG/M2

## 2023-01-01 DIAGNOSIS — I50.9 HEART FAILURE, UNSPECIFIED (H): ICD-10-CM

## 2023-01-01 DIAGNOSIS — Z79.01 ANTICOAGULATION MONITORING, INR RANGE 2-3: ICD-10-CM

## 2023-01-01 DIAGNOSIS — I10 ESSENTIAL (PRIMARY) HYPERTENSION: ICD-10-CM

## 2023-01-01 DIAGNOSIS — Z94.0 HISTORY OF RENAL TRANSPLANT: ICD-10-CM

## 2023-01-01 DIAGNOSIS — R53.1 GENERALIZED WEAKNESS: ICD-10-CM

## 2023-01-01 DIAGNOSIS — Z94.0 STATUS POST KIDNEY TRANSPLANT: ICD-10-CM

## 2023-01-01 DIAGNOSIS — I50.9 CHRONIC CONGESTIVE HEART FAILURE, UNSPECIFIED HEART FAILURE TYPE (H): Primary | ICD-10-CM

## 2023-01-01 DIAGNOSIS — I48.21 PERMANENT ATRIAL FIBRILLATION (H): ICD-10-CM

## 2023-01-01 DIAGNOSIS — M62.81 GENERALIZED MUSCLE WEAKNESS: ICD-10-CM

## 2023-01-01 DIAGNOSIS — N18.9 CHRONIC KIDNEY DISEASE, UNSPECIFIED: ICD-10-CM

## 2023-01-01 DIAGNOSIS — I50.21 ACUTE SYSTOLIC CONGESTIVE HEART FAILURE (H): ICD-10-CM

## 2023-01-01 DIAGNOSIS — I50.9 ACUTE ON CHRONIC CONGESTIVE HEART FAILURE, UNSPECIFIED HEART FAILURE TYPE (H): ICD-10-CM

## 2023-01-01 DIAGNOSIS — I10 HYPERTENSION, UNSPECIFIED TYPE: ICD-10-CM

## 2023-01-01 DIAGNOSIS — N18.4 CHRONIC KIDNEY DISEASE, STAGE 4 (SEVERE) (H): ICD-10-CM

## 2023-01-01 DIAGNOSIS — E86.0 DEHYDRATION: ICD-10-CM

## 2023-01-01 DIAGNOSIS — I10 PRIMARY HYPERTENSION: Primary | ICD-10-CM

## 2023-01-01 DIAGNOSIS — N18.30 STAGE 3 CHRONIC KIDNEY DISEASE, UNSPECIFIED WHETHER STAGE 3A OR 3B CKD (H): ICD-10-CM

## 2023-01-01 DIAGNOSIS — R19.7 DIARRHEA, UNSPECIFIED: ICD-10-CM

## 2023-01-01 DIAGNOSIS — R19.7 DIARRHEA, UNSPECIFIED TYPE: ICD-10-CM

## 2023-01-01 DIAGNOSIS — N17.9 ACUTE RENAL FAILURE SUPERIMPOSED ON CHRONIC KIDNEY DISEASE, UNSPECIFIED CKD STAGE, UNSPECIFIED ACUTE RENAL FAILURE TYPE: ICD-10-CM

## 2023-01-01 DIAGNOSIS — R79.1 SUPRATHERAPEUTIC INR: ICD-10-CM

## 2023-01-01 DIAGNOSIS — Z11.1 ENCOUNTER FOR SCREENING FOR RESPIRATORY TUBERCULOSIS: ICD-10-CM

## 2023-01-01 DIAGNOSIS — K70.30 ALCOHOLIC CIRRHOSIS OF LIVER WITHOUT ASCITES (H): ICD-10-CM

## 2023-01-01 DIAGNOSIS — Z53.9 ERRONEOUS ENCOUNTER--DISREGARD: Primary | ICD-10-CM

## 2023-01-01 DIAGNOSIS — N18.9 ACUTE RENAL FAILURE SUPERIMPOSED ON CHRONIC KIDNEY DISEASE, UNSPECIFIED CKD STAGE, UNSPECIFIED ACUTE RENAL FAILURE TYPE: ICD-10-CM

## 2023-01-01 DIAGNOSIS — R79.1 ABNORMAL COAGULATION PROFILE: ICD-10-CM

## 2023-01-01 LAB
A1AT PHENOTYP SERPL-IMP: ABNORMAL
A1AT SERPL-MCNC: 218 MG/DL
ALBUMIN SERPL BCG-MCNC: 4 G/DL (ref 3.5–5.2)
ALBUMIN SERPL BCG-MCNC: 4.2 G/DL (ref 3.5–5.2)
ALBUMIN SERPL BCG-MCNC: 4.2 G/DL (ref 3.5–5.2)
ALBUMIN SERPL BCG-MCNC: 4.5 G/DL (ref 3.5–5.2)
ALBUMIN SERPL BCG-MCNC: 4.6 G/DL (ref 3.5–5.2)
ALBUMIN UR-MCNC: NEGATIVE MG/DL
ALBUMIN UR-MCNC: NEGATIVE MG/DL
ALP SERPL-CCNC: 152 U/L (ref 40–129)
ALP SERPL-CCNC: 164 U/L (ref 40–129)
ALP SERPL-CCNC: 168 U/L (ref 40–129)
ALP SERPL-CCNC: 173 U/L (ref 40–129)
ALP SERPL-CCNC: 197 U/L (ref 40–129)
ALP SERPL-CCNC: 202 U/L (ref 40–129)
ALP SERPL-CCNC: 228 U/L (ref 40–129)
ALT SERPL W P-5'-P-CCNC: 52 U/L (ref 10–50)
ALT SERPL W P-5'-P-CCNC: 61 U/L (ref 10–50)
ALT SERPL W P-5'-P-CCNC: 66 U/L (ref 10–50)
ALT SERPL W P-5'-P-CCNC: 71 U/L (ref 10–50)
ALT SERPL W P-5'-P-CCNC: 78 U/L (ref 10–50)
ALT SERPL W P-5'-P-CCNC: 93 U/L (ref 10–50)
ALT SERPL W P-5'-P-CCNC: 99 U/L (ref 10–50)
AMMONIA PLAS-SCNC: 17 UMOL/L (ref 16–60)
AMMONIA PLAS-SCNC: 25 UMOL/L (ref 16–60)
AMMONIA PLAS-SCNC: 33 UMOL/L (ref 16–60)
ANA PAT SER IF-IMP: ABNORMAL
ANA SER QL IF: ABNORMAL
ANA TITR SER IF: ABNORMAL {TITER}
ANION GAP SERPL CALCULATED.3IONS-SCNC: 12 MMOL/L (ref 7–15)
ANION GAP SERPL CALCULATED.3IONS-SCNC: 13 MMOL/L (ref 7–15)
ANION GAP SERPL CALCULATED.3IONS-SCNC: 14 MMOL/L (ref 7–15)
ANION GAP SERPL CALCULATED.3IONS-SCNC: 15 MMOL/L (ref 7–15)
ANION GAP SERPL CALCULATED.3IONS-SCNC: 16 MMOL/L (ref 7–15)
ANION GAP SERPL CALCULATED.3IONS-SCNC: 18 MMOL/L (ref 7–15)
ANION GAP SERPL CALCULATED.3IONS-SCNC: 18 MMOL/L (ref 7–15)
ANION GAP SERPL CALCULATED.3IONS-SCNC: 19 MMOL/L (ref 7–15)
ANION GAP SERPL CALCULATED.3IONS-SCNC: 20 MMOL/L (ref 7–15)
APPEARANCE UR: CLEAR
APPEARANCE UR: CLEAR
AST SERPL W P-5'-P-CCNC: 171 U/L (ref 10–50)
AST SERPL W P-5'-P-CCNC: 176 U/L (ref 10–50)
AST SERPL W P-5'-P-CCNC: 208 U/L (ref 10–50)
AST SERPL W P-5'-P-CCNC: 44 U/L (ref 10–50)
AST SERPL W P-5'-P-CCNC: 56 U/L (ref 10–50)
AST SERPL W P-5'-P-CCNC: 72 U/L (ref 10–50)
AST SERPL W P-5'-P-CCNC: 99 U/L (ref 10–50)
ATRIAL RATE - MUSE: 76 BPM
ATRIAL RATE - MUSE: 92 BPM
ATRIAL RATE - MUSE: 96 BPM
BACTERIA #/AREA URNS HPF: ABNORMAL /HPF
BACTERIA BLD CULT: NO GROWTH
BACTERIA BLD CULT: NO GROWTH
BASOPHILS # BLD AUTO: 0 10E3/UL (ref 0–0.2)
BASOPHILS # BLD MANUAL: 0 10E3/UL (ref 0–0.2)
BASOPHILS NFR BLD AUTO: 1 %
BASOPHILS NFR BLD MANUAL: 2 %
BILIRUB DIRECT SERPL-MCNC: 0.55 MG/DL (ref 0–0.3)
BILIRUB DIRECT SERPL-MCNC: 0.79 MG/DL (ref 0–0.3)
BILIRUB SERPL-MCNC: 1.4 MG/DL
BILIRUB SERPL-MCNC: 1.7 MG/DL
BILIRUB SERPL-MCNC: 1.8 MG/DL
BILIRUB SERPL-MCNC: 1.9 MG/DL
BILIRUB SERPL-MCNC: 2 MG/DL
BILIRUB SERPL-MCNC: 2.1 MG/DL
BILIRUB SERPL-MCNC: 2.1 MG/DL
BILIRUB UR QL STRIP: NEGATIVE
BILIRUB UR QL STRIP: NEGATIVE
BUN SERPL-MCNC: 42.5 MG/DL (ref 8–23)
BUN SERPL-MCNC: 45.1 MG/DL (ref 8–23)
BUN SERPL-MCNC: 46.8 MG/DL (ref 8–23)
BUN SERPL-MCNC: 49.2 MG/DL (ref 8–23)
BUN SERPL-MCNC: 49.5 MG/DL (ref 8–23)
BUN SERPL-MCNC: 50.3 MG/DL (ref 8–23)
BUN SERPL-MCNC: 51.8 MG/DL (ref 8–23)
BUN SERPL-MCNC: 53.3 MG/DL (ref 8–23)
BUN SERPL-MCNC: 55.6 MG/DL (ref 8–23)
BUN SERPL-MCNC: 58.4 MG/DL (ref 8–23)
BUN SERPL-MCNC: 59.5 MG/DL (ref 8–23)
BUN SERPL-MCNC: 63.5 MG/DL (ref 8–23)
BUN SERPL-MCNC: 64.6 MG/DL (ref 8–23)
BUN SERPL-MCNC: 65.9 MG/DL (ref 8–23)
C COLI+JEJUNI+LARI FUSA STL QL NAA+PROBE: NOT DETECTED
C DIFF TOX B STL QL: NEGATIVE
CALCIUM SERPL-MCNC: 10 MG/DL (ref 8.8–10.2)
CALCIUM SERPL-MCNC: 8.8 MG/DL (ref 8.8–10.2)
CALCIUM SERPL-MCNC: 8.9 MG/DL (ref 8.8–10.2)
CALCIUM SERPL-MCNC: 9.1 MG/DL (ref 8.8–10.2)
CALCIUM SERPL-MCNC: 9.1 MG/DL (ref 8.8–10.2)
CALCIUM SERPL-MCNC: 9.5 MG/DL (ref 8.8–10.2)
CALCIUM SERPL-MCNC: 9.6 MG/DL (ref 8.8–10.2)
CALCIUM SERPL-MCNC: 9.6 MG/DL (ref 8.8–10.2)
CALCIUM SERPL-MCNC: 9.7 MG/DL (ref 8.8–10.2)
CALCIUM SERPL-MCNC: 9.8 MG/DL (ref 8.8–10.2)
CALCIUM SERPL-MCNC: 9.8 MG/DL (ref 8.8–10.2)
CALCIUM SERPL-MCNC: 9.9 MG/DL (ref 8.8–10.2)
CHLORIDE SERPL-SCNC: 101 MMOL/L (ref 98–107)
CHLORIDE SERPL-SCNC: 101 MMOL/L (ref 98–107)
CHLORIDE SERPL-SCNC: 102 MMOL/L (ref 98–107)
CHLORIDE SERPL-SCNC: 102 MMOL/L (ref 98–107)
CHLORIDE SERPL-SCNC: 104 MMOL/L (ref 98–107)
CHLORIDE SERPL-SCNC: 105 MMOL/L (ref 98–107)
CHLORIDE SERPL-SCNC: 107 MMOL/L (ref 98–107)
CHLORIDE SERPL-SCNC: 110 MMOL/L (ref 98–107)
CHLORIDE SERPL-SCNC: 97 MMOL/L (ref 98–107)
CHLORIDE SERPL-SCNC: 98 MMOL/L (ref 98–107)
CMV DNA SPEC NAA+PROBE-ACNC: NOT DETECTED IU/ML
COLOR UR AUTO: ABNORMAL
COLOR UR AUTO: YELLOW
CREAT SERPL-MCNC: 2.34 MG/DL (ref 0.67–1.17)
CREAT SERPL-MCNC: 2.34 MG/DL (ref 0.67–1.17)
CREAT SERPL-MCNC: 2.37 MG/DL (ref 0.67–1.17)
CREAT SERPL-MCNC: 2.41 MG/DL (ref 0.67–1.17)
CREAT SERPL-MCNC: 2.47 MG/DL (ref 0.67–1.17)
CREAT SERPL-MCNC: 2.69 MG/DL (ref 0.67–1.17)
CREAT SERPL-MCNC: 2.79 MG/DL (ref 0.67–1.17)
CREAT SERPL-MCNC: 2.85 MG/DL (ref 0.67–1.17)
CREAT SERPL-MCNC: 2.92 MG/DL (ref 0.67–1.17)
CREAT SERPL-MCNC: 3.04 MG/DL (ref 0.67–1.17)
CREAT SERPL-MCNC: 3.11 MG/DL (ref 0.67–1.17)
CREAT SERPL-MCNC: 3.19 MG/DL (ref 0.67–1.17)
CREAT SERPL-MCNC: 4.1 MG/DL (ref 0.67–1.17)
CREAT SERPL-MCNC: 4.29 MG/DL (ref 0.67–1.17)
CYCLOSPORINE BLD LC/MS/MS-MCNC: 39 UG/L (ref 50–400)
CYCLOSPORINE BLD LC/MS/MS-MCNC: 73 UG/L (ref 50–400)
CYCLOSPORINE BLD LC/MS/MS-MCNC: 85 UG/L (ref 50–400)
DEPRECATED HCO3 PLAS-SCNC: 20 MMOL/L (ref 22–29)
DEPRECATED HCO3 PLAS-SCNC: 20 MMOL/L (ref 22–29)
DEPRECATED HCO3 PLAS-SCNC: 21 MMOL/L (ref 22–29)
DEPRECATED HCO3 PLAS-SCNC: 22 MMOL/L (ref 22–29)
DEPRECATED HCO3 PLAS-SCNC: 22 MMOL/L (ref 22–29)
DEPRECATED HCO3 PLAS-SCNC: 24 MMOL/L (ref 22–29)
DEPRECATED HCO3 PLAS-SCNC: 24 MMOL/L (ref 22–29)
DEPRECATED HCO3 PLAS-SCNC: 25 MMOL/L (ref 22–29)
DEPRECATED HCO3 PLAS-SCNC: 26 MMOL/L (ref 22–29)
DEPRECATED HCO3 PLAS-SCNC: 26 MMOL/L (ref 22–29)
DEPRECATED HCO3 PLAS-SCNC: 27 MMOL/L (ref 22–29)
DIASTOLIC BLOOD PRESSURE - MUSE: NORMAL MMHG
EC STX1 GENE STL QL NAA+PROBE: NOT DETECTED
EC STX2 GENE STL QL NAA+PROBE: NOT DETECTED
ELLIPTOCYTES BLD QL SMEAR: SLIGHT
EOSINOPHIL # BLD AUTO: 0 10E3/UL (ref 0–0.7)
EOSINOPHIL # BLD AUTO: 0.1 10E3/UL (ref 0–0.7)
EOSINOPHIL # BLD MANUAL: 0 10E3/UL (ref 0–0.7)
EOSINOPHIL NFR BLD AUTO: 1 %
EOSINOPHIL NFR BLD MANUAL: 0 %
ERYTHROCYTE [DISTWIDTH] IN BLOOD BY AUTOMATED COUNT: 14.2 % (ref 10–15)
ERYTHROCYTE [DISTWIDTH] IN BLOOD BY AUTOMATED COUNT: 14.3 % (ref 10–15)
ERYTHROCYTE [DISTWIDTH] IN BLOOD BY AUTOMATED COUNT: 14.5 % (ref 10–15)
ERYTHROCYTE [DISTWIDTH] IN BLOOD BY AUTOMATED COUNT: 14.5 % (ref 10–15)
ERYTHROCYTE [DISTWIDTH] IN BLOOD BY AUTOMATED COUNT: 14.6 % (ref 10–15)
ERYTHROCYTE [DISTWIDTH] IN BLOOD BY AUTOMATED COUNT: 14.7 % (ref 10–15)
ERYTHROCYTE [DISTWIDTH] IN BLOOD BY AUTOMATED COUNT: 14.7 % (ref 10–15)
ERYTHROCYTE [DISTWIDTH] IN BLOOD BY AUTOMATED COUNT: 14.9 % (ref 10–15)
ERYTHROCYTE [DISTWIDTH] IN BLOOD BY AUTOMATED COUNT: 15.3 % (ref 10–15)
ERYTHROCYTE [DISTWIDTH] IN BLOOD BY AUTOMATED COUNT: 15.8 % (ref 10–15)
ERYTHROCYTE [DISTWIDTH] IN BLOOD BY AUTOMATED COUNT: 15.8 % (ref 10–15)
ERYTHROCYTE [DISTWIDTH] IN BLOOD BY AUTOMATED COUNT: 15.9 % (ref 10–15)
FERRITIN SERPL-MCNC: 341 NG/ML (ref 31–409)
FLUAV RNA SPEC QL NAA+PROBE: NEGATIVE
FLUBV RNA RESP QL NAA+PROBE: NEGATIVE
FRAGMENTS BLD QL SMEAR: SLIGHT
GAMMA INTERFERON BACKGROUND BLD IA-ACNC: 0.01 IU/ML
GFR SERPL CREATININE-BSD FRML MDRD: 13 ML/MIN/1.73M2
GFR SERPL CREATININE-BSD FRML MDRD: 14 ML/MIN/1.73M2
GFR SERPL CREATININE-BSD FRML MDRD: 19 ML/MIN/1.73M2
GFR SERPL CREATININE-BSD FRML MDRD: 19 ML/MIN/1.73M2
GFR SERPL CREATININE-BSD FRML MDRD: 20 ML/MIN/1.73M2
GFR SERPL CREATININE-BSD FRML MDRD: 21 ML/MIN/1.73M2
GFR SERPL CREATININE-BSD FRML MDRD: 21 ML/MIN/1.73M2
GFR SERPL CREATININE-BSD FRML MDRD: 22 ML/MIN/1.73M2
GFR SERPL CREATININE-BSD FRML MDRD: 23 ML/MIN/1.73M2
GFR SERPL CREATININE-BSD FRML MDRD: 25 ML/MIN/1.73M2
GFR SERPL CREATININE-BSD FRML MDRD: 26 ML/MIN/1.73M2
GFR SERPL CREATININE-BSD FRML MDRD: 27 ML/MIN/1.73M2
GLIADIN IGA SER-ACNC: 5.3 U/ML
GLIADIN IGG SER-ACNC: 0.6 U/ML
GLUCOSE BLDC GLUCOMTR-MCNC: 104 MG/DL (ref 70–99)
GLUCOSE BLDC GLUCOMTR-MCNC: 106 MG/DL (ref 70–99)
GLUCOSE BLDC GLUCOMTR-MCNC: 108 MG/DL (ref 70–99)
GLUCOSE BLDC GLUCOMTR-MCNC: 126 MG/DL (ref 70–99)
GLUCOSE BLDC GLUCOMTR-MCNC: 136 MG/DL (ref 70–99)
GLUCOSE BLDC GLUCOMTR-MCNC: 212 MG/DL (ref 70–99)
GLUCOSE SERPL-MCNC: 100 MG/DL (ref 70–99)
GLUCOSE SERPL-MCNC: 111 MG/DL (ref 70–99)
GLUCOSE SERPL-MCNC: 119 MG/DL (ref 70–99)
GLUCOSE SERPL-MCNC: 120 MG/DL (ref 70–99)
GLUCOSE SERPL-MCNC: 130 MG/DL (ref 70–99)
GLUCOSE SERPL-MCNC: 132 MG/DL (ref 70–99)
GLUCOSE SERPL-MCNC: 135 MG/DL (ref 70–99)
GLUCOSE SERPL-MCNC: 140 MG/DL (ref 70–99)
GLUCOSE SERPL-MCNC: 67 MG/DL (ref 70–99)
GLUCOSE SERPL-MCNC: 70 MG/DL (ref 70–99)
GLUCOSE SERPL-MCNC: 74 MG/DL (ref 70–99)
GLUCOSE SERPL-MCNC: 83 MG/DL (ref 70–99)
GLUCOSE SERPL-MCNC: 87 MG/DL (ref 70–99)
GLUCOSE SERPL-MCNC: 96 MG/DL (ref 70–99)
GLUCOSE UR STRIP-MCNC: NEGATIVE MG/DL
GLUCOSE UR STRIP-MCNC: NEGATIVE MG/DL
HAV IGG SER QL IA: NONREACTIVE
HAV IGM SERPL QL IA: NONREACTIVE
HBV CORE AB SERPL QL IA: NONREACTIVE
HBV SURFACE AB SERPL IA-ACNC: 0.31 M[IU]/ML
HBV SURFACE AB SERPL IA-ACNC: NONREACTIVE M[IU]/ML
HBV SURFACE AG SERPL QL IA: NONREACTIVE
HCO3 BLDV-SCNC: 26 MMOL/L (ref 21–28)
HCT VFR BLD AUTO: 30.5 % (ref 40–53)
HCT VFR BLD AUTO: 31.4 % (ref 40–53)
HCT VFR BLD AUTO: 31.9 % (ref 40–53)
HCT VFR BLD AUTO: 32.5 % (ref 40–53)
HCT VFR BLD AUTO: 33.5 % (ref 40–53)
HCT VFR BLD AUTO: 33.8 % (ref 40–53)
HCT VFR BLD AUTO: 34.7 % (ref 40–53)
HCT VFR BLD AUTO: 34.9 % (ref 40–53)
HCT VFR BLD AUTO: 35 % (ref 40–53)
HCT VFR BLD AUTO: 35 % (ref 40–53)
HCT VFR BLD AUTO: 36.2 % (ref 40–53)
HCT VFR BLD AUTO: 36.3 % (ref 40–53)
HCV AB SERPL QL IA: NONREACTIVE
HGB BLD-MCNC: 10.3 G/DL (ref 13.3–17.7)
HGB BLD-MCNC: 10.3 G/DL (ref 13.3–17.7)
HGB BLD-MCNC: 10.5 G/DL (ref 13.3–17.7)
HGB BLD-MCNC: 10.7 G/DL (ref 13.3–17.7)
HGB BLD-MCNC: 10.8 G/DL (ref 13.3–17.7)
HGB BLD-MCNC: 10.8 G/DL (ref 13.3–17.7)
HGB BLD-MCNC: 10.9 G/DL (ref 13.3–17.7)
HGB BLD-MCNC: 11.2 G/DL (ref 13.3–17.7)
HGB BLD-MCNC: 11.4 G/DL (ref 13.3–17.7)
HGB BLD-MCNC: 11.5 G/DL (ref 13.3–17.7)
HGB BLD-MCNC: 9.5 G/DL (ref 13.3–17.7)
HGB BLD-MCNC: 9.7 G/DL (ref 13.3–17.7)
HGB UR QL STRIP: ABNORMAL
HGB UR QL STRIP: NEGATIVE
HOLD SPECIMEN: NORMAL
HYALINE CASTS: 11 /LPF
HYALINE CASTS: 17 /LPF
IGA SERPL-MCNC: 191 MG/DL (ref 84–499)
IMM GRANULOCYTES # BLD: 0 10E3/UL
IMM GRANULOCYTES NFR BLD: 0 %
IMM GRANULOCYTES NFR BLD: 1 %
INR PPP: 2.39 (ref 0.85–1.15)
INR PPP: 2.73 (ref 0.85–1.15)
INR PPP: 2.76 (ref 0.85–1.15)
INR PPP: 2.9 (ref 0.85–1.15)
INR PPP: 3.22 (ref 0.85–1.15)
INR PPP: 3.46 (ref 0.85–1.15)
INR PPP: 3.47 (ref 0.85–1.15)
INR PPP: 3.59 (ref 0.85–1.15)
INR PPP: 3.65 (ref 0.85–1.15)
INR PPP: 4.02 (ref 0.85–1.15)
INR PPP: 5.81 (ref 0.85–1.15)
INR PPP: 7.27 (ref 0.85–1.15)
INR PPP: 7.65 (ref 0.85–1.15)
INTERPRETATION ECG - MUSE: NORMAL
IRON BINDING CAPACITY (ROCHE): 208 UG/DL (ref 240–430)
IRON SATN MFR SERPL: 14 % (ref 15–46)
IRON SERPL-MCNC: 29 UG/DL (ref 61–157)
KETONES UR STRIP-MCNC: NEGATIVE MG/DL
KETONES UR STRIP-MCNC: NEGATIVE MG/DL
LACTATE BLD-SCNC: 0.9 MMOL/L
LACTATE SERPL-SCNC: 1.8 MMOL/L (ref 0.7–2)
LEUKOCYTE ESTERASE UR QL STRIP: ABNORMAL
LEUKOCYTE ESTERASE UR QL STRIP: NEGATIVE
LIPASE SERPL-CCNC: 9 U/L (ref 13–60)
LVEF ECHO: NORMAL
LYMPHOCYTES # BLD AUTO: 0.2 10E3/UL (ref 0.8–5.3)
LYMPHOCYTES # BLD AUTO: 0.2 10E3/UL (ref 0.8–5.3)
LYMPHOCYTES # BLD AUTO: 0.3 10E3/UL (ref 0.8–5.3)
LYMPHOCYTES # BLD AUTO: 0.7 10E3/UL (ref 0.8–5.3)
LYMPHOCYTES # BLD MANUAL: 0.4 10E3/UL (ref 0.8–5.3)
LYMPHOCYTES NFR BLD AUTO: 13 %
LYMPHOCYTES NFR BLD AUTO: 23 %
LYMPHOCYTES NFR BLD AUTO: 5 %
LYMPHOCYTES NFR BLD AUTO: 7 %
LYMPHOCYTES NFR BLD MANUAL: 25 %
M TB IFN-G BLD-IMP: NEGATIVE
M TB IFN-G CD4+ BCKGRND COR BLD-ACNC: 2.47 IU/ML
MCH RBC QN AUTO: 28.6 PG (ref 26.5–33)
MCH RBC QN AUTO: 28.7 PG (ref 26.5–33)
MCH RBC QN AUTO: 28.8 PG (ref 26.5–33)
MCH RBC QN AUTO: 28.8 PG (ref 26.5–33)
MCH RBC QN AUTO: 29 PG (ref 26.5–33)
MCH RBC QN AUTO: 29.2 PG (ref 26.5–33)
MCH RBC QN AUTO: 29.3 PG (ref 26.5–33)
MCH RBC QN AUTO: 29.5 PG (ref 26.5–33)
MCH RBC QN AUTO: 29.6 PG (ref 26.5–33)
MCH RBC QN AUTO: 29.7 PG (ref 26.5–33)
MCHC RBC AUTO-ENTMCNC: 30.9 G/DL (ref 31.5–36.5)
MCHC RBC AUTO-ENTMCNC: 30.9 G/DL (ref 31.5–36.5)
MCHC RBC AUTO-ENTMCNC: 31.1 G/DL (ref 31.5–36.5)
MCHC RBC AUTO-ENTMCNC: 31.3 G/DL (ref 31.5–36.5)
MCHC RBC AUTO-ENTMCNC: 31.4 G/DL (ref 31.5–36.5)
MCHC RBC AUTO-ENTMCNC: 31.7 G/DL (ref 31.5–36.5)
MCHC RBC AUTO-ENTMCNC: 31.7 G/DL (ref 31.5–36.5)
MCHC RBC AUTO-ENTMCNC: 31.8 G/DL (ref 31.5–36.5)
MCHC RBC AUTO-ENTMCNC: 32 G/DL (ref 31.5–36.5)
MCHC RBC AUTO-ENTMCNC: 32.3 G/DL (ref 31.5–36.5)
MCV RBC AUTO: 91 FL (ref 78–100)
MCV RBC AUTO: 91 FL (ref 78–100)
MCV RBC AUTO: 92 FL (ref 78–100)
MCV RBC AUTO: 93 FL (ref 78–100)
MCV RBC AUTO: 94 FL (ref 78–100)
MCV RBC AUTO: 95 FL (ref 78–100)
MCV RBC AUTO: 95 FL (ref 78–100)
METAMYELOCYTES # BLD MANUAL: 0.2 10E3/UL
METAMYELOCYTES NFR BLD MANUAL: 13 %
MITOCHONDRIA M2 IGG SER-ACNC: 1.3 U/ML
MITOGEN IGNF BCKGRD COR BLD-ACNC: 0.03 IU/ML
MITOGEN IGNF BCKGRD COR BLD-ACNC: 0.05 IU/ML
MONOCYTES # BLD AUTO: 0.3 10E3/UL (ref 0–1.3)
MONOCYTES # BLD AUTO: 0.5 10E3/UL (ref 0–1.3)
MONOCYTES # BLD AUTO: 0.8 10E3/UL (ref 0–1.3)
MONOCYTES # BLD AUTO: 0.8 10E3/UL (ref 0–1.3)
MONOCYTES # BLD MANUAL: 0.3 10E3/UL (ref 0–1.3)
MONOCYTES NFR BLD AUTO: 14 %
MONOCYTES NFR BLD AUTO: 17 %
MONOCYTES NFR BLD AUTO: 19 %
MONOCYTES NFR BLD AUTO: 20 %
MONOCYTES NFR BLD MANUAL: 18 %
MUCOUS THREADS #/AREA URNS LPF: PRESENT /LPF
MUCOUS THREADS #/AREA URNS LPF: PRESENT /LPF
MYCOPHENOLATE SERPL LC/MS/MS-MCNC: 1.49 MG/L (ref 1–3.5)
MYCOPHENOLATE SERPL LC/MS/MS-MCNC: 2.01 MG/L (ref 1–3.5)
MYCOPHENOLATE-G SERPL LC/MS/MS-MCNC: 139 MG/L (ref 30–95)
MYCOPHENOLATE-G SERPL LC/MS/MS-MCNC: >200 MG/L (ref 30–95)
NEUTROPHILS # BLD AUTO: 0.8 10E3/UL (ref 1.6–8.3)
NEUTROPHILS # BLD AUTO: 2.3 10E3/UL (ref 1.6–8.3)
NEUTROPHILS # BLD AUTO: 2.7 10E3/UL (ref 1.6–8.3)
NEUTROPHILS # BLD AUTO: 3.9 10E3/UL (ref 1.6–8.3)
NEUTROPHILS # BLD MANUAL: 0.6 10E3/UL (ref 1.6–8.3)
NEUTROPHILS NFR BLD AUTO: 55 %
NEUTROPHILS NFR BLD AUTO: 71 %
NEUTROPHILS NFR BLD AUTO: 73 %
NEUTROPHILS NFR BLD AUTO: 74 %
NEUTROPHILS NFR BLD MANUAL: 42 %
NITRATE UR QL: NEGATIVE
NITRATE UR QL: NEGATIVE
NOROV GI+II ORF1-ORF2 JNC STL QL NAA+PR: DETECTED
NOROV GI+II ORF1-ORF2 JNC STL QL NAA+PR: NOT DETECTED
NRBC # BLD AUTO: 0 10E3/UL
NRBC BLD AUTO-RTO: 0 /100
NT-PROBNP SERPL-MCNC: ABNORMAL PG/ML (ref 0–1800)
NT-PROBNP SERPL-MCNC: ABNORMAL PG/ML (ref 0–1800)
P AXIS - MUSE: NORMAL DEGREES
PCO2 BLDV: 41 MM HG (ref 40–50)
PH BLDV: 7.41 [PH] (ref 7.32–7.43)
PH UR STRIP: 5.5 [PH] (ref 5–7)
PH UR STRIP: 6 [PH] (ref 5–7)
PLAT MORPH BLD: ABNORMAL
PLATELET # BLD AUTO: 115 10E3/UL (ref 150–450)
PLATELET # BLD AUTO: 125 10E3/UL (ref 150–450)
PLATELET # BLD AUTO: 134 10E3/UL (ref 150–450)
PLATELET # BLD AUTO: 152 10E3/UL (ref 150–450)
PLATELET # BLD AUTO: 180 10E3/UL (ref 150–450)
PLATELET # BLD AUTO: 184 10E3/UL (ref 150–450)
PLATELET # BLD AUTO: 195 10E3/UL (ref 150–450)
PLATELET # BLD AUTO: 204 10E3/UL (ref 150–450)
PLATELET # BLD AUTO: 210 10E3/UL (ref 150–450)
PLATELET # BLD AUTO: 215 10E3/UL (ref 150–450)
PLATELET # BLD AUTO: 218 10E3/UL (ref 150–450)
PLATELET # BLD AUTO: 231 10E3/UL (ref 150–450)
PO2 BLDV: 38 MM HG (ref 25–47)
POTASSIUM SERPL-SCNC: 2.9 MMOL/L (ref 3.4–5.3)
POTASSIUM SERPL-SCNC: 3.1 MMOL/L (ref 3.4–5.3)
POTASSIUM SERPL-SCNC: 3.2 MMOL/L (ref 3.4–5.3)
POTASSIUM SERPL-SCNC: 3.3 MMOL/L (ref 3.4–5.3)
POTASSIUM SERPL-SCNC: 3.3 MMOL/L (ref 3.4–5.3)
POTASSIUM SERPL-SCNC: 3.4 MMOL/L (ref 3.4–5.3)
POTASSIUM SERPL-SCNC: 3.5 MMOL/L (ref 3.4–5.3)
POTASSIUM SERPL-SCNC: 3.6 MMOL/L (ref 3.4–5.3)
POTASSIUM SERPL-SCNC: 3.7 MMOL/L (ref 3.4–5.3)
POTASSIUM SERPL-SCNC: 3.7 MMOL/L (ref 3.4–5.3)
POTASSIUM SERPL-SCNC: 4.1 MMOL/L (ref 3.4–5.3)
POTASSIUM SERPL-SCNC: 4.3 MMOL/L (ref 3.4–5.3)
PR INTERVAL - MUSE: NORMAL MS
PROT SERPL-MCNC: 6.5 G/DL (ref 6.4–8.3)
PROT SERPL-MCNC: 6.5 G/DL (ref 6.4–8.3)
PROT SERPL-MCNC: 6.8 G/DL (ref 6.4–8.3)
PROT SERPL-MCNC: 6.9 G/DL (ref 6.4–8.3)
PROT SERPL-MCNC: 6.9 G/DL (ref 6.4–8.3)
PROT SERPL-MCNC: 7.2 G/DL (ref 6.4–8.3)
PROT SERPL-MCNC: 7.6 G/DL (ref 6.4–8.3)
QRS DURATION - MUSE: 152 MS
QRS DURATION - MUSE: 156 MS
QRS DURATION - MUSE: 158 MS
QT - MUSE: 434 MS
QT - MUSE: 450 MS
QT - MUSE: 476 MS
QTC - MUSE: 482 MS
QTC - MUSE: 500 MS
QTC - MUSE: 531 MS
QUANTIFERON MITOGEN: 2.48 IU/ML
QUANTIFERON NIL TUBE: 0.01 IU/ML
QUANTIFERON TB1 TUBE: 0.06 IU/ML
QUANTIFERON TB2 TUBE: 0.04
R AXIS - MUSE: -31 DEGREES
R AXIS - MUSE: -32 DEGREES
R AXIS - MUSE: -7 DEGREES
RBC # BLD AUTO: 3.22 10E6/UL (ref 4.4–5.9)
RBC # BLD AUTO: 3.29 10E6/UL (ref 4.4–5.9)
RBC # BLD AUTO: 3.47 10E6/UL (ref 4.4–5.9)
RBC # BLD AUTO: 3.49 10E6/UL (ref 4.4–5.9)
RBC # BLD AUTO: 3.65 10E6/UL (ref 4.4–5.9)
RBC # BLD AUTO: 3.66 10E6/UL (ref 4.4–5.9)
RBC # BLD AUTO: 3.75 10E6/UL (ref 4.4–5.9)
RBC # BLD AUTO: 3.78 10E6/UL (ref 4.4–5.9)
RBC # BLD AUTO: 3.79 10E6/UL (ref 4.4–5.9)
RBC # BLD AUTO: 3.83 10E6/UL (ref 4.4–5.9)
RBC # BLD AUTO: 3.85 10E6/UL (ref 4.4–5.9)
RBC # BLD AUTO: 3.97 10E6/UL (ref 4.4–5.9)
RBC MORPH BLD: ABNORMAL
RBC URINE: 2 /HPF
RBC URINE: <1 /HPF
RSV RNA SPEC NAA+PROBE: NEGATIVE
RVA NSP5 STL QL NAA+PROBE: NOT DETECTED
SALMONELLA SP RPOD STL QL NAA+PROBE: NOT DETECTED
SAO2 % BLDV: 73 % (ref 94–100)
SARS-COV-2 RNA RESP QL NAA+PROBE: NEGATIVE
SHIGELLA SP+EIEC IPAH STL QL NAA+PROBE: NOT DETECTED
SMA IGG SER-ACNC: 8 UNITS
SODIUM SERPL-SCNC: 138 MMOL/L (ref 136–145)
SODIUM SERPL-SCNC: 139 MMOL/L (ref 136–145)
SODIUM SERPL-SCNC: 140 MMOL/L (ref 136–145)
SODIUM SERPL-SCNC: 142 MMOL/L (ref 136–145)
SODIUM SERPL-SCNC: 142 MMOL/L (ref 136–145)
SODIUM SERPL-SCNC: 144 MMOL/L (ref 136–145)
SODIUM SERPL-SCNC: 145 MMOL/L (ref 136–145)
SODIUM SERPL-SCNC: 146 MMOL/L (ref 136–145)
SODIUM SERPL-SCNC: 148 MMOL/L (ref 136–145)
SP GR UR STRIP: 1.01 (ref 1–1.03)
SP GR UR STRIP: 1.01 (ref 1–1.03)
SQUAMOUS EPITHELIAL: <1 /HPF
SYSTOLIC BLOOD PRESSURE - MUSE: NORMAL MMHG
T AXIS - MUSE: 119 DEGREES
T AXIS - MUSE: 122 DEGREES
T AXIS - MUSE: 135 DEGREES
TME LAST DOSE: ABNORMAL H
TME LAST DOSE: NORMAL H
TROPONIN T SERPL HS-MCNC: 110 NG/L
TROPONIN T SERPL HS-MCNC: 143 NG/L
TROPONIN T SERPL HS-MCNC: 149 NG/L
TSH SERPL DL<=0.005 MIU/L-ACNC: 3.67 UIU/ML (ref 0.3–4.2)
TTG IGA SER-ACNC: 0.7 U/ML
TTG IGG SER-ACNC: 1 U/ML
UROBILINOGEN UR STRIP-MCNC: NORMAL MG/DL
UROBILINOGEN UR STRIP-MCNC: NORMAL MG/DL
V CHOL+PARA RFBL+TRKH+TNAA STL QL NAA+PR: NOT DETECTED
VENTRICULAR RATE- MUSE: 69 BPM
VENTRICULAR RATE- MUSE: 75 BPM
VENTRICULAR RATE- MUSE: 80 BPM
WBC # BLD AUTO: 1.4 10E3/UL (ref 4–11)
WBC # BLD AUTO: 1.4 10E3/UL (ref 4–11)
WBC # BLD AUTO: 2.5 10E3/UL (ref 4–11)
WBC # BLD AUTO: 2.8 10E3/UL (ref 4–11)
WBC # BLD AUTO: 3.2 10E3/UL (ref 4–11)
WBC # BLD AUTO: 3.7 10E3/UL (ref 4–11)
WBC # BLD AUTO: 3.8 10E3/UL (ref 4–11)
WBC # BLD AUTO: 4.1 10E3/UL (ref 4–11)
WBC # BLD AUTO: 5.5 10E3/UL (ref 4–11)
WBC # BLD AUTO: 5.9 10E3/UL (ref 4–11)
WBC # BLD AUTO: 6 10E3/UL (ref 4–11)
WBC # BLD AUTO: 6.9 10E3/UL (ref 4–11)
WBC URINE: 2 /HPF
WBC URINE: 7 /HPF
Y ENTERO RECN STL QL NAA+PROBE: NOT DETECTED

## 2023-01-01 PROCEDURE — 82435 ASSAY OF BLOOD CHLORIDE: CPT | Performed by: NURSE PRACTITIONER

## 2023-01-01 PROCEDURE — 97110 THERAPEUTIC EXERCISES: CPT | Mod: GO

## 2023-01-01 PROCEDURE — 250N000012 HC RX MED GY IP 250 OP 636 PS 637: Performed by: INTERNAL MEDICINE

## 2023-01-01 PROCEDURE — 87340 HEPATITIS B SURFACE AG IA: CPT | Performed by: PHYSICIAN ASSISTANT

## 2023-01-01 PROCEDURE — 99232 SBSQ HOSP IP/OBS MODERATE 35: CPT | Performed by: INTERNAL MEDICINE

## 2023-01-01 PROCEDURE — 84484 ASSAY OF TROPONIN QUANT: CPT | Performed by: EMERGENCY MEDICINE

## 2023-01-01 PROCEDURE — 250N000013 HC RX MED GY IP 250 OP 250 PS 637: Performed by: INTERNAL MEDICINE

## 2023-01-01 PROCEDURE — 99309 SBSQ NF CARE MODERATE MDM 30: CPT | Performed by: NURSE PRACTITIONER

## 2023-01-01 PROCEDURE — 85007 BL SMEAR W/DIFF WBC COUNT: CPT | Performed by: INTERNAL MEDICINE

## 2023-01-01 PROCEDURE — 36415 COLL VENOUS BLD VENIPUNCTURE: CPT | Performed by: INTERNAL MEDICINE

## 2023-01-01 PROCEDURE — 83550 IRON BINDING TEST: CPT | Performed by: INTERNAL MEDICINE

## 2023-01-01 PROCEDURE — 84484 ASSAY OF TROPONIN QUANT: CPT | Performed by: INTERNAL MEDICINE

## 2023-01-01 PROCEDURE — P9604 ONE-WAY ALLOW PRORATED TRIP: HCPCS | Performed by: NURSE PRACTITIONER

## 2023-01-01 PROCEDURE — 99233 SBSQ HOSP IP/OBS HIGH 50: CPT | Performed by: INTERNAL MEDICINE

## 2023-01-01 PROCEDURE — 87798 DETECT AGENT NOS DNA AMP: CPT | Performed by: NURSE PRACTITIONER

## 2023-01-01 PROCEDURE — 80158 DRUG ASSAY CYCLOSPORINE: CPT | Performed by: INTERNAL MEDICINE

## 2023-01-01 PROCEDURE — 82140 ASSAY OF AMMONIA: CPT | Performed by: INTERNAL MEDICINE

## 2023-01-01 PROCEDURE — 85027 COMPLETE CBC AUTOMATED: CPT | Performed by: NURSE PRACTITIONER

## 2023-01-01 PROCEDURE — 99232 SBSQ HOSP IP/OBS MODERATE 35: CPT | Mod: FS | Performed by: NURSE PRACTITIONER

## 2023-01-01 PROCEDURE — 85610 PROTHROMBIN TIME: CPT | Performed by: INTERNAL MEDICINE

## 2023-01-01 PROCEDURE — 80048 BASIC METABOLIC PNL TOTAL CA: CPT | Performed by: INTERNAL MEDICINE

## 2023-01-01 PROCEDURE — 99285 EMERGENCY DEPT VISIT HI MDM: CPT | Mod: 25

## 2023-01-01 PROCEDURE — 250N000013 HC RX MED GY IP 250 OP 250 PS 637: Performed by: STUDENT IN AN ORGANIZED HEALTH CARE EDUCATION/TRAINING PROGRAM

## 2023-01-01 PROCEDURE — C9803 HOPD COVID-19 SPEC COLLECT: HCPCS

## 2023-01-01 PROCEDURE — 99223 1ST HOSP IP/OBS HIGH 75: CPT | Performed by: INTERNAL MEDICINE

## 2023-01-01 PROCEDURE — 86706 HEP B SURFACE ANTIBODY: CPT | Performed by: PHYSICIAN ASSISTANT

## 2023-01-01 PROCEDURE — 36415 COLL VENOUS BLD VENIPUNCTURE: CPT | Performed by: PHYSICIAN ASSISTANT

## 2023-01-01 PROCEDURE — 85025 COMPLETE CBC W/AUTO DIFF WBC: CPT | Performed by: EMERGENCY MEDICINE

## 2023-01-01 PROCEDURE — 99223 1ST HOSP IP/OBS HIGH 75: CPT | Mod: AI | Performed by: INTERNAL MEDICINE

## 2023-01-01 PROCEDURE — 250N000013 HC RX MED GY IP 250 OP 250 PS 637: Performed by: PHYSICIAN ASSISTANT

## 2023-01-01 PROCEDURE — 250N000011 HC RX IP 250 OP 636: Performed by: EMERGENCY MEDICINE

## 2023-01-01 PROCEDURE — 82140 ASSAY OF AMMONIA: CPT | Performed by: EMERGENCY MEDICINE

## 2023-01-01 PROCEDURE — 93976 VASCULAR STUDY: CPT

## 2023-01-01 PROCEDURE — 80048 BASIC METABOLIC PNL TOTAL CA: CPT | Performed by: EMERGENCY MEDICINE

## 2023-01-01 PROCEDURE — 83880 ASSAY OF NATRIURETIC PEPTIDE: CPT | Performed by: EMERGENCY MEDICINE

## 2023-01-01 PROCEDURE — 36415 COLL VENOUS BLD VENIPUNCTURE: CPT | Performed by: EMERGENCY MEDICINE

## 2023-01-01 PROCEDURE — 87493 C DIFF AMPLIFIED PROBE: CPT | Performed by: INTERNAL MEDICINE

## 2023-01-01 PROCEDURE — 71046 X-RAY EXAM CHEST 2 VIEWS: CPT

## 2023-01-01 PROCEDURE — 36415 COLL VENOUS BLD VENIPUNCTURE: CPT | Performed by: NURSE PRACTITIONER

## 2023-01-01 PROCEDURE — 86015 ACTIN ANTIBODY EACH: CPT | Performed by: INTERNAL MEDICINE

## 2023-01-01 PROCEDURE — 80180 DRUG SCRN QUAN MYCOPHENOLATE: CPT | Performed by: INTERNAL MEDICINE

## 2023-01-01 PROCEDURE — 93005 ELECTROCARDIOGRAM TRACING: CPT

## 2023-01-01 PROCEDURE — 76705 ECHO EXAM OF ABDOMEN: CPT

## 2023-01-01 PROCEDURE — 120N000001 HC R&B MED SURG/OB

## 2023-01-01 PROCEDURE — 87637 SARSCOV2&INF A&B&RSV AMP PRB: CPT | Performed by: EMERGENCY MEDICINE

## 2023-01-01 PROCEDURE — 80053 COMPREHEN METABOLIC PANEL: CPT | Performed by: EMERGENCY MEDICINE

## 2023-01-01 PROCEDURE — 85610 PROTHROMBIN TIME: CPT | Performed by: EMERGENCY MEDICINE

## 2023-01-01 PROCEDURE — 82104 ALPHA-1-ANTITRYPSIN PHENO: CPT | Performed by: INTERNAL MEDICINE

## 2023-01-01 PROCEDURE — 250N000011 HC RX IP 250 OP 636: Performed by: STUDENT IN AN ORGANIZED HEALTH CARE EDUCATION/TRAINING PROGRAM

## 2023-01-01 PROCEDURE — 81001 URINALYSIS AUTO W/SCOPE: CPT | Performed by: EMERGENCY MEDICINE

## 2023-01-01 PROCEDURE — 85014 HEMATOCRIT: CPT | Performed by: NURSE PRACTITIONER

## 2023-01-01 PROCEDURE — 97165 OT EVAL LOW COMPLEX 30 MIN: CPT | Mod: GO

## 2023-01-01 PROCEDURE — 99233 SBSQ HOSP IP/OBS HIGH 50: CPT | Mod: 25 | Performed by: INTERNAL MEDICINE

## 2023-01-01 PROCEDURE — 258N000003 HC RX IP 258 OP 636: Performed by: INTERNAL MEDICINE

## 2023-01-01 PROCEDURE — 86704 HEP B CORE ANTIBODY TOTAL: CPT | Performed by: PHYSICIAN ASSISTANT

## 2023-01-01 PROCEDURE — 86803 HEPATITIS C AB TEST: CPT | Performed by: PHYSICIAN ASSISTANT

## 2023-01-01 PROCEDURE — 250N000011 HC RX IP 250 OP 636: Performed by: INTERNAL MEDICINE

## 2023-01-01 PROCEDURE — 85014 HEMATOCRIT: CPT | Performed by: INTERNAL MEDICINE

## 2023-01-01 PROCEDURE — 85027 COMPLETE CBC AUTOMATED: CPT | Performed by: INTERNAL MEDICINE

## 2023-01-01 PROCEDURE — 85004 AUTOMATED DIFF WBC COUNT: CPT | Performed by: NURSE PRACTITIONER

## 2023-01-01 PROCEDURE — 96361 HYDRATE IV INFUSION ADD-ON: CPT

## 2023-01-01 PROCEDURE — 96374 THER/PROPH/DIAG INJ IV PUSH: CPT

## 2023-01-01 PROCEDURE — 80048 BASIC METABOLIC PNL TOTAL CA: CPT | Performed by: NURSE PRACTITIONER

## 2023-01-01 PROCEDURE — 83605 ASSAY OF LACTIC ACID: CPT | Performed by: EMERGENCY MEDICINE

## 2023-01-01 PROCEDURE — 82374 ASSAY BLOOD CARBON DIOXIDE: CPT | Performed by: NURSE PRACTITIONER

## 2023-01-01 PROCEDURE — 97530 THERAPEUTIC ACTIVITIES: CPT | Mod: GO

## 2023-01-01 PROCEDURE — 93306 TTE W/DOPPLER COMPLETE: CPT

## 2023-01-01 PROCEDURE — 82784 ASSAY IGA/IGD/IGG/IGM EACH: CPT | Performed by: INTERNAL MEDICINE

## 2023-01-01 PROCEDURE — 80053 COMPREHEN METABOLIC PANEL: CPT | Performed by: PHYSICIAN ASSISTANT

## 2023-01-01 PROCEDURE — 85004 AUTOMATED DIFF WBC COUNT: CPT | Performed by: INTERNAL MEDICINE

## 2023-01-01 PROCEDURE — 87040 BLOOD CULTURE FOR BACTERIA: CPT | Performed by: EMERGENCY MEDICINE

## 2023-01-01 PROCEDURE — 87506 IADNA-DNA/RNA PROBE TQ 6-11: CPT | Performed by: INTERNAL MEDICINE

## 2023-01-01 PROCEDURE — 250N000009 HC RX 250: Performed by: STUDENT IN AN ORGANIZED HEALTH CARE EDUCATION/TRAINING PROGRAM

## 2023-01-01 PROCEDURE — 82248 BILIRUBIN DIRECT: CPT | Performed by: EMERGENCY MEDICINE

## 2023-01-01 PROCEDURE — 82728 ASSAY OF FERRITIN: CPT | Performed by: INTERNAL MEDICINE

## 2023-01-01 PROCEDURE — 99222 1ST HOSP IP/OBS MODERATE 55: CPT | Performed by: INTERNAL MEDICINE

## 2023-01-01 PROCEDURE — 86038 ANTINUCLEAR ANTIBODIES: CPT | Performed by: INTERNAL MEDICINE

## 2023-01-01 PROCEDURE — 36415 COLL VENOUS BLD VENIPUNCTURE: CPT | Performed by: STUDENT IN AN ORGANIZED HEALTH CARE EDUCATION/TRAINING PROGRAM

## 2023-01-01 PROCEDURE — 99310 SBSQ NF CARE HIGH MDM 45: CPT | Performed by: NURSE PRACTITIONER

## 2023-01-01 PROCEDURE — 85041 AUTOMATED RBC COUNT: CPT | Performed by: INTERNAL MEDICINE

## 2023-01-01 PROCEDURE — 86708 HEPATITIS A ANTIBODY: CPT | Performed by: PHYSICIAN ASSISTANT

## 2023-01-01 PROCEDURE — 99285 EMERGENCY DEPT VISIT HI MDM: CPT | Mod: CS,25

## 2023-01-01 PROCEDURE — 86381 MITOCHONDRIAL ANTIBODY EACH: CPT | Performed by: INTERNAL MEDICINE

## 2023-01-01 PROCEDURE — 99238 HOSP IP/OBS DSCHRG MGMT 30/<: CPT | Performed by: STUDENT IN AN ORGANIZED HEALTH CARE EDUCATION/TRAINING PROGRAM

## 2023-01-01 PROCEDURE — 250N000013 HC RX MED GY IP 250 OP 250 PS 637: Performed by: NURSE PRACTITIONER

## 2023-01-01 PROCEDURE — 85610 PROTHROMBIN TIME: CPT | Performed by: STUDENT IN AN ORGANIZED HEALTH CARE EDUCATION/TRAINING PROGRAM

## 2023-01-01 PROCEDURE — 99239 HOSP IP/OBS DSCHRG MGMT >30: CPT | Performed by: INTERNAL MEDICINE

## 2023-01-01 PROCEDURE — 83690 ASSAY OF LIPASE: CPT | Performed by: EMERGENCY MEDICINE

## 2023-01-01 PROCEDURE — 80053 COMPREHEN METABOLIC PANEL: CPT | Performed by: INTERNAL MEDICINE

## 2023-01-01 PROCEDURE — 99316 NF DSCHRG MGMT 30 MIN+: CPT | Performed by: NURSE PRACTITIONER

## 2023-01-01 PROCEDURE — 83516 IMMUNOASSAY NONANTIBODY: CPT | Performed by: INTERNAL MEDICINE

## 2023-01-01 PROCEDURE — 83605 ASSAY OF LACTIC ACID: CPT

## 2023-01-01 PROCEDURE — 258N000003 HC RX IP 258 OP 636: Performed by: EMERGENCY MEDICINE

## 2023-01-01 PROCEDURE — 250N000013 HC RX MED GY IP 250 OP 250 PS 637: Performed by: EMERGENCY MEDICINE

## 2023-01-01 PROCEDURE — 93306 TTE W/DOPPLER COMPLETE: CPT | Mod: 26 | Performed by: INTERNAL MEDICINE

## 2023-01-01 PROCEDURE — 86709 HEPATITIS A IGM ANTIBODY: CPT | Performed by: PHYSICIAN ASSISTANT

## 2023-01-01 PROCEDURE — G1010 CDSM STANSON: HCPCS

## 2023-01-01 PROCEDURE — 70450 CT HEAD/BRAIN W/O DYE: CPT | Mod: MA

## 2023-01-01 PROCEDURE — 86481 TB AG RESPONSE T-CELL SUSP: CPT | Performed by: NURSE PRACTITIONER

## 2023-01-01 PROCEDURE — P9603 ONE-WAY ALLOW PRORATED MILES: HCPCS | Performed by: NURSE PRACTITIONER

## 2023-01-01 PROCEDURE — 85027 COMPLETE CBC AUTOMATED: CPT | Performed by: EMERGENCY MEDICINE

## 2023-01-01 PROCEDURE — 74176 CT ABD & PELVIS W/O CONTRAST: CPT | Mod: MA

## 2023-01-01 PROCEDURE — 82248 BILIRUBIN DIRECT: CPT | Performed by: PHYSICIAN ASSISTANT

## 2023-01-01 PROCEDURE — 85610 PROTHROMBIN TIME: CPT | Mod: ORL | Performed by: ORTHOPAEDIC SURGERY

## 2023-01-01 PROCEDURE — 84443 ASSAY THYROID STIM HORMONE: CPT | Performed by: INTERNAL MEDICINE

## 2023-01-01 RX ORDER — POTASSIUM CHLORIDE 1500 MG/1
40 TABLET, EXTENDED RELEASE ORAL ONCE
Status: COMPLETED | OUTPATIENT
Start: 2023-01-01 | End: 2023-01-01

## 2023-01-01 RX ORDER — FUROSEMIDE 40 MG
40 TABLET ORAL DAILY
Status: DISCONTINUED | OUTPATIENT
Start: 2023-01-01 | End: 2023-01-01

## 2023-01-01 RX ORDER — POTASSIUM CHLORIDE 1500 MG/1
20 TABLET, EXTENDED RELEASE ORAL ONCE
Status: COMPLETED | OUTPATIENT
Start: 2023-01-01 | End: 2023-01-01

## 2023-01-01 RX ORDER — LACTULOSE 10 G/15ML
20 SOLUTION ORAL 2 TIMES DAILY
Status: DISCONTINUED | OUTPATIENT
Start: 2023-01-01 | End: 2023-01-01 | Stop reason: HOSPADM

## 2023-01-01 RX ORDER — GABAPENTIN 100 MG/1
300-600 CAPSULE ORAL AT BEDTIME
Status: DISCONTINUED | OUTPATIENT
Start: 2023-01-01 | End: 2023-01-01

## 2023-01-01 RX ORDER — RISPERIDONE 0.25 MG/1
0.25 TABLET ORAL DAILY
Status: DISCONTINUED | OUTPATIENT
Start: 2023-01-01 | End: 2023-01-01

## 2023-01-01 RX ORDER — HYDRALAZINE HYDROCHLORIDE 50 MG/1
50 TABLET, FILM COATED ORAL EVERY 8 HOURS
DISCHARGE
Start: 2023-01-01

## 2023-01-01 RX ORDER — MULTIPLE VITAMINS W/ MINERALS TAB 9MG-400MCG
1 TAB ORAL DAILY
Status: DISCONTINUED | OUTPATIENT
Start: 2023-01-01 | End: 2023-01-01 | Stop reason: HOSPADM

## 2023-01-01 RX ORDER — LACTULOSE 10 G/15ML
20 SOLUTION ORAL 2 TIMES DAILY
DISCHARGE
Start: 2023-01-01

## 2023-01-01 RX ORDER — CARVEDILOL 12.5 MG/1
12.5 TABLET ORAL 2 TIMES DAILY WITH MEALS
Status: DISCONTINUED | OUTPATIENT
Start: 2023-01-01 | End: 2023-01-01 | Stop reason: HOSPADM

## 2023-01-01 RX ORDER — HYDRALAZINE HYDROCHLORIDE 50 MG/1
50 TABLET, FILM COATED ORAL EVERY 8 HOURS
Status: DISCONTINUED | OUTPATIENT
Start: 2023-01-01 | End: 2023-01-01 | Stop reason: HOSPADM

## 2023-01-01 RX ORDER — CARVEDILOL 6.25 MG/1
6.25 TABLET ORAL 2 TIMES DAILY WITH MEALS
Status: DISCONTINUED | OUTPATIENT
Start: 2023-01-01 | End: 2023-01-01

## 2023-01-01 RX ORDER — HALOPERIDOL 0.5 MG/1
2 TABLET ORAL EVERY 6 HOURS PRN
Status: DISCONTINUED | OUTPATIENT
Start: 2023-01-01 | End: 2023-01-01

## 2023-01-01 RX ORDER — TORSEMIDE 20 MG/1
20 TABLET ORAL
COMMUNITY

## 2023-01-01 RX ORDER — WARFARIN SODIUM 3 MG/1
1.5 TABLET ORAL DAILY
DISCHARGE
Start: 2023-01-01 | End: 2023-01-01

## 2023-01-01 RX ORDER — DILTIAZEM HYDROCHLORIDE 180 MG/1
180 CAPSULE, COATED, EXTENDED RELEASE ORAL DAILY
Status: DISCONTINUED | OUTPATIENT
Start: 2023-01-01 | End: 2023-01-01

## 2023-01-01 RX ORDER — CALCITRIOL 0.25 UG/1
0.25 CAPSULE, LIQUID FILLED ORAL DAILY
Status: DISCONTINUED | OUTPATIENT
Start: 2023-01-01 | End: 2023-01-01 | Stop reason: HOSPADM

## 2023-01-01 RX ORDER — ONDANSETRON 2 MG/ML
4 INJECTION INTRAMUSCULAR; INTRAVENOUS EVERY 6 HOURS PRN
Status: DISCONTINUED | OUTPATIENT
Start: 2023-01-01 | End: 2023-01-01 | Stop reason: HOSPADM

## 2023-01-01 RX ORDER — PROCHLORPERAZINE 25 MG
12.5 SUPPOSITORY, RECTAL RECTAL EVERY 12 HOURS PRN
Status: DISCONTINUED | OUTPATIENT
Start: 2023-01-01 | End: 2023-01-01 | Stop reason: HOSPADM

## 2023-01-01 RX ORDER — HYDROXYZINE HYDROCHLORIDE 25 MG/1
25 TABLET, FILM COATED ORAL EVERY 6 HOURS PRN
Status: DISCONTINUED | OUTPATIENT
Start: 2023-01-01 | End: 2023-01-01 | Stop reason: HOSPADM

## 2023-01-01 RX ORDER — POTASSIUM CHLORIDE 1500 MG/1
20 TABLET, EXTENDED RELEASE ORAL 2 TIMES DAILY
COMMUNITY
Start: 2022-01-01

## 2023-01-01 RX ORDER — POTASSIUM CHLORIDE 1.5 G/1.58G
40 POWDER, FOR SOLUTION ORAL ONCE
Status: COMPLETED | OUTPATIENT
Start: 2023-01-01 | End: 2023-01-01

## 2023-01-01 RX ORDER — AMOXICILLIN 250 MG
2 CAPSULE ORAL 2 TIMES DAILY PRN
Status: DISCONTINUED | OUTPATIENT
Start: 2023-01-01 | End: 2023-01-01

## 2023-01-01 RX ORDER — ONDANSETRON 4 MG/1
4 TABLET, ORALLY DISINTEGRATING ORAL EVERY 6 HOURS PRN
Status: DISCONTINUED | OUTPATIENT
Start: 2023-01-01 | End: 2023-01-01 | Stop reason: HOSPADM

## 2023-01-01 RX ORDER — MULTIPLE VITAMINS W/ MINERALS TAB 9MG-400MCG
1 TAB ORAL DAILY
COMMUNITY

## 2023-01-01 RX ORDER — AMOXICILLIN 250 MG
1 CAPSULE ORAL 2 TIMES DAILY PRN
Status: DISCONTINUED | OUTPATIENT
Start: 2023-01-01 | End: 2023-01-01

## 2023-01-01 RX ORDER — ACETAMINOPHEN 650 MG/1
650 SUPPOSITORY RECTAL EVERY 6 HOURS PRN
Status: DISCONTINUED | OUTPATIENT
Start: 2023-01-01 | End: 2023-01-01 | Stop reason: HOSPADM

## 2023-01-01 RX ORDER — ACETAMINOPHEN 500 MG
1000 TABLET ORAL ONCE
Status: COMPLETED | OUTPATIENT
Start: 2023-01-01 | End: 2023-01-01

## 2023-01-01 RX ORDER — CYCLOSPORINE 25 MG/1
50 CAPSULE, LIQUID FILLED ORAL 2 TIMES DAILY
Status: DISCONTINUED | OUTPATIENT
Start: 2023-01-01 | End: 2023-01-01 | Stop reason: HOSPADM

## 2023-01-01 RX ORDER — SODIUM CHLORIDE, SODIUM LACTATE, POTASSIUM CHLORIDE, CALCIUM CHLORIDE 600; 310; 30; 20 MG/100ML; MG/100ML; MG/100ML; MG/100ML
INJECTION, SOLUTION INTRAVENOUS CONTINUOUS
Status: DISCONTINUED | OUTPATIENT
Start: 2023-01-01 | End: 2023-01-01 | Stop reason: HOSPADM

## 2023-01-01 RX ORDER — MYCOPHENOLATE MOFETIL 500 MG/1
500 TABLET ORAL 2 TIMES DAILY
Status: DISCONTINUED | OUTPATIENT
Start: 2023-01-01 | End: 2023-01-01 | Stop reason: HOSPADM

## 2023-01-01 RX ORDER — MYCOPHENOLATE MOFETIL 500 MG/1
500 TABLET ORAL 2 TIMES DAILY
Status: DISCONTINUED | OUTPATIENT
Start: 2023-01-01 | End: 2023-01-01

## 2023-01-01 RX ORDER — GABAPENTIN 100 MG/1
200 CAPSULE ORAL AT BEDTIME
Status: DISCONTINUED | OUTPATIENT
Start: 2023-01-01 | End: 2023-01-01 | Stop reason: HOSPADM

## 2023-01-01 RX ORDER — COLCHICINE 0.6 MG/1
0.6 TABLET ORAL 2 TIMES DAILY PRN
COMMUNITY
Start: 2022-01-01

## 2023-01-01 RX ORDER — ONDANSETRON 2 MG/ML
4 INJECTION INTRAMUSCULAR; INTRAVENOUS ONCE
Status: DISCONTINUED | OUTPATIENT
Start: 2023-01-01 | End: 2023-01-01

## 2023-01-01 RX ORDER — FUROSEMIDE 10 MG/ML
40 INJECTION INTRAMUSCULAR; INTRAVENOUS
Status: COMPLETED | OUTPATIENT
Start: 2023-01-01 | End: 2023-01-01

## 2023-01-01 RX ORDER — HALOPERIDOL 5 MG/ML
2 INJECTION INTRAMUSCULAR EVERY 6 HOURS PRN
Status: DISCONTINUED | OUTPATIENT
Start: 2023-01-01 | End: 2023-01-01

## 2023-01-01 RX ORDER — METOLAZONE 2.5 MG/1
TABLET ORAL
COMMUNITY
Start: 2022-01-01

## 2023-01-01 RX ORDER — HALOPERIDOL 0.5 MG/1
2 TABLET ORAL EVERY 6 HOURS PRN
Status: COMPLETED | OUTPATIENT
Start: 2023-01-01 | End: 2023-01-01

## 2023-01-01 RX ORDER — WARFARIN SODIUM 3 MG/1
TABLET ORAL
COMMUNITY

## 2023-01-01 RX ORDER — FUROSEMIDE 10 MG/ML
60 INJECTION INTRAMUSCULAR; INTRAVENOUS ONCE
Status: COMPLETED | OUTPATIENT
Start: 2023-01-01 | End: 2023-01-01

## 2023-01-01 RX ORDER — HYDRALAZINE HYDROCHLORIDE 25 MG/1
25 TABLET, FILM COATED ORAL EVERY 8 HOURS SCHEDULED
Status: DISCONTINUED | OUTPATIENT
Start: 2023-01-01 | End: 2023-01-01

## 2023-01-01 RX ORDER — DILTIAZEM HYDROCHLORIDE 180 MG/1
180 CAPSULE, COATED, EXTENDED RELEASE ORAL DAILY
Status: ON HOLD | COMMUNITY
End: 2023-01-01

## 2023-01-01 RX ORDER — LORAZEPAM 2 MG/ML
0.5 INJECTION INTRAMUSCULAR ONCE
Status: COMPLETED | OUTPATIENT
Start: 2023-01-01 | End: 2023-01-01

## 2023-01-01 RX ORDER — GABAPENTIN 300 MG/1
300 CAPSULE ORAL AT BEDTIME
Status: DISCONTINUED | OUTPATIENT
Start: 2023-01-01 | End: 2023-01-01 | Stop reason: HOSPADM

## 2023-01-01 RX ORDER — POTASSIUM CHLORIDE 1500 MG/1
20 TABLET, EXTENDED RELEASE ORAL 2 TIMES DAILY
COMMUNITY
End: 2023-01-01

## 2023-01-01 RX ORDER — CARVEDILOL 12.5 MG/1
12.5 TABLET ORAL 2 TIMES DAILY WITH MEALS
DISCHARGE
Start: 2023-01-01

## 2023-01-01 RX ORDER — CALCITRIOL 0.25 UG/1
0.25 CAPSULE, LIQUID FILLED ORAL DAILY
COMMUNITY
Start: 2022-01-01

## 2023-01-01 RX ORDER — ONDANSETRON 2 MG/ML
4 INJECTION INTRAMUSCULAR; INTRAVENOUS ONCE
Status: COMPLETED | OUTPATIENT
Start: 2023-01-01 | End: 2023-01-01

## 2023-01-01 RX ORDER — LACTULOSE 10 G/15ML
10 SOLUTION ORAL 2 TIMES DAILY
Status: DISCONTINUED | OUTPATIENT
Start: 2023-01-01 | End: 2023-01-01

## 2023-01-01 RX ORDER — CARVEDILOL 6.25 MG/1
12.5 TABLET ORAL 2 TIMES DAILY WITH MEALS
Status: DISCONTINUED | OUTPATIENT
Start: 2023-01-01 | End: 2023-01-01 | Stop reason: HOSPADM

## 2023-01-01 RX ORDER — LIDOCAINE 40 MG/G
CREAM TOPICAL
Status: DISCONTINUED | OUTPATIENT
Start: 2023-01-01 | End: 2023-01-01 | Stop reason: HOSPADM

## 2023-01-01 RX ORDER — HYDROXYZINE HYDROCHLORIDE 50 MG/1
50 TABLET, FILM COATED ORAL EVERY 6 HOURS PRN
Status: DISCONTINUED | OUTPATIENT
Start: 2023-01-01 | End: 2023-01-01 | Stop reason: HOSPADM

## 2023-01-01 RX ORDER — LANOLIN ALCOHOL/MO/W.PET/CERES
3 CREAM (GRAM) TOPICAL
Status: DISCONTINUED | OUTPATIENT
Start: 2023-01-01 | End: 2023-01-01 | Stop reason: HOSPADM

## 2023-01-01 RX ORDER — HYDRALAZINE HYDROCHLORIDE 50 MG/1
50 TABLET, FILM COATED ORAL EVERY 8 HOURS SCHEDULED
Status: DISCONTINUED | OUTPATIENT
Start: 2023-01-01 | End: 2023-01-01 | Stop reason: HOSPADM

## 2023-01-01 RX ORDER — DEXTROSE MONOHYDRATE 50 MG/ML
INJECTION, SOLUTION INTRAVENOUS CONTINUOUS
Status: ACTIVE | OUTPATIENT
Start: 2023-01-01 | End: 2023-01-01

## 2023-01-01 RX ORDER — PROCHLORPERAZINE MALEATE 5 MG
5 TABLET ORAL EVERY 6 HOURS PRN
Status: DISCONTINUED | OUTPATIENT
Start: 2023-01-01 | End: 2023-01-01 | Stop reason: HOSPADM

## 2023-01-01 RX ORDER — FUROSEMIDE 40 MG
80 TABLET ORAL DAILY
Status: DISCONTINUED | OUTPATIENT
Start: 2023-01-01 | End: 2023-01-01

## 2023-01-01 RX ORDER — ACETAMINOPHEN 325 MG/1
650 TABLET ORAL EVERY 6 HOURS PRN
Status: DISCONTINUED | OUTPATIENT
Start: 2023-01-01 | End: 2023-01-01 | Stop reason: HOSPADM

## 2023-01-01 RX ORDER — HALOPERIDOL 5 MG/ML
2 INJECTION INTRAMUSCULAR EVERY 6 HOURS PRN
Status: COMPLETED | OUTPATIENT
Start: 2023-01-01 | End: 2023-01-01

## 2023-01-01 RX ORDER — CARVEDILOL 3.12 MG/1
3.12 TABLET ORAL 2 TIMES DAILY WITH MEALS
Status: DISCONTINUED | OUTPATIENT
Start: 2023-01-01 | End: 2023-01-01

## 2023-01-01 RX ORDER — FEBUXOSTAT 40 MG/1
40 TABLET, FILM COATED ORAL DAILY
Status: ON HOLD | COMMUNITY
End: 2023-01-01

## 2023-01-01 RX ORDER — WARFARIN SODIUM 3 MG/1
3 TABLET ORAL
Status: COMPLETED | OUTPATIENT
Start: 2023-01-01 | End: 2023-01-01

## 2023-01-01 RX ORDER — BUMETANIDE 0.25 MG/ML
1 INJECTION INTRAMUSCULAR; INTRAVENOUS ONCE
Status: COMPLETED | OUTPATIENT
Start: 2023-01-01 | End: 2023-01-01

## 2023-01-01 RX ORDER — LANOLIN ALCOHOL/MO/W.PET/CERES
3 CREAM (GRAM) TOPICAL EVERY EVENING
Status: DISCONTINUED | OUTPATIENT
Start: 2023-01-01 | End: 2023-01-01 | Stop reason: HOSPADM

## 2023-01-01 RX ADMIN — HYDRALAZINE HYDROCHLORIDE 12.5 MG: 25 TABLET, FILM COATED ORAL at 10:00

## 2023-01-01 RX ADMIN — FUROSEMIDE 40 MG: 10 INJECTION, SOLUTION INTRAMUSCULAR; INTRAVENOUS at 08:42

## 2023-01-01 RX ADMIN — HYDRALAZINE HYDROCHLORIDE 25 MG: 25 TABLET, FILM COATED ORAL at 06:18

## 2023-01-01 RX ADMIN — HYDRALAZINE HYDROCHLORIDE 50 MG: 50 TABLET, FILM COATED ORAL at 03:23

## 2023-01-01 RX ADMIN — ACETAMINOPHEN 1000 MG: 500 TABLET ORAL at 20:37

## 2023-01-01 RX ADMIN — CARVEDILOL 12.5 MG: 12.5 TABLET, FILM COATED ORAL at 17:57

## 2023-01-01 RX ADMIN — SODIUM CHLORIDE 500 ML: 9 INJECTION, SOLUTION INTRAVENOUS at 13:14

## 2023-01-01 RX ADMIN — MYCOPHENOLATE MOFETIL 500 MG: 500 TABLET ORAL at 07:56

## 2023-01-01 RX ADMIN — LACTULOSE 10 G: 20 SOLUTION ORAL at 08:22

## 2023-01-01 RX ADMIN — CALCITRIOL 0.25 MCG: 0.25 CAPSULE ORAL at 08:35

## 2023-01-01 RX ADMIN — HYDRALAZINE HYDROCHLORIDE 12.5 MG: 25 TABLET, FILM COATED ORAL at 21:59

## 2023-01-01 RX ADMIN — HYDRALAZINE HYDROCHLORIDE 25 MG: 25 TABLET, FILM COATED ORAL at 14:17

## 2023-01-01 RX ADMIN — POTASSIUM CHLORIDE 20 MEQ: 1500 TABLET, EXTENDED RELEASE ORAL at 03:22

## 2023-01-01 RX ADMIN — CYCLOSPORINE 50 MG: 25 CAPSULE, LIQUID FILLED ORAL at 10:00

## 2023-01-01 RX ADMIN — MYCOPHENOLATE MOFETIL 500 MG: 500 TABLET ORAL at 21:43

## 2023-01-01 RX ADMIN — HYDRALAZINE HYDROCHLORIDE 25 MG: 25 TABLET, FILM COATED ORAL at 21:18

## 2023-01-01 RX ADMIN — HALOPERIDOL LACTATE 2 MG: 5 INJECTION, SOLUTION INTRAMUSCULAR at 01:27

## 2023-01-01 RX ADMIN — LACTULOSE 10 G: 20 SOLUTION ORAL at 22:19

## 2023-01-01 RX ADMIN — CARVEDILOL 12.5 MG: 12.5 TABLET, FILM COATED ORAL at 08:39

## 2023-01-01 RX ADMIN — Medication 1 MG: at 20:40

## 2023-01-01 RX ADMIN — CARVEDILOL 9.38 MG: 6.25 TABLET, FILM COATED ORAL at 08:24

## 2023-01-01 RX ADMIN — CYCLOSPORINE 50 MG: 25 CAPSULE, LIQUID FILLED ORAL at 20:41

## 2023-01-01 RX ADMIN — ONDANSETRON 4 MG: 2 INJECTION INTRAMUSCULAR; INTRAVENOUS at 20:36

## 2023-01-01 RX ADMIN — CARVEDILOL 12.5 MG: 12.5 TABLET, FILM COATED ORAL at 09:32

## 2023-01-01 RX ADMIN — RISPERIDONE 0.25 MG: 0.25 TABLET ORAL at 10:01

## 2023-01-01 RX ADMIN — RISPERIDONE 0.25 MG: 0.25 TABLET ORAL at 09:58

## 2023-01-01 RX ADMIN — HALOPERIDOL LACTATE 2 MG: 5 INJECTION, SOLUTION INTRAMUSCULAR at 13:53

## 2023-01-01 RX ADMIN — CARVEDILOL 12.5 MG: 6.25 TABLET, FILM COATED ORAL at 12:08

## 2023-01-01 RX ADMIN — DEXTROSE MONOHYDRATE: 50 INJECTION, SOLUTION INTRAVENOUS at 12:30

## 2023-01-01 RX ADMIN — POTASSIUM CHLORIDE 20 MEQ: 1500 TABLET, EXTENDED RELEASE ORAL at 11:02

## 2023-01-01 RX ADMIN — MULTIPLE VITAMINS W/ MINERALS TAB 1 TABLET: TAB at 07:56

## 2023-01-01 RX ADMIN — LACTULOSE 10 G: 20 SOLUTION ORAL at 09:58

## 2023-01-01 RX ADMIN — LACTULOSE 20 G: 10 SOLUTION ORAL at 08:36

## 2023-01-01 RX ADMIN — FUROSEMIDE 40 MG: 10 INJECTION, SOLUTION INTRAMUSCULAR; INTRAVENOUS at 15:30

## 2023-01-01 RX ADMIN — LACTULOSE 20 G: 10 SOLUTION ORAL at 20:44

## 2023-01-01 RX ADMIN — CYCLOSPORINE 50 MG: 25 CAPSULE, LIQUID FILLED ORAL at 22:19

## 2023-01-01 RX ADMIN — CYCLOSPORINE 50 MG: 25 CAPSULE, LIQUID FILLED ORAL at 08:59

## 2023-01-01 RX ADMIN — HYDRALAZINE HYDROCHLORIDE 50 MG: 50 TABLET, FILM COATED ORAL at 13:12

## 2023-01-01 RX ADMIN — MYCOPHENOLATE MOFETIL 500 MG: 500 TABLET ORAL at 21:49

## 2023-01-01 RX ADMIN — MYCOPHENOLATE MOFETIL 500 MG: 500 TABLET ORAL at 20:40

## 2023-01-01 RX ADMIN — HYDRALAZINE HYDROCHLORIDE 50 MG: 50 TABLET, FILM COATED ORAL at 06:16

## 2023-01-01 RX ADMIN — MYCOPHENOLATE MOFETIL 500 MG: 500 TABLET ORAL at 08:58

## 2023-01-01 RX ADMIN — PHYTONADIONE 10 MG: 10 INJECTION, EMULSION INTRAMUSCULAR; INTRAVENOUS; SUBCUTANEOUS at 11:15

## 2023-01-01 RX ADMIN — HYDRALAZINE HYDROCHLORIDE 50 MG: 50 TABLET, FILM COATED ORAL at 17:40

## 2023-01-01 RX ADMIN — GABAPENTIN 200 MG: 100 CAPSULE ORAL at 21:59

## 2023-01-01 RX ADMIN — MYCOPHENOLATE MOFETIL 500 MG: 500 TABLET ORAL at 10:01

## 2023-01-01 RX ADMIN — LACTULOSE 10 G: 20 SOLUTION ORAL at 20:40

## 2023-01-01 RX ADMIN — POTASSIUM CHLORIDE 40 MEQ: 1500 TABLET, EXTENDED RELEASE ORAL at 08:58

## 2023-01-01 RX ADMIN — MYCOPHENOLATE MOFETIL 500 MG: 500 TABLET ORAL at 20:44

## 2023-01-01 RX ADMIN — HYDRALAZINE HYDROCHLORIDE 25 MG: 25 TABLET, FILM COATED ORAL at 06:27

## 2023-01-01 RX ADMIN — WARFARIN SODIUM 1.5 MG: 3 TABLET ORAL at 17:40

## 2023-01-01 RX ADMIN — CYCLOSPORINE 50 MG: 25 CAPSULE, LIQUID FILLED ORAL at 09:59

## 2023-01-01 RX ADMIN — DILTIAZEM HYDROCHLORIDE 180 MG: 180 CAPSULE, COATED, EXTENDED RELEASE ORAL at 08:41

## 2023-01-01 RX ADMIN — ACETAMINOPHEN 650 MG: 325 TABLET, FILM COATED ORAL at 03:55

## 2023-01-01 RX ADMIN — HYDRALAZINE HYDROCHLORIDE 25 MG: 25 TABLET, FILM COATED ORAL at 22:19

## 2023-01-01 RX ADMIN — RISPERIDONE 0.25 MG: 0.25 TABLET ORAL at 08:24

## 2023-01-01 RX ADMIN — LACTULOSE 10 G: 20 SOLUTION ORAL at 12:28

## 2023-01-01 RX ADMIN — SODIUM CHLORIDE 1000 ML: 9 INJECTION, SOLUTION INTRAVENOUS at 23:35

## 2023-01-01 RX ADMIN — CYCLOSPORINE 50 MG: 25 CAPSULE, LIQUID FILLED ORAL at 08:41

## 2023-01-01 RX ADMIN — LACTULOSE 20 G: 10 SOLUTION ORAL at 08:56

## 2023-01-01 RX ADMIN — LORAZEPAM 0.5 MG: 2 INJECTION INTRAMUSCULAR; INTRAVENOUS at 16:01

## 2023-01-01 RX ADMIN — WARFARIN SODIUM 1.5 MG: 3 TABLET ORAL at 18:31

## 2023-01-01 RX ADMIN — HYDRALAZINE HYDROCHLORIDE 25 MG: 25 TABLET, FILM COATED ORAL at 09:00

## 2023-01-01 RX ADMIN — POTASSIUM CHLORIDE 40 MEQ: 1.5 POWDER, FOR SOLUTION ORAL at 13:19

## 2023-01-01 RX ADMIN — CYCLOSPORINE 50 MG: 25 CAPSULE, LIQUID FILLED ORAL at 21:42

## 2023-01-01 RX ADMIN — HYDRALAZINE HYDROCHLORIDE 50 MG: 50 TABLET, FILM COATED ORAL at 21:49

## 2023-01-01 RX ADMIN — CYCLOSPORINE 50 MG: 25 CAPSULE, LIQUID FILLED ORAL at 23:05

## 2023-01-01 RX ADMIN — HYDRALAZINE HYDROCHLORIDE 50 MG: 50 TABLET, FILM COATED ORAL at 06:33

## 2023-01-01 RX ADMIN — MYCOPHENOLATE MOFETIL 500 MG: 500 TABLET ORAL at 08:41

## 2023-01-01 RX ADMIN — CALCITRIOL 0.25 MCG: 0.25 CAPSULE ORAL at 09:31

## 2023-01-01 RX ADMIN — CARVEDILOL 9.38 MG: 6.25 TABLET, FILM COATED ORAL at 18:20

## 2023-01-01 RX ADMIN — CARVEDILOL 9.38 MG: 6.25 TABLET, FILM COATED ORAL at 09:58

## 2023-01-01 RX ADMIN — FUROSEMIDE 60 MG: 10 INJECTION, SOLUTION INTRAMUSCULAR; INTRAVENOUS at 19:13

## 2023-01-01 RX ADMIN — CARVEDILOL 9.38 MG: 6.25 TABLET, FILM COATED ORAL at 18:22

## 2023-01-01 RX ADMIN — CYCLOSPORINE 50 MG: 25 CAPSULE, LIQUID FILLED ORAL at 08:24

## 2023-01-01 RX ADMIN — CALCITRIOL CAPSULES 0.25 MCG 0.25 MCG: 0.25 CAPSULE ORAL at 12:12

## 2023-01-01 RX ADMIN — MYCOPHENOLATE MOFETIL 500 MG: 500 TABLET ORAL at 08:24

## 2023-01-01 RX ADMIN — WARFARIN SODIUM 3 MG: 3 TABLET ORAL at 18:20

## 2023-01-01 RX ADMIN — CARVEDILOL 9.38 MG: 6.25 TABLET, FILM COATED ORAL at 08:56

## 2023-01-01 RX ADMIN — MYCOPHENOLATE MOFETIL 500 MG: 500 TABLET ORAL at 23:04

## 2023-01-01 RX ADMIN — MYCOPHENOLATE MOFETIL 500 MG: 500 TABLET ORAL at 09:59

## 2023-01-01 RX ADMIN — CYCLOSPORINE 50 MG: 25 CAPSULE, LIQUID FILLED ORAL at 12:10

## 2023-01-01 RX ADMIN — CYCLOSPORINE 50 MG: 25 CAPSULE, LIQUID FILLED ORAL at 09:31

## 2023-01-01 RX ADMIN — CYCLOSPORINE 50 MG: 25 CAPSULE, LIQUID FILLED ORAL at 07:56

## 2023-01-01 RX ADMIN — LACTULOSE 20 G: 10 SOLUTION ORAL at 21:30

## 2023-01-01 RX ADMIN — CYCLOSPORINE 50 MG: 25 CAPSULE, LIQUID FILLED ORAL at 20:44

## 2023-01-01 RX ADMIN — MYCOPHENOLATE MOFETIL 500 MG: 500 TABLET ORAL at 08:36

## 2023-01-01 RX ADMIN — CALCITRIOL 0.25 MCG: 0.25 CAPSULE ORAL at 07:56

## 2023-01-01 RX ADMIN — SODIUM CHLORIDE, POTASSIUM CHLORIDE, SODIUM LACTATE AND CALCIUM CHLORIDE: 600; 310; 30; 20 INJECTION, SOLUTION INTRAVENOUS at 03:24

## 2023-01-01 RX ADMIN — CARVEDILOL 3.12 MG: 3.12 TABLET, FILM COATED ORAL at 18:09

## 2023-01-01 RX ADMIN — BUMETANIDE 1 MG: 0.25 INJECTION INTRAMUSCULAR; INTRAVENOUS at 12:00

## 2023-01-01 RX ADMIN — CYCLOSPORINE 50 MG: 25 CAPSULE, LIQUID FILLED ORAL at 20:15

## 2023-01-01 RX ADMIN — MULTIPLE VITAMINS W/ MINERALS TAB 1 TABLET: TAB at 08:58

## 2023-01-01 RX ADMIN — CARVEDILOL 12.5 MG: 12.5 TABLET, FILM COATED ORAL at 17:39

## 2023-01-01 RX ADMIN — MYCOPHENOLATE MOFETIL 500 MG: 500 TABLET ORAL at 20:14

## 2023-01-01 RX ADMIN — CARVEDILOL 9.38 MG: 6.25 TABLET, FILM COATED ORAL at 17:06

## 2023-01-01 RX ADMIN — POTASSIUM CHLORIDE 40 MEQ: 1500 TABLET, EXTENDED RELEASE ORAL at 14:18

## 2023-01-01 RX ADMIN — FUROSEMIDE 80 MG: 40 TABLET ORAL at 08:24

## 2023-01-01 RX ADMIN — MULTIPLE VITAMINS W/ MINERALS TAB 1 TABLET: TAB at 08:36

## 2023-01-01 RX ADMIN — POTASSIUM CHLORIDE 40 MEQ: 1500 TABLET, EXTENDED RELEASE ORAL at 12:28

## 2023-01-01 RX ADMIN — CYCLOSPORINE 50 MG: 25 CAPSULE, LIQUID FILLED ORAL at 08:35

## 2023-01-01 RX ADMIN — CALCITRIOL 0.25 MCG: 0.25 CAPSULE ORAL at 08:58

## 2023-01-01 RX ADMIN — FUROSEMIDE 80 MG: 40 TABLET ORAL at 14:17

## 2023-01-01 RX ADMIN — CARVEDILOL 12.5 MG: 12.5 TABLET, FILM COATED ORAL at 07:56

## 2023-01-01 RX ADMIN — MYCOPHENOLATE MOFETIL 500 MG: 500 TABLET ORAL at 22:19

## 2023-01-01 RX ADMIN — HYDRALAZINE HYDROCHLORIDE 50 MG: 50 TABLET, FILM COATED ORAL at 22:59

## 2023-01-01 RX ADMIN — MYCOPHENOLATE MOFETIL 500 MG: 500 TABLET ORAL at 09:32

## 2023-01-01 RX ADMIN — CYCLOSPORINE 50 MG: 25 CAPSULE, LIQUID FILLED ORAL at 21:49

## 2023-01-01 RX ADMIN — LACTULOSE 20 G: 10 SOLUTION ORAL at 20:14

## 2023-01-01 RX ADMIN — SODIUM CHLORIDE 1000 ML: 9 INJECTION, SOLUTION INTRAVENOUS at 20:36

## 2023-01-01 RX ADMIN — HYDRALAZINE HYDROCHLORIDE 50 MG: 50 TABLET, FILM COATED ORAL at 05:34

## 2023-01-01 RX ADMIN — CARVEDILOL 6.25 MG: 3.12 TABLET, FILM COATED ORAL at 10:01

## 2023-01-01 ASSESSMENT — ACTIVITIES OF DAILY LIVING (ADL)
ADLS_ACUITY_SCORE: 39
ADLS_ACUITY_SCORE: 43
ADLS_ACUITY_SCORE: 49
ADLS_ACUITY_SCORE: 41
ADLS_ACUITY_SCORE: 24
ADLS_ACUITY_SCORE: 49
ADLS_ACUITY_SCORE: 39
ADLS_ACUITY_SCORE: 43
ADLS_ACUITY_SCORE: 43
ADLS_ACUITY_SCORE: 51
ADLS_ACUITY_SCORE: 37
ADLS_ACUITY_SCORE: 49
ADLS_ACUITY_SCORE: 45
ADLS_ACUITY_SCORE: 43
ADLS_ACUITY_SCORE: 47
ADLS_ACUITY_SCORE: 49
CHANGE_IN_FUNCTIONAL_STATUS_SINCE_ONSET_OF_CURRENT_ILLNESS/INJURY: NO
ADLS_ACUITY_SCORE: 37
CONCENTRATING,_REMEMBERING_OR_MAKING_DECISIONS_DIFFICULTY: NO
ADLS_ACUITY_SCORE: 41
ADLS_ACUITY_SCORE: 37
ADLS_ACUITY_SCORE: 47
ADLS_ACUITY_SCORE: 47
ADLS_ACUITY_SCORE: 49
ADLS_ACUITY_SCORE: 49
ADLS_ACUITY_SCORE: 24
ADLS_ACUITY_SCORE: 24
ADLS_ACUITY_SCORE: 47
ADLS_ACUITY_SCORE: 41
ADLS_ACUITY_SCORE: 49
ADLS_ACUITY_SCORE: 49
FALL_HISTORY_WITHIN_LAST_SIX_MONTHS: YES
ADLS_ACUITY_SCORE: 41
ADLS_ACUITY_SCORE: 41
ADLS_ACUITY_SCORE: 42
ADLS_ACUITY_SCORE: 32
ADLS_ACUITY_SCORE: 43
VISION_MANAGEMENT: READING GLASSES
WEAR_GLASSES_OR_BLIND: YES
ADLS_ACUITY_SCORE: 43
ADLS_ACUITY_SCORE: 35
ADLS_ACUITY_SCORE: 49
ADLS_ACUITY_SCORE: 47
ADLS_ACUITY_SCORE: 47
ADLS_ACUITY_SCORE: 51
ADLS_ACUITY_SCORE: 51
ADLS_ACUITY_SCORE: 45
ADLS_ACUITY_SCORE: 41
ADLS_ACUITY_SCORE: 48
ADLS_ACUITY_SCORE: 35
ADLS_ACUITY_SCORE: 43
ADLS_ACUITY_SCORE: 37
ADLS_ACUITY_SCORE: 45
ADLS_ACUITY_SCORE: 35
ADLS_ACUITY_SCORE: 47
ADLS_ACUITY_SCORE: 49
ADLS_ACUITY_SCORE: 49
ADLS_ACUITY_SCORE: 47
ADLS_ACUITY_SCORE: 42
ADLS_ACUITY_SCORE: 49
ADLS_ACUITY_SCORE: 47
ADLS_ACUITY_SCORE: 47
ADLS_ACUITY_SCORE: 43
ADLS_ACUITY_SCORE: 43
ADLS_ACUITY_SCORE: 49
ADLS_ACUITY_SCORE: 33
ADLS_ACUITY_SCORE: 49
ADLS_ACUITY_SCORE: 47
ADLS_ACUITY_SCORE: 47
ADLS_ACUITY_SCORE: 49
ADLS_ACUITY_SCORE: 37
PREVIOUS_RESPONSIBILITIES: MEAL PREP;HOUSEKEEPING;LAUNDRY;MEDICATION MANAGEMENT;DRIVING
ADLS_ACUITY_SCORE: 47
ADLS_ACUITY_SCORE: 43
ADLS_ACUITY_SCORE: 24
ADLS_ACUITY_SCORE: 35
NUMBER_OF_TIMES_PATIENT_HAS_FALLEN_WITHIN_LAST_SIX_MONTHS: 2
ADLS_ACUITY_SCORE: 37
ADLS_ACUITY_SCORE: 49
ADLS_ACUITY_SCORE: 42
ADLS_ACUITY_SCORE: 41
ADLS_ACUITY_SCORE: 41
DIFFICULTY_EATING/SWALLOWING: NO
ADLS_ACUITY_SCORE: 42
ADLS_ACUITY_SCORE: 47
ADLS_ACUITY_SCORE: 49
DRESSING/BATHING_DIFFICULTY: NO
ADLS_ACUITY_SCORE: 22
WALKING_OR_CLIMBING_STAIRS_DIFFICULTY: NO
ADLS_ACUITY_SCORE: 39
ADLS_ACUITY_SCORE: 24
ADLS_ACUITY_SCORE: 51
ADLS_ACUITY_SCORE: 24
ADLS_ACUITY_SCORE: 49
ADLS_ACUITY_SCORE: 45
ADLS_ACUITY_SCORE: 37
ADLS_ACUITY_SCORE: 35
ADLS_ACUITY_SCORE: 47
ADLS_ACUITY_SCORE: 43
ADLS_ACUITY_SCORE: 33
ADLS_ACUITY_SCORE: 47
ADLS_ACUITY_SCORE: 47
ADLS_ACUITY_SCORE: 43
ADLS_ACUITY_SCORE: 41
ADLS_ACUITY_SCORE: 47
ADLS_ACUITY_SCORE: 49
DOING_ERRANDS_INDEPENDENTLY_DIFFICULTY: YES
ADLS_ACUITY_SCORE: 39
ADLS_ACUITY_SCORE: 49
ADLS_ACUITY_SCORE: 47
ADLS_ACUITY_SCORE: 49
TOILETING_ISSUES: NO
ADLS_ACUITY_SCORE: 49
ADLS_ACUITY_SCORE: 45
ADLS_ACUITY_SCORE: 35
ADLS_ACUITY_SCORE: 42
ADLS_ACUITY_SCORE: 43
ADLS_ACUITY_SCORE: 47

## 2023-01-01 ASSESSMENT — ENCOUNTER SYMPTOMS
WEAKNESS: 1
FATIGUE: 1
VOMITING: 0
NAUSEA: 0
CONFUSION: 1
ACTIVITY CHANGE: 1
FEVER: 0
COLOR CHANGE: 1
COUGH: 0
SHORTNESS OF BREATH: 1
SHORTNESS OF BREATH: 1

## 2023-01-07 NOTE — ED PROVIDER NOTES
"    History     Chief Complaint:  Shortness of Breath       The history is provided by the patient.      Matheus White Sr. is a 82 year old male who presents with shortness of breath. Patient reports he has been feeling short of breath for the past few days while laying flat, which is inhibiting his ability to sleep. Endorses dry mouth. Denies fevers or cough. Has a history of atrial fibrillation, which he takes medications for.     Independent Historian: yes    Review of External Notes: N/A    ROS:  Review of Systems   Constitutional: Negative for fever.   Respiratory: Positive for shortness of breath. Negative for cough.    All other systems reviewed and are negative.    Allergies:  No Known Allergies     Medications:    Rocaltrol  Colcyrs   Neurontin  Prednisone     Past Medical History:    Atrial fibrillation  CKD  Hypertension  Gout  CHF  Anemia       Past Surgical History:    Renal transplant   IR fine needle aspiration   Percutaneous kidney biopsy   Esophagogastroduodenoscopy      Family History:    family history includes Emphysema in his father.    Social History:   reports that he has never smoked. He has never used smokeless tobacco. He reports current alcohol use. He reports that he does not use drugs.  PCP: Erika, Marco Mendez     Physical Exam     Patient Vitals for the past 24 hrs:   BP Temp Temp src Pulse Resp SpO2 Height Weight   01/07/23 1140 (!) 160/91 -- -- 86 -- 95 % -- --   01/07/23 0912 (!) 165/93 97.7  F (36.5  C) Temporal 84 18 97 % 1.778 m (5' 10\") 72.6 kg (160 lb)        Physical Exam  Gen: well appearing, in no acute distress  Oral : Mucous membranes moist,   Nose: No rhinorhea  Ears: External near normal, without drainage  Eyes: periorbital tissues and sclera normal   Neck: supple, no abnormal swelling  Lungs: Clear bilaterally, no tachypnea or distress, speaks full sentences  CV: Regular rate, regular rhythm  Abd: soft, nontender, nondistended, no rebound/guarding  Ext: no " lower extremity edema  Skin: warm, dry, well perfused, no rashes/bruising/lesions on exposed skin  Neuro: alert, no gross motor or sensory deficits,   Psych: pleasant mood, normal affect    Emergency Department Course   ECG:  ECG results from 01/07/23   EKG 12-lead, tracing only     Value    Systolic Blood Pressure     Diastolic Blood Pressure     Ventricular Rate 69    Atrial Rate 76    IN Interval     QRS Duration 156        QTc 482    P Axis     R AXIS -7    T Axis 135    Interpretation ECG      Atrial fibrillation with premature ventricular or aberrantly conducted complexes  Left bundle branch block  Abnormal ECG  When compared with ECG of 21-JUN-2022 15:31,  Atrial fibrillation has replaced Wide QRS rhythm         Imaging:  Chest XR,  PA & LAT   Preliminary Result   IMPRESSION: Stable cardiomegaly. Stable ill-defined opacity along the medial right lung base that could just be atelectasis. No new acute airspace disease identified. No effusion.            Report per radiology    Laboratory:  Labs Ordered and Resulted from Time of ED Arrival to Time of ED Departure   BASIC METABOLIC PANEL - Abnormal       Result Value    Sodium 144      Potassium 3.4      Chloride 104      Carbon Dioxide (CO2) 24      Anion Gap 16 (*)     Urea Nitrogen 42.5 (*)     Creatinine 2.69 (*)     Calcium 10.0      Glucose 87      GFR Estimate 23 (*)    TROPONIN T, HIGH SENSITIVITY - Abnormal    Troponin T, High Sensitivity 110 (*)    NT PROBNP INPATIENT - Abnormal    N terminal Pro BNP Inpatient 29,125 (*)    CBC WITH PLATELETS AND DIFFERENTIAL - Abnormal    WBC Count 3.2 (*)     RBC Count 3.75 (*)     Hemoglobin 10.8 (*)     Hematocrit 34.9 (*)     MCV 93      MCH 28.8      MCHC 30.9 (*)     RDW 14.3      Platelet Count 134 (*)     % Neutrophils 74      % Lymphocytes 7      % Monocytes 17      % Eosinophils 1      % Basophils 1      % Immature Granulocytes 0      NRBCs per 100 WBC 0      Absolute Neutrophils 2.3      Absolute  Lymphocytes 0.2 (*)     Absolute Monocytes 0.5      Absolute Eosinophils 0.0      Absolute Basophils 0.0      Absolute Immature Granulocytes 0.0      Absolute NRBCs 0.0          Emergency Department Course & Assessments:    Interventions:  Medications   bumetanide (BUMEX) injection 1 mg (1 mg Intravenous Given 1/7/23 1200)        Independent Interpretation (X-rays, CTs, rhythm strip):  N/A    Consultations/Discussion of Management or Tests:  ED Course as of 01/07/23 1258   Sat Jan 07, 2023   0935 I obtained history and examined the patient.    1239 I rechecked the patient and explained findings.        Social Determinants of Health affecting care:  N/A    Disposition:  The patient was discharged to home.     Impression & Plan    CMS Diagnoses: None    Medical Decision Making:  Patient presents with the emergency department with some complaints of what sounds like some orthopnea as well as exertional shortness of breath.  He did not take his diuretic this morning.  Sounds like he uses occasional metolazone as well.  Afebrile, chest x-ray was actually quite clear.  No hypoxia.  He reports his weights have been fairly stable.  Gave him a dose of Bumex in the ED he began urinating quite a lot, felt better quite quickly.  Ambulatory on his own without any assistance and feeling improved.  Requesting discharge.  He actually has follow-up with his nephrologist tomorrow.  Will recommend he return to all his normal medications for now.  He feels comfortable with management plan.  His troponin is just slightly elevated consistent with CKD and heart failure.  I feel like he is not having ACS, PE or dissection.    Diagnosis:    ICD-10-CM    1. Acute on chronic congestive heart failure, unspecified heart failure type (H)  I50.9            Discharge Medications:  New Prescriptions    No medications on file        Scribe Disclosure:  I, GEE PATTERSON, am serving as a scribe at 9:35 AM on 1/7/2023 to document services personally  performed by Bernard Rahman MD based on my observations and the provider's statements to me.     1/7/2023   Bernard Rahman MD Tschetter, Paul Anthony, MD  01/07/23 9259

## 2023-01-07 NOTE — ED TRIAGE NOTES
Presents to ED with 3 days of SOB, worse on exertion. Denies chest pain or fevers. Hx atrial fibrillation, takes coumadin. ABCs intact. Pt A&OX4.     Triage Assessment     Row Name 01/07/23 0911       Triage Assessment (Adult)    Airway WDL WDL       Respiratory WDL    Respiratory WDL X;rhythm/pattern    Rhythm/Pattern, Respiratory dyspnea on exertion       Skin Circulation/Temperature WDL    Skin Circulation/Temperature WDL WDL       Cardiac WDL    Cardiac WDL WDL       Peripheral/Neurovascular WDL    Peripheral Neurovascular WDL WDL       Cognitive/Neuro/Behavioral WDL    Cognitive/Neuro/Behavioral WDL WDL

## 2023-01-08 PROBLEM — K70.30 ALCOHOLIC CIRRHOSIS OF LIVER WITHOUT ASCITES (H): Status: ACTIVE | Noted: 2023-01-01

## 2023-01-08 PROBLEM — I50.21 ACUTE SYSTOLIC CONGESTIVE HEART FAILURE (H): Status: ACTIVE | Noted: 2023-01-01

## 2023-01-08 PROBLEM — R53.1 GENERALIZED WEAKNESS: Status: ACTIVE | Noted: 2023-01-01

## 2023-01-08 PROBLEM — R79.1 SUPRATHERAPEUTIC INR: Status: ACTIVE | Noted: 2023-01-01

## 2023-01-08 NOTE — ED NOTES
Rapid Assessment Note     History:   The patient was seen in the ED yesterday with shortness of breath. Since being seen yesterday he seems more lethargic and has been just laying in bed without sheets of clothes on. He was placed on a water pill yesterday and has not been on water pills since his kidney transplant. Daughter also notes that he has been more confused the past two days. He is also more weak but has not fallen. Daughter notes he looks more yellow as well. He has not been eating well. He has been taking his medication. He denies nausea, chest pain, or vomiting.He has a appointment with his kidney doctor next week.     Exam:   General:  Alert, interactive  Cardiovascular:  Well perfused  Lungs:  No respiratory distress, no accessory muscle use  Abd- soft, NT, ND  Neuro:  Moving all 4 extremities, speech slow but clear, nonfocal exam  Skin:  Warm, dry  Ext- BLE edema unchanged from baseline  Psych:  Normal affect       Plan of Care:   I evaluated the patient and developed an initial plan of care. I discussed this plan and explained that I, or one of my partners, would be returning to complete the evaluation.     MD Brandon Vasquez Wenlan, MD  01/08/23 1876

## 2023-01-08 NOTE — ED TRIAGE NOTES
"    Pt reports that he was seen in the ED yesterday due to similar symptoms, pt reports that he is here today due to SOB, and generalized weakness. Pt family concerned that pt has been having these symptoms for the last 3 days, family reports that he has been having increasing worsening confusion for the last 48 hours and more \"yellow\" skin per family. PT BEFAST negative. VSS   "

## 2023-01-09 NOTE — ED PROVIDER NOTES
History     Chief Complaint:  Shortness of Breath, Jaundice, and Altered Mental Status       The history is provided by the patient.      Matheus White Sr. is a 82 year old male who presents with shortness of breath. The patient was seen in the ED yesterday with shortness of breath. Since being seen yesterday he seems more lethargic and has been just laying in bed without sheets of clothes on. He was placed on a water pill yesterday and has not been on water pills since his kidney transplant. Daughter also notes that he has been more confused the past two days. He is also more weak but has not fallen. Daughter had noticed he is much slower walking with her. Daughter notes he looks more yellow as well. He has not been eating well. He has been taking his medication. He denies nausea, chest pain, or vomiting.He has a appointment with his kidney doctor next week.      Independent Historian: None     Review of External Notes: None     ROS:  Review of Systems   Constitutional: Positive for activity change and fatigue.   Respiratory: Positive for shortness of breath.    Cardiovascular: Negative for chest pain.   Gastrointestinal: Negative for nausea and vomiting.   Skin: Positive for color change.   Neurological: Positive for weakness.   Psychiatric/Behavioral: Positive for confusion.   All other systems reviewed and are negative.    Allergies:  No Known Allergies      Medications:    Rocaltrol  Colcyrs   Neurontin  Prednisone      Past Medical History:    Atrial fibrillation  CKD  Hypertension  Gout  CHF  Anemia      Past Surgical History:    Renal transplant   IR fine needle aspiration   Percutaneous kidney biopsy   Esophagogastroduodenoscopy       Family History:    Father-Emphysema  Sister-Emphysema     Social History:  Patient reports that he has never smoked. He has never used smokeless tobacco. He reports current alcohol use. He reports that he does not use drugs.  PCP: [facility]     Physical  Exam     Patient Vitals for the past 24 hrs:   BP Temp Temp src Pulse Resp SpO2   01/08/23 1641 (!) 153/90 97.9  F (36.6  C) Temporal 87 18 98 %        Physical Exam  Constitutional:       Appearance: He is well-developed.   HENT:      Right Ear: Tympanic membrane and external ear normal.      Left Ear: Tympanic membrane and external ear normal.      Mouth/Throat:      Mouth: Mucous membranes are dry.      Pharynx: Oropharynx is clear. No oropharyngeal exudate or posterior oropharyngeal erythema.   Eyes:      General: No scleral icterus.     Extraocular Movements: Extraocular movements intact.      Conjunctiva/sclera: Conjunctivae normal.      Pupils: Pupils are equal, round, and reactive to light.   Cardiovascular:      Rate and Rhythm: Normal rate and regular rhythm.      Heart sounds: Normal heart sounds. No murmur heard.    No friction rub. No gallop.   Pulmonary:      Effort: Pulmonary effort is normal. No respiratory distress.      Breath sounds: Normal breath sounds. No wheezing or rales.   Abdominal:      General: Bowel sounds are normal. There is no distension.      Palpations: Abdomen is soft. There is no mass.      Tenderness: There is no abdominal tenderness.   Musculoskeletal:         General: Normal range of motion.      Cervical back: Normal range of motion and neck supple.      Right lower leg: Edema present.      Left lower leg: Edema present.   Skin:     General: Skin is warm and dry.      Capillary Refill: Capillary refill takes less than 2 seconds.      Findings: No rash.   Neurological:      General: No focal deficit present.      Mental Status: He is alert.      Cranial Nerves: No cranial nerve deficit.           Emergency Department Course   ECG:  ECG results from 01/08/23   EKG 12-lead, tracing only     Value    Systolic Blood Pressure     Diastolic Blood Pressure     Ventricular Rate 80    Atrial Rate 96    IA Interval     QRS Duration 158        QTc 500    P Axis     R AXIS -32    T  Axis 119    Interpretation ECG      Undetermined rhythm  Left axis deviation  Left bundle branch block  Abnormal ECG  When compared with ECG of 07-JAN-2023 09:40, (unconfirmed)  Current undetermined rhythm precludes rhythm comparison, needs review         Imaging:  Abdomen US, limited (RUQ only)   Final Result   IMPRESSION:   1.  No significant gallbladder wall thickening, or gallstones identified.            CT Abdomen Pelvis w/o Contrast   Final Result   IMPRESSION:    1.  Possible subtle changes cholecystitis and ultrasound is recommended for further evaluation.      2.  Mild findings of hepatomegaly and cirrhosis with associated secondary findings of mild portal venous hypertension.      3.  Left inguinal hernia which contains some fat and sigmoid colon with no evidence for inflammation or obstruction.      4.  Atrophic native left kidney, surgically absent right kidney, and there is a transplant kidney in the right hemipelvis. There are some benign cysts seen in the native left kidney and no follow-up is recommended.      5.  Nonobstructive nephrolithiasis transplant kidney.      6.  Spleen is at the upper limits of normal in size at 12.9 cm, but otherwise normal.      7.  Moderate coronary artery calcifications.      8.  1 mm x 1 mm noncalcified pulmonary nodule right lower lobe and no follow-up is recommended unless there is high risk factors for malignancy.         XR Chest 2 Views   Final Result   IMPRESSION: Heart size is prominent. This is unchanged. Mild pulmonary vascular congestion. No pleural effusion or pneumothorax.      CT Head w/o Contrast   Final Result   IMPRESSION:   1.  No CT finding of a mass, hemorrhage or focal area suggestive of infarct.   2.  Diffuse age related changes along with old lacunar infarct left thalamus.         Report per radiology    Laboratory:  Labs Ordered and Resulted from Time of ED Arrival to Time of ED Departure   NT PROBNP INPATIENT - Abnormal       Result Value    N  terminal Pro BNP Inpatient 47,003 (*)    CBC WITH PLATELETS - Abnormal    WBC Count 4.1      RBC Count 3.78 (*)     Hemoglobin 10.8 (*)     Hematocrit 34.7 (*)     MCV 92      MCH 28.6      MCHC 31.1 (*)     RDW 14.5      Platelet Count 125 (*)    BASIC METABOLIC PANEL - Abnormal    Sodium 142      Potassium 3.7      Chloride 101      Carbon Dioxide (CO2) 21 (*)     Anion Gap 20 (*)     Urea Nitrogen 55.6 (*)     Creatinine 3.04 (*)     Calcium 10.0      Glucose 120 (*)     GFR Estimate 20 (*)    TROPONIN T, HIGH SENSITIVITY - Abnormal    Troponin T, High Sensitivity 143 (*)    HEPATIC FUNCTION PANEL - Abnormal    Protein Total 7.2      Albumin 4.5      Bilirubin Total 2.0 (*)     Alkaline Phosphatase 197 (*)      (*)     ALT 78 (*)     Bilirubin Direct 0.79 (*)    INR - Abnormal    INR 3.65 (*)    LACTIC ACID WHOLE BLOOD - Normal    Lactic Acid 1.8     AMMONIA - Normal    Ammonia 17     INFLUENZA A/B & SARS-COV2 PCR MULTIPLEX - Normal    Influenza A PCR Negative      Influenza B PCR Negative      RSV PCR Negative      SARS CoV2 PCR Negative     ROUTINE UA WITH MICROSCOPIC REFLEX TO CULTURE   BLOOD CULTURE   BLOOD CULTURE      Emergency Department Course & Assessments:    Interventions:  Medications   cycloSPORINE modified (GENERIC EQUIVALENT) capsule 50 mg (has no administration in time range)   diltiazem ER COATED BEADS (CARDIZEM CD/CARTIA XT) 24 hr capsule 180 mg (has no administration in time range)   gabapentin (NEURONTIN) capsule 300 mg (has no administration in time range)   mycophenolate (GENERIC EQUIVALENT) tablet 500 mg (has no administration in time range)   lidocaine 1 % 0.1-1 mL (has no administration in time range)   lidocaine (LMX4) cream (has no administration in time range)   sodium chloride (PF) 0.9% PF flush 3 mL (has no administration in time range)   sodium chloride (PF) 0.9% PF flush 3 mL (has no administration in time range)   melatonin tablet 1 mg (has no administration in time range)    Patient is already receiving anticoagulation with heparin, enoxaparin (LOVENOX), warfarin (COUMADIN)  or other anticoagulant medication (has no administration in time range)   acetaminophen (TYLENOL) tablet 650 mg (has no administration in time range)     Or   acetaminophen (TYLENOL) Suppository 650 mg (has no administration in time range)   senna-docusate (SENOKOT-S/PERICOLACE) 8.6-50 MG per tablet 1 tablet (has no administration in time range)     Or   senna-docusate (SENOKOT-S/PERICOLACE) 8.6-50 MG per tablet 2 tablet (has no administration in time range)   ondansetron (ZOFRAN ODT) ODT tab 4 mg (has no administration in time range)     Or   ondansetron (ZOFRAN) injection 4 mg (has no administration in time range)   Warfarin Dose Required Daily - Pharmacist Managed (has no administration in time range)   furosemide (LASIX) injection 40 mg (has no administration in time range)   warfarin-No DOSE today (has no administration in time range)   furosemide (LASIX) injection 60 mg (60 mg Intravenous Given 1/8/23 1913)      Consultations/Discussion of Management or Tests:  ED Course as of 01/08/23 2259   Sun Jan 08, 2023 1840 Obtained the patients history and performed initial exam     2103 Talked to Dr Pak about the patients findings and possible admittance.     Disposition:  The patient was admitted to the hospital under the care of Dr. Pak.     Impression & Plan      Medical Decision Making:  Patient presents today for evaluation of above complaints.  He was seen here yesterday and diagnosed with CHF.  Family states that he seems off today although no focal findings were found on his exam.  He does have now increased in his troponin as well as creatinine.  Family is uncertain whether he is taking his medication today.  There is no known trauma or fall.  We did do a CT scan to make sure there is no bleed given that he is on Coumadin.  He is also supratherapeutic on his INR.  He has new LFT abnormalities.   We did do a CT which showed likely cirrhosis and hepatomegaly.  This appears to be a new diagnosis.  There was a question of cholecystitis but patient does not have any abdominal pain.  We did do an ultrasound which did not show findings of cholecystitis.  Patient is given Lasix here and is diuresing.  He does need admission for further treatment and evaluation.  He is currently stable for cardiac telemetry admission.  Family has been here and updated.    Diagnosis:    ICD-10-CM    1. Acute systolic congestive heart failure (H)  I50.21       2. Alcoholic cirrhosis of liver without ascites (H)  K70.30       3. Generalized weakness  R53.1       4. Supratherapeutic INR  R79.1            Scribe Disclosure:  I, Pascual Smith, am serving as a scribe at 6:37 PM on 1/8/2023 to document services personally performed by Dameon Taylor MD based on my observations and the provider's statements to me.     1/8/2023   Dameon Taylor MD Cheng, Wenlan, MD  01/08/23 4487

## 2023-01-09 NOTE — PHARMACY-ADMISSION MEDICATION HISTORY
Admission medication history interview status for this patient is complete. See [facility] admission navigator for allergy information, prior to admission medications and immunization status.     Medication history interview done, indicate source(s): Patient  Medication history resources (including written lists, pill bottles, clinic record):[place], [facility] Everywhere, pt's medication bottles  Pharmacy: [id number]    Changes made to PTA medication list:  Added: calcitriol, colchicine, metolazone  Changed: torsemide sig, gabapentin sig, warfarin sig  Reported as Not Taking: none  Removed: none    Actions taken by pharmacist (provider contacted, etc):Left provider sticky note     Additional medication history information: Patient manages his own medications and he is a fairly good historian, although some questions he could not answer. He did have his home medication bottles with him. Has good refill history, looks like primarily doctors at [facility] and our list matches theirs well.     -Warfarin: Pt reports he takes 3mg 3x/week and 1.5mg all other days. Although he did seem confused and was not 100% sure what his current dosing is. Most recent anticoag encounter (12/24) his dosage regimen was changed from 3mg MWF, 1.5mg ROW to 3mg TF, 1.5m ROW. So unclear how pt has been taking it since 12/24. I updated based on how he should be taking it.   -Based on his elevated INR, pt has likely still been taking 3mg MWF and 1.5m ROW.       Medication reconciliation/reorder completed by provider prior to medication history?  Y   (Y/N)     Prior to Admission medications    Medication Sig Last Dose Taking? Auth Provider Long Term End Date   calcitRIOL (ROCALTROL) 0.25 MCG capsule Take 0.25 mcg by mouth daily Past Week Yes Unknown, Entered By History Yes    colchicine (COLCYRS) 0.6 MG tablet Take 0.6 mg by mouth daily Past Week Yes Unknown, Entered By History     cycloSPORINE modified (GENERIC EQUIVALENT) 25 MG capsule  Take 50 mg by mouth 2 times daily Past Week at pm Yes Unknown, Entered By History     diltiazem ER COATED BEADS (CARDIZEM CD/CARTIA XT) 180 MG 24 hr capsule Take 180 mg by mouth daily Past Week at am Yes Unknown, Entered By History     gabapentin (NEURONTIN) 300 MG capsule Take 300-600 mg by mouth At Bedtime Past Week at hs Yes Unknown, Entered By History     metolazone (ZAROXOLYN) 2.5 MG tablet Take 1 Tablet (2.5 mg) by mouth one time if needed (Weight greater than 165 pounds can use once per week). Past Month Yes Unknown, Entered By History Yes    multivitamin w/minerals (THERA-VIT-M) tablet Take 1 tablet by mouth daily Past Week Yes Unknown, Entered By History     mycophenolate (GENERIC EQUIVALENT) 500 MG tablet Take 500 mg by mouth 2 times daily Past Week at pm Yes Unknown, Entered By History     potassium chloride ER (K-TAB) 20 MEQ CR tablet Take 20 mEq by mouth 2 times daily Past Week Yes Unknown, Entered By History     torsemide (DEMADEX) 20 MG tablet Take 20 mg by mouth daily at 2 pm Past Week Yes Unknown, Entered By History Yes    torsemide (DEMADEX) 20 MG tablet Take 40 mg by mouth every morning Past Week Yes Can, MD Cj Yes    warfarin ANTICOAGULANT (COUMADIN) 3 MG tablet 3 mg every Tue, Fri; 1.5 mg (3 mg x 0.5) all other days in the evening OR as directed 1/8/2023 at pm Yes Unknown, Entered By History       Ronda Tan, PharmD, [facility]   Emergency Medicine Pharmacist  [phone] or Ken  January 9, 2023

## 2023-01-09 NOTE — CONSULTS
[facility]    Cardiology Consultation     Date of Admission:  1/8/2023    Assessment & Plan   Matheus White Sr. is a 82 year old male who was admitted on 1/8/2023.    Impression:  1.  Congestive heart failure.  Acute on chronic systolic congestive heart failure.  2.  Cardiomyopathy.  This has been recorded in the past as a nonischemic cardiomyopathy.  Ejection fraction previously was in the 40 to 45% range and now is dropped into the 35% range.  3.  Chronic atrial fibrillation.  Rate is well controlled with diltiazem.  However in the setting of reduced left ventricular systolic function and congestive heart failure diltiazem is probably not the best agent to be using in this situation and beta-blockers would be preferred.  4.  Chronic renal failure stage IV status post kidney transplant.  This will certainly complicate the treatment of congestive heart failure.  5.  Pancytopenia with anemia, thrombocytopenia and reduced white cell count.  6.  Moderate mitral regurgitation, moderate tricuspid regurgitation, moderate aortic regurgitation.  7.  Mildly dilated ascending aorta.  8.  Mild to moderate pulmonary hypertension.  9.  Systemic hypertension with blood pressure not well controlled.    Plan:  1.  I fully agree with the diuretic therapy.  2.  We will stop the diltiazem and start the patient on carvedilol 3.125 mg twice a day.  We will titrate the dose of that up.  3.  Start hydralazine 12.5 mg 3 times a day.  4.  Presence of significant renal insufficiency precludes the use of ACE inhibitors, angiotensin receptor blockers and spironolactone.  5.  Strict intake and output and daily weights.    Carlito Farrar MD, MD, MD    Primary Care Physician   [facility]    Reason for Consult   Reason for consult: I was asked by hospitalist service to evaluate this patient for congestive heart failure..    History of Present Illness   Matheus White Sr. is a 82 year old male  who presents with 6-month history of increasing shortness of breath on exertion orthopnea and PND.  This has become particularly worse over the last few weeks.  This patient has been followed at the [facility] with Dr. Cj Bagley.  He has known ejection fraction of 40 to 45% and carries a diagnosis of a nonischemic cardiomyopathy.  This is a patient who had a previous kidney transplantation back in 2009.  He has chronic atrial fibrillation on warfarin and he has a history of essential hypertension.  He has been started on diuretic therapy and his breathing has improved.  He has significant renal dysfunction which is improving with diuretic therapy.  He is also hypertensive on admission.  Echocardiography here shows an ejection fraction of 45%.  He has moderate tricuspid regurgitation.  Moderate aortic regurgitation and moderate mitral regurgitation.  He has mild pulmonary hypertension with a right ventricular systolic pressure of 37 mmHg plus right atrial pressure.    Past Medical History   Past Medical History:   Diagnosis Date     Atrial fibrillation, permanent (H) 04/2009     Cellulitis of left forearm 07/10/2020     CKD (chronic kidney disease)      HTN (hypertension), benign          Past Surgical History   Past Surgical History:   Procedure Laterality Date     AS TRANSPLANT,PREP CADAVER RENAL GRAFT Right 04/2009     IR FINE NEEDLE ASPIRATION W ULTRASOUND  10/15/2019     PERCUTANEOUS BIOPSY KIDNEY Right 12/23/2019    Procedure: Right Kidney Biopsy;  Surgeon: Josiah Ponce MD;  Location: [facility] OR     TRANSPLANT           Prior to Admission Medications   Prior to Admission Medications   Prescriptions Last Dose Informant Patient Reported? Taking?   calcitRIOL (ROCALTROL) 0.25 MCG capsule Past Week  Yes Yes   Sig: Take 0.25 mcg by mouth daily   colchicine (COLCYRS) 0.6 MG tablet Past Week  Yes Yes   Sig: Take 0.6 mg by mouth daily   cycloSPORINE modified (GENERIC EQUIVALENT) 25 MG capsule Past  Week at pm Self Yes Yes   Sig: Take 50 mg by mouth 2 times daily   diltiazem ER COATED BEADS (CARDIZEM CD/CARTIA XT) 180 MG 24 hr capsule Past Week at am Self Yes Yes   Sig: Take 180 mg by mouth daily   gabapentin (NEURONTIN) 300 MG capsule Past Week at hs Self Yes Yes   Sig: Take 300-600 mg by mouth At Bedtime   metolazone (ZAROXOLYN) 2.5 MG tablet Past Month  Yes Yes   Sig: Take 1 Tablet (2.5 mg) by mouth one time if needed (Weight greater than 165 pounds can use once per week).   multivitamin w/minerals (THERA-VIT-M) tablet Past Week  Yes Yes   Sig: Take 1 tablet by mouth daily   mycophenolate (GENERIC EQUIVALENT) 500 MG tablet Past Week at pm Self Yes Yes   Sig: Take 500 mg by mouth 2 times daily   potassium chloride ER (K-TAB) 20 MEQ CR tablet Past Week  Yes Yes   Sig: Take 20 mEq by mouth 2 times daily   torsemide (DEMADEX) 20 MG tablet Past Week  Yes Yes   Sig: Take 40 mg by mouth every morning   torsemide (DEMADEX) 20 MG tablet Past Week  Yes Yes   Sig: Take 20 mg by mouth daily at 2 pm   warfarin ANTICOAGULANT (COUMADIN) 3 MG tablet 1/8/2023 at pm Self Yes Yes   Sig: 3 mg every Tue, Fri; 1.5 mg (3 mg x 0.5) all other days in the evening OR as directed      Facility-Administered Medications: None     Current Facility-Administered Medications   Medication Dose Route Frequency     carvedilol  3.125 mg Oral BID w/meals     cycloSPORINE modified  50 mg Oral BID     gabapentin  300-600 mg Oral At Bedtime     hydrALAZINE  12.5 mg Oral Q8H HECTOR     mycophenolate  500 mg Oral BID     sodium chloride (PF)  3 mL Intracatheter Q8H     Warfarin Therapy Reminder  1 each Oral See Admin Instructions     warfarin-No DOSE today  1 each Does not apply no dose today (warfarin)     Current Facility-Administered Medications   Medication Last Rate     - MEDICATION INSTRUCTIONS -       Allergies   No Known Allergies    Social History    reports that he has never smoked. He has never used smokeless tobacco. He reports current  "alcohol use. He reports that he does not use drugs.      Family History   I have reviewed this patient's family history and updated it with pertinent information if needed.  Family History   Problem Relation Age of Onset     Emphysema Father           Review of Systems   A comprehensive review of system was performed and is negative other than that noted in the HPI or here.     Physical Exam   Vital Signs with Ranges  Temp:  [97.8  F (36.6  C)-98.2  F (36.8  C)] 98.2  F (36.8  C)  Pulse:  [84-92] 85  Resp:  [12-20] 12  BP: (137-167)/() 137/98  SpO2:  [95 %-100 %] 100 %  Wt Readings from Last 4 Encounters:   01/09/23 72.6 kg (160 lb)   01/07/23 72.6 kg (160 lb)   04/19/21 82.6 kg (182 lb)   03/16/20 83.9 kg (185 lb)     I/O last 3 completed shifts:  In: 320 [P.O.:320]  Out: 600 [Urine:600]      Vitals: BP (!) 137/98 (BP Location: Right arm)   Pulse 85   Temp 98.2  F (36.8  C) (Oral)   Resp 12   Ht 1.778 m (5' 10\")   Wt 72.6 kg (160 lb)   SpO2 100%   BMI 22.96 kg/m      Physical Exam:   General - Alert and oriented to time place and person in no acute distress  Eyes - No scleral icterus  HEENT - Neck supple, moist mucous membranes  Cardiovascular -heart sounds 1 variable.  Heart sound 2 normal.  Systolic ejection murmur which is 1/6 in the aortic area.  Jugular venous pulse is raised the lower one third of the neck.  Carotids are normal with no bruits  Extremities - There is trace edema  Respiratory -symmetrical expansion of the chest.  Occasional fine crackles at both bases.  Skin - No pallor or cyanosis.  Increased pigmentation of the lower extremities consistent with chronic venous hypertension.  Gastrointestinal - Non tender and non distended without rebound or guarding  Psych - Appropriate affect   Neurological - No gross motor neurological focal deficits.  No gross sensory defects.        Recent Labs   Lab 01/09/23  0754 01/08/23  1723 01/07/23  1005   WBC 3.7* 4.1 3.2*   HGB 10.5* 10.8* 10.8*   MCV " 92 92 93   * 125* 134*   INR 4.02* 3.65*  --     142 144   POTASSIUM 3.1* 3.7 3.4   CHLORIDE 101 101 104   CO2 20* 21* 24   BUN 59.5* 55.6* 42.5*   CR 2.92* 3.04* 2.69*   GFRESTIMATED 21* 20* 23*   ANIONGAP 19* 20* 16*   ELEAZAR 9.5 10.0 10.0   * 120* 87   ALBUMIN  --  4.5  --    PROTTOTAL  --  7.2  --    BILITOTAL  --  2.0*  --    ALKPHOS  --  197*  --    ALT  --  78*  --    AST  --  208*  --      Recent Labs   Lab Test 02/09/18  0800   TRIG 145     Recent Labs   Lab 01/09/23  0754 01/08/23  1723 01/07/23  1005   WBC 3.7* 4.1 3.2*   HGB 10.5* 10.8* 10.8*   HCT 33.5* 34.7* 34.9*   MCV 92 92 93   * 125* 134*   IRON 29*  --   --    IRONSAT 14*  --   --    *  --   --      No results for input(s): PH, PHV, PO2, PO2V, SAT, PCO2, PCO2V, HCO3, HCO3V in the last 168 hours.  Recent Labs   Lab 01/08/23  1723 01/07/23  1005   NTBNPI 47,003* 29,125*     No results for input(s): DD in the last 168 hours.  No results for input(s): SED, CRP in the last 168 hours.  Recent Labs   Lab 01/09/23  0754 01/08/23  1723 01/07/23  1005   * 125* 134*     Recent Labs   Lab 01/09/23  0754   TSH 3.67     Recent Labs   Lab 01/08/23  2315   COLOR Light Yellow   APPEARANCE Clear   URINEGLC Negative   URINEBILI Negative   URINEKETONE Negative   SG 1.009   UBLD Moderate*   URINEPH 5.5   PROTEIN Negative   NITRITE Negative   LEUKEST Negative   RBCU <1   WBCU 2       Imaging:  Recent Results (from the past 48 hour(s))   CT Head w/o Contrast    Narrative    EXAM: CT HEAD W/O CONTRAST  LOCATION: [facility]  DATE/TIME: 1/8/2023 6:09 PM    INDICATION: confusion  COMPARISON: 06/16/2022.  TECHNIQUE: Routine CT Head without IV contrast. Multiplanar reformats. Dose reduction techniques were used.    FINDINGS:  INTRACRANIAL CONTENTS: No intracranial hemorrhage, extraaxial collection, or mass effect.  No CT evidence of acute infarct. There is scattered diffuse low attenuation within the periventricular  and subcortical white matter consistent with diffuse small   vessel ischemic disease. There is an old lacunar infarct left thalamus. The ventricular system, basal cisterns and the cortical sulci are consistent with diffuse volume loss.     VISUALIZED ORBITS/SINUSES/MASTOIDS: No intraorbital abnormality. No paranasal sinus mucosal disease. No middle ear or mastoid effusion.    BONES/SOFT TISSUES: No acute abnormality.      Impression    IMPRESSION:  1.  No CT finding of a mass, hemorrhage or focal area suggestive of infarct.  2.  Diffuse age related changes along with old lacunar infarct left thalamus.   XR Chest 2 Views    Narrative    EXAM: XR CHEST 2 VIEWS  LOCATION: [facility]  DATE/TIME: 1/8/2023 6:21 PM    INDICATION: sob  COMPARISON: 1/7/2023      Impression    IMPRESSION: Heart size is prominent. This is unchanged. Mild pulmonary vascular congestion. No pleural effusion or pneumothorax.   CT Abdomen Pelvis w/o Contrast    Narrative    EXAM: CT ABDOMEN PELVIS W/O CONTRAST  LOCATION: [facility]  DATE/TIME: 1/8/2023 7:50 PM    INDICATION: Abdominal pain, LFT up, kidney dz.  COMPARISON: None.  TECHNIQUE: CT scan of the abdomen and pelvis was performed without IV contrast. Multiplanar reformats were obtained. Dose reduction techniques were used.  CONTRAST: None.    FINDINGS:   LOWER CHEST: Mild mosaic perfusion which is nonspecific, but most typical for air trapping secondary to small airway inflammation. 1.5 x 1.5 mm noncalcified pulmonary nodule in the left lower lobe. Mild cardiomegaly. Moderate coronary artery   calcifications.    HEPATOBILIARY: There is some slight stranding seen in the fat surrounding the gallbladder and I cannot exclude subtle findings of acute cholecystitis. If this is of concern clinically, ultrasound is recommended of the gallbladder for further evaluation.   There are changes most typical for slight cirrhotic configuration of the liver with  associated mild hepatomegaly. There are findings most typical for some portal venous hypertension with slight varicosities and the spleen is at the upper limits of normal   in size at 12.9 cm.    PANCREAS: Mildly atrophic, but otherwise normal.    SPLEEN: Upper limits of normal in size at 12.9 cm.    ADRENAL GLANDS: Normal.    KIDNEYS/BLADDER: Significant atrophy is seen of the left kidney with some scattered benign cysts seen in the left kidney, some of which show some benign associated calcifications. There is a transplant kidney seen in the right hemipelvis with two tiny   nonobstructive stones and the largest measures approximately 4 mm in greatest dimension. No complications are seen of the transplanted kidney. The native right kidney is surgically absent. The bladder is normal.    BOWEL: There is a small to moderate-sized left inguinal hernia which contains some fat and a portion of the sigmoid colon with no evidence for obstruction or inflammation, however. Mild findings of diverticulosis with no evidence for diverticulitis. The   bowel is otherwise normal.    LYMPH NODES: Normal.    VASCULATURE: Advanced calcification with no aneurysmal dilatation.    PELVIC ORGANS: Mild to moderate calcification. Prostatic gland enlargement.    MUSCULOSKELETAL: Advanced multilevel degenerative changes of the lumbar spine with associated degenerative disc disease.      Impression    IMPRESSION:   1.  Possible subtle changes cholecystitis and ultrasound is recommended for further evaluation.    2.  Mild findings of hepatomegaly and cirrhosis with associated secondary findings of mild portal venous hypertension.    3.  Left inguinal hernia which contains some fat and sigmoid colon with no evidence for inflammation or obstruction.    4.  Atrophic native left kidney, surgically absent right kidney, and there is a transplant kidney in the right hemipelvis. There are some benign cysts seen in the native left kidney and no  follow-up is recommended.    5.  Nonobstructive nephrolithiasis transplant kidney.    6.  Spleen is at the upper limits of normal in size at 12.9 cm, but otherwise normal.    7.  Moderate coronary artery calcifications.    8.  1 mm x 1 mm noncalcified pulmonary nodule right lower lobe and no follow-up is recommended unless there is high risk factors for malignancy.     Abdomen US, limited (RUQ only)    Narrative    EXAM: US ABDOMEN LIMITED  LOCATION: [facility]  DATE/TIME: 2023 9:44 PM    INDICATION: abnm LFT  COMPARISON: CT dated 2023  TECHNIQUE: Limited abdominal ultrasound.    FINDINGS:    GALLBLADDER: Normal. No gallstones, wall thickening, or pericholecystic fluid. Negative sonographic Gustafson's sign.    BILE DUCTS: No biliary dilatation. The common duct measures 7.6 mm.    LIVER: Normal parenchyma with smooth contour. No focal mass.    RIGHT KIDNEY: Surgically absent    PANCREAS: The visualized portions are normal.    No ascites.      Impression    IMPRESSION:  1.  No significant gallbladder wall thickening, or gallstones identified.       Echocardiogram Complete   Result Value    LVEF  35%    Narrative    [id number]  [id number]  [id number]  [id number]^ASHLEY^AL^AJ     [facility]  Echocardiography Laboratory  [address] [place] [address]     Name: SR BRAD MOHAMUD SR.  MRN: [id number]  : 1940  Study Date: 2023 08:10 AM  Age: 82 yrs  Gender: Male  Patient Location: [facility]  Reason For Study: CHF  Ordering Physician: CORINNE RAMIREZ  Performed By: Tye Franz RDCS     BSA: 1.9 m2  Height: 70 in  Weight: 160 lb  HR: 80  BP: 157/90 mmHg  ______________________________________________________________________________  Procedure  Complete Portable Echo Adult.  ______________________________________________________________________________  Interpretation Summary     1. The left ventricle is normal in size. The visual ejection fraction  is  estimated at 35%. There is moderate global hypokinesia of the left ventricle.  2. The right ventricle is normal in structure, function and size.  3. There is moderate (2+) tricuspid regurgitation.  4. Mild (35-45mmHg) pulmonary hypertension is present. The right ventricular  systolic pressure is approximated at 37mmHg plus the right atrial pressure.  5. There is moderate (2+) aortic regurgitation.  6. The ascending aorta is Mildly dilated. 4.0cm.     Echo 12/2019 showed EF 40-45%, 1+ MR/AI, 2+ TR, aorta 4.1cm.  ______________________________________________________________________________  Left Ventricle  The left ventricle is normal in size. There is mild concentric left  ventricular hypertrophy. The visual ejection fraction is estimated at 35%.  Left ventricular diastolic function is indeterminate. There is moderate global  hypokinesia of the left ventricle.     Right Ventricle  The right ventricle is normal in structure, function and size.     Atria  The left atrium is moderately dilated. The right atrium is mild to moderately  dilated. There is no atrial shunt seen.     Mitral Valve  There is moderate mitral annular calcification. The mitral valve leaflets  appear thickened, but open well. There is moderate (2+) mitral regurgitation.     Tricuspid Valve  There is moderate (2+) tricuspid regurgitation. Mild (35-45mmHg) pulmonary  hypertension is present. The right ventricular systolic pressure is  approximated at 37mmHg plus the right atrial pressure.     Aortic Valve  There is moderate trileaflet aortic sclerosis. There is moderate (2+) aortic  regurgitation. No aortic stenosis is present.     Pulmonic Valve  The pulmonic valve is normal in structure and function.     Vessels  The ascending aorta is Mildly dilated. Dilation of the inferior vena cava is  present with abnormal respiratory variation in diameter.     Pericardium  There is no pericardial effusion.     Rhythm  Rhythm uncertain, wide  QRS.  ______________________________________________________________________________  MMode/2D Measurements & Calculations     IVSd: 1.5 cm  LVIDd: 5.4 cm  LVIDs: 4.3 cm  LVPWd: 1.3 cm  FS: 20.4 %  LV mass(C)d: 326.7 grams  LV mass(C)dI: 172.1 grams/m2  Ao root diam: 3.7 cm  LA dimension: 5.6 cm  LA/Ao: 1.5  LVOT diam: 2.1 cm  LVOT area: 3.5 cm2  LA Volume (BP): 94.9 ml  LA Volume Index (BP): 49.9 ml/m2  RWT: 0.49     Doppler Measurements & Calculations  MV E max jonny: 114.0 cm/sec  MV A max jonny: 34.6 cm/sec  MV E/A: 3.3  MV dec time: 0.12 sec  Ao V2 max: 168.0 cm/sec  Ao max P.0 mmHg  Ao V2 mean: 124.0 cm/sec  Ao mean P.0 mmHg  Ao V2 VTI: 33.6 cm  MARCOS(I,D): 1.8 cm2  MARCOS(V,D): 1.8 cm2  AI P1/2t: 357.4 msec  LV V1 max PG: 3.1 mmHg  LV V1 max: 88.5 cm/sec  LV V1 VTI: 17.5 cm  SV(LVOT): 60.6 ml  SI(LVOT): 31.9 ml/m2  TR max jonny: 304.0 cm/sec  TR max P.0 mmHg  AV Jonny Ratio (DI): 0.53  MARCOS Index (cm2/m2): 0.95  E/E' av.9  Lateral E/e': 13.5  Medial E/e': 24.4     ______________________________________________________________________________  Report approved by: Winsome Jones 2023 09:22 AM             Echo:  No results found for this or any previous visit (from the past 4320 hour(s)).    Clinically Significant Risk Factors Present on Admission       # Hypokalemia: Lowest K = 3.1 mmol/L in last 2 days, will replace as needed      # Anion Gap Metabolic Acidosis: Highest Anion Gap = 20 mmol/L in last 2 days, will monitor and treat as appropriate    # Drug Induced Coagulation Defect: home medication list includes an anticoagulant medication  # Thrombocytopenia: Lowest platelets = 115 in last 2 days, will monitor for bleeding     Cardiovascular: Cardiac Arrhythmia: Atrial fibrillation: Chronic    Fluid & Electrolyte Disorders: Hypokalemia and Fluid overload, unspecified        Hematology/Oncology: Thrombocytopenia Including Purpuric, HIT, & Other Platelet Defects: Thrombocytopenia,  unspecified  Coagulation defect, unspecified    Nephrology: CKD POA List: Stage 4 (GFR 15-29)            Pulmonary Heart Disease (Pulmonary hypertension or Cor pulmonale): Pulmonary Hypertension, unspecified    Limitations of activities/Fatigue (optional): Chronic Fatigue and Other Debilities: Chronic fatigue, unspecified

## 2023-01-09 NOTE — PLAN OF CARE
End of shift summary: BOARDER [id number]  Dx: SOB & CHF exacerbation  A/O: Alert to and Self, confused and impulsive  Diet: low fat, low sodium. 2000 mL fluid restriction  Fluids: Saline locked.  Transfer: SBA  Bathroom: Voiding  Pain: Tylenol given for pain.   Telemetry Monitoring: Yes - SR w/ BBB  Treatment: lasix, tele, GI and nephrology consults, PT/OT/SW consult, ECHO today  Discharge Plans: tbd        Blood pressure (!) 157/91, pulse 92, temperature 97.8  F (36.6  C), temperature source Oral, resp. rate 20, SpO2 95 %.

## 2023-01-09 NOTE — PROGRESS NOTES
[facility]  Hospitalist Progress Note  Sarmad Owens MD 01/09/2023    Reason for Stay (Diagnosis): Acute hypoxic respiratory failure.         Assessment and Plan:      Summary of Stay: Matheus White Sr. is a 82 year old male with PMH of ESRD, renal transplantation back in 2009, on chronic immunosuppression therapy, congestive heart failure, Afib on warfarin, HTN, nonischemic cardiomyopathy with prior EF of 40 to 45%, who lives independently at home. He was seen in the emergency room a day earlier for shortness of breath and difficulty laying flat inhibiting his ability to sleep.  He was diuresed in the emergency room and demonstrated no significant hypoxia and had some significant improvement that led for him to request for discharge and subsequently went home.   He then came back to the emergency room due to concerns of increasing shortness of breath, generalized weakness, increasingly lethargy, and possible yellowish discoloration of the skin and concerns of decreasing oral intake and admitted to [facility] on 1/8/2023.     Problem list:     #1  Acute on chronic systolic CHF.  #2  Elevated Troponin likely secondary to #1, type II MI.  -Continue gentle diuresis.  -Continue telemetry monitoring.  -Daily weight, input and output, cardiac diet.  -Echocardiogram showed EF of 35%, +2 tricuspid regurg, +2 aortic regurg there is moderate global hypokinesia of left ventricle.  -The latest echo seems to be worsening on the prior study.  -Cardiology consulted, awaiting input.  -Patient is already on full anticoagulation.    #3  Permanent A. fib on chronic anticoagulation  #4  Ssupratherapeutic INR on warfarin  -Patient is on anticoagulation for permanent A. Fib.  -INR is supra therapeutic.  -Pharmacy will dose warfarin as appropriate     #5 History of renal transplant, CKD stage IV.  #6.  Mild hypokalemia  -Patient is on immunosuppressive treatment.  -Continue gentle diuretics.  -Gently replace potassium,  ordered 20 mEq p.o. x1 .  -Nephrology is consulted, input is appreciated.    #7. elevated liver enzyme with indirect hyperbilirubinemia.  -This possibly due to CHF exacerbation .  -Imaging also suggestive of chronic liver disease with portal hypertension.  -No ascites seen.  -No sign of acute cholecystitis or biliary obstruction.  -No history of heavy alcohol use.  -GI is consulted, ordered viral hepatitis and to follow-up at liver clinic as an outpatient.     #8 physical deconditioning  -Reportedly lives independently  -We will request for PT, OT evaluation social service input for discharge planning disposition       DVT Prophylaxis: Warfarin  Code Status: DNR / DNI.  I had long discussion with patient and he stated he does not want to be resuscitated or intubated.  This also verified from his ex-wife who is at bedside.  CODE STATUS is changed.    Discharge Dispo: Home.  Estimated Disch Date / # of Days until Disch: 2-3 days, pending improvement and further work up.  I discussed with patient at length the plan of care, all his question concerns addressed, on also discussed with his ex-wife.        Interval History (Subjective):      Patient seen and examined, assumed care today, his ex-wife at bedside, feels about the same, no pain, tolerating diet, no cough, fever or SOB, not on oxygen                  Physical Exam:      Last Vital Signs:  BP (!) 157/90   Pulse 85   Temp 98.1  F (36.7  C) (Oral)   Resp 12   SpO2 96%     I/O last 3 completed shifts:  In: 120 [P.O.:120]  Out: 300 [Urine:300]  There were no vitals filed for this visit.  Current Facility-Administered Medications   Medication     acetaminophen (TYLENOL) tablet 650 mg    Or     acetaminophen (TYLENOL) Suppository 650 mg     cycloSPORINE modified (GENERIC EQUIVALENT) capsule 50 mg     diltiazem ER COATED BEADS (CARDIZEM CD/CARTIA XT) 24 hr capsule 180 mg     furosemide (LASIX) injection 40 mg     gabapentin (NEURONTIN) capsule 300-600 mg      lidocaine (LMX4) cream     lidocaine 1 % 0.1-1 mL     melatonin tablet 1 mg     mycophenolate (GENERIC EQUIVALENT) tablet 500 mg     ondansetron (ZOFRAN ODT) ODT tab 4 mg    Or     ondansetron (ZOFRAN) injection 4 mg     Patient is already receiving anticoagulation with heparin, enoxaparin (LOVENOX), warfarin (COUMADIN)  or other anticoagulant medication     potassium chloride ER (KLOR-CON M) CR tablet 20 mEq     senna-docusate (SENOKOT-S/PERICOLACE) 8.6-50 MG per tablet 1 tablet    Or     senna-docusate (SENOKOT-S/PERICOLACE) 8.6-50 MG per tablet 2 tablet     sodium chloride (PF) 0.9% PF flush 3 mL     sodium chloride (PF) 0.9% PF flush 3 mL     Warfarin Dose Required Daily - Pharmacist Managed     Current Outpatient Medications   Medication     calcitRIOL (ROCALTROL) 0.25 MCG capsule     colchicine (COLCYRS) 0.6 MG tablet     cycloSPORINE modified (GENERIC EQUIVALENT) 25 MG capsule     diltiazem ER COATED BEADS (CARDIZEM CD/CARTIA XT) 180 MG 24 hr capsule     gabapentin (NEURONTIN) 300 MG capsule     metolazone (ZAROXOLYN) 2.5 MG tablet     multivitamin w/minerals (THERA-VIT-M) tablet     mycophenolate (GENERIC EQUIVALENT) 500 MG tablet     potassium chloride ER (K-TAB) 20 MEQ CR tablet     torsemide (DEMADEX) 20 MG tablet     torsemide (DEMADEX) 20 MG tablet     warfarin ANTICOAGULANT (COUMADIN) 3 MG tablet       Constitutional: Awake, alert, cooperative, no apparent distress   Respiratory: Clear to auscultation bilaterally, no crackles or wheezing   Cardiovascular: Regular rate and rhythm, normal S1 and S2, and no murmur noted   Abdomen: Normal bowel sounds, soft, non-distended, non-tender   Skin: No rashes, no cyanosis, dry to touch   Neuro: Alert and oriented x3, no weakness, numbness, memory loss   Extremities: trace edema, normal range of motion   Other(s):HEENT Pink, icteric.       All other systems: Negative          Medications:      All current medications were reviewed with changes reflected in problem  list.         Data:      All new lab and imaging data was reviewed.   Labs:  Recent Labs   Lab 01/09/23  0754 01/08/23  1723 01/07/23  1005   WBC 3.7* 4.1 3.2*   HGB 10.5* 10.8* 10.8*   HCT 33.5* 34.7* 34.9*   MCV 92 92 93   * 125* 134*     Recent Labs   Lab 01/09/23  0754 01/08/23  1723 01/07/23  1005    142 144   POTASSIUM 3.1* 3.7 3.4   CHLORIDE 101 101 104   CO2 20* 21* 24   ANIONGAP 19* 20* 16*   * 120* 87   BUN 59.5* 55.6* 42.5*   CR 2.92* 3.04* 2.69*   GFRESTIMATED 21* 20* 23*   ELEAZAR 9.5 10.0 10.0   PROTTOTAL  --  7.2  --    ALBUMIN  --  4.5  --    BILITOTAL  --  2.0*  --    ALKPHOS  --  197*  --    AST  --  208*  --    ALT  --  78*  --      Recent Labs   Lab 01/09/23  0754 01/08/23  1723 01/07/23  1005   * 120* 87      Imaging:   Results for orders placed or performed during the hospital encounter of 01/08/23   CT Head w/o Contrast    Narrative    EXAM: CT HEAD W/O CONTRAST  LOCATION: [facility]  DATE/TIME: 1/8/2023 6:09 PM    INDICATION: confusion  COMPARISON: 06/16/2022.  TECHNIQUE: Routine CT Head without IV contrast. Multiplanar reformats. Dose reduction techniques were used.    FINDINGS:  INTRACRANIAL CONTENTS: No intracranial hemorrhage, extraaxial collection, or mass effect.  No CT evidence of acute infarct. There is scattered diffuse low attenuation within the periventricular and subcortical white matter consistent with diffuse small   vessel ischemic disease. There is an old lacunar infarct left thalamus. The ventricular system, basal cisterns and the cortical sulci are consistent with diffuse volume loss.     VISUALIZED ORBITS/SINUSES/MASTOIDS: No intraorbital abnormality. No paranasal sinus mucosal disease. No middle ear or mastoid effusion.    BONES/SOFT TISSUES: No acute abnormality.      Impression    IMPRESSION:  1.  No CT finding of a mass, hemorrhage or focal area suggestive of infarct.  2.  Diffuse age related changes along with old lacunar  infarct left thalamus.   XR Chest 2 Views    Narrative    EXAM: XR CHEST 2 VIEWS  LOCATION: [facility]  DATE/TIME: 1/8/2023 6:21 PM    INDICATION: sob  COMPARISON: 1/7/2023      Impression    IMPRESSION: Heart size is prominent. This is unchanged. Mild pulmonary vascular congestion. No pleural effusion or pneumothorax.   CT Abdomen Pelvis w/o Contrast    Narrative    EXAM: CT ABDOMEN PELVIS W/O CONTRAST  LOCATION: [facility]  DATE/TIME: 1/8/2023 7:50 PM    INDICATION: Abdominal pain, LFT up, kidney dz.  COMPARISON: None.  TECHNIQUE: CT scan of the abdomen and pelvis was performed without IV contrast. Multiplanar reformats were obtained. Dose reduction techniques were used.  CONTRAST: None.    FINDINGS:   LOWER CHEST: Mild mosaic perfusion which is nonspecific, but most typical for air trapping secondary to small airway inflammation. 1.5 x 1.5 mm noncalcified pulmonary nodule in the left lower lobe. Mild cardiomegaly. Moderate coronary artery   calcifications.    HEPATOBILIARY: There is some slight stranding seen in the fat surrounding the gallbladder and I cannot exclude subtle findings of acute cholecystitis. If this is of concern clinically, ultrasound is recommended of the gallbladder for further evaluation.   There are changes most typical for slight cirrhotic configuration of the liver with associated mild hepatomegaly. There are findings most typical for some portal venous hypertension with slight varicosities and the spleen is at the upper limits of normal   in size at 12.9 cm.    PANCREAS: Mildly atrophic, but otherwise normal.    SPLEEN: Upper limits of normal in size at 12.9 cm.    ADRENAL GLANDS: Normal.    KIDNEYS/BLADDER: Significant atrophy is seen of the left kidney with some scattered benign cysts seen in the left kidney, some of which show some benign associated calcifications. There is a transplant kidney seen in the right hemipelvis with two tiny    nonobstructive stones and the largest measures approximately 4 mm in greatest dimension. No complications are seen of the transplanted kidney. The native right kidney is surgically absent. The bladder is normal.    BOWEL: There is a small to moderate-sized left inguinal hernia which contains some fat and a portion of the sigmoid colon with no evidence for obstruction or inflammation, however. Mild findings of diverticulosis with no evidence for diverticulitis. The   bowel is otherwise normal.    LYMPH NODES: Normal.    VASCULATURE: Advanced calcification with no aneurysmal dilatation.    PELVIC ORGANS: Mild to moderate calcification. Prostatic gland enlargement.    MUSCULOSKELETAL: Advanced multilevel degenerative changes of the lumbar spine with associated degenerative disc disease.      Impression    IMPRESSION:   1.  Possible subtle changes cholecystitis and ultrasound is recommended for further evaluation.    2.  Mild findings of hepatomegaly and cirrhosis with associated secondary findings of mild portal venous hypertension.    3.  Left inguinal hernia which contains some fat and sigmoid colon with no evidence for inflammation or obstruction.    4.  Atrophic native left kidney, surgically absent right kidney, and there is a transplant kidney in the right hemipelvis. There are some benign cysts seen in the native left kidney and no follow-up is recommended.    5.  Nonobstructive nephrolithiasis transplant kidney.    6.  Spleen is at the upper limits of normal in size at 12.9 cm, but otherwise normal.    7.  Moderate coronary artery calcifications.    8.  1 mm x 1 mm noncalcified pulmonary nodule right lower lobe and no follow-up is recommended unless there is high risk factors for malignancy.     Abdomen US, limited (RUQ only)    Narrative    EXAM: US ABDOMEN LIMITED  LOCATION: [facility]  DATE/TIME: 1/8/2023 9:44 PM    INDICATION: abnm LFT  COMPARISON: CT dated 1/8/2023  TECHNIQUE:  Limited abdominal ultrasound.    FINDINGS:    GALLBLADDER: Normal. No gallstones, wall thickening, or pericholecystic fluid. Negative sonographic Gustafson's sign.    BILE DUCTS: No biliary dilatation. The common duct measures 7.6 mm.    LIVER: Normal parenchyma with smooth contour. No focal mass.    RIGHT KIDNEY: Surgically absent    PANCREAS: The visualized portions are normal.    No ascites.      Impression    IMPRESSION:  1.  No significant gallbladder wall thickening, or gallstones identified.       Echocardiogram Complete     Value    LVEF  35%    [facility]    [id number]  [id number]  [id number]  [id number]^ASHLEY^AL^AJ     [facility]  Echocardiography Laboratory  [address] [place] [address]     Name: SR BRAD MOHAMUD SR.  MRN: [id number]  : 1940  Study Date: 2023 08:10 AM  Age: 82 yrs  Gender: Male  Patient Location: [facility]  Reason For Study: CHF  Ordering Physician: CORINNE RAMIREZ  Performed By: Tye Franz RDCS     BSA: 1.9 m2  Height: 70 in  Weight: 160 lb  HR: 80  BP: 157/90 mmHg  ______________________________________________________________________________  Procedure  Complete Portable Echo Adult.  ______________________________________________________________________________  Interpretation Summary     1. The left ventricle is normal in size. The visual ejection fraction is  estimated at 35%. There is moderate global hypokinesia of the left ventricle.  2. The right ventricle is normal in structure, function and size.  3. There is moderate (2+) tricuspid regurgitation.  4. Mild (35-45mmHg) pulmonary hypertension is present. The right ventricular  systolic pressure is approximated at 37mmHg plus the right atrial pressure.  5. There is moderate (2+) aortic regurgitation.  6. The ascending aorta is Mildly dilated. 4.0cm.     Echo 2019 showed EF 40-45%, 1+ MR/AI, 2+ TR, aorta  4.1cm.  ______________________________________________________________________________  Left Ventricle  The left ventricle is normal in size. There is mild concentric left  ventricular hypertrophy. The visual ejection fraction is estimated at 35%.  Left ventricular diastolic function is indeterminate. There is moderate global  hypokinesia of the left ventricle.     Right Ventricle  The right ventricle is normal in structure, function and size.     Atria  The left atrium is moderately dilated. The right atrium is mild to moderately  dilated. There is no atrial shunt seen.     Mitral Valve  There is moderate mitral annular calcification. The mitral valve leaflets  appear thickened, but open well. There is moderate (2+) mitral regurgitation.     Tricuspid Valve  There is moderate (2+) tricuspid regurgitation. Mild (35-45mmHg) pulmonary  hypertension is present. The right ventricular systolic pressure is  approximated at 37mmHg plus the right atrial pressure.     Aortic Valve  There is moderate trileaflet aortic sclerosis. There is moderate (2+) aortic  regurgitation. No aortic stenosis is present.     Pulmonic Valve  The pulmonic valve is normal in structure and function.     Vessels  The ascending aorta is Mildly dilated. Dilation of the inferior vena cava is  present with abnormal respiratory variation in diameter.     Pericardium  There is no pericardial effusion.     Rhythm  Rhythm uncertain, wide QRS.  ______________________________________________________________________________  MMode/2D Measurements & Calculations     IVSd: 1.5 cm  LVIDd: 5.4 cm  LVIDs: 4.3 cm  LVPWd: 1.3 cm  FS: 20.4 %  LV mass(C)d: 326.7 grams  LV mass(C)dI: 172.1 grams/m2  Ao root diam: 3.7 cm  LA dimension: 5.6 cm  LA/Ao: 1.5  LVOT diam: 2.1 cm  LVOT area: 3.5 cm2  LA Volume (BP): 94.9 ml  LA Volume Index (BP): 49.9 ml/m2  RWT: 0.49     Doppler Measurements & Calculations  MV E max reba: 114.0 cm/sec  MV A max reba: 34.6 cm/sec  MV E/A:  3.3  MV dec time: 0.12 sec  Ao V2 max: 168.0 cm/sec  Ao max P.0 mmHg  Ao V2 mean: 124.0 cm/sec  Ao mean P.0 mmHg  Ao V2 VTI: 33.6 cm  MARCOS(I,D): 1.8 cm2  MARCOS(V,D): 1.8 cm2  AI P1/2t: 357.4 msec  LV V1 max PG: 3.1 mmHg  LV V1 max: 88.5 cm/sec  LV V1 VTI: 17.5 cm  SV(LVOT): 60.6 ml  SI(LVOT): 31.9 ml/m2  TR max jonny: 304.0 cm/sec  TR max P.0 mmHg  AV Jonny Ratio (DI): 0.53  MARCOS Index (cm2/m2): 0.95  E/E' av.9  Lateral E/e': 13.5  Medial E/e': 24.4     ______________________________________________________________________________  Report approved by: Winsome Jones 2023 09:22 AM

## 2023-01-09 NOTE — PHARMACY-ANTICOAGULATION SERVICE
Clinical Pharmacy - Warfarin Dosing Consult     Pharmacy has been consulted to manage this patient s warfarin therapy.  Indication: Atrial Fibrillation  Therapy Goal: INR 2-3  OP Anticoag Clinic: [facility]  Warfarin Prior to Admission: Yes  Warfarin PTA Regimen: 3 mg Tuesday/Friday, 1.5 mg all other days  Recent documented change in oral intake/nutrition: Unknown    INR   Date Value Ref Range Status   01/08/2023 3.65 (H) 0.85 - 1.15 Final   06/16/2022 3.03 (H) 0.85 - 1.15 Final       Recommend holding warfarin today.  Pharmacy will monitor Matheus White SrIzzy daily and order warfarin doses to achieve specified goal.      Please contact pharmacy as soon as possible if the warfarin needs to be held for a procedure or if the warfarin goals change.      Cande Ospina, PharmD, [facility]  Emergency Medicine Clinical Pharmacist  [phone]

## 2023-01-09 NOTE — CONSULTS
GASTROENTEROLOGY CONSULTATION      Matheus White Sr.  [id number] [facility] [address]  82 year old male     Admission Date/Time: 1/8/2023  Primary Care Provider: Marco James     We were asked to see the patient in consultation by Dr. Pak for evaluation of elevated liver enzymes.    CC: shortness of breath     HPI:  Matheus White Sr. is a 82 year old male with past medical history significant for end-stage renal disease, renal transplant in 2009, chronic immunosuppression therapy, congestive heart failure, chronic anticoagulation for atrial fibrillation with warfarin, hypertension, nonischemic cardiomyopathy with prior EF of 40 to 45% admitted January 8 with symptoms of worsening shortness of breath, generalized weakness, lethargy, possible jaundice, decreased p.o. intake with subsequent findings of elevated bilirubin, transaminases, imaging findings concerning for liver cirrhosis, and acute on chronic congestive heart failure, supratherapeutic INR.    Patient was seen in the emergency room on January 7 for worsening shortness of breath, received diuresis and subsequently discharged for likely congestive heart failure exacerbation.  However, he returned to the emergency room yesterday with worsening shortness of breath, generalized weakness, increasing lethargy, possible jaundice and concerns for decreased p.o. intake.  He denies any abdominal pain, nausea, vomiting fevers, chills.  He denies noticing jaundice or scleral icterus. No light colored stools or dark urine. He started a few new medications recently but not not clear at this time what these are. He denies any history of heavy alcohol use in the past or any current alcohol use. Denies any prior history of known liver disease.     Lab work-up was notable for elevated BNP levels (47,003) concerning for acute on chronic CHF exacerbation.  Incidentally he was noted to have increased liver enzymes and bilirubin with a bilirubin  level of 2, direct bilirubin 0.79, , ALT 78, alkaline phosphatase 197.  Most recent liver enzymes and bilirubin checked in June 2022 at which time alkaline phosphatase is elevated at 163 blood ALT, AST, bilirubin were normal at that time.  In addition troponin noted to be elevated at 143.  INR 3.65 in the setting of chronic warfarin therapy.  CBC showed a normal white blood cell count, stable anemia with a hemoglobin of 10.8, low platelets at 125 (previously normal in June 2022), normal MCV at 92.  Creatinine elevated at 3.04, BUN 55, normal electrolytes.    CT abdomen pelvis without contrast showed fat stranding around the gallbladder suspicious for possible cholecystitis, hepatomegaly, portal hypertension concerning for liver cirrhosis, spleen upper limits of normal at 12.9 cm, no ascites.     Right upper quadrant ultrasound showed no gallstones, normal gallbladder, no evidence of biliary dilation with the common duct measuring 7.6 mm, no ascites.     No prior liver imaging to review prior to this admission.    PAST MEDICAL HISTORY:  Patient Active Problem List    Diagnosis Date Noted     Generalized weakness 01/08/2023     Priority: Medium     Acute systolic congestive heart failure (H) 01/08/2023     Priority: Medium     Supratherapeutic INR 01/08/2023     Priority: Medium     Alcoholic cirrhosis of liver without ascites (H) 01/08/2023     Priority: Medium     Chronic kidney disease, stage 3 (H) 04/19/2021     Priority: Medium     Cellulitis of left forearm 07/10/2020     Priority: Medium     Cellulitis of left lower extremity 10/17/2019     Priority: Medium     Acute kidney injury superimposed on chronic kidney disease (H) 10/12/2019     Priority: Medium     Anticoagulation monitoring, INR range 2-3 06/24/2019     Priority: Medium     History of renal transplant 06/24/2019     Priority: Medium     HTN (hypertension) 06/24/2019     Priority: Medium     Permanent atrial fibrillation (H) 06/24/2019      Priority: Medium          ROS: A comprehensive ten point review of systems was negative aside from those in mentioned in the HPI.       MEDICATIONS:   Prior to Admission medications    Medication Sig Start Date End Date Taking? Authorizing Provider   potassium chloride ER (KLOR-CON M) 20 MEQ CR tablet Take 20 mEq by mouth 2 times daily   Yes Reported, Patient   cycloSPORINE modified (GENERIC EQUIVALENT) 25 MG capsule Take 50 mg by mouth 2 times daily    Unknown, Entered By History   diltiazem ER COATED BEADS (CARDIZEM CD/CARTIA XT) 180 MG 24 hr capsule Take 180 mg by mouth daily    Unknown, Entered By History   gabapentin (NEURONTIN) 300 MG capsule Take 300 mg by mouth daily    Unknown, Entered By History   mycophenolate (GENERIC EQUIVALENT) 500 MG tablet Take 500 mg by mouth 2 times daily    Unknown, Entered By History   order for DME Equipment being ordered: Cane ()  Treatment Diagnosis: Gait Instability  Patient not taking: Reported on 4/19/2021 10/22/19   Mery Salgado MD   Specialty Vitamins Products (VITAMINS FOR HAIR) TABS Take 1 tablet by mouth 8/21/19   Reported, Patient   torsemide (DEMADEX) 20 MG tablet Take 2 tablets (40 mg) by mouth 2 times daily 4/13/20   Cj Bagley MD   warfarin ANTICOAGULANT (COUMADIN) 3 MG tablet 3 mg every Tue, Fri; 1.5 mg (3 mg x 0.5) all other days in the evening OR as directed    Unknown, Entered By History        ALLERGIES: No Known Allergies     SOCIAL HISTORY:  Social History     Tobacco Use     Smoking status: Never     Smokeless tobacco: Never   Substance Use Topics     Alcohol use: Yes     Comment: 12  oz. beer/week     Drug use: Never        FAMILY HISTORY:  Family History   Problem Relation Age of Onset     Emphysema Father         PHYSICAL EXAM:   BP (!) 157/90   Pulse 85   Temp 98.1  F (36.7  C) (Oral)   Resp 12   SpO2 96%      PHYSICAL EXAM:  General: alert, oriented, NAD  SKIN: +mild jaundice, no suspicious lesions, rashes, or spider angiomas  HEAD:  Normocephalic. No masses, lesions, tenderness or abnormalities  NECK: Neck supple. No adenopathy. Thyroid symmetric, normal size.  EYES: + scleral icterus  ENT: ENT exam normal, no neck nodes or sinus tenderness  RESPIRATORY: negative, Good diaphragmatic excursion. Lungs clear  CARDIOVASCULAR: negative, PMI normal. No lifts, heaves, or thrills. RRR. No murmurs, clicks gallops or rub  GASTROINTESTINAL: +BS, soft, mild left sided abdominal tenderness, ND, no HSM, no masses/guarding/rebound  JOINT/EXTREMITIES: extremities normal- no gross deformities noted, gait normal and normal muscle tone  NEURO: Reflexes grossly normal and symmetric. Sensation grossly WNL.  PSYCH: no abnormal anxiety/depression  LYMPH: No anterior cervical, posterior cervical, or supraclavicular adenopathy     LABS:  I reviewed the patient's new clinical lab test results.   Recent Labs   Lab Test 01/08/23  1723 01/07/23  1005 06/21/22  1547 06/16/22  0831 06/10/22  1047   WBC 4.1 3.2* 5.3 7.1 5.9   HGB 10.8* 10.8* 10.3* 10.7* 10.4*   MCV 92 93 92 91 90   * 134* 182 244 228   INR 3.65*  --   --  3.03* 4.31*     Recent Labs   Lab Test 01/08/23  1723 01/07/23  1005 06/21/22  1547    144 140   POTASSIUM 3.7 3.4 3.1*   CHLORIDE 101 104 103   CO2 21* 24 29   BUN 55.6* 42.5* 51*   ANIONGAP 20* 16* 8   ELEAZAR 10.0 10.0 9.3     Recent Labs   Lab Test 01/08/23  2315 01/08/23  1723 06/10/22  1047 03/16/20  2143 12/23/19  0621 10/16/19  1204   ALBUMIN  --  4.5 3.4 3.9  --   --    BILITOTAL  --  2.0* 1.1 1.3  --   --    ALT  --  78* 21 17  --   --    AST  --  208* 16 20  --   --    ALKPHOS  --  197* 163* 262*  --   --    PROTEIN Negative  --   --   --  100* 30*        IMAGING  I personally reviewed the patient's new imaging results.    Narrative & Impression   EXAM: US ABDOMEN LIMITED  LOCATION: [facility]  DATE/TIME: 1/8/2023 9:44 PM     INDICATION: abnm LFT  COMPARISON: CT dated 1/8/2023  TECHNIQUE: Limited abdominal  ultrasound.     FINDINGS:     GALLBLADDER: Normal. No gallstones, wall thickening, or pericholecystic fluid. Negative sonographic Gustafosn's sign.     BILE DUCTS: No biliary dilatation. The common duct measures 7.6 mm.     LIVER: Normal parenchyma with smooth contour. No focal mass.     RIGHT KIDNEY: Surgically absent     PANCREAS: The visualized portions are normal.     No ascites.                                                                      IMPRESSION:  1.  No significant gallbladder wall thickening, or gallstones identified.     EXAM: CT ABDOMEN PELVIS W/O CONTRAST  LOCATION: [facility]  DATE/TIME: 1/8/2023 7:50 PM     INDICATION: Abdominal pain, LFT up, kidney dz.  COMPARISON: None.  TECHNIQUE: CT scan of the abdomen and pelvis was performed without IV contrast. Multiplanar reformats were obtained. Dose reduction techniques were used.  CONTRAST: None.     FINDINGS:   LOWER CHEST: Mild mosaic perfusion which is nonspecific, but most typical for air trapping secondary to small airway inflammation. 1.5 x 1.5 mm noncalcified pulmonary nodule in the left lower lobe. Mild cardiomegaly. Moderate coronary artery   calcifications.     HEPATOBILIARY: There is some slight stranding seen in the fat surrounding the gallbladder and I cannot exclude subtle findings of acute cholecystitis. If this is of concern clinically, ultrasound is recommended of the gallbladder for further evaluation.   There are changes most typical for slight cirrhotic configuration of the liver with associated mild hepatomegaly. There are findings most typical for some portal venous hypertension with slight varicosities and the spleen is at the upper limits of normal   in size at 12.9 cm.     PANCREAS: Mildly atrophic, but otherwise normal.     SPLEEN: Upper limits of normal in size at 12.9 cm.     ADRENAL GLANDS: Normal.     KIDNEYS/BLADDER: Significant atrophy is seen of the left kidney with some scattered benign cysts  seen in the left kidney, some of which show some benign associated calcifications. There is a transplant kidney seen in the right hemipelvis with two tiny   nonobstructive stones and the largest measures approximately 4 mm in greatest dimension. No complications are seen of the transplanted kidney. The native right kidney is surgically absent. The bladder is normal.     BOWEL: There is a small to moderate-sized left inguinal hernia which contains some fat and a portion of the sigmoid colon with no evidence for obstruction or inflammation, however. Mild findings of diverticulosis with no evidence for diverticulitis. The   bowel is otherwise normal.     LYMPH NODES: Normal.     VASCULATURE: Advanced calcification with no aneurysmal dilatation.     PELVIC ORGANS: Mild to moderate calcification. Prostatic gland enlargement.     MUSCULOSKELETAL: Advanced multilevel degenerative changes of the lumbar spine with associated degenerative disc disease.                                                                      IMPRESSION:   1.  Possible subtle changes cholecystitis and ultrasound is recommended for further evaluation.     2.  Mild findings of hepatomegaly and cirrhosis with associated secondary findings of mild portal venous hypertension.     3.  Left inguinal hernia which contains some fat and sigmoid colon with no evidence for inflammation or obstruction.     4.  Atrophic native left kidney, surgically absent right kidney, and there is a transplant kidney in the right hemipelvis. There are some benign cysts seen in the native left kidney and no follow-up is recommended.     5.  Nonobstructive nephrolithiasis transplant kidney.     6.  Spleen is at the upper limits of normal in size at 12.9 cm, but otherwise normal.     7.  Moderate coronary artery calcifications.     8.  1 mm x 1 mm noncalcified pulmonary nodule right lower lobe and no follow-up is recommended unless there is high risk factors for malignancy.      CONSULTATION ASSESSMENT AND PLAN:     82 year old male with past medical history significant for end-stage renal disease, renal transplant in 2009, chronic immunosuppression therapy, congestive heart failure, chronic anticoagulation for atrial fibrillation with warfarin, hypertension, nonischemic cardiomyopathy with prior EF of 40 to 45% admitted January 8 with symptoms of worsening shortness of breath, generalized weakness, lethargy, possible jaundice, decreased p.o. intake with subsequent findings of elevated bilirubin, transaminases, imaging findings concerning for liver cirrhosis, and acute on chronic congestive heart failure, supratherapeutic INR.    1. Elevated liver enzymes with indirect hyperbilirubinemia in the setting of CHF exacerbation. Imaging suggestive of chronic liver disease with portal hypertension, also with low platelets this admission. This appears to be a new diagnosis according to patient. CHF is the most likely underlying etiology for both of these findings. No signs of acute cholecystitis or biliary obstruction. He does not exhibit any RUQ pain that would support obstruction and bilirubin is mainly indirect elevation. Would consider viral causes for liver disease. No history of past heavy alcohol use or current use. He mentions a few medications but elevation in liver enzymes typically higher in drug induced liver injuries.     No ascites on imaging to tap for SAAG. INR supratherapeutic in the setting of chronic warfarin therapy.     --Viral hepatitis serologies.   --Treat heart failure exacerbation and monitor LFTs for trend.   --We can arrange outpatient liver clinic follow up.     Will discuss further with Dr. Silva.     Total time spent:  45 minutes was spent providing patient care, including patient evaluation, reviewing documentation/test results, and . Thank you for asking us to participate in the care of this patient.      Kristi Lehman, [facility]  "[facility] ([facility])       --------------------  Gastroenterology Consult in Conjunction with Advanced Practice Provider   The patient was seen and evaluated in conjunction with the mid-level provider. Please see their note for details. All labs and imaging studies have been reviewed.     Objective   Patient is a 82-year-old gentleman with a past history of renal transplant on immunosuppression since 2009.  He also has a history of congestive heart failure and is on an coagulation for atrial fibrillation.    He was seen in the emergency room on January 7 for shortness of breath and received diuresis and was discharged with congestive heart failure exacerbation.  He then returned to the ER on January 8 with symptoms of shortness of breath, weakness, lethargy, and decreased oral intake.  He was also found to have mildly elevated bilirubin along with mildly elevated transaminases and alkaline phosphatase.  He had a CT with findings concerning for cirrhosis.    We have been asked to see him regarding his elevated LFTs.  As work-up was notable for a BNP of 47,003.  INR was 3.65 on chronic warfarin therapy.  Creatinine was possibly mildly above baseline although very close to baseline.    His only new medications are diuretics.  He rarely drinks alcohol.     Vitals Blood pressure (!) 137/98, pulse 85, temperature 98.2  F (36.8  C), temperature source Oral, resp. rate 12, height 1.778 m (5' 10\"), weight 72.6 kg (160 lb), SpO2 100 %.          Physical Exam   General: awake, alert, oriented times three    Cardiovascular: Irregularly irregular    Chest: lungs are clear to auscultation bilaterally    Neurologic: grossly intact, moves all four extremities         Laboratory     Electrolytes    Recent Labs   Lab 01/09/23  0754 01/08/23  1723 01/07/23  1005    142 144   POTASSIUM 3.1* 3.7 3.4   CHLORIDE 101 101 104   CO2 20* 21* 24   * 120* 87   CR 2.92* 3.04* 2.69*   BUN 59.5* 55.6* 42.5*      Hematology    Recent Labs "   Lab 01/09/23  0754 01/08/23  1723 01/07/23  1005   HGB 10.5* 10.8* 10.8*   MCV 92 92 93   WBC 3.7* 4.1 3.2*   * 125* 134*   INR 4.02* 3.65*  --       LFTs & Lipase    Recent Labs   Lab 01/08/23  1723   *   ALT 78*   ALKPHOS 197*   BILITOTAL 2.0*       Imaging Studies     As above    I have reviewed the current diagnostic and laboratory tests.           Impression and Plan      Elevated LFTs and CT findings concerning for possible cirrhosis.  There is mild hepatomegaly and possible portal venous hypertension.  Spleen is at the upper limits of normal.  AST is greater than ALT.  Bilirubin is 2.0 which is primarily indirect.  Alkaline phosphatase is mildly elevated.  Alkaline phosphatase has been mildly elevated in the past albumin is normal.  INR is not helpful as he is on chronic warfarin therapy.  Most likely reason for elevation in LFTs is congestive hepatopathy.  Another possibility is a elevated cyclosporine level.  He does not consume consistent alcohol.  Hepatitis A, B, and C have been checked and are negative.  Autoimmune liver disease seems unlikely given that he is on immunosuppression.  We will order an PRINCE anti-smooth muscle antibody and AMA to be thorough.  We will order iron studies and celiac panel.  We will also order alpha-1 antitrypsin phenotype.  Obtain TSH. Lastly we will obtain a Doppler to look for portal vein thrombosis or other vascular abnormality around the liver.     Approximately 30 minutes of total time was spent providing patient care including patient evaluation, reviewing documentation/test results, and .           Kristian Silva MD  Thank you for the opportunity to participate in the care of this patient.   Please feel free to call me with any questions or concerns.  Phone number [phone].

## 2023-01-09 NOTE — PLAN OF CARE
"Pt. Alert to self, situation and sometimes pleasantly con used, denied pain, SBA for mobility on potassium, mag. Protocol potassium was replaced today, recheck tomorrow morning, pt is on telemetry monitor, voiding adequately , use urinal at the bed side,has PIV line on right arm.    BP (!) 137/98 (BP Location: Right arm)   Pulse 85   Temp 98.2  F (36.8  C) (Oral)   Resp 12   Ht 1.778 m (5' 10\")   Wt 72.6 kg (160 lb)   SpO2 100%   BMI 22.96 kg/m         "

## 2023-01-09 NOTE — ED NOTES
[facility]  ED Nurse Handoff Report    Matheus White Sr. is a 82 year old male   ED Chief complaint: Shortness of Breath, Jaundice, and Altered Mental Status  . ED Diagnosis:   Final diagnoses:   Acute systolic congestive heart failure (H)   Alcoholic cirrhosis of liver without ascites (H)   Generalized weakness   Supratherapeutic INR     Allergies: No Known Allergies    Code Status: Full Code  Activity level - Baseline/Home:  Independent. Activity Level - Current:   Assist X 1. Lift room needed: No. Bariatric: No   Needed: No   Isolation: No. Infection: Not Applicable.     Vital Signs:   Vitals:    01/08/23 1859 01/08/23 1900 01/08/23 1901 01/08/23 1921   BP: (!) 152/108   (!) 167/105   BP Location:       Pulse: 84   85   Resp:    18   Temp:       TempSrc:       SpO2:  98% 100% 96%       Cardiac Rhythm:  ,      Pain level:    Patient confused: Yes. Patient Falls Risk: Yes.   Elimination Status: Has voided   Patient Report - Initial Complaint: The patient was seen in the ED yesterday with shortness of breath. Since being seen yesterday he seems more lethargic and has been just laying in bed without sheets of clothes on. He was placed on a water pill yesterday and has not been on water pills since his kidney transplant. Daughter also notes that he has been more confused the past two days. He is also more weak but has not fallen. Daughter notes he looks more yellow as well. He has not been eating well. He has been taking his medication. He denies nausea, chest pain, or vomiting.He has a appointment with his kidney doctor next week.   Focused Assessment: Respiratory WDL Respiratory WDL: .WDL except; rhythm/pattern  Rhythm/Pattern, Respiratory: shortness of breath    Musculoskeletal (Adult) Musculoskeletal WDL: mobility  General Mobility: generalized weakness; moderately impaired   Skin WDL Skin WDL: .WDL except; color  Skin Color: jaundiced  Tests Performed: labs, imaging. Abnormal Results:    Labs Ordered and Resulted from Time of ED Arrival to Time of ED Departure   NT PROBNP INPATIENT - Abnormal       Result Value    N terminal Pro BNP Inpatient 47,003 (*)    CBC WITH PLATELETS - Abnormal    WBC Count 4.1      RBC Count 3.78 (*)     Hemoglobin 10.8 (*)     Hematocrit 34.7 (*)     MCV 92      MCH 28.6      MCHC 31.1 (*)     RDW 14.5      Platelet Count 125 (*)    BASIC METABOLIC PANEL - Abnormal    Sodium 142      Potassium 3.7      Chloride 101      Carbon Dioxide (CO2) 21 (*)     Anion Gap 20 (*)     Urea Nitrogen 55.6 (*)     Creatinine 3.04 (*)     Calcium 10.0      Glucose 120 (*)     GFR Estimate 20 (*)    TROPONIN T, HIGH SENSITIVITY - Abnormal    Troponin T, High Sensitivity 143 (*)    HEPATIC FUNCTION PANEL - Abnormal    Protein Total 7.2      Albumin 4.5      Bilirubin Total 2.0 (*)     Alkaline Phosphatase 197 (*)      (*)     ALT 78 (*)     Bilirubin Direct 0.79 (*)    INR - Abnormal    INR 3.65 (*)    LACTIC ACID WHOLE BLOOD - Normal    Lactic Acid 1.8     AMMONIA - Normal    Ammonia 17     INFLUENZA A/B & SARS-COV2 PCR MULTIPLEX - Normal    Influenza A PCR Negative      Influenza B PCR Negative      RSV PCR Negative      SARS CoV2 PCR Negative     ROUTINE UA WITH MICROSCOPIC REFLEX TO CULTURE   BLOOD CULTURE   BLOOD CULTURE     CT Abdomen Pelvis w/o Contrast   Final Result   IMPRESSION:    1.  Possible subtle changes cholecystitis and ultrasound is recommended for further evaluation.      2.  Mild findings of hepatomegaly and cirrhosis with associated secondary findings of mild portal venous hypertension.      3.  Left inguinal hernia which contains some fat and sigmoid colon with no evidence for inflammation or obstruction.      4.  Atrophic native left kidney, surgically absent right kidney, and there is a transplant kidney in the right hemipelvis. There are some benign cysts seen in the native left kidney and no follow-up is recommended.      5.  Nonobstructive nephrolithiasis  transplant kidney.      6.  Spleen is at the upper limits of normal in size at 12.9 cm, but otherwise normal.      7.  Moderate coronary artery calcifications.      8.  1 mm x 1 mm noncalcified pulmonary nodule right lower lobe and no follow-up is recommended unless there is high risk factors for malignancy.         XR Chest 2 Views   Final Result   IMPRESSION: Heart size is prominent. This is unchanged. Mild pulmonary vascular congestion. No pleural effusion or pneumothorax.      CT Head w/o Contrast   Final Result   IMPRESSION:   1.  No CT finding of a mass, hemorrhage or focal area suggestive of infarct.   2.  Diffuse age related changes along with old lacunar infarct left thalamus.      Abdomen US, limited (RUQ only)    (Results Pending)      Treatments provided: see MAR  Family Comments: family member at bedside  OBS brochure/video discussed/provided to patient:  No  ED Medications:   Medications   furosemide (LASIX) injection 60 mg (60 mg Intravenous Given 1/8/23 1913)     Drips infusing:  No  For the majority of the shift, the patient's behavior Green. Interventions performed were n/a.    Sepsis treatment initiated: No     Patient tested for COVID 19 prior to admission: Yes     ED Nurse Name/Phone Number: Iona Hughes RN,   9:10 PM      RECEIVING UNIT ED HANDOFF REVIEW    Above ED Nurse Handoff Report was reviewed: Yes  Reviewed by: Hollie Morales RN on January 10, 2023 at 4:17 PM

## 2023-01-09 NOTE — CONSULTS
RENAL CONSULTATION NOTE    REFERRING MD:  Arsalan Pak MD    REASON FOR CONSULTATION:  History of kidney transplantation, ESKD, chf    HPI:  82 y.o man with kidney transplant, CKD IV, NICM and atria fibrillation, who is admitted for confusion and shortness of breath.  He was in the ER on 1/7 for SOB.  His SOB improved after 1 mg of Bumex  He was discharged.   He came back Sunday for worsening SOB, weakness and confusion.   He was given IV Lasix.  Shortness of breath resolved.  He is not sitting at the edge of the bed comfortably with needing supplemental O2.   He is somewhat confuse.  His daughter days this is not typical of his mentation.     ROS:  A complete review of systems was performed and is negative except as noted above.    PMH:    Past Medical History:   Diagnosis Date     Atrial fibrillation, permanent (H) 04/2009     Cellulitis of left forearm 07/10/2020     CKD (chronic kidney disease)      HTN (hypertension), benign         PSH:    Past Surgical History:   Procedure Laterality Date     AS TRANSPLANT,PREP CADAVER RENAL GRAFT Right 04/2009     IR FINE NEEDLE ASPIRATION W ULTRASOUND  10/15/2019     PERCUTANEOUS BIOPSY KIDNEY Right 12/23/2019    Procedure: Right Kidney Biopsy;  Surgeon: Josiah Ponce MD;  Location: [facility]     TRANSPLANT         MEDICATIONS:      cycloSPORINE modified  50 mg Oral BID     diltiazem ER COATED BEADS  180 mg Oral Daily     furosemide  40 mg Intravenous BID     gabapentin  300-600 mg Oral At Bedtime     mycophenolate  500 mg Oral BID     sodium chloride (PF)  3 mL Intracatheter Q8H     Warfarin Therapy Reminder  1 each Oral See Admin Instructions     warfarin-No DOSE today  1 each Does not apply no dose today (warfarin)       ALLERGIES:    Allergies as of 01/08/2023     (No Known Allergies)       FH:    Family History   Problem Relation Age of Onset     Emphysema Father        SH:    Social History     Socioeconomic History     Marital status: Single     Spouse  "name: Not on file     Number of children: Not on file     Years of education: Not on file     Highest education level: Not on file   Occupational History     Not on file   Tobacco Use     Smoking status: Never     Smokeless tobacco: Never   Substance and Sexual Activity     Alcohol use: Yes     Comment: 12  oz. beer/week     Drug use: Never     Sexual activity: Not Currently   Other Topics Concern     Not on file   Social History Narrative     Not on file     Social Determinants of Health     Financial Resource Strain: Not on file   Food Insecurity: Not on file   Transportation Needs: Not on file   Physical Activity: Not on file   Stress: Not on file   Social Connections: Not on file   Intimate Partner Violence: Not on file   Housing Stability: Not on file       PHYSICAL EXAM:    BP (!) 137/98 (BP Location: Right arm)   Pulse 85   Temp 98.2  F (36.8  C) (Oral)   Resp 12   Ht 1.778 m (5' 10\")   Wt 72.6 kg (160 lb)   SpO2 100%   BMI 22.96 kg/m    GENERAL: Elderly yazmin frail.   HEENT:  Normocephalic. No gross abnormalities.  Pupils equal.  MMM.    CV: irregular, irregular  RESP: Clear bilaterally with good efforts. No wheezes or crackles.   GI: Abdomen soft, NT. ND.  MUSCULOSKELETAL: extremities nl - no gross deformities noted. No edema.   SKIN: no suspicious lesions or rashes, dry to touch  NEURO:  Generalized weakness. Answering questions. Does not know the day, year or location.   PSYCH: mood good, affect appropriate  LYMPH: No palpable ant/post cervical    LABS:      CBC RESULTS:     Recent Labs   Lab 01/09/23  0754 01/08/23  1723 01/07/23  1005   WBC 3.7* 4.1 3.2*   RBC 3.66* 3.78* 3.75*   HGB 10.5* 10.8* 10.8*   HCT 33.5* 34.7* 34.9*   * 125* 134*       BMP RESULTS:  Recent Labs   Lab 01/09/23  0754 01/08/23  1723 01/07/23  1005    142 144   POTASSIUM 3.1* 3.7 3.4   CHLORIDE 101 101 104   CO2 20* 21* 24   BUN 59.5* 55.6* 42.5*   CR 2.92* 3.04* 2.69*   * 120* 87   ELEAZAR 9.5 10.0 10.0 "       INR  Recent Labs   Lab 01/09/23  0754 01/08/23  1723   INR 4.02* 3.65*        DIAGNOSTICS:  Reviewed    CT wo contrast:  1.  Possible subtle changes cholecystitis and ultrasound is recommended for further evaluation.     2.  Mild findings of hepatomegaly and cirrhosis with associated secondary findings of mild portal venous hypertension.     3.  Left inguinal hernia which contains some fat and sigmoid colon with no evidence for inflammation or obstruction.     4.  Atrophic native left kidney, surgically absent right kidney, and there is a transplant kidney in the right hemipelvis. There are some benign cysts seen in the native left kidney and no follow-up is recommended.     5.  Nonobstructive nephrolithiasis transplant kidney.     6.  Spleen is at the upper limits of normal in size at 12.9 cm, but otherwise normal.     7.  Moderate coronary artery calcifications.     8.  1 mm x 1 mm noncalcified pulmonary nodule right lower lobe and no follow-up is recommended unless there is high risk factors for malignancy.     A/P:  82 y.o man with CKD IV and kidney transplant.     # Kidney transplant in 2009: Allograft Scr ~ 3 mg/dl. Allograft function is at baseline.   -Dr. Parkinson    # Immunosuppressions:    -CsA 50 mg bid   - mg bid    # NICM with EF of 35%: Not needing supplemental O2. No peripheral edema. Lungs are clear   -Lasix 40 mg IV bid    # Hypokalemia and acidosis.     # Hypertension:   -dilt     # Abnormal liver tests: GI consult pendig    Plan:  # Con CsA and MMF  # Check CsA trough level  # Cautious with IV Lasix    I discussed the plan with Dr. Owens in person.     Carlos Daniels MD  [facility] - Nephrology  Office Phone: [phone]  Pager: [phone]

## 2023-01-09 NOTE — H&P
[facility]    History and Physical - Hospitalist Service       Date of Admission:  1/8/2023    Assessment & Plan      Matheus White Sr. is a 82 year old male with prior history of end-stage renal disease, renal transplantation back in 2009, on chronic immunosuppression therapy, congestive heart failure, on chronic warfarin anticoagulation for longstanding history of atrial fibrillation, hypertension, nonischemic cardiomyopathy with prior EF of 40 to 45% who reportedly lives independently at home and was seen in the emergency room yesterday for increasing shortness of breath and difficulty laying flat inhibiting his ability to sleep.  He was diuresed in the emergency room and demonstrated no significant hypoxia and had some significant improvement that led for him to request for discharge and subsequently went home but tests to come back in the emergency room earlier today due to concerns of increasing shortness of breath, generalized weakness, increasingly lethargy, and fearing jaundice and concerns of decreasing oral intake.    Problem list:    #1 acute on chronic congestive heart failure with reduced EF with last known EF of 40 to 45% in exacerbation  #2 detectable troponin likely secondary to #1  #3 history of permanent A. fib on chronic anticoagulation  #4 supratherapeutic INR on warfarin    -Admit as inpatient.  At risk for clinical deterioration.  Will be needing cardiac telemetry monitoring  -Detectable troponin and will be rechecked.  Currently anticoagulated with supratherapeutic INR on warfarin  -Warfarin protocol as per pharmacy service.  Will defer dosing to pharmacy service  -Check echocardiogram  -Received IV Lasix earlier a 60 mg  -Strict intake and output, daily weights, cardiac diet  -Please utilize same weighing scale  -I will request for formal cardiology evaluation.  An established cardiology patient in the past  -Will await further recommendations    #5 history of  kidney transplantation, CKD  -Formal nephrology input, will also await further input and recommendations regarding optimization of his active diuresis  -On immunosuppression    #6 abnormal LFTs, hyperbilirubinemia  -Denies any abdominal pain, abdominal ultrasound showed no evidence of cholecystitis, gallbladder wall thickening  -Monitor and follow repeat comprehensive metabolic panel  -CT scan earlier showed evidence of portal hypertension, hepatomegaly and appearance of liver cirrhosis  -No prior history of portal hypertension  -We will ask for [facility] input    #7 physical deconditioning  -Reportedly lives independently  -We will request for PT, OT evaluation social service input for discharge planning disposition         Diet:  Cardiac  DVT Prophylaxis: Warfarin  Olvera Catheter: Not present  Lines: None     Cardiac Monitoring: None  Code Status:  full    Clinically Significant Risk Factors Present on Admission             # Anion Gap Metabolic Acidosis: Highest Anion Gap = 20 mmol/L in last 2 days, will monitor and treat as appropriate   # Drug Induced Coagulation Defect: home medication list includes an anticoagulant medication  # Thrombocytopenia: Lowest platelets = 125 in last 2 days, will monitor for bleeding                Disposition Plan      Expected Discharge Date: Anticipating he will be requiring at least 2-3 patient       Al Balta Pak MD, MD  Hospitalist Service  [facility]  Securely message with e[employer] (more info)  Text page via [employer] Paging/Directory     ______________________________________________________________________    Chief Complaint   Increasing shortness of breath, mental status changes, concerns for appearing jaundice    History is obtained from the patient  Discussion with emergency room service, review of extensive medical records    History of Present Illness   Matheus AALIYAH White Sr. is a 82 year old male with prior history of end-stage  renal disease, renal transplantation back in 2009, on chronic immunosuppression therapy, congestive heart failure, on chronic warfarin anticoagulation for longstanding history of atrial fibrillation, hypertension, nonischemic cardiomyopathy with prior EF of 40 to 45% who reportedly lives independently at home and was seen in the emergency room yesterday for increasing shortness of breath and difficulty laying flat inhibiting his ability to sleep.  He was diuresed in the emergency room and demonstrated no significant hypoxia and had some significant improvement that led for him to request for discharge and subsequently went home but tests to come back in the emergency room earlier today due to concerns of increasing shortness of breath, generalized weakness, increasingly lethargy, and fearing jaundice and concerns of decreasing oral intake.  There were no reports of any fevers, chills, coughing spells, nausea, vomiting, bleeding tendencies, actual fall event, focal weakness, palpitations, severe headaches, blurred vision.  During his reevaluation in the emergency room he demonstrated stable blood pressure levels at the hypertensive side, not tachycardic, not hypoxic and afebrile.  He continues to deny any ongoing chest pain during that time, EKG showed prior known left axis deviation and left bundle branch block, proBNP levels were increasing together with his troponin I levels.  His chest x-ray showing mild pulmonary vascular congestion, CT of the head did not show any acute findings but revealed prior old lacunar infarct at the left thalamus.  Further lab findings did show stable hemoglobin levels, thrombocytopenia, no significant leukocytosis, with electrolyte levels showing potassium at acceptable levels.  Increasing azotemia with baseline creatinine levels.  However LFTs were elevated compared to 6 months ago with bilirubinemia at 2.0.  He subsequently underwent CT of the abdomen and pelvis minus contrast study  that showed possible subtle changes of cholecystitis and findings of hepatomegaly and cirrhosis with secondary findings of mild portal venous hypertension, upper normal range of spleen size, moderate coronary artery calcifications with 1 mm x 1 mm pulmonary nodule right lower lobe.  He is subsequent abdominal ultrasound showed no significant gallbladder wall thickening or gallstones seen.  He was provided with IV diuresis and his case was referred to us for further evaluation and care hence this hospitalization.            Past Medical History    Past Medical History:   Diagnosis Date     Atrial fibrillation, permanent (H) 04/2009     Cellulitis of left forearm 07/10/2020     CKD (chronic kidney disease)      HTN (hypertension), benign        Past Surgical History   Past Surgical History:   Procedure Laterality Date     AS TRANSPLANT,PREP CADAVER RENAL GRAFT Right 04/2009     IR FINE NEEDLE ASPIRATION W ULTRASOUND  10/15/2019     PERCUTANEOUS BIOPSY KIDNEY Right 12/23/2019    Procedure: Right Kidney Biopsy;  Surgeon: Josiah Ponce MD;  Location: [facility] OR     TRANSPLANT         Prior to Admission Medications   Prior to Admission Medications   Prescriptions Last Dose Informant Patient Reported? Taking?   Specialty Vitamins Products (VITAMINS FOR HAIR) TABS   Yes No   Sig: Take 1 tablet by mouth   cycloSPORINE modified (GENERIC EQUIVALENT) 25 MG capsule  Self Yes No   Sig: Take 50 mg by mouth 2 times daily   diltiazem ER COATED BEADS (CARDIZEM CD/CARTIA XT) 180 MG 24 hr capsule  Self Yes No   Sig: Take 180 mg by mouth daily   gabapentin (NEURONTIN) 300 MG capsule  Self Yes No   Sig: Take 300 mg by mouth daily   mycophenolate (GENERIC EQUIVALENT) 500 MG tablet  Self Yes No   Sig: Take 500 mg by mouth 2 times daily   order for DME   No No   Sig: Equipment being ordered: Cane ()  Treatment Diagnosis: Gait Instability   Patient not taking: Reported on 4/19/2021   potassium chloride ER (KLOR-CON M) 20 MEQ CR tablet    Yes Yes   Sig: Take 20 mEq by mouth 2 times daily   torsemide (DEMADEX) 20 MG tablet   Yes No   Sig: Take 2 tablets (40 mg) by mouth 2 times daily   valACYclovir (VALTREX) 1000 mg tablet   No No   Sig: Take 1 tablet (1,000 mg) by mouth 3 times daily for 7 days   warfarin ANTICOAGULANT (COUMADIN) 3 MG tablet  Self Yes No   Sig: Take 3 mg by mouth daily      Facility-Administered Medications: None        Review of Systems    The 10 point Review of Systems is negative other than noted in the HPI or here.     Social History   I have reviewed this patient's social history and updated it with pertinent information if needed.  Social History     Tobacco Use     Smoking status: Never     Smokeless tobacco: Never   Substance Use Topics     Alcohol use: Yes     Comment: 12  oz. beer/week     Drug use: Never       Family History   I have reviewed this patient's family history and updated it with pertinent information if needed.  Family History   Problem Relation Age of Onset     Emphysema Father        Allergies   No Known Allergies     Physical Exam   Vital Signs: Temp: 98.4  F (36.9  C) Temp src: Oral BP: (!) 167/105 Pulse: 85   Resp: 18 SpO2: 96 % O2 Device: None (Room air)    Weight: 0 lbs 0 oz    HEENT; Atraumatic, normocephalic, pinkish conjuctiva, pupils bilateral reactive   Skin: warm and moist, no rashes  Lungs: equal chest expansion, clear to auscultation, no wheezes, no stridor, no crackles,   Heart: normal rate, regular irregular rhythm, no rubs or gallops.   Abdomen: normal bowel sounds, no tenderness, no peritoneal signs, no guarding  Extremities: no deformities, bilateral lower extremity nonpitting edema   Neuro; follow commands, alert and oriented x3, spontaneous speech, coherent, moves all extremities spontaneously  Psych; no hallucination, euthymic mood, not agitated        Medical Decision Making       30 MINUTES SPENT BY ME on the date of service doing chart review, history, exam, documentation & further  activities per the note.  MANAGEMENT DISCUSSED with the following over the past 24 hours: Yes   NOTE(S)/MEDICAL RECORDS REVIEWED over the past 24 hours: Yes  Tests ORDERED & REVIEWED in the past 24 hours:  Tests personally interpreted in the past 24 hours:      Data     I have personally reviewed the following data over the past 24 hrs:    4.1  \   10.8 (L)   / 125 (L)     142 101 55.6 (H) /  120 (H)   3.7 21 (L) 3.04 (H) \       ALT: 78 (H) AST: 208 (H) AP: 197 (H) TBILI: 2.0 (H)   ALB: 4.5 TOT PROTEIN: 7.2 LIPASE: N/A       Trop: 143 (HH) BNP: 47,003 (H)       Procal: N/A CRP: N/A Lactic Acid: 1.8       INR:  3.65 (H) PTT:  N/A   D-dimer:  N/A Fibrinogen:  N/A       Imaging results reviewed over the past 24 hrs:   Recent Results (from the past 24 hour(s))   CT Head w/o Contrast    Narrative    EXAM: CT HEAD W/O CONTRAST  LOCATION: [facility]  DATE/TIME: 1/8/2023 6:09 PM    INDICATION: confusion  COMPARISON: 06/16/2022.  TECHNIQUE: Routine CT Head without IV contrast. Multiplanar reformats. Dose reduction techniques were used.    FINDINGS:  INTRACRANIAL CONTENTS: No intracranial hemorrhage, extraaxial collection, or mass effect.  No CT evidence of acute infarct. There is scattered diffuse low attenuation within the periventricular and subcortical white matter consistent with diffuse small   vessel ischemic disease. There is an old lacunar infarct left thalamus. The ventricular system, basal cisterns and the cortical sulci are consistent with diffuse volume loss.     VISUALIZED ORBITS/SINUSES/MASTOIDS: No intraorbital abnormality. No paranasal sinus mucosal disease. No middle ear or mastoid effusion.    BONES/SOFT TISSUES: No acute abnormality.      Impression    IMPRESSION:  1.  No CT finding of a mass, hemorrhage or focal area suggestive of infarct.  2.  Diffuse age related changes along with old lacunar infarct left thalamus.   XR Chest 2 Views    Narrative    EXAM: XR CHEST 2  VIEWS  LOCATION: [facility]  DATE/TIME: 1/8/2023 6:21 PM    INDICATION: sob  COMPARISON: 1/7/2023      Impression    IMPRESSION: Heart size is prominent. This is unchanged. Mild pulmonary vascular congestion. No pleural effusion or pneumothorax.   CT Abdomen Pelvis w/o Contrast    Narrative    EXAM: CT ABDOMEN PELVIS W/O CONTRAST  LOCATION: [facility]  DATE/TIME: 1/8/2023 7:50 PM    INDICATION: Abdominal pain, LFT up, kidney dz.  COMPARISON: None.  TECHNIQUE: CT scan of the abdomen and pelvis was performed without IV contrast. Multiplanar reformats were obtained. Dose reduction techniques were used.  CONTRAST: None.    FINDINGS:   LOWER CHEST: Mild mosaic perfusion which is nonspecific, but most typical for air trapping secondary to small airway inflammation. 1.5 x 1.5 mm noncalcified pulmonary nodule in the left lower lobe. Mild cardiomegaly. Moderate coronary artery   calcifications.    HEPATOBILIARY: There is some slight stranding seen in the fat surrounding the gallbladder and I cannot exclude subtle findings of acute cholecystitis. If this is of concern clinically, ultrasound is recommended of the gallbladder for further evaluation.   There are changes most typical for slight cirrhotic configuration of the liver with associated mild hepatomegaly. There are findings most typical for some portal venous hypertension with slight varicosities and the spleen is at the upper limits of normal   in size at 12.9 cm.    PANCREAS: Mildly atrophic, but otherwise normal.    SPLEEN: Upper limits of normal in size at 12.9 cm.    ADRENAL GLANDS: Normal.    KIDNEYS/BLADDER: Significant atrophy is seen of the left kidney with some scattered benign cysts seen in the left kidney, some of which show some benign associated calcifications. There is a transplant kidney seen in the right hemipelvis with two tiny   nonobstructive stones and the largest measures approximately 4 mm in greatest  dimension. No complications are seen of the transplanted kidney. The native right kidney is surgically absent. The bladder is normal.    BOWEL: There is a small to moderate-sized left inguinal hernia which contains some fat and a portion of the sigmoid colon with no evidence for obstruction or inflammation, however. Mild findings of diverticulosis with no evidence for diverticulitis. The   bowel is otherwise normal.    LYMPH NODES: Normal.    VASCULATURE: Advanced calcification with no aneurysmal dilatation.    PELVIC ORGANS: Mild to moderate calcification. Prostatic gland enlargement.    MUSCULOSKELETAL: Advanced multilevel degenerative changes of the lumbar spine with associated degenerative disc disease.      Impression    IMPRESSION:   1.  Possible subtle changes cholecystitis and ultrasound is recommended for further evaluation.    2.  Mild findings of hepatomegaly and cirrhosis with associated secondary findings of mild portal venous hypertension.    3.  Left inguinal hernia which contains some fat and sigmoid colon with no evidence for inflammation or obstruction.    4.  Atrophic native left kidney, surgically absent right kidney, and there is a transplant kidney in the right hemipelvis. There are some benign cysts seen in the native left kidney and no follow-up is recommended.    5.  Nonobstructive nephrolithiasis transplant kidney.    6.  Spleen is at the upper limits of normal in size at 12.9 cm, but otherwise normal.    7.  Moderate coronary artery calcifications.    8.  1 mm x 1 mm noncalcified pulmonary nodule right lower lobe and no follow-up is recommended unless there is high risk factors for malignancy.     Abdomen US, limited (RUQ only)    Narrative    EXAM: US ABDOMEN LIMITED  LOCATION: [facility]  DATE/TIME: 1/8/2023 9:44 PM    INDICATION: abnm LFT  COMPARISON: CT dated 1/8/2023  TECHNIQUE: Limited abdominal ultrasound.    FINDINGS:    GALLBLADDER: Normal. No gallstones,  wall thickening, or pericholecystic fluid. Negative sonographic Gustafson's sign.    BILE DUCTS: No biliary dilatation. The common duct measures 7.6 mm.    LIVER: Normal parenchyma with smooth contour. No focal mass.    RIGHT KIDNEY: Surgically absent    PANCREAS: The visualized portions are normal.    No ascites.      Impression    IMPRESSION:  1.  No significant gallbladder wall thickening, or gallstones identified.

## 2023-01-09 NOTE — PROGRESS NOTES
I saw and evaluated the patient.  I discussed the case with Dr. Owens.  No new recs  Full consult to follow.

## 2023-01-10 NOTE — PROGRESS NOTES
Pt is A & O to self, disoriented to place, time, up with SBA, on room air, sinus tachy on telemetry, a-fib on warfarin, cardiology consulted, hydralazin tid, needs frequent redirection, PIV saline locked.

## 2023-01-10 NOTE — PROGRESS NOTES
[facility]  Hospitalist Progress Note  Sarmad Owens MD 01/10/2023    Reason for Stay (Diagnosis): Acute hypoxic respiratory failure.         Assessment and Plan:      Summary of Stay: Matheus White Sr. is a 82 year old male with PMH of ESRD, renal transplantation back in 2009, on chronic immunosuppression therapy, congestive heart failure, Afib on warfarin, HTN, nonischemic cardiomyopathy with prior EF of 40 to 45%, who lives independently at home. He was seen in the emergency room a day earlier for shortness of breath and difficulty laying flat inhibiting his ability to sleep.  He was diuresed in the emergency room and demonstrated no significant hypoxia and had some significant improvement that led for him to request for discharge and subsequently went home.   He then came back to the emergency room due to concerns of increasing shortness of breath, generalized weakness, increasingly lethargy, and possible yellowish discoloration of the skin and concerns of decreasing oral intake and admitted to [facility] on 1/8/2023.     Problem list:     #1  Acute on chronic systolic CHF.  #2  Elevated Troponin likely secondary to #1, type II MI.  -Continue gentle diuresis, will transition to p.o. starting tomorrow.  -Patient is euvolemic or may be on the dry side today.  -Has decreased oral intake per daughter at bedside.  -Continue telemetry monitoring.  -Daily weight, input and output, cardiac diet.  -Echocardiogram showed EF of 35%, +2 tricuspid regurg, +2 aortic regurg there is moderate global hypokinesia of left ventricle.  -The latest Echo seems to be worsening when compared to prior study.  -Cardiology consulted, input appreciated.  -Patient is already on full anticoagulation.  -Cardiologist stopped Cardizem, started on low-dose Coreg to titrated up as tolerated.    #3  Permanent A. fib on chronic anticoagulation  #4  Ssupratherapeutic INR on warfarin, goal 2-3  -Patient is on anticoagulation for  permanent A. Fib.  -INR is supra therapeutic.  -Pharmacy will dose warfarin as appropriate.  -Cardizem stopped due to decreased LV function, started on Coreg.     #5.  History of renal transplant, CKD stage IV.  #6.  Mild hypokalemia  -Patient is on immunosuppressive treatment.  -Continue gentle diuretics.  -Gently replace potassium, 40 mEq p.o. x1 ordered.  -Nephrology is consulted, input is appreciated, discussed the case personally with Nephrologist..  -Check potassium in a.m.    #7. elevated liver enzyme with indirect hyperbilirubinemia.  #8 Suspect underlying chronic liver disease with portal hypertension  -This possibly due to CHF exacerbation .  -Imaging also suggestive of chronic liver disease with portal hypertension.  -No ascites seen.  -No sign of acute cholecystitis or biliary obstruction.  -No history of heavy alcohol use.  -GI is consulted, input appreciated.  -LFT, bilirubin trending down, hepatitis serology negative.  -PRINCE, F-actin, AMA, A1 AT, celiac pending.  -Cyclosporine level is pending.  -Right upper quadrant ultrasound negative.  -No evidence of hepatic vessels thrombosis or abnormality on color Doppler     #9. Physical deconditioning  -Reportedly lives independently  -We will request for PT, OT evaluation social service input for discharge planning disposition.    #10. Metabolic encephalopathy.  -Patient was somewhat confused overnight, tried to leave the floor, at 1 point code 21 was called.  -Patient was given anxiolytic medications and remained calm.  -He has a sitter at bedside.  -Needs close monitoring.  -Continue lactulose, ammonia level is normal.    #11: Pancytopenia:   -Patient with mild leukopenia, thrombocytopenia and also anemia.  -CBC daily.  -Continue close monitoring    Clinically Significant Risk Factors        # Hypokalemia: Lowest K = 2.9 mmol/L in last 2 days, will replace as needed      # Anion Gap Metabolic Acidosis: Highest Anion Gap = 20 mmol/L in last 2 days, will  "monitor and treat as appropriate    # Thrombocytopenia: Lowest platelets = 115 in last 2 days, will monitor for bleeding                 DVT Prophylaxis: Warfarin, will be dosed per pharmacy  Code Status: DNR / DNI.    Discharge Dispo: Home.  Estimated Disch Date / # of Days until Disch: 2-3 days, pending improvement and further work up.  I discussed with patient's daughter the plan of care, all her question and concerns addressed.        Interval History (Subjective):      Patient seen and examined, overnight episode of agitation and trying to leave floor and code 21 was called .  Currently patient is calm and resting but is slightly seems to be slow to respond to questions.  Slightly that he did not sleep well last night.  His daughter at bedside, patient has a sitter at bedside, no pain, tolerating diet, no cough, fever or SOB, not on oxygen                  Physical Exam:      Last Vital Signs:  BP (!) 159/85 (BP Location: Right arm)   Pulse 74   Temp 97.7  F (36.5  C) (Oral)   Resp 20   Ht 1.778 m (5' 10\")   Wt 72.6 kg (160 lb)   SpO2 95%   BMI 22.96 kg/m      I/O last 3 completed shifts:  In: 350 [P.O.:350]  Out: 600 [Urine:600]  Vitals:    01/09/23 1100   Weight: 72.6 kg (160 lb)     Current Facility-Administered Medications   Medication     acetaminophen (TYLENOL) tablet 650 mg    Or     acetaminophen (TYLENOL) Suppository 650 mg     carvedilol (COREG) tablet 6.25 mg     cycloSPORINE modified (GENERIC EQUIVALENT) capsule 50 mg     gabapentin (NEURONTIN) capsule 200 mg     haloperidol (HALDOL) tablet 2 mg    Or     haloperidol lactate (HALDOL) injection 2 mg     hydrALAZINE (APRESOLINE) half-tab 12.5 mg     lactulose (CHRONULAC) solution 10 g     lidocaine (LMX4) cream     lidocaine 1 % 0.1-1 mL     melatonin tablet 1 mg     mycophenolate (GENERIC EQUIVALENT) tablet 500 mg     ondansetron (ZOFRAN ODT) ODT tab 4 mg    Or     ondansetron (ZOFRAN) injection 4 mg     Patient is already receiving " anticoagulation with heparin, enoxaparin (LOVENOX), warfarin (COUMADIN)  or other anticoagulant medication     potassium chloride ER (KLOR-CON M) CR tablet 40 mEq     risperiDONE (risperDAL) tablet 0.25 mg     senna-docusate (SENOKOT-S/PERICOLACE) 8.6-50 MG per tablet 1 tablet    Or     senna-docusate (SENOKOT-S/PERICOLACE) 8.6-50 MG per tablet 2 tablet     sodium chloride (PF) 0.9% PF flush 3 mL     sodium chloride (PF) 0.9% PF flush 3 mL     Warfarin Dose Required Daily - Pharmacist Managed     Current Outpatient Medications   Medication     calcitRIOL (ROCALTROL) 0.25 MCG capsule     colchicine (COLCYRS) 0.6 MG tablet     cycloSPORINE modified (GENERIC EQUIVALENT) 25 MG capsule     diltiazem ER COATED BEADS (CARDIZEM CD/CARTIA XT) 180 MG 24 hr capsule     gabapentin (NEURONTIN) 300 MG capsule     metolazone (ZAROXOLYN) 2.5 MG tablet     multivitamin w/minerals (THERA-VIT-M) tablet     mycophenolate (GENERIC EQUIVALENT) 500 MG tablet     potassium chloride ER (K-TAB) 20 MEQ CR tablet     torsemide (DEMADEX) 20 MG tablet     torsemide (DEMADEX) 20 MG tablet     warfarin ANTICOAGULANT (COUMADIN) 3 MG tablet       Constitutional: Awake, alert, cooperative, no apparent distress   Respiratory: Clear to auscultation bilaterally, no crackles or wheezing   Cardiovascular: Regular rate and rhythm, normal S1 and S2, and no murmur noted   Abdomen: Normal bowel sounds, soft, non-distended, non-tender   Skin: No rashes, no cyanosis, dry to touch   Neuro: Alert and oriented x3, no weakness, numbness, memory loss   Extremities: trace edema, normal range of motion   Other(s):HEENT Pink, icteric, dry oral mucosa.       All other systems: Negative.          Medications:      All current medications were reviewed with changes reflected in problem list.         Data:      All new lab and imaging data was reviewed.   Labs:  Recent Labs   Lab 01/09/23  0754 01/08/23  1723 01/07/23  1005   WBC 3.7* 4.1 3.2*   HGB 10.5* 10.8* 10.8*   HCT  33.5* 34.7* 34.9*   MCV 92 92 93   * 125* 134*     Recent Labs   Lab 01/10/23  1013 01/09/23  0754 01/08/23  1723    140 142   POTASSIUM 2.9* 3.1* 3.7   CHLORIDE 102 101 101   CO2 26 20* 21*   ANIONGAP 16* 19* 20*   * 100* 120*   BUN 64.6* 59.5* 55.6*   CR 2.79* 2.92* 3.04*   GFRESTIMATED 22* 21* 20*   ELEAZAR 9.8 9.5 10.0   PROTTOTAL 6.8  --  7.2   ALBUMIN 4.0  --  4.5   BILITOTAL 1.8*  --  2.0*   ALKPHOS 173*  --  197*   AST 99*  --  208*   ALT 71*  --  78*     Recent Labs   Lab 01/10/23  1013 01/09/23  0754 01/08/23  1723 01/07/23  1005   * 100* 120* 87      Imaging:   Results for orders placed or performed during the hospital encounter of 01/08/23   CT Head w/o Contrast    Narrative    EXAM: CT HEAD W/O CONTRAST  LOCATION: [facility]  DATE/TIME: 1/8/2023 6:09 PM    INDICATION: confusion  COMPARISON: 06/16/2022.  TECHNIQUE: Routine CT Head without IV contrast. Multiplanar reformats. Dose reduction techniques were used.    FINDINGS:  INTRACRANIAL CONTENTS: No intracranial hemorrhage, extraaxial collection, or mass effect.  No CT evidence of acute infarct. There is scattered diffuse low attenuation within the periventricular and subcortical white matter consistent with diffuse small   vessel ischemic disease. There is an old lacunar infarct left thalamus. The ventricular system, basal cisterns and the cortical sulci are consistent with diffuse volume loss.     VISUALIZED ORBITS/SINUSES/MASTOIDS: No intraorbital abnormality. No paranasal sinus mucosal disease. No middle ear or mastoid effusion.    BONES/SOFT TISSUES: No acute abnormality.      Impression    IMPRESSION:  1.  No CT finding of a mass, hemorrhage or focal area suggestive of infarct.  2.  Diffuse age related changes along with old lacunar infarct left thalamus.   XR Chest 2 Views    Narrative    EXAM: XR CHEST 2 VIEWS  LOCATION: [facility]  DATE/TIME: 1/8/2023 6:21 PM    INDICATION:  sob  COMPARISON: 1/7/2023      Impression    IMPRESSION: Heart size is prominent. This is unchanged. Mild pulmonary vascular congestion. No pleural effusion or pneumothorax.   CT Abdomen Pelvis w/o Contrast    Narrative    EXAM: CT ABDOMEN PELVIS W/O CONTRAST  LOCATION: [facility]  DATE/TIME: 1/8/2023 7:50 PM    INDICATION: Abdominal pain, LFT up, kidney dz.  COMPARISON: None.  TECHNIQUE: CT scan of the abdomen and pelvis was performed without IV contrast. Multiplanar reformats were obtained. Dose reduction techniques were used.  CONTRAST: None.    FINDINGS:   LOWER CHEST: Mild mosaic perfusion which is nonspecific, but most typical for air trapping secondary to small airway inflammation. 1.5 x 1.5 mm noncalcified pulmonary nodule in the left lower lobe. Mild cardiomegaly. Moderate coronary artery   calcifications.    HEPATOBILIARY: There is some slight stranding seen in the fat surrounding the gallbladder and I cannot exclude subtle findings of acute cholecystitis. If this is of concern clinically, ultrasound is recommended of the gallbladder for further evaluation.   There are changes most typical for slight cirrhotic configuration of the liver with associated mild hepatomegaly. There are findings most typical for some portal venous hypertension with slight varicosities and the spleen is at the upper limits of normal   in size at 12.9 cm.    PANCREAS: Mildly atrophic, but otherwise normal.    SPLEEN: Upper limits of normal in size at 12.9 cm.    ADRENAL GLANDS: Normal.    KIDNEYS/BLADDER: Significant atrophy is seen of the left kidney with some scattered benign cysts seen in the left kidney, some of which show some benign associated calcifications. There is a transplant kidney seen in the right hemipelvis with two tiny   nonobstructive stones and the largest measures approximately 4 mm in greatest dimension. No complications are seen of the transplanted kidney. The native right kidney is  surgically absent. The bladder is normal.    BOWEL: There is a small to moderate-sized left inguinal hernia which contains some fat and a portion of the sigmoid colon with no evidence for obstruction or inflammation, however. Mild findings of diverticulosis with no evidence for diverticulitis. The   bowel is otherwise normal.    LYMPH NODES: Normal.    VASCULATURE: Advanced calcification with no aneurysmal dilatation.    PELVIC ORGANS: Mild to moderate calcification. Prostatic gland enlargement.    MUSCULOSKELETAL: Advanced multilevel degenerative changes of the lumbar spine with associated degenerative disc disease.      Impression    IMPRESSION:   1.  Possible subtle changes cholecystitis and ultrasound is recommended for further evaluation.    2.  Mild findings of hepatomegaly and cirrhosis with associated secondary findings of mild portal venous hypertension.    3.  Left inguinal hernia which contains some fat and sigmoid colon with no evidence for inflammation or obstruction.    4.  Atrophic native left kidney, surgically absent right kidney, and there is a transplant kidney in the right hemipelvis. There are some benign cysts seen in the native left kidney and no follow-up is recommended.    5.  Nonobstructive nephrolithiasis transplant kidney.    6.  Spleen is at the upper limits of normal in size at 12.9 cm, but otherwise normal.    7.  Moderate coronary artery calcifications.    8.  1 mm x 1 mm noncalcified pulmonary nodule right lower lobe and no follow-up is recommended unless there is high risk factors for malignancy.     Abdomen US, limited (RUQ only)    Narrative    EXAM: US ABDOMEN LIMITED  LOCATION: [facility]  DATE/TIME: 1/8/2023 9:44 PM    INDICATION: abnm LFT  COMPARISON: CT dated 1/8/2023  TECHNIQUE: Limited abdominal ultrasound.    FINDINGS:    GALLBLADDER: Normal. No gallstones, wall thickening, or pericholecystic fluid. Negative sonographic Gustafson's sign.    BILE DUCTS:  No biliary dilatation. The common duct measures 7.6 mm.    LIVER: Normal parenchyma with smooth contour. No focal mass.    RIGHT KIDNEY: Surgically absent    PANCREAS: The visualized portions are normal.    No ascites.      Impression    IMPRESSION:  1.  No significant gallbladder wall thickening, or gallstones identified.       Echocardiogram Complete     Value    LVEF  35%    [facility]    [id number]  [id number]  [id number]  [id number]^ASHLEY^AL^AJ     [facility]  Echocardiography Laboratory  [address] [place] [address]     Name: SR BRAD MOHAMUD SR.  MRN: [id number]  : 1940  Study Date: 2023 08:10 AM  Age: 82 yrs  Gender: Male  Patient Location: [facility]  Reason For Study: CHF  Ordering Physician: CORINNE RAMIREZ  Performed By: Tye Franz RDCS     BSA: 1.9 m2  Height: 70 in  Weight: 160 lb  HR: 80  BP: 157/90 mmHg  ______________________________________________________________________________  Procedure  Complete Portable Echo Adult.  ______________________________________________________________________________  Interpretation Summary     1. The left ventricle is normal in size. The visual ejection fraction is  estimated at 35%. There is moderate global hypokinesia of the left ventricle.  2. The right ventricle is normal in structure, function and size.  3. There is moderate (2+) tricuspid regurgitation.  4. Mild (35-45mmHg) pulmonary hypertension is present. The right ventricular  systolic pressure is approximated at 37mmHg plus the right atrial pressure.  5. There is moderate (2+) aortic regurgitation.  6. The ascending aorta is Mildly dilated. 4.0cm.     Echo 2019 showed EF 40-45%, 1+ MR/AI, 2+ TR, aorta 4.1cm.  ______________________________________________________________________________  Left Ventricle  The left ventricle is normal in size. There is mild concentric left  ventricular hypertrophy. The visual ejection fraction is estimated at 35%.  Left  ventricular diastolic function is indeterminate. There is moderate global  hypokinesia of the left ventricle.     Right Ventricle  The right ventricle is normal in structure, function and size.     Atria  The left atrium is moderately dilated. The right atrium is mild to moderately  dilated. There is no atrial shunt seen.     Mitral Valve  There is moderate mitral annular calcification. The mitral valve leaflets  appear thickened, but open well. There is moderate (2+) mitral regurgitation.     Tricuspid Valve  There is moderate (2+) tricuspid regurgitation. Mild (35-45mmHg) pulmonary  hypertension is present. The right ventricular systolic pressure is  approximated at 37mmHg plus the right atrial pressure.     Aortic Valve  There is moderate trileaflet aortic sclerosis. There is moderate (2+) aortic  regurgitation. No aortic stenosis is present.     Pulmonic Valve  The pulmonic valve is normal in structure and function.     Vessels  The ascending aorta is Mildly dilated. Dilation of the inferior vena cava is  present with abnormal respiratory variation in diameter.     Pericardium  There is no pericardial effusion.     Rhythm  Rhythm uncertain, wide QRS.  ______________________________________________________________________________  MMode/2D Measurements & Calculations     IVSd: 1.5 cm  LVIDd: 5.4 cm  LVIDs: 4.3 cm  LVPWd: 1.3 cm  FS: 20.4 %  LV mass(C)d: 326.7 grams  LV mass(C)dI: 172.1 grams/m2  Ao root diam: 3.7 cm  LA dimension: 5.6 cm  LA/Ao: 1.5  LVOT diam: 2.1 cm  LVOT area: 3.5 cm2  LA Volume (BP): 94.9 ml  LA Volume Index (BP): 49.9 ml/m2  RWT: 0.49     Doppler Measurements & Calculations  MV E max reba: 114.0 cm/sec  MV A max reba: 34.6 cm/sec  MV E/A: 3.3  MV dec time: 0.12 sec  Ao V2 max: 168.0 cm/sec  Ao max P.0 mmHg  Ao V2 mean: 124.0 cm/sec  Ao mean P.0 mmHg  Ao V2 VTI: 33.6 cm  MARCOS(I,D): 1.8 cm2  MARCOS(V,D): 1.8 cm2  AI P1/2t: 357.4 msec  LV V1 max PG: 3.1 mmHg  LV V1 max: 88.5 cm/sec  LV V1  VTI: 17.5 cm  SV(LVOT): 60.6 ml  SI(LVOT): 31.9 ml/m2  TR max jonny: 304.0 cm/sec  TR max P.0 mmHg  AV Jonny Ratio (DI): 0.53  MARCOS Index (cm2/m2): 0.95  E/E' av.9  Lateral E/e': 13.5  Medial E/e': 24.4     ______________________________________________________________________________  Report approved by: Winsome Jones 2023 09:22 AM

## 2023-01-10 NOTE — PROGRESS NOTES
Notified attending MD regarding pt's behavior change: Pt more confused. Tried to leave the floor. Initiated Code 21. No PRN medication available to calm him down. Would you order some PRNs? Thank you

## 2023-01-10 NOTE — ED NOTES
"Found patient wandering the [place] with his jacket on. Attempted to redirect without success. Attempted to redirect patient back into his room without success. Patient stating that \"I will not do that... I will pay you to left me go.\" Attempted to redirect with assist from ED doctor without success. Patient attempting to elope. Code 21 called. Patient continue to attempt to elope, unable to redirect without success. Attempted to get patient to sit in a chair and calm down without success. With the assist of coworkers able to get patient into bed and place in soft medical restraint due to patient altered mental status and elopement attempt.     MD updated. Patient continue to be altered and attempting to elope despite restraints.   "

## 2023-01-10 NOTE — PROGRESS NOTES
"GASTROENTEROLOGY PROGRESS NOTE     SUBJECTIVE:  Step daughter at bedside today. Notes some confusion. Has happened in the past with prior hospitalizations. Oriented to self and place. Denies abdominal pain. Unclear last BM.      OBJECTIVE:  BP (!) 159/85 (BP Location: Right arm)   Pulse 74   Temp 97.7  F (36.5  C) (Oral)   Resp 20   Ht 1.778 m (5' 10\")   Wt 72.6 kg (160 lb)   SpO2 95%   BMI 22.96 kg/m    Temp (24hrs), Av  F (36.7  C), Min:97.7  F (36.5  C), Max:98.2  F (36.8  C)    Patient Vitals for the past 72 hrs:   Weight   23 1100 72.6 kg (160 lb)       Intake/Output Summary (Last 24 hours) at 1/10/2023 1150  Last data filed at 2023 1600  Gross per 24 hour   Intake 150 ml   Output 300 ml   Net -150 ml        PHYSICAL EXAM  Gen: alert, oriented to place and self, NAD  Abd: soft, NT/ND, +BS  Neuro: no asterixis.      Additional Comments:  ROS, FH, SH: See initial GI consult for details.     I have reviewed the patient's new clinical lab results:     Recent Labs   Lab Test 01/10/23  0739 23  0754 23  1723 23  1005   WBC  --  3.7* 4.1 3.2*   HGB  --  10.5* 10.8* 10.8*   MCV  --  92 92 93   PLT  --  115* 125* 134*   INR 3.46* 4.02* 3.65*  --      Recent Labs   Lab Test 01/10/23  1013 23  0754 23  1723   POTASSIUM 2.9* 3.1* 3.7   CHLORIDE 102 101 101   CO2 26 20* 21*   BUN 64.6* 59.5* 55.6*   ANIONGAP 16* 19* 20*     Recent Labs   Lab Test 01/10/23  1013 23  2315 23  1723 06/10/22  1047 20  2143 19  0621 10/16/19  1204   ALBUMIN 4.0  --  4.5 3.4   < >  --   --    BILITOTAL 1.8*  --  2.0* 1.1   < >  --   --    ALT 71*  --  78* 21   < >  --   --    AST 99*  --  208* 16   < >  --   --    PROTEIN  --  Negative  --   --   --  100* 30*    < > = values in this interval not displayed.        Assessment/Plan:  82 year old male with past medical history significant for end-stage renal disease, renal transplant in , chronic immunosuppression therapy, " congestive heart failure, chronic anticoagulation for atrial fibrillation with warfarin, hypertension, nonischemic cardiomyopathy with prior EF of 40 to 45% admitted January 8 with symptoms of worsening shortness of breath, generalized weakness, lethargy, possible jaundice, decreased p.o. intake with subsequent findings of elevated bilirubin, transaminases, imaging findings concerning for liver cirrhosis, and acute on chronic congestive heart failure, supratherapeutic INR.     1. Elevated liver enzymes with indirect hyperbilirubinemia in the setting of CHF exacerbation. Imaging suggestive of chronic liver disease with portal hypertension, also with low platelets this admission. This appears to be a new diagnosis according to patient. Most likely secondary to congestive hepatopathy. Cyclosporine possible cause. No signs of acute cholecystitis or biliary obstruction. He does not exhibit any RUQ pain that would support obstruction and bilirubin is mainly indirect elevation. No history of alcohol use/abuse.     LFTs/bili/INR trending down. Hepatitis A/B/C serology negative. Ferritin normal. Iron levels low. TSH normal. PRINCE, Factin, AMA, A1AT, celiac pending. Cyclosporine level pending.     Today with some mild confusion. Unclear if this is related to hepatic encephalopathy. Does not have asterixis.     --Await labs.   --Start low dose lactulose 10g BID.   --Monitor stool output.   --Trend LFTs/bili/INR.   --Continue management of CHF exacerbation per cardiology/hospital teams.     Reviewed with Dr. Silva.     Kristi Lehman, PAC  [facility] ([facility])

## 2023-01-10 NOTE — PROGRESS NOTES
"[facility] Cardiology Progress Note            Interval History:   Moderately reduced left ventricular systolic function.  Renal insufficiency status post kidney transplant.  Hypertensive.  Heart failure with reduced ejection fraction.  Chronic atrial fibrillation with controlled ventricular response.  Patient has responded well to switching from diltiazem which would be a less desirable drug in patients with reduced left ventricular systolic function to Coreg.  Blood pressure is on the higher side and so we will titrate up the carvedilol to 9.375 mg twice a day and increase the hydralazine which is a good afterload reducer to 25 mg 3 times a day.  Getting the blood pressure under control will be a significant goal in this patient.  Creatinine has improved going from 1-3.05 down to 2.79 with diuresis.  The intake and output is inaccurate.              Medications:       carvedilol  9.375 mg Oral BID w/meals     cycloSPORINE modified  50 mg Oral BID     [Held by provider] gabapentin  200 mg Oral At Bedtime     hydrALAZINE  25 mg Oral Q8H HECTOR     lactulose  10 g Oral BID     mycophenolate  500 mg Oral BID     risperiDONE  0.25 mg Oral Daily     sodium chloride (PF)  3 mL Intracatheter Q8H     Warfarin Therapy Reminder  1 each Oral See Admin Instructions     warfarin-No DOSE today  1 each Does not apply no dose today (warfarin)               Physical Exam:   Blood pressure (!) 159/85, pulse 77, temperature 97.7  F (36.5  C), temperature source Oral, resp. rate 25, height 1.778 m (5' 10\"), weight 72.6 kg (160 lb), SpO2 95 %.  Rhythm: Atrial fibrillation with controlled ventricular response.   Constitutional:   fatigued and somnolent     Neck:   no jugular venous distension and no carotid bruits     Lungs:   clear to auscultation     Cardiovascular:   irregularly irregular rhythm and variable S1 and normal S2.  Systolic ejection murmur heard in the aortic area, 1/6.  Peripheral edema has significantly improved. "            Data:          Lab Results   Component Value Date     01/10/2023     01/09/2023     01/08/2023     03/16/2020     02/17/2020     02/10/2020    Lab Results   Component Value Date    CHLORIDE 102 01/10/2023    CHLORIDE 101 01/09/2023    CHLORIDE 101 01/08/2023    CHLORIDE 103 06/21/2022    CHLORIDE 104 06/10/2022    CHLORIDE 109 03/16/2020    CHLORIDE 106 02/17/2020    CHLORIDE 106 02/10/2020    Lab Results   Component Value Date    BUN 64.6 01/10/2023    BUN 59.5 01/09/2023    BUN 55.6 01/08/2023    BUN 51 06/21/2022    BUN 67 06/10/2022    BUN 34 03/16/2020    BUN 35 02/17/2020    BUN 31 02/10/2020      Lab Results   Component Value Date    POTASSIUM 2.9 01/10/2023    POTASSIUM 3.1 01/09/2023    POTASSIUM 3.7 01/08/2023    POTASSIUM 3.1 06/21/2022    POTASSIUM 3.0 06/10/2022    POTASSIUM 3.6 03/16/2020    POTASSIUM 3.5 02/17/2020    POTASSIUM 3.0 02/10/2020    Lab Results   Component Value Date    CO2 26 01/10/2023    CO2 20 01/09/2023    CO2 21 01/08/2023    CO2 29 06/21/2022    CO2 30 06/10/2022    CO2 27 03/16/2020    CO2 30 02/17/2020    CO2 30 02/10/2020    Lab Results   Component Value Date    CR 2.79 01/10/2023    CR 2.92 01/09/2023    CR 3.04 01/08/2023    CR 2.68 03/16/2020    CR 2.99 02/17/2020    CR 2.62 02/10/2020        Lab Results   Component Value Date    HGB 10.5 (L) 01/09/2023    HGB 10.8 (L) 01/08/2023    HGB 10.8 (L) 01/07/2023     No results found for: BILINEONATAL, TCBIL                Assessment and Plan:   Assessment:   Problem List    Supratherapeutic INR.  Improving.  Now 3.46.    Generalized weakness    Acute systolic congestive heart failure (H).  Appears to have improved.  The intake and output are not accurate.    Alcoholic cirrhosis of liver without ascites (H)  Atrial fibrillation with controlled ventricular response.  Heart rate is also well controlled with carvedilol.  Essential hypertension.  His blood pressure is not optimally  controlled and this is a significant goal for patients with systolic and diastolic congestive heart failure.  Status post renal transplant.  Discussed case with Dr. Carlos Daniels.  He will watch cyclosporine levels since diltiazem tends to increase the levels of cyclosporine in the bloodstream.  With stopping the diltiazem which is appropriate given his reduced left ventricular systolic function, the levels of cyclosporine may drop which could cause issues with rejection and.  However Dr. Lagos will keep an eye on that by checking levels.    * No resolved hospital problems. *       Plan:   Increase the dose of carvedilol to 9.375 mg twice a day.  Increase the dose of hydralazine to 25 mg 3 times a day.  We will keep a close eye on BUN and creatinine and we may have to back off on the intravenous diuretic and switch to oral.       Attestation:  I have reviewed today's vital signs, notes, medications, labs and imaging.  Care coordination / counseling time: 25 minutes  Total time: 35 minutes         Carlito Farrar MD, MD 1/10/2023  3:04 PM

## 2023-01-10 NOTE — PROVIDER NOTIFICATION
Md notified for prn meds if needed for overnight for agitation. Also notified if wanted on K+ protocol. Awaiting new orders.

## 2023-01-10 NOTE — PROGRESS NOTES
" Renal Medicine Progress Note            Assessment/Plan:     82 y.o man with CKD IV and kidney transplant.      # Kidney transplant in 2009: Allograft Scr ~ 3 mg/dl. Allograft function is at baseline.               -Dr. Parkinson     # Immunosuppressions:                -CsA 50 mg bid               - mg bid     # NICM with EF of 35%: Not needing supplemental O2. No peripheral edema. Lungs are clear               -Lasix 40 mg IV bid    # Pancytopenia:    -cyclosporine level pending     # Hypokalemia: Poor intake and diuretic.      # Hypertension:               -dilt-->Coreg and added hydralazine per cardiology                 # Abnormal liver tests: GI is following.    Plan:  # Hold Neurontin  # Lasix 80 po daily starting tomorrow  # Cyclosporine level is pending  # Adding MMF level  # Dilt was changed to Coreg by cardiology--?cyclosporine may be lower off dilt.     I discussed the case with Dr. Farrar and Dr Owens        Interval History:     I reviewed events of the last 24 hrs.   Patient was confused and tried to leave.   Afebrile.   Kidney function is at baseline.  Received 40 meq KCl replacement    He knows he is at the hospital but not the name or date of the week.  He converses and follow directions appropriately.         Medications and Allergies:       carvedilol  6.25 mg Oral BID w/meals     cycloSPORINE modified  50 mg Oral BID     gabapentin  200 mg Oral At Bedtime     hydrALAZINE  12.5 mg Oral Q8H HECTOR     lactulose  10 g Oral BID     mycophenolate  500 mg Oral BID     risperiDONE  0.25 mg Oral Daily     sodium chloride (PF)  3 mL Intracatheter Q8H     Warfarin Therapy Reminder  1 each Oral See Admin Instructions      No Known Allergies         Physical Exam:   Vitals were reviewed   , Blood pressure (!) 159/85, pulse 74, temperature 97.7  F (36.5  C), temperature source Oral, resp. rate 20, height 1.778 m (5' 10\"), weight 72.6 kg (160 lb), SpO2 95 %.    Wt Readings from Last 3 Encounters: "   01/09/23 72.6 kg (160 lb)   01/07/23 72.6 kg (160 lb)   04/19/21 82.6 kg (182 lb)       Intake/Output Summary (Last 24 hours) at 1/10/2023 1231  Last data filed at 1/9/2023 1600  Gross per 24 hour   Intake 150 ml   Output 300 ml   Net -150 ml       GENERAL APPEARANCE: NAD  HEENT:  Eyes/ears/nose/neck grossly normal  RESP: lungs cta b c good efforts, no crackles, rhonchi or wheezes  CV: RRR, nl S1/S2/ Soft murmur.  ABDOMEN: Soft, NT  EXTREMITIES/SKIN: no rashes/lesions on observed skin; No edema.   NEURO: Awake, alert and answering.          Data:     CBC RESULTS:     Recent Labs   Lab 01/09/23  0754 01/08/23  1723 01/07/23  1005   WBC 3.7* 4.1 3.2*   RBC 3.66* 3.78* 3.75*   HGB 10.5* 10.8* 10.8*   HCT 33.5* 34.7* 34.9*   * 125* 134*       Basic Metabolic Panel:  Recent Labs   Lab 01/10/23  1013 01/09/23  0754 01/08/23  1723 01/07/23  1005    140 142 144   POTASSIUM 2.9* 3.1* 3.7 3.4   CHLORIDE 102 101 101 104   CO2 26 20* 21* 24   BUN 64.6* 59.5* 55.6* 42.5*   CR 2.79* 2.92* 3.04* 2.69*   * 100* 120* 87   ELEAZAR 9.8 9.5 10.0 10.0       INR  Recent Labs   Lab 01/10/23  0739 01/09/23  0754 01/08/23  1723   INR 3.46* 4.02* 3.65*      Attestation:   I have reviewed today's relevant vital signs, notes, medications, labs and imaging.    Carlos Daniels MD  [facility] - Nephrology  Office phone :[phone]  Pager: [phone]

## 2023-01-10 NOTE — PLAN OF CARE
A&Ox1, reoriented to time, place, and situation. VSS on RA. SBA. Denied pain at this time. Pt on 2L fluid restriction with daily weight. Pt on coumadin for A-fib. PIV SL. 4 points restraints applied after pt trying to elope. MD was notified, PRN IV haldol given. Sitter at hallway.

## 2023-01-10 NOTE — PLAN OF CARE
"Goal Outcome Evaluation:    VSS ex elevated BP. Confused, oriented to self only. Restraints discontinued at 0845. Redirectable. Reported auditory hallucinations and attempting to leave again at 1400. IV haldol given with good results. Sitter at bedside. Good po intake today with fluid restriction followed. Daughter at bedside and able to help reorient pt throughout shift.     BP (!) 159/85 (BP Location: Right arm)   Pulse 79   Temp 98  F (36.7  C) (Oral)   Resp (!) 41   Ht 1.778 m (5' 10\")   Wt 72.6 kg (160 lb)   SpO2 95%   BMI 22.96 kg/m                          "

## 2023-01-11 NOTE — PROGRESS NOTES
[facility]  Cardiology Progress Note    Date of Service (when I saw the patient): 01/11/2023       Assessment & Plan   Matheus White Sr. is a 82 year old male who was admitted on 1/8/2023.     1.  : Acute systolic heart failure  - NT pro BNP > 47,000  - Echocardiogram showed a moderately reduced EF 35% (40-45% 2019), moderate global hypokinesia of the LV. Normal RV size and function, mild pulmonary hypertension, elevated RV systolic pressure 37 mmHg + RA pressure. Moderate aortic regurgitation.   - Diuresed 2 kg on IV lasix ( last dose 1/9), creatinine down trending 2.47    2.  : Atrial Fibrillation   3.  : Hypertension   4.  CKD stage IV s/p kidney transplant (2009)     Plan   1. Breathing improving. Euvolemic upon exam. Down ~ 2 kg on IV lasix. Diuretics per nephrology, plan to give 80 mg today.   2. EF 35%, uptitration of GDMT as able. Continue carvedilol and hydralazine.   3. A-fib rate controlled. INR therapeutic 2.73      ZAY Lacey CNP  Text Page  (M-F, 7:30 am - 4:00 pm)    Interval History   Up in chair with family at bedside ( ex-wife). Denies chest pain, shortness of breath at rest, + orthopnea, no palpitation, or leg edema. Euvolemic upon exam. Creatinine trending down. BP stable. A-fib rate controlled.     Physical Exam   Temp: 98  F (36.7  C) Temp src: Oral BP: 136/71 Pulse: 79   Resp: 18 SpO2: 94 % O2 Device: None (Room air)    Vitals:    01/09/23 1100 01/10/23 1707   Weight: 72.6 kg (160 lb) 70.1 kg (154 lb 9.6 oz)     Vital Signs with Ranges  Temp:  [97.3  F (36.3  C)-98  F (36.7  C)] 98  F (36.7  C)  Pulse:  [69-81] 79  Resp:  [18-41] 18  BP: (134-149)/(66-76) 136/71  SpO2:  [94 %-96 %] 94 %  I/O last 3 completed shifts:  In: 510 [P.O.:510]  Out: -     GEN:  In general, this is a well nourished elderly male in no acute distress.   HEENT:  Pupils equal, round. Sclerae nonicteric. Clear oropharynx. Mucous membranes moist.  NECK: Supple, no masses appreciated.  Trachea midline. No JVD  C/V:  Regular rate and irregular rhythm, no murmur, rub or gallop. No S3 or RV heave.   RESP: Respirations are unlabored. No use of accessory muscles. Clear to auscultation bilaterally without wheezing, rales, or rhonchi.  GI: Abdomen soft, nontender, nondistended. No HSM appreciated.   EXTREM: No  LE edema. No cyanosis or clubbing.  NEURO: Alert and oriented, cooperative. Mild confusion   PSYCH: Normal affect.  SKIN: Warm and dry. No rashes or petechiae appreciated.       Medications     - MEDICATION INSTRUCTIONS -         carvedilol  9.375 mg Oral BID w/meals     cycloSPORINE modified  50 mg Oral BID     [Held by provider] gabapentin  200 mg Oral At Bedtime     hydrALAZINE  25 mg Oral Q8H HECTOR     lactulose  10 g Oral BID     mycophenolate  500 mg Oral BID     risperiDONE  0.25 mg Oral Daily     sodium chloride (PF)  3 mL Intracatheter Q8H     Warfarin Therapy Reminder  1 each Oral See Admin Instructions       Data   Reviewed       I spent > 20 minutes face-to-face and/or coordinating care. Over 50% of our time on the unit was spent counseling the patient and/or coordinating care       ZAY Lacey CNP 1/11/2023

## 2023-01-11 NOTE — PLAN OF CARE
Goal Outcome Evaluation:    Pt admitted to the floor, resting comfortably. VSS. A&O to self. Easily redirectable. Denies pain. Bedside attendant present. Family at bedside. Tolerating reg diet. Transfers with SBA. Tele reads Afib. MD notified d/t if patient needed potassium protocol, if wanted any PRN meds for agitation, no new orders received.

## 2023-01-11 NOTE — PROGRESS NOTES
[facility]  Hospitalist Progress Note  Sarmad Owens MD 01/11/2023    Reason for Stay (Diagnosis): Acute hypoxic respiratory failure.         Assessment and Plan:      Summary of Stay: Matheus White Sr. is a 82 year old male with PMH of ESRD, renal transplantation back in 2009, on chronic immunosuppression therapy, congestive heart failure, Afib on warfarin, HTN, nonischemic cardiomyopathy with prior EF of 40 to 45%, who lives independently at home. He was seen in the emergency room a day earlier for shortness of breath and difficulty laying flat inhibiting his ability to sleep.  He was diuresed in the emergency room and demonstrated no significant hypoxia and had some significant improvement that led for him to request for discharge and subsequently went home.   He then came back to the emergency room due to concerns of increasing shortness of breath, generalized weakness, increasingly lethargy, and possible yellowish discoloration of the skin and concerns of decreasing oral intake and admitted to [facility] on 1/8/2023.     Problem list:     #1  Acute on chronic systolic CHF.  #2  Elevated Troponin likely secondary to #1, type II MI.  -Continue gentle diuresis, transitioned to oral dose , Nephrologist is managing.  -Patient is euvolemic or may be on the dry side today.  -Continue telemetry monitoring.  -Daily weight, input and output, cardiac diet.  -Echocardiogram showed EF of 35%, +2 tricuspid regurg, +2 aortic regurg there is moderate global hypokinesia of left ventricle.  -The latest Echo seems to be worsening when compared to prior study.  -Cardiology consulted, input appreciated.  -Patient is already on full anticoagulation.  -Cardiologist stopped Cardizem, started on low-dose Coreg to titrated up as tolerated.    #3  Permanent A. fib on chronic anticoagulation  #4  Ssupratherapeutic INR on warfarin, goal 2-3  -Patient is on anticoagulation for permanent A. Fib.  -INR is supra therapeutic  on presentation now in the normal range..  -Pharmacy will dose warfarin as appropriate.  -Cardizem stopped due to decreased LV function, started on Coreg and being titrated up for both rate control and decreased systolic function..     #5.  History of renal transplant, CKD stage IV.  #6.  Mild hypokalemia  -Patient is on immunosuppressive treatment.  -Continue gentle diuretics per nephrologist.  -Gently replace potassium, 40 mEq p.o. x1 ordered again today..  -Nephrology is consulted, input is appreciated, discussed the case with Nephrologist..  -Check potassium in a.m.    #7. Elevated liver enzyme with indirect hyperbilirubinemia.  #8 Chronic liver disease with portal hypertension  -This possibly due to CHF exacerbation, with underlying Cirrhosis.  -Imaging also suggestive of chronic liver disease with portal hypertension.  -No ascites seen.  -No sign of acute cholecystitis or biliary obstruction.  -No history of heavy alcohol use per patient and family.  -GI is consulted, input appreciated.  -LFT better, bilirubin about the same, 1.9, hepatitis serology negative.  -PRINCE, F-actin, AMA, A1 AT, celiac pending.  -Cyclosporine level is pending.  -Right upper quadrant ultrasound negative.  -No evidence of hepatic vessels thrombosis or abnormality on color Doppler     #9. Physical deconditioning  -Reportedly lives independently  -PT, OT evaluation, Social service input for discharge planning pending.  -Patient lives alone and may need increased level of care vs [facility], pending PT/OT recommendation.    #10. Metabolic encephalopathy. Resolving.  -he is less confused today, but hard of remembering details.  -He was started on Lactulose by GI, noted NH3 level is normal.  -He slept better last night on the floor, was in ED for about 48 hours and did not sleep there.  -Patient was given anxiolytic medications and remained calm.  -He has a sitter at bedside.  -Needs close monitoring.  -Continue lactulose, ammonia level is  "normal.    #11: Bicytopenia: ( anemia and Leukopenia)  -Patient with mild leukopenia.  -Plt count improved to 180K.  -CBC daily.  -Continue close monitoring    Clinically Significant Risk Factors        # Hypokalemia: Lowest K = 2.9 mmol/L in last 2 days, will replace as needed                         DVT Prophylaxis: Warfarin, will be dosed per pharmacy  Code Status: DNR / DNI.    Discharge Dispo: Home.  Estimated Disch Date / # of Days until Disch: 1-2 days in hospital pending improvement and  medications adjustment.   I discussed with patient and with his daughter at bedside the plan of care. All their question and concerns addressed.        Interval History (Subjective):      Patient seen and examined, no new overnight issues, able to rest last night, He is calm and resting, somewhat forgetful. No pain, tolerating diet, no cough, fever or SOB, not on oxygen.                  Physical Exam:      Last Vital Signs:  /63 (BP Location: Right arm)   Pulse 79   Temp 98  F (36.7  C) (Oral)   Resp 18   Ht 1.778 m (5' 10\")   Wt 70.1 kg (154 lb 9.6 oz)   SpO2 94%   BMI 22.18 kg/m      I/O last 3 completed shifts:  In: 510 [P.O.:510]  Out: -   Vitals:    01/09/23 1100 01/10/23 1707   Weight: 72.6 kg (160 lb) 70.1 kg (154 lb 9.6 oz)     Current Facility-Administered Medications   Medication     acetaminophen (TYLENOL) tablet 650 mg    Or     acetaminophen (TYLENOL) Suppository 650 mg     carvedilol (COREG) tablet 9.375 mg     cycloSPORINE modified (GENERIC EQUIVALENT) capsule 50 mg     furosemide (LASIX) tablet 40 mg     [Held by provider] gabapentin (NEURONTIN) capsule 200 mg     hydrALAZINE (APRESOLINE) tablet 25 mg     lactulose (CHRONULAC) solution 10 g     lidocaine (LMX4) cream     lidocaine 1 % 0.1-1 mL     melatonin tablet 1 mg     mycophenolate (GENERIC EQUIVALENT) tablet 500 mg     ondansetron (ZOFRAN ODT) ODT tab 4 mg    Or     ondansetron (ZOFRAN) injection 4 mg     Patient is already receiving " anticoagulation with heparin, enoxaparin (LOVENOX), warfarin (COUMADIN)  or other anticoagulant medication     potassium chloride ER (KLOR-CON M) CR tablet 40 mEq     risperiDONE (risperDAL) tablet 0.25 mg     senna-docusate (SENOKOT-S/PERICOLACE) 8.6-50 MG per tablet 1 tablet    Or     senna-docusate (SENOKOT-S/PERICOLACE) 8.6-50 MG per tablet 2 tablet     sodium chloride (PF) 0.9% PF flush 3 mL     sodium chloride (PF) 0.9% PF flush 3 mL     Warfarin Dose Required Daily - Pharmacist Managed       Constitutional: Awake, alert, cooperative, no apparent distress   Respiratory: Clear to auscultation bilaterally, no crackles or wheezing   Cardiovascular: Regular rate and rhythm, normal S1 and S2, and no murmur noted   Abdomen: Normal bowel sounds, soft, non-distended, non-tender   Skin: No rashes, no cyanosis, dry to touch   Neuro: Alert and oriented x3, no weakness, numbness, +memory loss   Extremities: No edema, normal range of motion   Other(s):HEENT Pink, nonicteric, dry oral mucosa.       All other systems: Negative.          Medications:      All current medications were reviewed with changes reflected in problem list.         Data:      All new lab and imaging data was reviewed.   Labs:  Recent Labs   Lab 01/11/23  0840 01/09/23  0754 01/08/23  1723   WBC 2.5* 3.7* 4.1   HGB 10.9* 10.5* 10.8*   HCT 35.0* 33.5* 34.7*   MCV 92 92 92    115* 125*     Recent Labs   Lab 01/11/23  0840 01/10/23  1013 01/09/23  0754 01/08/23  1723    144 140 142   POTASSIUM 3.1* 2.9* 3.1* 3.7   CHLORIDE 104 102 101 101   CO2 27 26 20* 21*   ANIONGAP 14 16* 19* 20*   * 132* 100* 120*   BUN 58.4* 64.6* 59.5* 55.6*   CR 2.47* 2.79* 2.92* 3.04*   GFRESTIMATED 25* 22* 21* 20*   ELEAZAR 10.0 9.8 9.5 10.0   PROTTOTAL 6.9 6.8  --  7.2   ALBUMIN 4.2 4.0  --  4.5   BILITOTAL 1.9* 1.8*  --  2.0*   ALKPHOS 168* 173*  --  197*   AST 72* 99*  --  208*   ALT 66* 71*  --  78*     Recent Labs   Lab 01/11/23  0840 01/10/23  1013  01/09/23  0754 01/08/23  1723 01/07/23  1005   * 132* 100* 120* 87      Imaging:   Results for orders placed or performed during the hospital encounter of 01/08/23   CT Head w/o Contrast    Narrative    EXAM: CT HEAD W/O CONTRAST  LOCATION: [facility]  DATE/TIME: 1/8/2023 6:09 PM    INDICATION: confusion  COMPARISON: 06/16/2022.  TECHNIQUE: Routine CT Head without IV contrast. Multiplanar reformats. Dose reduction techniques were used.    FINDINGS:  INTRACRANIAL CONTENTS: No intracranial hemorrhage, extraaxial collection, or mass effect.  No CT evidence of acute infarct. There is scattered diffuse low attenuation within the periventricular and subcortical white matter consistent with diffuse small   vessel ischemic disease. There is an old lacunar infarct left thalamus. The ventricular system, basal cisterns and the cortical sulci are consistent with diffuse volume loss.     VISUALIZED ORBITS/SINUSES/MASTOIDS: No intraorbital abnormality. No paranasal sinus mucosal disease. No middle ear or mastoid effusion.    BONES/SOFT TISSUES: No acute abnormality.      Impression    IMPRESSION:  1.  No CT finding of a mass, hemorrhage or focal area suggestive of infarct.  2.  Diffuse age related changes along with old lacunar infarct left thalamus.   XR Chest 2 Views    Narrative    EXAM: XR CHEST 2 VIEWS  LOCATION: [facility]  DATE/TIME: 1/8/2023 6:21 PM    INDICATION: sob  COMPARISON: 1/7/2023      Impression    IMPRESSION: Heart size is prominent. This is unchanged. Mild pulmonary vascular congestion. No pleural effusion or pneumothorax.   CT Abdomen Pelvis w/o Contrast    Narrative    EXAM: CT ABDOMEN PELVIS W/O CONTRAST  LOCATION: [facility]  DATE/TIME: 1/8/2023 7:50 PM    INDICATION: Abdominal pain, LFT up, kidney dz.  COMPARISON: None.  TECHNIQUE: CT scan of the abdomen and pelvis was performed without IV contrast. Multiplanar reformats were  obtained. Dose reduction techniques were used.  CONTRAST: None.    FINDINGS:   LOWER CHEST: Mild mosaic perfusion which is nonspecific, but most typical for air trapping secondary to small airway inflammation. 1.5 x 1.5 mm noncalcified pulmonary nodule in the left lower lobe. Mild cardiomegaly. Moderate coronary artery   calcifications.    HEPATOBILIARY: There is some slight stranding seen in the fat surrounding the gallbladder and I cannot exclude subtle findings of acute cholecystitis. If this is of concern clinically, ultrasound is recommended of the gallbladder for further evaluation.   There are changes most typical for slight cirrhotic configuration of the liver with associated mild hepatomegaly. There are findings most typical for some portal venous hypertension with slight varicosities and the spleen is at the upper limits of normal   in size at 12.9 cm.    PANCREAS: Mildly atrophic, but otherwise normal.    SPLEEN: Upper limits of normal in size at 12.9 cm.    ADRENAL GLANDS: Normal.    KIDNEYS/BLADDER: Significant atrophy is seen of the left kidney with some scattered benign cysts seen in the left kidney, some of which show some benign associated calcifications. There is a transplant kidney seen in the right hemipelvis with two tiny   nonobstructive stones and the largest measures approximately 4 mm in greatest dimension. No complications are seen of the transplanted kidney. The native right kidney is surgically absent. The bladder is normal.    BOWEL: There is a small to moderate-sized left inguinal hernia which contains some fat and a portion of the sigmoid colon with no evidence for obstruction or inflammation, however. Mild findings of diverticulosis with no evidence for diverticulitis. The   bowel is otherwise normal.    LYMPH NODES: Normal.    VASCULATURE: Advanced calcification with no aneurysmal dilatation.    PELVIC ORGANS: Mild to moderate calcification. Prostatic gland  enlargement.    MUSCULOSKELETAL: Advanced multilevel degenerative changes of the lumbar spine with associated degenerative disc disease.      Impression    IMPRESSION:   1.  Possible subtle changes cholecystitis and ultrasound is recommended for further evaluation.    2.  Mild findings of hepatomegaly and cirrhosis with associated secondary findings of mild portal venous hypertension.    3.  Left inguinal hernia which contains some fat and sigmoid colon with no evidence for inflammation or obstruction.    4.  Atrophic native left kidney, surgically absent right kidney, and there is a transplant kidney in the right hemipelvis. There are some benign cysts seen in the native left kidney and no follow-up is recommended.    5.  Nonobstructive nephrolithiasis transplant kidney.    6.  Spleen is at the upper limits of normal in size at 12.9 cm, but otherwise normal.    7.  Moderate coronary artery calcifications.    8.  1 mm x 1 mm noncalcified pulmonary nodule right lower lobe and no follow-up is recommended unless there is high risk factors for malignancy.     Abdomen US, limited (RUQ only)    Narrative    EXAM: US ABDOMEN LIMITED  LOCATION: [facility]  DATE/TIME: 1/8/2023 9:44 PM    INDICATION: abnm LFT  COMPARISON: CT dated 1/8/2023  TECHNIQUE: Limited abdominal ultrasound.    FINDINGS:    GALLBLADDER: Normal. No gallstones, wall thickening, or pericholecystic fluid. Negative sonographic Gustafson's sign.    BILE DUCTS: No biliary dilatation. The common duct measures 7.6 mm.    LIVER: Normal parenchyma with smooth contour. No focal mass.    RIGHT KIDNEY: Surgically absent    PANCREAS: The visualized portions are normal.    No ascites.      Impression    IMPRESSION:  1.  No significant gallbladder wall thickening, or gallstones identified.       Echocardiogram Complete     Value    LVEF  35%    Narrative    [id number]  [id number]  [id number]  [id number]^ASHLEY^AL^[facility]  [facility]  Echocardiography Laboratory  [address] [place] [address]     Name: SR BRAD MOHAMUD SR.  MRN: [id number]  : 1940  Study Date: 2023 08:10 AM  Age: 82 yrs  Gender: Male  Patient Location: [facility]  Reason For Study: CHF  Ordering Physician: CORINNE RAMIREZ  Performed By: Tye Franz RDCS     BSA: 1.9 m2  Height: 70 in  Weight: 160 lb  HR: 80  BP: 157/90 mmHg  ______________________________________________________________________________  Procedure  Complete Portable Echo Adult.  ______________________________________________________________________________  Interpretation Summary     1. The left ventricle is normal in size. The visual ejection fraction is  estimated at 35%. There is moderate global hypokinesia of the left ventricle.  2. The right ventricle is normal in structure, function and size.  3. There is moderate (2+) tricuspid regurgitation.  4. Mild (35-45mmHg) pulmonary hypertension is present. The right ventricular  systolic pressure is approximated at 37mmHg plus the right atrial pressure.  5. There is moderate (2+) aortic regurgitation.  6. The ascending aorta is Mildly dilated. 4.0cm.     Echo 2019 showed EF 40-45%, 1+ MR/AI, 2+ TR, aorta 4.1cm.  ______________________________________________________________________________  Left Ventricle  The left ventricle is normal in size. There is mild concentric left  ventricular hypertrophy. The visual ejection fraction is estimated at 35%.  Left ventricular diastolic function is indeterminate. There is moderate global  hypokinesia of the left ventricle.     Right Ventricle  The right ventricle is normal in structure, function and size.     Atria  The left atrium is moderately dilated. The right atrium is mild to moderately  dilated. There is no atrial shunt seen.     Mitral Valve  There is moderate mitral annular calcification. The mitral valve leaflets  appear thickened, but open well. There is moderate (2+)  mitral regurgitation.     Tricuspid Valve  There is moderate (2+) tricuspid regurgitation. Mild (35-45mmHg) pulmonary  hypertension is present. The right ventricular systolic pressure is  approximated at 37mmHg plus the right atrial pressure.     Aortic Valve  There is moderate trileaflet aortic sclerosis. There is moderate (2+) aortic  regurgitation. No aortic stenosis is present.     Pulmonic Valve  The pulmonic valve is normal in structure and function.     Vessels  The ascending aorta is Mildly dilated. Dilation of the inferior vena cava is  present with abnormal respiratory variation in diameter.     Pericardium  There is no pericardial effusion.     Rhythm  Rhythm uncertain, wide QRS.  ______________________________________________________________________________  MMode/2D Measurements & Calculations     IVSd: 1.5 cm  LVIDd: 5.4 cm  LVIDs: 4.3 cm  LVPWd: 1.3 cm  FS: 20.4 %  LV mass(C)d: 326.7 grams  LV mass(C)dI: 172.1 grams/m2  Ao root diam: 3.7 cm  LA dimension: 5.6 cm  LA/Ao: 1.5  LVOT diam: 2.1 cm  LVOT area: 3.5 cm2  LA Volume (BP): 94.9 ml  LA Volume Index (BP): 49.9 ml/m2  RWT: 0.49     Doppler Measurements & Calculations  MV E max jonny: 114.0 cm/sec  MV A max jonny: 34.6 cm/sec  MV E/A: 3.3  MV dec time: 0.12 sec  Ao V2 max: 168.0 cm/sec  Ao max P.0 mmHg  Ao V2 mean: 124.0 cm/sec  Ao mean P.0 mmHg  Ao V2 VTI: 33.6 cm  MARCOS(I,D): 1.8 cm2  MARCOS(V,D): 1.8 cm2  AI P1/2t: 357.4 msec  LV V1 max PG: 3.1 mmHg  LV V1 max: 88.5 cm/sec  LV V1 VTI: 17.5 cm  SV(LVOT): 60.6 ml  SI(LVOT): 31.9 ml/m2  TR max jonny: 304.0 cm/sec  TR max P.0 mmHg  AV Jonny Ratio (DI): 0.53  MARCOS Index (cm2/m2): 0.95  E/E' av.9  Lateral E/e': 13.5  Medial E/e': 24.4     ______________________________________________________________________________  Report approved by: Winsome Jones 2023 09:22 AM

## 2023-01-11 NOTE — PROGRESS NOTES
"   01/11/23 0828   Appointment Info   Signing Clinician's Name / Credentials (OT) Ashlie Garcia, OTR/L   Living Environment   People in Home grandchild(edilberto)   Current Living Arrangements house  ([facility])   Home Accessibility stairs to enter home;stairs within home   Living Environment Comments Pt lives with granddaughter in [facility], [place]/within.   Self-Care   Usual Activity Tolerance good   Current Activity Tolerance good   Regular Exercise No   Activity/Exercise/Self-Care Comment Pt reports indep in all ADLs, IADLs and mobility tasks with no AD at baseline. Pt is a retired . Granddaughter lives with him, but is a college student.   Instrumental Activities of Daily Living (IADL)   Previous Responsibilities meal prep;housekeeping;laundry;medication management;driving   IADL Comments Family/granddaughter can assist as able   General Information   Onset of Illness/Injury or Date of Surgery 01/08/23   Referring Physician Arsalan Pak MD   Patient/Family Therapy Goal Statement (OT) Pt's goal is to d/c home   Additional Occupational Profile Info/Pertinent History of Current Problem Per chart: Pt is an 82 year old male admitted shortness of breath, generalized weakness, increasingly lethargy, and possible yellowish discoloration of the skin and concerns of decreasing oral intake   Existing Precautions/Restrictions cardiac   Cognitive Status Examination   Orientation Status orientation to person, place and time   Cognitive Status Comments Pt tangential and talkative, continues to come back to various topics. Appears to have some confusion regarding reason for admission, current doctors \"ordering things for me.\" Asking many questions of this writer, \"who sent you?\", etc. Answered as able.   Pain Assessment   Patient Currently in Pain No   Range of Motion Comprehensive   Comment, General Range of Motion [facility]   Strength Comprehensive (MMT)   Comment, General Manual Muscle Testing (MMT) Assessment " Overall WFL   Bed Mobility   Bed Mobility supine-sit;sit-supine   Supine-Sit [place] (Bed Mobility) unable to assess   Sit-Supine [place] (Bed Mobility) unable to assess   Transfers   Transfers bed-chair transfer;sit-stand transfer;toilet transfer;shower transfer   Transfer Skill: Bed to Chair/Chair to Bed   Bed-Chair [place] (Transfers) supervision   Sit-Stand Transfer   Sit-Stand [place] (Transfers) supervision   Shower Transfer   [place] Level (Shower Transfer) not tested   Toilet Transfer   [place] Level (Toilet Transfer) supervision   Activities of Daily Living   BADL Assessment/Intervention upper body dressing;lower body dressing;grooming;toileting   Upper Body Dressing Assessment/Training   [place] Level (Upper Body Dressing) contact guard assist   Lower Body Dressing Assessment/Training   [place] Level (Lower Body Dressing) contact guard assist   Grooming Assessment/Training   [place] Level (Grooming) contact guard assist   Toileting   [place] Level (Toileting) contact guard assist   Clinical Impression   Criteria for Skilled Therapeutic Interventions Met (OT) Yes, treatment indicated   OT Diagnosis Impaired ADLs, IADLs and mobility tasks   OT Problem List-Impairments impacting ADL problems related to;activity tolerance impaired;balance;cognition;strength   ADL comments/analysis Pt below baseline level of functioning   Assessment of Occupational Performance 3-5 Performance Deficits   Identified Performance Deficits IADLs, bathing, endurance   Planned Therapy Interventions (OT) ADL retraining;IADL retraining;cognition;strengthening;transfer training;home program guidelines;progressive activity/exercise;risk factor education   Clinical Decision Making Complexity (OT) low complexity   Risk & Benefits of therapy have been explained evaluation/treatment results reviewed;risks/benefits reviewed;care plan/treatment goals reviewed;current/potential barriers  reviewed;participants voiced agreement with care plan;participants included;patient;daughter   OT Total Evaluation Time   OT Eval, Low Complexity Minutes (26570) 20   OT Goals   Therapy Frequency (OT) Daily   OT Predicted Duration/Target Date for Goal Attainment 01/14/23   OT Goals Hygiene/Grooming;Lower Body Dressing;Toilet Transfer/Toileting;Cardiac Phase 1   OT: Hygiene/Grooming supervision/stand-by assist;while standing   OT: Lower Body Dressing Supervision/stand-by assist   OT: Toilet Transfer/Toileting Supervision/stand-by assist;toilet transfer;cleaning and garment management   OT: Understanding of cardiac education to maximize quality of life, condition management, and health outcomes Patient;Verbalize   OT: Perform aerobic activity with stable cardiovascular response continuous;5 minutes;10 minutes;ambulation   OT: Functional/aerobic ambulation tolerance with stable cardiovascular response in order to return to home and community environment Supervision/SBA;Greater than 300 feet   OT: Navigation of stairs simulating home set up with stable cardiovascular response in order to return to home and community environment Supervision/SBA;Greater than 10 stairs   OT Discharge Planning   OT Discharge Recommendation (DC Rec) home with assist   OT Rationale for DC Rec Pt presents below baseline level of functioning, limited by impaired activity tolerance. Recommend ongoing skilled OT while IP. Due to pt's mild confusion, likely would benefit from 24/7 supervision in home environment for safety. Family involved and supportive.   OT Brief overview of current status Ambulated 200 feet with CGA/SBA and no AD, 1 standing rest break   Total Session Time   Total Session Time (sum of timed and untimed services) 20

## 2023-01-11 NOTE — PLAN OF CARE
Goal Outcome Evaluation:      VSS tele Afib CVR BBB, On Coumadin. On Coreg, 2.5 sec and 2.6sec pauses, HR 30s then back up to 60s-70s, Pt asymptomatic BP stable,  Cardiology aware. Elevated BNP on admission. EF 35% see echo for further details.  Cr improving, Cr 2.47, Cardiology following. Started 80meq PO Laisx. ALT and AST improving, GI following. PT/OT/ following. Pt A&Ox3 but forgetful, alarms in place.

## 2023-01-11 NOTE — PLAN OF CARE
Goal Outcome Evaluation:      Plan of Care Reviewed With: patient    Overall Patient Progress: no changeOverall Patient Progress: no change    Pt oriented to self, VSS on RA. Tele A fib CVR with BBB. PSC in room during the evening until 2300, family in room from 2300- 0700. Resting comfortably throughout night, very pleasant. SBA in room. MD paged about low K+ of 2.9, no replacement ordered, no K+ protocol. Recheck in AM.

## 2023-01-11 NOTE — PROGRESS NOTES
"  Gastroenterology Progress Note     Subjective   Patient is AN 82-year-old gentleman who we are seeing for elevated LFTs.  CT findings are concerning for cirrhosis and mild portal venous hypertension.  Work-up for causes of cirrhosis has been ordered.  He had some confusion on admission which according to his daughter is improving.  He has been started on lactulose.  He does state that he has had bowel movements but not diarrhea.    He does continue to have left lower quadrant pain.  This is an area of an inguinal hernia which contains some fat and sigmoid colon.  He guards this area.  According to him he was supposed to have surgery on this site next month.     Objective     Vitals Blood pressure 119/65, pulse 79, temperature 98  F (36.7  C), temperature source Oral, resp. rate 18, height 1.778 m (5' 10\"), weight 70.1 kg (154 lb 9.6 oz), SpO2 97 %.          Physical Exam   General: awake, alert    Cardiovascular: RRR    Chest: lungs are clear to auscultation bilaterally    Abdomen: soft, tender in the left lower quadrant, non-distended, bowel sounds present    Neurologic: grossly intact, moves all four extremities         Laboratory     Electrolytes    Recent Labs   Lab 01/11/23  0840 01/10/23  1013 01/09/23  0754    144 140   POTASSIUM 3.1* 2.9* 3.1*   CHLORIDE 104 102 101   CO2 27 26 20*   * 132* 100*   CR 2.47* 2.79* 2.92*   BUN 58.4* 64.6* 59.5*      Hematology    Recent Labs   Lab 01/11/23  0840 01/10/23  0739 01/09/23  0754 01/08/23  1723   HGB 10.9*  --  10.5* 10.8*   MCV 92  --  92 92   WBC 2.5*  --  3.7* 4.1     --  115* 125*   INR 2.73* 3.46* 4.02* 3.65*      LFTs & Lipase    Recent Labs   Lab 01/11/23  0840 01/10/23  1013 01/08/23  1723   AST 72* 99* 208*   ALT 66* 71* 78*   ALKPHOS 168* 173* 197*   BILITOTAL 1.9* 1.8* 2.0*       Imaging Studies     US ABDOMEN OR PELVIS DOPPLER LIMITED PORTABLE 1/10/2023 9:36 AM     CLINICAL HISTORY: Assess for portal vein thrombosis, hepatic " vein  thrombosis.     TECHNIQUE: Limited abdominal ultrasound. Color flow with spectral  Doppler and waveform analysis performed.     COMPARISON: Ultrasound abdomen 1/8/2023.     FINDINGS:     ABDOMINAL DUPLEX: The hepatic artery, hepatic veins, IVC, portal  veins, and splenic vein are patent with flow in the normal direction.                                                                       IMPRESSION: Color and Doppler spectral assessment of the hepatic  vessels shows no evidence for thrombosis or specific abnormality.       I have reviewed the current diagnostic and laboratory tests.           Impression and Plan      Abnormal LFTs with CT findings concerning for cirrhosis.  Mild hepatomegaly and possible portal venous hypertension seen.  Spleen is upper limits of normal.  AST and ALT are improving.  Bilirubin has remained the same.  Patient is on warfarin and therefore INR is not helpful in assessing liver function.  Possible reason for elevated LFTs is congestive hepatopathy.  He is on cyclosporine and level checked yesterday was not elevated.  He does not consume alcohol.  Hepatitis A, B, and C are negative.  Autoimmune liver disease has been checked.  PRINCE is mildly elevated.  I suspect that this is not the case given the low elevation and the fact that the AST and ALT are improving over time.  Doppler was done and was negative for portal vein thrombosis.  I do not expect any further work-up.  We will continue to follow LFTs.       30 minutes of total time was spent providing patient care including patient evaluation, reviewing documentation/test results, and .           Kristian Silva MD  Thank you for the opportunity to participate in the care of this patient.   Please feel free to call me with any questions or concerns.  Phone number [phone].

## 2023-01-11 NOTE — PROGRESS NOTES
" Renal Medicine Progress Note            Assessment/Plan:     82 y.o man with CKD IV and kidney transplant.      # Kidney transplant in 2009: Allograft Scr ~ 3 mg/dl. Allograft function is at baseline.               -Dr. Parkinson     # Immunosuppressions:                -CsA 50 mg bid               - mg bid     # NICM with EF of 35%: Not needing supplemental O2. No peripheral edema. Lungs are clear              -lungs are clear and no peripheral edema.     # Pancytopenia: PLT improved. Leukopenic.                -cyclosporine level pending     # Hypokalemia: Poor intake and diuretic.      # Hypertension:               -dilt-->Coreg and added hydralazine per cardiology                 # Abnormal liver tests: GI is following.     Plan:  # Add-on CsA level today or draw trough level tomorrow if unable to add-on.  # MMF level pending  # Lasix 40 mg po daily  # KCl 40 meq x 1         Interval History:     Afebrile. VSS.   He is sitting in chair and conversing normally.   He is not needing supplemental O2.  He denies SOB.  No peripheral edema.  He knows that he is at [facility] today.    He is less confused today.  Multiple family members at the bedside.          Medications and Allergies:       carvedilol  9.375 mg Oral BID w/meals     cycloSPORINE modified  50 mg Oral BID     [Held by provider] gabapentin  200 mg Oral At Bedtime     hydrALAZINE  25 mg Oral Q8H HECTOR     lactulose  10 g Oral BID     mycophenolate  500 mg Oral BID     risperiDONE  0.25 mg Oral Daily     sodium chloride (PF)  3 mL Intracatheter Q8H     Warfarin Therapy Reminder  1 each Oral See Admin Instructions      No Known Allergies         Physical Exam:   Vitals were reviewed   , Blood pressure 136/71, pulse 79, temperature 98  F (36.7  C), temperature source Oral, resp. rate 18, height 1.778 m (5' 10\"), weight 70.1 kg (154 lb 9.6 oz), SpO2 94 %.    Wt Readings from Last 3 Encounters:   01/10/23 70.1 kg (154 lb 9.6 oz)   01/07/23 72.6 kg (160 " lb)   04/19/21 82.6 kg (182 lb)       Intake/Output Summary (Last 24 hours) at 1/11/2023 1135  Last data filed at 1/11/2023 0600  Gross per 24 hour   Intake 510 ml   Output --   Net 510 ml     GENERAL APPEARANCE: NAD  HEENT:  Eyes/ears/nose/neck grossly normal  RESP: lungs cta b c good efforts, no crackles, rhonchi or wheezes  CV: RRR, nl S1/S2/ Soft murmur.  ABDOMEN: Soft, NT  EXTREMITIES/SKIN: no rashes/lesions on observed skin; No edema.   NEURO: Awake, alert and answering. He knows he is at [facility] today. Less confused.          Data:     CBC RESULTS:     Recent Labs   Lab 01/11/23  0840 01/09/23  0754 01/08/23  1723 01/07/23  1005   WBC 2.5* 3.7* 4.1 3.2*   RBC 3.79* 3.66* 3.78* 3.75*   HGB 10.9* 10.5* 10.8* 10.8*   HCT 35.0* 33.5* 34.7* 34.9*    115* 125* 134*       Basic Metabolic Panel:  Recent Labs   Lab 01/11/23  0840 01/10/23  1013 01/09/23  0754 01/08/23  1723 01/07/23  1005    144 140 142 144   POTASSIUM 3.1* 2.9* 3.1* 3.7 3.4   CHLORIDE 104 102 101 101 104   CO2 27 26 20* 21* 24   BUN 58.4* 64.6* 59.5* 55.6* 42.5*   CR 2.47* 2.79* 2.92* 3.04* 2.69*   * 132* 100* 120* 87   ELEAZAR 10.0 9.8 9.5 10.0 10.0       INR  Recent Labs   Lab 01/11/23  0840 01/10/23  0739 01/09/23  0754 01/08/23  1723   INR 2.73* 3.46* 4.02* 3.65*      Attestation:   I have reviewed today's relevant vital signs, notes, medications, labs and imaging.    Carlos Daniels MD  [facility] - Nephrology  Office phone :[phone]  Pager: [phone]

## 2023-01-12 NOTE — PLAN OF CARE
"Pt. Alert to self and confused to situation, SBA for mobility, voiding adequately, has some pain in body, but refused to take pain meds, potassium, and hydralazine. On tele monitor AF with BBB, CMS intact, family present at the bed side.    /65 (BP Location: Right arm)   Pulse 69   Temp 98.1  F (36.7  C) (Oral)   Resp 18   Ht 1.778 m (5' 10\")   Wt 70.1 kg (154 lb 9.6 oz)   SpO2 99%   BMI 22.18 kg/m     "

## 2023-01-12 NOTE — PLAN OF CARE
Physical Therapy: Orders received. Chart reviewed and discussed with occupational therapy.? Physical Therapy not indicated, no IP PT needs identified during OT screen.  OT to continue to work with patient during hospitalization. To avoid duplication of services, defer discharge recommendations to care team and OT.  Will complete orders.

## 2023-01-12 NOTE — PLAN OF CARE
Alert/ confused. Restless overnight. Family at bedside. VSS. Denies pain. LS diminished. Tele Afib CVR w/ BBB. Assist x1/gait belt.

## 2023-01-12 NOTE — PROGRESS NOTES
[facility]  Cardiology Progress Note    Date of Service (when I saw the patient): 01/12/2023       Assessment & Plan   Matheus White Sr. is a 82 year old male who was admitted on 1/8/2023.     1.  : Acute systolic heart failure  - NT pro BNP > 47,000  - Echocardiogram showed a moderately reduced EF 35% (40-45% 2019), moderate global hypokinesia of the LV. Normal RV size and function, mild pulmonary hypertension, elevated RV systolic pressure 37 mmHg + RA pressure. Moderate aortic regurgitation.   - Diuresing on lasix, down ~ 2 kg, inaccurate I/O. Restarted PO lasix 80 mg 1/11  - Carvedilol and hydralazine.     2.  : Atrial Fibrillation   - Rate controlled, anticoagulated on Warfarin.   3.  : Hypertension   4.  CKD stage IV s/p kidney transplant (2009)     Plan   1.  Euvolemic upon exam. Down ~ 2 kg. Nephrology to manage diuretics , restarted lasix 80 mg daily 1/11.   2. EF 35%, uptitration of GDMT as able. Continue carvedilol and hydralazine.  3. Daily weight, strict I/O, low sodium diet.    4. A-fib rate controlled. INR therapeutic 2.73, continue Warfarin, pharmacy to dose.   5. Follow up with established cardiologist 7- 10 days post discharge.       ZAY Lacey CNP  [employer]  (M-F, 7:30 am - 4:00 pm)    Interval History   Patient resting, confused with conversation. Ex-wife at bedside. Denies chest pain, shortness of breath at rest, no palpitation, or leg edema. Euvolemic upon exam. Vitals and tele stable.     Physical Exam   Temp: 98.1  F (36.7  C) Temp src: Oral BP: 128/64 Pulse: 65   Resp: 18 SpO2: 96 % O2 Device: None (Room air)    Vitals:    01/09/23 1100 01/10/23 1707   Weight: 72.6 kg (160 lb) 70.1 kg (154 lb 9.6 oz)     Vital Signs with Ranges  Temp:  [97.5  F (36.4  C)-98.1  F (36.7  C)] 98.1  F (36.7  C)  Pulse:  [65-79] 65  Resp:  [18] 18  BP: (115-146)/(63-77) 128/64  SpO2:  [96 %-97 %] 96 %  I/O last 3 completed shifts:  In: 590 [P.O.:590]  Out: -     GEN:  In  general, this is a well nourished elderly male in no acute distress.   HEENT:  Pupils equal, round. Sclerae nonicteric. Clear oropharynx. Mucous membranes moist.  NECK: Supple, no masses appreciated. Trachea midline. No JVD  C/V:  Regular rate and irregular rhythm, no murmur, rub or gallop. No S3 or RV heave.   RESP: Respirations are unlabored. No use of accessory muscles. Clear to auscultation bilaterally without wheezing, rales, or rhonchi.  GI: Abdomen soft, nontender, nondistended. No HSM appreciated.   EXTREM: No  LE edema. No cyanosis or clubbing.  NEURO: Alert and oriented, cooperative. Mild confusion   PSYCH: Normal affect.  SKIN: Warm and dry. No rashes or petechiae appreciated.       Medications     - MEDICATION INSTRUCTIONS -         carvedilol  9.375 mg Oral BID w/meals     cycloSPORINE modified  50 mg Oral BID     furosemide  80 mg Oral Daily     [Held by provider] gabapentin  200 mg Oral At Bedtime     hydrALAZINE  25 mg Oral Q8H HECTOR     lactulose  10 g Oral BID     mycophenolate  500 mg Oral BID     risperiDONE  0.25 mg Oral Daily     sodium chloride (PF)  3 mL Intracatheter Q8H     Warfarin Therapy Reminder  1 each Oral See Admin Instructions       Data   Reviewed       I spent > 20 minutes face-to-face and/or coordinating care. Over 50% of our time on the unit was spent counseling the patient and/or coordinating care       ZAY Lacey CNP 1/12/2023

## 2023-01-12 NOTE — PROGRESS NOTES
[facility]  Hospitalist Progress Note  Sarmad Owens MD 01/12/2023    Reason for Stay (Diagnosis): Acute hypoxic respiratory failure.         Assessment and Plan:      Summary of Stay: Matheus White Sr. is a 82 year old male with PMH of ESRD, renal transplantation back in 2009, on chronic immunosuppression therapy, congestive heart failure, Afib on warfarin, HTN, nonischemic cardiomyopathy with prior EF of 40 to 45%, who lives independently at home. He was seen in the emergency room a day earlier for shortness of breath and difficulty laying flat inhibiting his ability to sleep.  He was diuresed in the emergency room and demonstrated no significant hypoxia and had some significant improvement that led for him to request for discharge and subsequently went home.   He then came back to the emergency room due to concerns of increasing shortness of breath, generalized weakness, increasingly lethargy, and possible yellowish discoloration of the skin and concerns of decreasing oral intake and admitted to [facility] on 1/8/2023.     Problem list:     #1  Acute on chronic systolic CHF.  #2  Elevated Troponin likely secondary to #1, type II MI.  -Continue gentle diuresis, transitioned to oral dose , Nephrologist is managing.  -Patient is euvolemic or may be on the dry side today.  -Continue telemetry monitoring.  -Daily weight, input and output, cardiac diet.  -Echocardiogram showed EF of 35%, +2 tricuspid regurg, +2 aortic regurg there is moderate global hypokinesia of left ventricle.  -The latest Echo seems to be worsening when compared to prior study and cardiology adjusted his meds.  -Cardiology consulted, input appreciated.  -Patient is already on full anticoagulation.  -Cardiologist stopped Cardizem, started on low-dose Coreg to titrated up as tolerated.    #3  Permanent A. fib on chronic anticoagulation  #4  Ssupratherapeutic INR on warfarin, goal 2-3  -Patient is on anticoagulation for permanent A.  Fib.  -INR is supra therapeutic on presentation now in the normal range..  -Pharmacy will dose warfarin as appropriate.  -Cardizem stopped due to decreased LV function, started on Coreg and being titrated up for both rate control and decreased systolic function..     #5.  History of renal transplant, CKD stage IV.  #6.  Mild hypokalemia  -Patient is on immunosuppressive treatment.  -Continue gentle diuretics per nephrologist.  -Gently replace potassium, 40 mEq p.o. x1 ordered again today..  -Nephrology is consulted, input is appreciated, discussed the case with Nephrologist..  -Check potassium in a.m.    #7. Elevated liver enzyme with indirect hyperbilirubinemia.  #8 Chronic liver disease with portal hypertension  -This possibly due to CHF exacerbation, with underlying Cirrhosis.  -Imaging also suggestive of chronic liver disease with portal hypertension.  -No ascites seen.  -No sign of acute cholecystitis or biliary obstruction.  -No history of heavy alcohol use per patient and family.  -GI is consulted, input appreciated.  -LFT better, bilirubin about the same, 1.9, hepatitis serology negative.  -PRINCE, F-actin, AMA, A1 AT, celiac pending.  -Cyclosporine level is pending.  -Right upper quadrant ultrasound negative.  -No evidence of hepatic vessels thrombosis or abnormality on color Doppler     #9. Physical deconditioning  -Reportedly lives independently  -PT, OT evaluation, Social service input for discharge planning pending.  -Patient lives alone and may need increased level of care vs [facility], pending PT/OT recommendation.    #10. Metabolic encephalopathy. Slightly worse today.  -He is more confused, but hard of remembering details.  -He did not eat yet.  -Continue Lactulose started by GI, noted NH3 level is normal.  -He did not sleep well last night.  -Stopped his low dose risperidone and also haldol.  -He has a sitter at bedside.  -Needs close monitoring.  -Continue lactulose, ammonia level is normal.     #11:  "Bicytopenia: ( anemia and Leukopenia)  -Patient with mild leukopenia.  -Plt count improved to 180K.  -CBC daily.  -Continue close monitoring    Clinically Significant Risk Factors        # Hypokalemia: Lowest K = 3.1 mmol/L in last 2 days, will replace as needed                         DVT Prophylaxis: Warfarin, will be dosed per pharmacy  Code Status: DNR / DNI.    Discharge Dispo: Home.  Estimated Disch Date / # of Days until Disch: 1 day if mental status improves and ok with Nephrology and Cardiology.     I discussed with patient and with his daughter at bedside the plan of care.  Patient does not appear to have any question.  Also briefly discussed with nephrology and cardiology         Interval History (Subjective):      Patient seen and examined, no new overnight issues, able to rest last night, He is calm and resting, somewhat forgetful. No pain, tolerating diet, no cough, fever or SOB, not on oxygen.                  Physical Exam:      Last Vital Signs:  /64 (BP Location: Right arm)   Pulse 65   Temp 98.1  F (36.7  C) (Oral)   Resp 18   Ht 1.778 m (5' 10\")   Wt 70.1 kg (154 lb 9.6 oz)   SpO2 96%   BMI 22.18 kg/m      I/O last 3 completed shifts:  In: 590 [P.O.:590]  Out: -   Vitals:    01/09/23 1100 01/10/23 1707 01/12/23 1000   Weight: 72.6 kg (160 lb) 70.1 kg (154 lb 9.6 oz) 70.1 kg (154 lb 9.6 oz)     Current Facility-Administered Medications   Medication     acetaminophen (TYLENOL) tablet 650 mg    Or     acetaminophen (TYLENOL) Suppository 650 mg     carvedilol (COREG) tablet 9.375 mg     cycloSPORINE modified (GENERIC EQUIVALENT) capsule 50 mg     furosemide (LASIX) tablet 80 mg     [Held by provider] gabapentin (NEURONTIN) capsule 200 mg     hydrALAZINE (APRESOLINE) tablet 25 mg     lactulose (CHRONULAC) solution 10 g     lidocaine (LMX4) cream     lidocaine 1 % 0.1-1 mL     melatonin tablet 1 mg     mycophenolate (GENERIC EQUIVALENT) tablet 500 mg     ondansetron (ZOFRAN ODT) ODT tab 4 " mg    Or     ondansetron (ZOFRAN) injection 4 mg     Patient is already receiving anticoagulation with heparin, enoxaparin (LOVENOX), warfarin (COUMADIN)  or other anticoagulant medication     sodium chloride (PF) 0.9% PF flush 3 mL     sodium chloride (PF) 0.9% PF flush 3 mL     Warfarin Dose Required Daily - Pharmacist Managed       Constitutional: Awake, drowsy, slow to respond to questions, somewhat confused, no apparent distress.   Respiratory: Clear to auscultation bilaterally, no crackles or wheezing   Cardiovascular: Regular rate and rhythm, normal S1 and S2, and no murmur noted   Abdomen: Normal bowel sounds, soft, non-distended, non-tender   Skin: No rashes, no cyanosis, dry to touch   Neuro: Alert and oriented x3, no weakness, numbness, +memory loss   Extremities: No edema, normal range of motion   Other(s):HEENT Pink, nonicteric, dry oral mucosa.       All other systems: Negative.          Medications:      All current medications were reviewed with changes reflected in problem list.         Data:      All new lab and imaging data was reviewed.   Labs:  Recent Labs   Lab 01/12/23  0804 01/11/23  0840 01/09/23  0754   WBC 2.8* 2.5* 3.7*   HGB 11.5* 10.9* 10.5*   HCT 36.2* 35.0* 33.5*   MCV 91 92 92    180 115*     Recent Labs   Lab 01/12/23  0804 01/11/23  0840 01/10/23  1013    145 144   POTASSIUM 3.2* 3.1* 2.9*   CHLORIDE 104 104 102   CO2 25 27 26   ANIONGAP 15 14 16*   * 135* 132*   BUN 53.3* 58.4* 64.6*   CR 2.34* 2.47* 2.79*   GFRESTIMATED 27* 25* 22*   ELEAZAR 9.9 10.0 9.8   PROTTOTAL 6.9 6.9 6.8   ALBUMIN 4.2 4.2 4.0   BILITOTAL 1.7* 1.9* 1.8*   ALKPHOS 164* 168* 173*   AST 56* 72* 99*   ALT 61* 66* 71*     Recent Labs   Lab 01/12/23  0804 01/11/23  0840 01/10/23  1013 01/09/23  0754 01/08/23  1723   * 135* 132* 100* 120*      Imaging:   Results for orders placed or performed during the hospital encounter of 01/08/23   CT Head w/o Contrast    Narrative    EXAM: CT HEAD W/O  CONTRAST  LOCATION: [facility]  DATE/TIME: 1/8/2023 6:09 PM    INDICATION: confusion  COMPARISON: 06/16/2022.  TECHNIQUE: Routine CT Head without IV contrast. Multiplanar reformats. Dose reduction techniques were used.    FINDINGS:  INTRACRANIAL CONTENTS: No intracranial hemorrhage, extraaxial collection, or mass effect.  No CT evidence of acute infarct. There is scattered diffuse low attenuation within the periventricular and subcortical white matter consistent with diffuse small   vessel ischemic disease. There is an old lacunar infarct left thalamus. The ventricular system, basal cisterns and the cortical sulci are consistent with diffuse volume loss.     VISUALIZED ORBITS/SINUSES/MASTOIDS: No intraorbital abnormality. No paranasal sinus mucosal disease. No middle ear or mastoid effusion.    BONES/SOFT TISSUES: No acute abnormality.      Impression    IMPRESSION:  1.  No CT finding of a mass, hemorrhage or focal area suggestive of infarct.  2.  Diffuse age related changes along with old lacunar infarct left thalamus.   XR Chest 2 Views    Narrative    EXAM: XR CHEST 2 VIEWS  LOCATION: [facility]  DATE/TIME: 1/8/2023 6:21 PM    INDICATION: sob  COMPARISON: 1/7/2023      Impression    IMPRESSION: Heart size is prominent. This is unchanged. Mild pulmonary vascular congestion. No pleural effusion or pneumothorax.   CT Abdomen Pelvis w/o Contrast    Narrative    EXAM: CT ABDOMEN PELVIS W/O CONTRAST  LOCATION: [facility]  DATE/TIME: 1/8/2023 7:50 PM    INDICATION: Abdominal pain, LFT up, kidney dz.  COMPARISON: None.  TECHNIQUE: CT scan of the abdomen and pelvis was performed without IV contrast. Multiplanar reformats were obtained. Dose reduction techniques were used.  CONTRAST: None.    FINDINGS:   LOWER CHEST: Mild mosaic perfusion which is nonspecific, but most typical for air trapping secondary to small airway inflammation. 1.5 x 1.5 mm  noncalcified pulmonary nodule in the left lower lobe. Mild cardiomegaly. Moderate coronary artery   calcifications.    HEPATOBILIARY: There is some slight stranding seen in the fat surrounding the gallbladder and I cannot exclude subtle findings of acute cholecystitis. If this is of concern clinically, ultrasound is recommended of the gallbladder for further evaluation.   There are changes most typical for slight cirrhotic configuration of the liver with associated mild hepatomegaly. There are findings most typical for some portal venous hypertension with slight varicosities and the spleen is at the upper limits of normal   in size at 12.9 cm.    PANCREAS: Mildly atrophic, but otherwise normal.    SPLEEN: Upper limits of normal in size at 12.9 cm.    ADRENAL GLANDS: Normal.    KIDNEYS/BLADDER: Significant atrophy is seen of the left kidney with some scattered benign cysts seen in the left kidney, some of which show some benign associated calcifications. There is a transplant kidney seen in the right hemipelvis with two tiny   nonobstructive stones and the largest measures approximately 4 mm in greatest dimension. No complications are seen of the transplanted kidney. The native right kidney is surgically absent. The bladder is normal.    BOWEL: There is a small to moderate-sized left inguinal hernia which contains some fat and a portion of the sigmoid colon with no evidence for obstruction or inflammation, however. Mild findings of diverticulosis with no evidence for diverticulitis. The   bowel is otherwise normal.    LYMPH NODES: Normal.    VASCULATURE: Advanced calcification with no aneurysmal dilatation.    PELVIC ORGANS: Mild to moderate calcification. Prostatic gland enlargement.    MUSCULOSKELETAL: Advanced multilevel degenerative changes of the lumbar spine with associated degenerative disc disease.      Impression    IMPRESSION:   1.  Possible subtle changes cholecystitis and ultrasound is recommended for  further evaluation.    2.  Mild findings of hepatomegaly and cirrhosis with associated secondary findings of mild portal venous hypertension.    3.  Left inguinal hernia which contains some fat and sigmoid colon with no evidence for inflammation or obstruction.    4.  Atrophic native left kidney, surgically absent right kidney, and there is a transplant kidney in the right hemipelvis. There are some benign cysts seen in the native left kidney and no follow-up is recommended.    5.  Nonobstructive nephrolithiasis transplant kidney.    6.  Spleen is at the upper limits of normal in size at 12.9 cm, but otherwise normal.    7.  Moderate coronary artery calcifications.    8.  1 mm x 1 mm noncalcified pulmonary nodule right lower lobe and no follow-up is recommended unless there is high risk factors for malignancy.     Abdomen US, limited (RUQ only)    Narrative    EXAM: US ABDOMEN LIMITED  LOCATION: [facility]  DATE/TIME: 2023 9:44 PM    INDICATION: abnm LFT  COMPARISON: CT dated 2023  TECHNIQUE: Limited abdominal ultrasound.    FINDINGS:    GALLBLADDER: Normal. No gallstones, wall thickening, or pericholecystic fluid. Negative sonographic Gustafson's sign.    BILE DUCTS: No biliary dilatation. The common duct measures 7.6 mm.    LIVER: Normal parenchyma with smooth contour. No focal mass.    RIGHT KIDNEY: Surgically absent    PANCREAS: The visualized portions are normal.    No ascites.      Impression    IMPRESSION:  1.  No significant gallbladder wall thickening, or gallstones identified.       Echocardiogram Complete     Value    LVEF  35%    Narrative    [id number]  [id number]  [id number]  [id number]^ASHLEY^AL^[facility]  Echocardiography Laboratory  [address] [place] [address]     Name: SR BRAD MOHAMUD SR.  MRN: [id number]  : 1940  Study Date: 2023 08:10 AM  Age: 82 yrs  Gender: Male  Patient Location: [facility]  Reason For Study:  CHF  Ordering Physician: CORINNE RAMIREZ  Performed By: Tye Franz RDCS     BSA: 1.9 m2  Height: 70 in  Weight: 160 lb  HR: 80  BP: 157/90 mmHg  ______________________________________________________________________________  Procedure  Complete Portable Echo Adult.  ______________________________________________________________________________  Interpretation Summary     1. The left ventricle is normal in size. The visual ejection fraction is  estimated at 35%. There is moderate global hypokinesia of the left ventricle.  2. The right ventricle is normal in structure, function and size.  3. There is moderate (2+) tricuspid regurgitation.  4. Mild (35-45mmHg) pulmonary hypertension is present. The right ventricular  systolic pressure is approximated at 37mmHg plus the right atrial pressure.  5. There is moderate (2+) aortic regurgitation.  6. The ascending aorta is Mildly dilated. 4.0cm.     Echo 12/2019 showed EF 40-45%, 1+ MR/AI, 2+ TR, aorta 4.1cm.  ______________________________________________________________________________  Left Ventricle  The left ventricle is normal in size. There is mild concentric left  ventricular hypertrophy. The visual ejection fraction is estimated at 35%.  Left ventricular diastolic function is indeterminate. There is moderate global  hypokinesia of the left ventricle.     Right Ventricle  The right ventricle is normal in structure, function and size.     Atria  The left atrium is moderately dilated. The right atrium is mild to moderately  dilated. There is no atrial shunt seen.     Mitral Valve  There is moderate mitral annular calcification. The mitral valve leaflets  appear thickened, but open well. There is moderate (2+) mitral regurgitation.     Tricuspid Valve  There is moderate (2+) tricuspid regurgitation. Mild (35-45mmHg) pulmonary  hypertension is present. The right ventricular systolic pressure is  approximated at 37mmHg plus the right atrial pressure.     Aortic  Valve  There is moderate trileaflet aortic sclerosis. There is moderate (2+) aortic  regurgitation. No aortic stenosis is present.     Pulmonic Valve  The pulmonic valve is normal in structure and function.     Vessels  The ascending aorta is Mildly dilated. Dilation of the inferior vena cava is  present with abnormal respiratory variation in diameter.     Pericardium  There is no pericardial effusion.     Rhythm  Rhythm uncertain, wide QRS.  ______________________________________________________________________________  MMode/2D Measurements & Calculations     IVSd: 1.5 cm  LVIDd: 5.4 cm  LVIDs: 4.3 cm  LVPWd: 1.3 cm  FS: 20.4 %  LV mass(C)d: 326.7 grams  LV mass(C)dI: 172.1 grams/m2  Ao root diam: 3.7 cm  LA dimension: 5.6 cm  LA/Ao: 1.5  LVOT diam: 2.1 cm  LVOT area: 3.5 cm2  LA Volume (BP): 94.9 ml  LA Volume Index (BP): 49.9 ml/m2  RWT: 0.49     Doppler Measurements & Calculations  MV E max jonny: 114.0 cm/sec  MV A max jonny: 34.6 cm/sec  MV E/A: 3.3  MV dec time: 0.12 sec  Ao V2 max: 168.0 cm/sec  Ao max P.0 mmHg  Ao V2 mean: 124.0 cm/sec  Ao mean P.0 mmHg  Ao V2 VTI: 33.6 cm  MARCOS(I,D): 1.8 cm2  MARCOS(V,D): 1.8 cm2  AI P1/2t: 357.4 msec  LV V1 max PG: 3.1 mmHg  LV V1 max: 88.5 cm/sec  LV V1 VTI: 17.5 cm  SV(LVOT): 60.6 ml  SI(LVOT): 31.9 ml/m2  TR max jonny: 304.0 cm/sec  TR max P.0 mmHg  AV Jonny Ratio (DI): 0.53  MARCOS Index (cm2/m2): 0.95  E/E' av.9  Lateral E/e': 13.5  Medial E/e': 24.4     ______________________________________________________________________________  Report approved by: Winsome Jones 2023 09:22 AM

## 2023-01-12 NOTE — PROGRESS NOTES
Pt Alert but very confused this evening. PIV is S/L.  Not a very good appetite,on low sat fat diet. Up with SBA, to bathroom. BP meds have parameters, see MAR. Pt has a History of CKD and renal transplant in 2009. Alt and AST improving. On TELE A-Fib in 60's. Pt's Ex-wife is staying with him this evening.

## 2023-01-12 NOTE — PROGRESS NOTES
" Renal Medicine Progress Note            Assessment/Plan:       82 y.o man with CKD IV and kidney transplant.      # Kidney transplant in 2009: Allograft Scr ~ 3 mg/dl. Allograft seems better-but he is also not eatin gmuch.                -Dr. Parkinson     # Immunosuppressions: I discussed the case with Dr. Parkinson today.                -CsA 50 mg bid               - mg bid     # NICM with EF of 35%: Not needing supplemental O2. No peripheral edema. Lungs are clear              -lungs are clear and no peripheral edema.     # Pancytopenia: PLT improved. Leukopenic (MMF?).                  # Hypokalemia: Poor intake and diuretic.      # Hypertension:               -dilt-->Coreg and added hydralazine per cardiology                 # Abnormal liver tests: No further work-up per GI.     Plan:  # Keep CsA and MMF at the same doses for now  # Check CMV   # KCl 40 meq x 1          Interval History:     Afebrile. VSS.   Urine output not recorded.   No new concerns or complaints from patient and daughter.  Getting PT.         Medications and Allergies:       carvedilol  9.375 mg Oral BID w/meals     cycloSPORINE modified  50 mg Oral BID     furosemide  80 mg Oral Daily     [Held by provider] gabapentin  200 mg Oral At Bedtime     hydrALAZINE  25 mg Oral Q8H HECTOR     lactulose  10 g Oral BID     mycophenolate  500 mg Oral BID     risperiDONE  0.25 mg Oral Daily     sodium chloride (PF)  3 mL Intracatheter Q8H     Warfarin Therapy Reminder  1 each Oral See Admin Instructions      No Known Allergies         Physical Exam:   Vitals were reviewed   , Blood pressure 128/64, pulse 65, temperature 98.1  F (36.7  C), temperature source Oral, resp. rate 18, height 1.778 m (5' 10\"), weight 70.1 kg (154 lb 9.6 oz), SpO2 96 %.    Wt Readings from Last 3 Encounters:   01/12/23 70.1 kg (154 lb 9.6 oz)   01/07/23 72.6 kg (160 lb)   04/19/21 82.6 kg (182 lb)       Intake/Output Summary (Last 24 hours) at 1/12/2023 1114  Last data filed at " 1/12/2023 1000  Gross per 24 hour   Intake 550 ml   Output --   Net 550 ml       GENERAL APPEARANCE: NAD. Very frail.  HEENT:  Eyes/ears/nose/neck grossly normal  RESP: lungs cta b c good efforts, no crackles, rhonchi or wheezes  CV: RRR, nl S1/S2/ Soft murmur.  ABDOMEN: Soft, NT  EXTREMITIES/SKIN: no rashes/lesions on observed skin; No edema.   NEURO: Awake, alert and answering. He knows he is at [place] today. Less confused.          Data:     CBC RESULTS:     Recent Labs   Lab 01/12/23  0804 01/11/23  0840 01/09/23  0754 01/08/23  1723 01/07/23  1005   WBC 2.8* 2.5* 3.7* 4.1 3.2*   RBC 3.97* 3.79* 3.66* 3.78* 3.75*   HGB 11.5* 10.9* 10.5* 10.8* 10.8*   HCT 36.2* 35.0* 33.5* 34.7* 34.9*    180 115* 125* 134*       Basic Metabolic Panel:  Recent Labs   Lab 01/12/23  0804 01/11/23  0840 01/10/23  1013 01/09/23  0754 01/08/23  1723 01/07/23  1005    145 144 140 142 144   POTASSIUM 3.2* 3.1* 2.9* 3.1* 3.7 3.4   CHLORIDE 104 104 102 101 101 104   CO2 25 27 26 20* 21* 24   BUN 53.3* 58.4* 64.6* 59.5* 55.6* 42.5*   CR 2.34* 2.47* 2.79* 2.92* 3.04* 2.69*   * 135* 132* 100* 120* 87   ELEAZAR 9.9 10.0 9.8 9.5 10.0 10.0       INR  Recent Labs   Lab 01/12/23  0804 01/11/23  0840 01/10/23  0739 01/09/23  0754   INR 2.76* 2.73* 3.46* 4.02*      Attestation:   I have reviewed today's relevant vital signs, notes, medications, labs and imaging.    Carlos Daniesl MD  [facility] - Nephrology  Office phone :[phone]  Pager: [phone]

## 2023-01-13 NOTE — PLAN OF CARE
Goal Outcome Evaluation:         Admitting Diagnosis:  CHF   Pertinent History:ESRD, kidney transplant ( 2009, CHF, Afib.  For vitals and assessment please see flow sheet.   Living Situation: Home  Pain plan: denies pain.  Mobility:  assistance, 1 person  Baseline activity: Independent.   Alarms/Safety: BA.   LDA's:  PIV.  Pertinent test results: K+ 3.2. Cr: 2.34.  Consults:  SW following.   Abnormals/Pending: none  Other Cares/Comments: Alert/confused, VSS, tele Afib/CVR. LS diminished.  FR 2L. Family at bedside, refused night meds.   Discharge Disposition:  TBD.   Discharge Time:  TBD.

## 2023-01-13 NOTE — PLAN OF CARE
Aler/ confused. Oriented to self. Difficult to redirect pt tonight. Ambulated in hallway this shift. VSS. Denies pain. LS diminished. Tele Afib CVR with BBB. 2 L fluid restriction. Assist x1/gait belt. Family at bedside.

## 2023-01-13 NOTE — PROGRESS NOTES
Notified patient does not follow instruction to get CT head. Ordered a dose Ativan 0.5 mg x 1 before CT scan is done.

## 2023-01-13 NOTE — PROGRESS NOTES
[facility]  Cardiology Progress Note    Date of Service (when I saw the patient): 01/13/2023       Assessment & Plan   Matheus White Sr. is a 82 year old male who was admitted on 1/8/2023.     1.  : Acute systolic heart failure  - NT pro BNP > 47,000  - Echocardiogram showed a moderately reduced EF 35% (40-45% 2019), moderate global hypokinesia of the LV. Normal RV size and function, mild pulmonary hypertension, elevated RV systolic pressure 37 mmHg + RA pressure. Moderate aortic regurgitation.   - Diuresing on lasix, down ~ 3kg since admission. Lasix restarted at 80 mg daily, reduced to 40 mg 1/13.   - Continue Carvedilol and hydralazine.     2.  : Atrial Fibrillation   - Rate controlled, anticoagulated on Warfarin.   3.  : Hypertension   4.  CKD stage IV s/p kidney transplant (2009)     Plan   1.  Euvolemic upon exam. Lasix reduced to 40 mg daily. Nephrology to manage diuretics  2. Blood pressures sub optimal, increase hydralazine to 50 mg 3x/day.   3. Continue carvedilol. Further uptitration of GDMT as able.   4. Daily weight, strict I/O, low sodium diet.    5. A-fib rate controlled, continue warfarin for stroke prevention    6. Follow up with primary cardiologist 7- 10 days post discharge.       ZAY Lacey CNP  Text Page  (M-F, 7:30 am - 4:00 pm)    Interval History   Patient alert, confused. Daughter at bedside.  Denies chest pain, shortness of breath at rest, no palpitation, or leg edema. Euvolemic upon exam. Lasix decreased to 40 mg daily.   BP elevated, hydralazine increased.     Physical Exam   Temp: 98.2  F (36.8  C) Temp src: Oral BP: (!) 152/76 Pulse: 76   Resp: 20 SpO2: 96 % O2 Device: None (Room air)    Vitals:    01/10/23 1707 01/12/23 1000 01/13/23 0500   Weight: 70.1 kg (154 lb 9.6 oz) 70.1 kg (154 lb 9.6 oz) 69.9 kg (154 lb 3.2 oz)     Vital Signs with Ranges  Temp:  [97.7  F (36.5  C)-98.2  F (36.8  C)] 98.2  F (36.8  C)  Pulse:  [69-78] 76  Resp:  [19-22]  20  BP: (125-153)/(65-88) 152/76  SpO2:  [96 %-99 %] 96 %  I/O last 3 completed shifts:  In: 560 [P.O.:560]  Out: 400 [Urine:400]    GEN:  In general, this is a well nourished elderly male in no acute distress.   HEENT:  Pupils equal, round. Sclerae nonicteric. Clear oropharynx. Mucous membranes moist.  NECK: Supple, no masses appreciated. Trachea midline. No JVD  C/V:  Regular rate and irregular rhythm, no murmur, rub or gallop. No S3 or RV heave.   RESP: Respirations are unlabored. No use of accessory muscles. Clear to auscultation bilaterally without wheezing, rales, or rhonchi.  GI: Abdomen soft, nontender, nondistended. No HSM appreciated.   EXTREM: No  LE edema. No cyanosis or clubbing.  NEURO: Alert and oriented, cooperative. Mild confusion   PSYCH: Normal affect.  SKIN: Warm and dry. No rashes or petechiae appreciated.       Medications     - MEDICATION INSTRUCTIONS -         calcitRIOL  0.25 mcg Oral Daily     carvedilol  9.375 mg Oral BID w/meals     cycloSPORINE modified  50 mg Oral BID     furosemide  40 mg Oral Daily     [Held by provider] gabapentin  200 mg Oral At Bedtime     hydrALAZINE  50 mg Oral Q8H HECTOR     lactulose  20 g Oral BID     multivitamin w/minerals  1 tablet Oral Daily     mycophenolate  500 mg Oral BID     sodium chloride (PF)  3 mL Intracatheter Q8H     Warfarin Therapy Reminder  1 each Oral See Admin Instructions       Data   Reviewed       I spent > 20 minutes face-to-face and/or coordinating care. Over 50% of our time on the unit was spent counseling the patient and/or coordinating care       ZAY Lacey CNP 1/13/2023

## 2023-01-13 NOTE — PROGRESS NOTES
[facility]  Hospitalist Progress Note  Sarmad Owens MD 01/13/2023    Reason for Stay (Diagnosis): Acute hypoxic respiratory failure, encephalopathy.         Assessment and Plan:      Summary of Stay: Matheus White Sr. is a 82 year old male with PMH of ESRD, renal transplantation back in 2009, on chronic immunosuppression therapy, congestive heart failure, Afib on warfarin, HTN, nonischemic cardiomyopathy with prior EF of 40 to 45%, who lives independently at home. He was seen in the emergency room a day earlier for shortness of breath and difficulty laying flat inhibiting his ability to sleep.  He was diuresed in the emergency room and demonstrated no significant hypoxia and had some significant improvement that led for him to request for discharge and subsequently went home.   He then came back to the emergency room due to concerns of increasing shortness of breath, generalized weakness, increasingly lethargy, and possible yellowish discoloration of the skin and concerns of decreasing oral intake and admitted to [facility] on 1/8/2023.     Problem list:     #1  Acute on chronic systolic CHF.  #2  Elevated Troponin likely secondary to #1, type II MI.  -Continue gentle diuresis, transitioned to oral dose , Nephrologist is managing.  -Patient is euvolemic or may be on the dry side today.  -Continue telemetry monitoring.  -Daily weight, input and output, cardiac diet.  -Echocardiogram showed EF of 35%, +2 tricuspid regurg, +2 aortic regurg there is moderate global hypokinesia of left ventricle.  -The latest Echo seems to be worsening when compared to prior study and cardiology adjusted his meds.  -Cardiology consulted, input appreciated.  -Patient is already on full anticoagulation.  -Cardiologist stopped Cardizem, started on low-dose Coreg to titrated up as tolerated.  -Diuretics management per cardiology and nephrology.    #3  Permanent A. fib on chronic anticoagulation  #4  Ssupratherapeutic INR  on warfarin, goal 2-3  -Patient is on anticoagulation for permanent A. Fib.  -INR is supra therapeutic on presentation now in the normal range..  -Pharmacy will dose warfarin as appropriate.  -Cardizem stopped due to decreased LV function, started on Coreg and being titrated up for both rate control and decreased systolic function..     #5.  History of renal transplant, CKD stage IV and immunocompromised state.  #6.  Mild hypokalemia  -Patient is on immunosuppressive treatment.  -Continue gentle diuretics per nephrologist.  -Gently replace potassium, 40 mEq p.o. x1 ordered again today..  -Nephrology is consulted, input is appreciated, discussed the case with Nephrologist..  -Check potassium in a.m.    #7. Elevated liver enzyme with indirect hyperbilirubinemia.  #8 Chronic liver disease with portal hypertension  -This possibly due to CHF exacerbation, with underlying Cirrhosis.  -Imaging also suggestive of chronic liver disease with portal hypertension.  -No ascites seen.  -No sign of acute cholecystitis or biliary obstruction.  -No history of heavy alcohol use per patient and family.  -GI is consulted, input appreciated.  -LFT better, bilirubin about the same, 1.9, hepatitis serology negative.  -PRINCE, F-actin, AMA, A1 AT, celiac pending.  -Cyclosporine level is pending.  -Right upper quadrant ultrasound negative.  -No evidence of hepatic vessels thrombosis or abnormality on color Doppler     #9. Physical deconditioning  -Reportedly lives independently  -PT, OT evaluation, Social service input for discharge planning pending.  -Patient lives alone and may need increased level of care vs [facility], pending PT/OT recommendation.    #10. Metabolic encephalopathy.  Worsening.  -He is more confused, slow to respond to questions today.  -Earlier he was able to ambulate with PT, but patient was not following simple instructions.  -Continue Lactulose started by GI, noted NH3 level is normal.  -Reported that he sleeps more during  daytime than at night probably contributing to his confusion.  -Stopped his low dose risperidone and also haldol 1/12, without any significant improvement since.  -He has a sitter at bedside.  -Needs close monitoring.  -Continue lactulose, ammonia level was normal 2 days ago, will check it again.  -There is no medication listed contributing to his mental status  -CMV PCR is pending.  -We will get CT scan of the head without contrast, to rule out bleeding as patient is on anticoagulation.  -Call MD with results when CT scan is done.    #11: Bicytopenia: ( anemia and Leukopenia)  -Patient with mild leukopenia.  -Plt count improved to 240.  -CBC daily.  -Continue close monitoring    Clinically Significant Risk Factors        # Hypokalemia: Lowest K = 3.2 mmol/L in last 2 days, will replace as needed  # Hypernatremia: Highest Na = 146 mmol/L in last 2 days, will monitor as appropriate                        DVT Prophylaxis: Warfarin, will be dosed per pharmacy  Code Status: DNR / DNI.    Discharge Dispo: Home.  Estimated Disch Date / # of Days until Disch: Plan was to discharge patient but his mental status deteriorated.  Patient will likely need TCU, needs to work with PT and OT when his mental status improved.   I discussed with patient and with his daughters and son at bedside the plan of care.  All their question and concerns addressed, showed understanding.  They are in agreement with TCU placement.  Also discussed with nephrologist and cardiologist.          Interval History (Subjective):      Patient seen and examined, drowsy, sleepy, incoherent and does not follow instructions today, reported he rested better last night.  Denied any pain, did not eat that much today, able to drink a little bit of orange juice, no cough, fever or SOB, not on oxygen.                  Physical Exam:      Last Vital Signs:  /74 (BP Location: Right arm)   Pulse 63   Temp 98.5  F (36.9  C) (Oral)   Resp 18   Ht 1.778 m (5'  "10\")   Wt 69.9 kg (154 lb 3.2 oz)   SpO2 96%   BMI 22.13 kg/m      I/O last 3 completed shifts:  In: 600 [P.O.:600]  Out: 400 [Urine:400]  Vitals:    01/09/23 1100 01/10/23 1707 01/12/23 1000 01/13/23 0500   Weight: 72.6 kg (160 lb) 70.1 kg (154 lb 9.6 oz) 70.1 kg (154 lb 9.6 oz) 69.9 kg (154 lb 3.2 oz)     Current Facility-Administered Medications   Medication     acetaminophen (TYLENOL) tablet 650 mg    Or     acetaminophen (TYLENOL) Suppository 650 mg     calcitRIOL (ROCALTROL) capsule 0.25 mcg     carvedilol (COREG) tablet 12.5 mg     cycloSPORINE modified (GENERIC EQUIVALENT) capsule 50 mg     furosemide (LASIX) tablet 60 mg     [Held by provider] gabapentin (NEURONTIN) capsule 200 mg     hydrALAZINE (APRESOLINE) tablet 50 mg     lactulose (CHRONULAC) solution 20 g     lidocaine (LMX4) cream     lidocaine 1 % 0.1-1 mL     melatonin tablet 1 mg     multivitamin w/minerals (THERA-VIT-M) tablet 1 tablet     mycophenolate (GENERIC EQUIVALENT) tablet 500 mg     ondansetron (ZOFRAN ODT) ODT tab 4 mg    Or     ondansetron (ZOFRAN) injection 4 mg     Patient is already receiving anticoagulation with heparin, enoxaparin (LOVENOX), warfarin (COUMADIN)  or other anticoagulant medication     sodium chloride (PF) 0.9% PF flush 3 mL     sodium chloride (PF) 0.9% PF flush 3 mL     Warfarin Dose Required Daily - Pharmacist Managed     warfarin-No DOSE today       Constitutional:  Sleepy and drowsy, slow to respond to questions, still confused, no apparent distress, chronically sick looking.   Respiratory: Clear to auscultation bilaterally, no crackles or wheezing   Cardiovascular: irregular rate and rhythm, normal S1 and S2, and no murmur noted   Abdomen: Normal bowel sounds, soft, non-distended, non-tender   Skin: No rashes, no cyanosis, dry to touch   Neuro: Alert and oriented x3, no weakness, numbness, +memory loss   Extremities: No edema, normal range of motion   Other(s):HEENT Pink, nonicteric, dry oral mucosa.     "   All other systems: Negative.          Medications:      All current medications were reviewed with changes reflected in problem list.         Data:      All new lab and imaging data was reviewed.   Labs:  Recent Labs   Lab 01/13/23  0905 01/12/23  0804 01/11/23  0840   WBC 3.8* 2.8* 2.5*   HGB 10.7* 11.5* 10.9*   HCT 33.8* 36.2* 35.0*   MCV 93 91 92    210 180     Recent Labs   Lab 01/13/23  0905 01/12/23  0804 01/11/23  0840   * 144 145   POTASSIUM 3.3* 3.2* 3.1*   CHLORIDE 107 104 104   CO2 25 25 27   ANIONGAP 14 15 14   * 140* 135*   BUN 51.8* 53.3* 58.4*   CR 2.41* 2.34* 2.47*   GFRESTIMATED 26* 27* 25*   ELEAZAR 9.6 9.9 10.0   PROTTOTAL 6.5 6.9 6.9   ALBUMIN 4.0 4.2 4.2   BILITOTAL 1.4* 1.7* 1.9*   ALKPHOS 152* 164* 168*   AST 44 56* 72*   ALT 52* 61* 66*     Recent Labs   Lab 01/13/23  0905 01/12/23  0804 01/11/23  0840 01/10/23  1013 01/09/23  0754   * 140* 135* 132* 100*      Imaging:   Results for orders placed or performed during the hospital encounter of 01/08/23   CT Head w/o Contrast    Narrative    EXAM: CT HEAD W/O CONTRAST  LOCATION: [facility]  DATE/TIME: 1/8/2023 6:09 PM    INDICATION: confusion  COMPARISON: 06/16/2022.  TECHNIQUE: Routine CT Head without IV contrast. Multiplanar reformats. Dose reduction techniques were used.    FINDINGS:  INTRACRANIAL CONTENTS: No intracranial hemorrhage, extraaxial collection, or mass effect.  No CT evidence of acute infarct. There is scattered diffuse low attenuation within the periventricular and subcortical white matter consistent with diffuse small   vessel ischemic disease. There is an old lacunar infarct left thalamus. The ventricular system, basal cisterns and the cortical sulci are consistent with diffuse volume loss.     VISUALIZED ORBITS/SINUSES/MASTOIDS: No intraorbital abnormality. No paranasal sinus mucosal disease. No middle ear or mastoid effusion.    BONES/SOFT TISSUES: No acute abnormality.       Impression    IMPRESSION:  1.  No CT finding of a mass, hemorrhage or focal area suggestive of infarct.  2.  Diffuse age related changes along with old lacunar infarct left thalamus.   XR Chest 2 Views    Narrative    EXAM: XR CHEST 2 VIEWS  LOCATION: [facility]  DATE/TIME: 1/8/2023 6:21 PM    INDICATION: sob  COMPARISON: 1/7/2023      Impression    IMPRESSION: Heart size is prominent. This is unchanged. Mild pulmonary vascular congestion. No pleural effusion or pneumothorax.   CT Abdomen Pelvis w/o Contrast    Narrative    EXAM: CT ABDOMEN PELVIS W/O CONTRAST  LOCATION: [facility]  DATE/TIME: 1/8/2023 7:50 PM    INDICATION: Abdominal pain, LFT up, kidney dz.  COMPARISON: None.  TECHNIQUE: CT scan of the abdomen and pelvis was performed without IV contrast. Multiplanar reformats were obtained. Dose reduction techniques were used.  CONTRAST: None.    FINDINGS:   LOWER CHEST: Mild mosaic perfusion which is nonspecific, but most typical for air trapping secondary to small airway inflammation. 1.5 x 1.5 mm noncalcified pulmonary nodule in the left lower lobe. Mild cardiomegaly. Moderate coronary artery   calcifications.    HEPATOBILIARY: There is some slight stranding seen in the fat surrounding the gallbladder and I cannot exclude subtle findings of acute cholecystitis. If this is of concern clinically, ultrasound is recommended of the gallbladder for further evaluation.   There are changes most typical for slight cirrhotic configuration of the liver with associated mild hepatomegaly. There are findings most typical for some portal venous hypertension with slight varicosities and the spleen is at the upper limits of normal   in size at 12.9 cm.    PANCREAS: Mildly atrophic, but otherwise normal.    SPLEEN: Upper limits of normal in size at 12.9 cm.    ADRENAL GLANDS: Normal.    KIDNEYS/BLADDER: Significant atrophy is seen of the left kidney with some scattered benign cysts  seen in the left kidney, some of which show some benign associated calcifications. There is a transplant kidney seen in the right hemipelvis with two tiny   nonobstructive stones and the largest measures approximately 4 mm in greatest dimension. No complications are seen of the transplanted kidney. The native right kidney is surgically absent. The bladder is normal.    BOWEL: There is a small to moderate-sized left inguinal hernia which contains some fat and a portion of the sigmoid colon with no evidence for obstruction or inflammation, however. Mild findings of diverticulosis with no evidence for diverticulitis. The   bowel is otherwise normal.    LYMPH NODES: Normal.    VASCULATURE: Advanced calcification with no aneurysmal dilatation.    PELVIC ORGANS: Mild to moderate calcification. Prostatic gland enlargement.    MUSCULOSKELETAL: Advanced multilevel degenerative changes of the lumbar spine with associated degenerative disc disease.      Impression    IMPRESSION:   1.  Possible subtle changes cholecystitis and ultrasound is recommended for further evaluation.    2.  Mild findings of hepatomegaly and cirrhosis with associated secondary findings of mild portal venous hypertension.    3.  Left inguinal hernia which contains some fat and sigmoid colon with no evidence for inflammation or obstruction.    4.  Atrophic native left kidney, surgically absent right kidney, and there is a transplant kidney in the right hemipelvis. There are some benign cysts seen in the native left kidney and no follow-up is recommended.    5.  Nonobstructive nephrolithiasis transplant kidney.    6.  Spleen is at the upper limits of normal in size at 12.9 cm, but otherwise normal.    7.  Moderate coronary artery calcifications.    8.  1 mm x 1 mm noncalcified pulmonary nodule right lower lobe and no follow-up is recommended unless there is high risk factors for malignancy.     Abdomen US, limited (RUQ only)    Narrative    EXAM: US  ABDOMEN LIMITED  LOCATION: [facility]  DATE/TIME: 2023 9:44 PM    INDICATION: abnm LFT  COMPARISON: CT dated 2023  TECHNIQUE: Limited abdominal ultrasound.    FINDINGS:    GALLBLADDER: Normal. No gallstones, wall thickening, or pericholecystic fluid. Negative sonographic Gustafson's sign.    BILE DUCTS: No biliary dilatation. The common duct measures 7.6 mm.    LIVER: Normal parenchyma with smooth contour. No focal mass.    RIGHT KIDNEY: Surgically absent    PANCREAS: The visualized portions are normal.    No ascites.      Impression    IMPRESSION:  1.  No significant gallbladder wall thickening, or gallstones identified.       Echocardiogram Complete     Value    LVEF  35%    Narrative    [id number]  [id number]  [id number]  [id number]^ASHLEY^AL^AJ     [facility]  Echocardiography Laboratory  [address] [place] [address]     Name: SR BRAD MOHAMUD SR.  MRN: [id number]  : 1940  Study Date: 2023 08:10 AM  Age: 82 yrs  Gender: Male  Patient Location: [facility]  Reason For Study: CHF  Ordering Physician: CORINNE RAMIREZ  Performed By: Tye Franz RDCS     BSA: 1.9 m2  Height: 70 in  Weight: 160 lb  HR: 80  BP: 157/90 mmHg  ______________________________________________________________________________  Procedure  Complete Portable Echo Adult.  ______________________________________________________________________________  Interpretation Summary     1. The left ventricle is normal in size. The visual ejection fraction is  estimated at 35%. There is moderate global hypokinesia of the left ventricle.  2. The right ventricle is normal in structure, function and size.  3. There is moderate (2+) tricuspid regurgitation.  4. Mild (35-45mmHg) pulmonary hypertension is present. The right ventricular  systolic pressure is approximated at 37mmHg plus the right atrial pressure.  5. There is moderate (2+) aortic regurgitation.  6. The ascending aorta is  Mildly dilated. 4.0cm.     Echo 12/2019 showed EF 40-45%, 1+ MR/AI, 2+ TR, aorta 4.1cm.  ______________________________________________________________________________  Left Ventricle  The left ventricle is normal in size. There is mild concentric left  ventricular hypertrophy. The visual ejection fraction is estimated at 35%.  Left ventricular diastolic function is indeterminate. There is moderate global  hypokinesia of the left ventricle.     Right Ventricle  The right ventricle is normal in structure, function and size.     Atria  The left atrium is moderately dilated. The right atrium is mild to moderately  dilated. There is no atrial shunt seen.     Mitral Valve  There is moderate mitral annular calcification. The mitral valve leaflets  appear thickened, but open well. There is moderate (2+) mitral regurgitation.     Tricuspid Valve  There is moderate (2+) tricuspid regurgitation. Mild (35-45mmHg) pulmonary  hypertension is present. The right ventricular systolic pressure is  approximated at 37mmHg plus the right atrial pressure.     Aortic Valve  There is moderate trileaflet aortic sclerosis. There is moderate (2+) aortic  regurgitation. No aortic stenosis is present.     Pulmonic Valve  The pulmonic valve is normal in structure and function.     Vessels  The ascending aorta is Mildly dilated. Dilation of the inferior vena cava is  present with abnormal respiratory variation in diameter.     Pericardium  There is no pericardial effusion.     Rhythm  Rhythm uncertain, wide QRS.  ______________________________________________________________________________  MMode/2D Measurements & Calculations     IVSd: 1.5 cm  LVIDd: 5.4 cm  LVIDs: 4.3 cm  LVPWd: 1.3 cm  FS: 20.4 %  LV mass(C)d: 326.7 grams  LV mass(C)dI: 172.1 grams/m2  Ao root diam: 3.7 cm  LA dimension: 5.6 cm  LA/Ao: 1.5  LVOT diam: 2.1 cm  LVOT area: 3.5 cm2  LA Volume (BP): 94.9 ml  LA Volume Index (BP): 49.9 ml/m2  RWT: 0.49     Doppler Measurements &  Calculations  MV E max jonny: 114.0 cm/sec  MV A max jonny: 34.6 cm/sec  MV E/A: 3.3  MV dec time: 0.12 sec  Ao V2 max: 168.0 cm/sec  Ao max P.0 mmHg  Ao V2 mean: 124.0 cm/sec  Ao mean P.0 mmHg  Ao V2 VTI: 33.6 cm  MARCOS(I,D): 1.8 cm2  MARCOS(V,D): 1.8 cm2  AI P1/2t: 357.4 msec  LV V1 max PG: 3.1 mmHg  LV V1 max: 88.5 cm/sec  LV V1 VTI: 17.5 cm  SV(LVOT): 60.6 ml  SI(LVOT): 31.9 ml/m2  TR max jonny: 304.0 cm/sec  TR max P.0 mmHg  AV Jonny Ratio (DI): 0.53  MARCOS Index (cm2/m2): 0.95  E/E' av.9  Lateral E/e': 13.5  Medial E/e': 24.4     ______________________________________________________________________________  Report approved by: Winsome Jones 2023 09:22 AM

## 2023-01-13 NOTE — PROGRESS NOTES
Renal Medicine Progress Note            Assessment/Plan:     82 y.o man with CKD IV and kidney transplant.      # Kidney transplant in 2009: Allograft Scr ~ 3 mg/dl. Allograft seems better-but he is also not eatin gmuch.                -Dr. Parkinson     # Immunosuppressions: I discussed the case with Dr. Parkinson today.                -CsA 50 mg bid               - mg bid     # NICM with EF of 35%: Not needing supplemental O2. No peripheral edema. Lungs are clear              -lungs are clear and no peripheral edema.     # Pancytopenia: PLT improved. WBC improving.                  # Hypokalemia and hypernatremia due to poor oral intake.   -needs to eat and drink a bit more     # Hypertension:               -dilt-->Coreg and added hydralazine per cardiology                 # Abnormal liver tests: No further work-up per GI.     Plan:  # I advised his daughter to have him eat and drink a bit more.   # FUV with Dr. Parkinson, his nephrologist in a week after discharge.    No other recommendations from me. Will watch labs from a distant if he remains here over the weekend.         Interval History:     Afebrile. VSS.   Daughter is at the bedside.  She says he seems a little better today.  She says he took a walk in the hallway this morning.  He is sleeping soundly currently.   Received KCL replacement this morning.  Lasix decreased to 40 mg daily.          Medications and Allergies:       calcitRIOL  0.25 mcg Oral Daily     carvedilol  9.375 mg Oral BID w/meals     cycloSPORINE modified  50 mg Oral BID     furosemide  40 mg Oral Daily     [Held by provider] gabapentin  200 mg Oral At Bedtime     hydrALAZINE  50 mg Oral Q8H HECTOR     lactulose  20 g Oral BID     multivitamin w/minerals  1 tablet Oral Daily     mycophenolate  500 mg Oral BID     sodium chloride (PF)  3 mL Intracatheter Q8H     Warfarin Therapy Reminder  1 each Oral See Admin Instructions     warfarin-No DOSE today  1 each Does not apply no dose today (warfarin)  "     No Known Allergies         Physical Exam:   Vitals were reviewed   , Blood pressure (!) 152/76, pulse 76, temperature 98.2  F (36.8  C), temperature source Oral, resp. rate 20, height 1.778 m (5' 10\"), weight 69.9 kg (154 lb 3.2 oz), SpO2 96 %.    Wt Readings from Last 3 Encounters:   01/13/23 69.9 kg (154 lb 3.2 oz)   01/07/23 72.6 kg (160 lb)   04/19/21 82.6 kg (182 lb)       Intake/Output Summary (Last 24 hours) at 1/13/2023 1055  Last data filed at 1/13/2023 0239  Gross per 24 hour   Intake 360 ml   Output 400 ml   Net -40 ml       GENERAL APPEARANCE: Sleeping.  HEENT:  Eyes/ears/nose/neck grossly normal  RESP: Not needing supplemental O2  ABDOMEN: soft, NT  EXTREMITIES/SKIN:  no edema         Data:     CBC RESULTS:     Recent Labs   Lab 01/13/23  0905 01/12/23  0804 01/11/23  0840 01/09/23  0754 01/08/23  1723 01/07/23  1005   WBC 3.8* 2.8* 2.5* 3.7* 4.1 3.2*   RBC 3.65* 3.97* 3.79* 3.66* 3.78* 3.75*   HGB 10.7* 11.5* 10.9* 10.5* 10.8* 10.8*   HCT 33.8* 36.2* 35.0* 33.5* 34.7* 34.9*    210 180 115* 125* 134*       Basic Metabolic Panel:  Recent Labs   Lab 01/13/23  0905 01/12/23  0804 01/11/23  0840 01/10/23  1013 01/09/23  0754 01/08/23  1723   * 144 145 144 140 142   POTASSIUM 3.3* 3.2* 3.1* 2.9* 3.1* 3.7   CHLORIDE 107 104 104 102 101 101   CO2 25 25 27 26 20* 21*   BUN 51.8* 53.3* 58.4* 64.6* 59.5* 55.6*   CR 2.41* 2.34* 2.47* 2.79* 2.92* 3.04*   * 140* 135* 132* 100* 120*   ELEAZAR 9.6 9.9 10.0 9.8 9.5 10.0       INR  Recent Labs   Lab 01/13/23  0905 01/12/23  0804 01/11/23  0840 01/10/23  0739   INR 3.47* 2.76* 2.73* 3.46*      Attestation:   I have reviewed today's relevant vital signs, notes, medications, labs and imaging.    Carlos Daniels MD  [facility]  Office phone :[phone]  Pager: [phone]  "

## 2023-01-13 NOTE — PROGRESS NOTES
"  Gastroenterology Progress Note     Subjective   Patient is an 82-year-old gentleman who we are seeing for elevated LFTs.  CT findings are concerning for cirrhosis and mild portal venous hypertension.  Work-up for causes of cirrhosis has been ordered.      He had some mild confusion on admission.  Yesterday his confusion seemed to have improved.  Today he is extremely confused.  He does not know where he is or even that he is in the hospital.    Last bowel movement January 11, 2023 at 17:44     Objective     Vitals Blood pressure (!) 153/88, pulse 69, temperature 97.7  F (36.5  C), temperature source Oral, resp. rate 19, height 1.778 m (5' 10\"), weight 70.1 kg (154 lb 9.6 oz), SpO2 98 %.          Physical Exam   refused physical exam        Laboratory     Electrolytes    Recent Labs   Lab 01/12/23  0804 01/11/23  0840 01/10/23  1013    145 144   POTASSIUM 3.2* 3.1* 2.9*   CHLORIDE 104 104 102   CO2 25 27 26   * 135* 132*   CR 2.34* 2.47* 2.79*   BUN 53.3* 58.4* 64.6*      Hematology    Recent Labs   Lab 01/12/23  0804 01/11/23  0840 01/10/23  0739 01/09/23  0754   HGB 11.5* 10.9*  --  10.5*   MCV 91 92  --  92   WBC 2.8* 2.5*  --  3.7*    180  --  115*   INR 2.76* 2.73* 3.46* 4.02*      LFTs & Lipase    Recent Labs   Lab 01/12/23  0804 01/11/23  0840 01/10/23  1013   AST 56* 72* 99*   ALT 61* 66* 71*   ALKPHOS 164* 168* 173*   BILITOTAL 1.7* 1.9* 1.8*       Imaging Studies     US ABDOMEN OR PELVIS DOPPLER LIMITED PORTABLE 1/10/2023 9:36 AM     CLINICAL HISTORY: Assess for portal vein thrombosis, hepatic vein  thrombosis.     TECHNIQUE: Limited abdominal ultrasound. Color flow with spectral  Doppler and waveform analysis performed.     COMPARISON: Ultrasound abdomen 1/8/2023.     FINDINGS:     ABDOMINAL DUPLEX: The hepatic artery, hepatic veins, IVC, portal  veins, and splenic vein are patent with flow in the normal direction.                                                                     "   IMPRESSION: Color and Doppler spectral assessment of the hepatic  vessels shows no evidence for thrombosis or specific abnormality.       I have reviewed the current diagnostic and laboratory tests.           Impression and Plan      Abnormal LFTs with CT findings concerning for cirrhosis.  Mild hepatomegaly and possible portal venous hypertension seen.  Spleen is upper limits of normal.  AST and ALT are improving.  Bilirubin has improved slightly.  Patient is on warfarin and therefore INR is not helpful in assessing liver function.  Possible reason for elevated LFTs is congestive hepatopathy.  He is on cyclosporine and level checked and was not elevated.  He does not consume alcohol.  Hepatitis A, B, and C are negative.  Autoimmune liver disease has been checked.  PRINCE is mildly elevated (1:80).  Smooth muscle antibody is negative.  I think autoimmune hepatitis is very unlikely given the low level elevation in PRINCE, he is on immunosuppressive, and LFTs are improving.  Doppler was done and was negative for portal vein thrombosis.  Celiac panel pending.  Alpha-1 antitrypsin pending.  Thyroid normal.  Iron saturation 14% with ferritin 341.  I do not expect any further work-up.  We will continue to follow LFTs.    Confusion.  Unsure of the cause.  Not likely to be hepatic encephalopathy.    No bowel movement today.  We will increase the lactulose slightly to 30 mL twice daily.       30 minutes of total time was spent providing patient care including patient evaluation, reviewing documentation/test results, and .           Kristian Silva MD  Thank you for the opportunity to participate in the care of this patient.   Please feel free to call me with any questions or concerns.  Phone number [phone].

## 2023-01-13 NOTE — PROGRESS NOTES
"  Gastroenterology Progress Note     Subjective   Patient is an 82-year-old gentleman who we are seeing for elevated LFTs.  CT findings are concerning for cirrhosis and mild portal venous hypertension.  Work-up for causes of cirrhosis has been ordered.      He had some mild confusion on admission.  Next day his confusion seemed to have improved.  On 1/12/23 he darian extremely confused and angry.  On January 13 he is still confused but not angry and is pleasant.  He has 3 family members in the room.  He fell asleep during our conversation.    Last bowel movement just occurred today according to family.     Objective     Vitals Blood pressure 134/74, pulse 63, temperature 98.5  F (36.9  C), temperature source Oral, resp. rate 18, height 1.778 m (5' 10\"), weight 69.9 kg (154 lb 3.2 oz), SpO2 96 %.          Physical Exam   General: awake, alert    Cardiovascular: irr    Chest: lungs are clear to auscultation bilaterally    Abdomen: soft, tender in the left lower quadrant, non-distended, bowel sounds present    Neurologic: confused, moves all four extremities           Laboratory     Electrolytes    Recent Labs   Lab 01/13/23  0905 01/12/23  0804 01/11/23  0840   * 144 145   POTASSIUM 3.3* 3.2* 3.1*   CHLORIDE 107 104 104   CO2 25 25 27   * 140* 135*   CR 2.41* 2.34* 2.47*   BUN 51.8* 53.3* 58.4*      Hematology    Recent Labs   Lab 01/13/23  0905 01/12/23  0804 01/11/23  0840   HGB 10.7* 11.5* 10.9*   MCV 93 91 92   WBC 3.8* 2.8* 2.5*    210 180   INR 3.47* 2.76* 2.73*      LFTs & Lipase    Recent Labs   Lab 01/13/23  0905 01/12/23  0804 01/11/23  0840   AST 44 56* 72*   ALT 52* 61* 66*   ALKPHOS 152* 164* 168*   BILITOTAL 1.4* 1.7* 1.9*       Imaging Studies     US ABDOMEN OR PELVIS DOPPLER LIMITED PORTABLE 1/10/2023 9:36 AM     CLINICAL HISTORY: Assess for portal vein thrombosis, hepatic vein  thrombosis.     TECHNIQUE: Limited abdominal ultrasound. Color flow with spectral  Doppler and waveform " analysis performed.     COMPARISON: Ultrasound abdomen 1/8/2023.     FINDINGS:     ABDOMINAL DUPLEX: The hepatic artery, hepatic veins, IVC, portal  veins, and splenic vein are patent with flow in the normal direction.                                                                       IMPRESSION: Color and Doppler spectral assessment of the hepatic  vessels shows no evidence for thrombosis or specific abnormality.       I have reviewed the current diagnostic and laboratory tests.           Impression and Plan      Abnormal LFTs with CT findings concerning for cirrhosis.  Mild hepatomegaly and possible portal venous hypertension seen.  Spleen is upper limits of normal.  LFTs are still improving.  Patient is on warfarin and therefore INR is not helpful in assessing liver function.  Celiac panel negative.  Alpha-1 antitrypsin negative.  Possible reason for elevated LFTs is congestive hepatopathy.  He is on cyclosporine and level checked and was not elevated.  He does not consume alcohol.  Hepatitis A, B, and C are negative.  Autoimmune liver disease has been checked.  PRINCE is mildly elevated (1:80).  Smooth muscle antibody is negative.  I think autoimmune hepatitis is very unlikely given the low level elevation in PRINCE, he is on immunosuppressives, and LFTs are improving.  Doppler was done and was negative for portal vein thrombosis.  Thyroid normal.  Iron saturation 14% with ferritin 341.  With normal platelet count and albumin cirrhosis seems less likely.  As an outpatient a FibroScan could be considered.  If this showed cirrhosis then screening for HCC over time and EGD to look for varices would be done.  If you would like follow-up in the GI clinic after discharge please let us know and this can be arranged.    Confusion.  Unsure of the cause.  Not likely to be hepatic encephalopathy.    One bowel movement today.  Yesterday I increased the lactulose slightly to 30 mL twice daily.  Prior to this his last bowel  movement was on the 11th at 1746.    At this time GI will sign off.  If you would like GI follow-up as an outpatient please let us know at the time of discharge.  If he had additional questions during his inpatient visit please do not hesitate to contact us.       30 minutes of total time was spent providing patient care including patient evaluation, reviewing documentation/test results, and .           Kristian Silva MD  Thank you for the opportunity to participate in the care of this patient.   Please feel free to call me with any questions or concerns.  Phone number [phone].

## 2023-01-13 NOTE — PLAN OF CARE
Patient Transfer Information    Patient tolerated transfer: Yes    Patient connected to monitoring equipment on arrival (if yes, what equipment): Yes: tele     Patient connected to wall oxygen on arrival (if applicable): No -BREEZY Jimenez.  (transporter) physically handed patient off to receiving RN: Yes. Melissa SPEAR    *See flowsheets for vital signs and focused assessment of admission/transfer*

## 2023-01-13 NOTE — CONSULTS
Care Management Initial Consult    General Information  Assessment completed with: Patient, Family, patient and children Archie Vergara  Type of CM/SW Visit: Initial Assessment    Primary Care Provider verified and updated as needed:     Readmission within the last 30 days: no previous admission in last 30 days      Reason for Consult: discharge planning  Advance Care Planning:       General Information Comments: Lives alone in own home had been very independent until hospitalized    Communication Assessment  Patient's communication style: spoken language (English or Bilingual)             Cognitive  Cognitive/Neuro/Behavioral: .WDL except, orientation, speech  Level of Consciousness: alert, confused  Arousal Level: opens eyes spontaneously  Orientation: disoriented to, place, time, situation  Mood/Behavior: calm  Best Language: 0 - No aphasia  Speech: rambling    Living Environment:   People in home: other (see comments) (Female college student lives in his basement and doesn't help with any cares or supervision)     Current living Arrangements: house      Able to return to prior arrangements: no       Family/Social Support:  Care provided by: self  Provides care for:    Marital Status: Single  Children          Description of Support System: Supportive, Involved    Support Assessment: Adequate family and caregiver support, Adequate social supports    Current Resources:   Patient receiving home care services:       Community Resources:    Equipment currently used at home:    Supplies currently used at home:      Employment/Financial:  Employment Status: retired        Financial Concerns: No concerns identified           Lifestyle & Psychosocial Needs:  Social Determinants of Health     Tobacco Use: Not on file   Alcohol Use: Not on file   Financial Resource Strain: Not on file   Food Insecurity: Not on file   Transportation Needs: Not on file   Physical Activity: Not on file   Stress: Not on file   Social  Connections: Not on file   Intimate Partner Violence: Not on file   Depression: Not at risk     PHQ-2 Score: 0   Housing Stability: Not on file       Functional Status:  Prior to admission patient needed assistance:  Independent, driving and managing all his ADLs and IADLs     Mental Health Status: none at this time          Chemical Dependency Status: none at this time        Additional Information:  Met with patient and three of his children. Patient was alert to self and his family, forget and is experiencing some hospital delirium. He is pleasantly confused, enjoys joking with the staff and signing with others.    Recommendations are for a TCU. Referrals made. Family is also thinking about private paying for 24/7 home care.     Referrals sent to TCUs and family will be looking into private care services.    RAYMOND Cai   Inpatient Care Coordination   Supervisor  [facility]  [phone]    RAYMOND Heath

## 2023-01-14 NOTE — PROGRESS NOTES
Renal Medicine Progress Note            Assessment/Plan:       82 y.o man with CKD IV and kidney transplant.      # Kidney transplant in 2009: Allograft Scr ~ 3 mg/dl. Allograft seems better-but he is also not eatin gmuch.                -Dr. Parkinson     # Immunosuppressions: I discussed the case with Dr. Parkinson today.                -CsA 50 mg bid               - mg bid     # NICM with EF of 35%: Not needing supplemental O2. No peripheral edema. Lungs are clear              -lungs are clear and no peripheral edema.                 # Hypokalemia and hypernatremia due to poor oral intake.               -needs to eat and drink a bit more     # Hypertension:               -dilt-->Coreg and added hydralazine per cardiology   -No need to tightly control BP in an elderly frail patient                 Plan:  # D5W at 100 ml hr x 1 liter  # Encourage oral intake including more fluid  # Agree with holding Lasix  # No need for tight BP control in an elderly frail patient        Interval History:     Patient is not eating much-only bites.  Sodium is trending up.  Allograft function is stable.   Seems very weak.   Mental status is about the same.            Medications and Allergies:       calcitRIOL  0.25 mcg Oral Daily     carvedilol  12.5 mg Oral BID w/meals     cycloSPORINE modified  50 mg Oral BID     [Held by provider] furosemide  60 mg Oral Daily     [Held by provider] gabapentin  200 mg Oral At Bedtime     hydrALAZINE  50 mg Oral Q8H HECTOR     lactulose  20 g Oral BID     multivitamin w/minerals  1 tablet Oral Daily     mycophenolate  500 mg Oral BID     sodium chloride (PF)  3 mL Intracatheter Q8H     Warfarin Therapy Reminder  1 each Oral See Admin Instructions     warfarin-No DOSE today  1 each Does not apply no dose today (warfarin)      No Known Allergies         Physical Exam:   Vitals were reviewed   , Blood pressure (!) 154/74, pulse 69, temperature 98.2  F (36.8  C), temperature source Axillary, resp. rate 18,  "height 1.778 m (5' 10\"), weight 68.4 kg (150 lb 14.4 oz), SpO2 97 %.    Wt Readings from Last 3 Encounters:   01/14/23 68.4 kg (150 lb 14.4 oz)   01/07/23 72.6 kg (160 lb)   04/19/21 82.6 kg (182 lb)       Intake/Output Summary (Last 24 hours) at 1/14/2023 1126  Last data filed at 1/14/2023 1000  Gross per 24 hour   Intake 820 ml   Output 200 ml   Net 620 ml     GENERAL APPEARANCE: NAD. Very frail.  HEENT:  Eyes/ears/nose/neck grossly normal  RESP: lungs cta b c good efforts, no crackles, rhonchi or wheezes  CV: RRR, nl S1/S2/ Soft murmur.  ABDOMEN: Soft, NT  EXTREMITIES/SKIN: no rashes/lesions on observed skin; No edema.   NEURO: Mental status about the same.          Data:     CBC RESULTS:     Recent Labs   Lab 01/14/23  0831 01/13/23  0905 01/12/23  0804 01/11/23  0840 01/09/23  0754 01/08/23  1723   WBC 6.9 3.8* 2.8* 2.5* 3.7* 4.1   RBC 3.85* 3.65* 3.97* 3.79* 3.66* 3.78*   HGB 11.4* 10.7* 11.5* 10.9* 10.5* 10.8*   HCT 36.3* 33.8* 36.2* 35.0* 33.5* 34.7*    204 210 180 115* 125*       Basic Metabolic Panel:  Recent Labs   Lab 01/14/23  0831 01/13/23  2126 01/13/23  1730 01/13/23  0905 01/12/23  0804 01/11/23  0840 01/10/23  1013 01/09/23  0754   *  --   --  146* 144 145 144 140   POTASSIUM 3.7  --   --  3.3* 3.2* 3.1* 2.9* 3.1*   CHLORIDE 110*  --   --  107 104 104 102 101   CO2 26  --   --  25 25 27 26 20*   BUN 49.2*  --   --  51.8* 53.3* 58.4* 64.6* 59.5*   CR 2.37*  --   --  2.41* 2.34* 2.47* 2.79* 2.92*   * 106* 212* 119* 140* 135* 132* 100*   ELEAZAR 9.6  --   --  9.6 9.9 10.0 9.8 9.5       INR  Recent Labs   Lab 01/14/23  0831 01/13/23  0905 01/12/23  0804 01/11/23  0840   INR 3.59* 3.47* 2.76* 2.73*      Attestation:   I have reviewed today's relevant vital signs, notes, medications, labs and imaging.    Carlos Daniels MD  [facility]  Office phone :[phone]  Pager: [phone]  "

## 2023-01-14 NOTE — PLAN OF CARE
Goal Outcome Evaluation:      Plan of Care Reviewed With: patient, child  Improving.  Patient ready for transitional care. Oriented to self. Follows direction inconsistently. Taking some meds today, refusing others. D5 running for 1L per nephro. Tele A fib. Up with GB. Refuses walker. Blood sugar BID. Left limb alert from old fistula. Plan is TCU. Continue POC.

## 2023-01-14 NOTE — PROVIDER NOTIFICATION
322JP c/o pain and attempting to get out of bed. Pt. unable to swallow oral medications. He is agitated and unable to follow commands. Is IV tylenol an option? Thanks!

## 2023-01-14 NOTE — PLAN OF CARE
Goal Outcome Evaluation:      Plan of Care Reviewed With: patient, child    Overall Patient Progress: no changeOverall Patient Progress: no change  Patient continues to be confused. Head CT today, given 0.5 mg IV ativan for test. Poor oral intake. 2L fluid restriction.  Room air. Echo 35%.  CHF on lasix. Being followed by GI for elevated LFT's, Nepro for Kidney transplant, Cardiology for CHF, SW for discharge planning. Continue POC.

## 2023-01-14 NOTE — PROGRESS NOTES
"[facility]  Hospitalist Progress Note     Assessment & Plan     ASSESSMENT    82M with hx of HTN, suspected dementia, pAF on warfarin, HFrEF EF 35% 2/2 NICM, and renal transplant w/ ongoing CKD Stage IV presents with shortness of breath and found to have AE CHF. Hospital course c/b AMS (suspect 2/2 delirium w/ underlying dementia) and hypernatremia. Once stable diuretic regimen is established, patient will discharge to TCU (as soon as tomorrow).    PLAN    AE HFrEF EF 35% 2/2 NICM  -Hold lasix today in setting of hypernatremia    Acute Metabolic Encephalopathy and Suspected Dementia  -Waxing and waning mental status during hospital stay consistent with delirium  -Suspect patient has some degree of underlying dementia    Hypernatremia   -Hold lasix  -D5W today per nephrology    Essential HTN and Hypertensive Urgency  -Antihypertensives being titrated by nephrology     Renal transplant w/ ongoing CKD Stage IV   -Home immunosuppressives  -Nephrology following, appreciate recommendations    Concern for Cirrhosis  -Evidence of cirrhosis on imaging although minimal clinical evidence of this  -GI recommends f/u for outpatient Fibroscan    pAF  -Home warfarin  -Coreg started in place of Cardizem given HFrEF    Type II MI   -2/2 the above now resolved    DVT Prophy  -Home warfarin    Disposition  -Likely to TCU tomorrow if stable diuretic regimen is established      Romie Hercules MD    Subjective     Seen at bedside along with daughter and bedside RN. Still somewhat confused although reported better than yesterday. Taking his pills. Discussed with SW - possible discharge tomorrow to TCU.        Objective   Blood pressure 137/70, pulse 66, temperature 98.5  F (36.9  C), temperature source Axillary, resp. rate 18, height 1.778 m (5' 10\"), weight 68.4 kg (150 lb 14.4 oz), SpO2 92 %.    PHYSICAL EXAM  General: Periodic confusion currently oriented to place and time but not situation  CV: RRR.  Lungs: CTAB. Nl " WOB.  Abd: Non-tender.  Ext: No edema.    LABS AND IMAGING  Reviewed and pertinent results discussed in assessment and plan.

## 2023-01-14 NOTE — PROGRESS NOTES
Care Management Follow Up    Length of Stay (days): 6    Expected Discharge Date: 01/16/2023     Concerns to be Addressed: care coordination/care conferences, discharge planning     Patient plan of care discussed at interdisciplinary rounds: Yes    Anticipated Discharge Disposition: Transitional Care     Anticipated Discharge Services: None  Anticipated Discharge DME: None    Education Provided on the Discharge Plan:  Yes  Patient/Family in Agreement with the Plan: yes    Referrals Placed by CM/SW: Post Acute Facilities  Private pay costs discussed: Not applicable    Additional Information:  Care management following for discharge planning. TCU recommended with referrals sent.  Met with patient and daughter at bedside. Updated that [facility] with shared room available this weekend.   [facility] requests clarification if shared room acceptable given transplant history.     Daughter will discuss options with siblings. Daughter is anticipating patient to remain in hospital through weekend.     Update 1535:  [facility] now declined for [facility].     CM will continue to follow for discharge planning.        Pat Bain, RN      Pat Bain RN Case Manager  Inpatient Care Coordination  [facility]   [phone]

## 2023-01-14 NOTE — PLAN OF CARE
Goal Outcome Evaluation:    Alert, slurred slow speech. Inconsistently follows commands. Needs step by step instructions. Attempted to get out of bed a couple of times overnight. Unable to give PO meds, d/t inability to swallow. Able to give AM hydrazaline w/ applesauce. Cardiac diet w/ 2L fluid restriction, little to no appetite. Ax1 w/ GB & walker. R PIV SL. VSS. Denies pain. B. TELE: a fib CVR w/ BBB.

## 2023-01-15 NOTE — DISCHARGE SUMMARY
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 Hospital course c/b AMS (suspect 2/2 delirium w/ underlying dementia), hypernatremia, and poor oral intake. Given issues with hypernatremia on higher diuretic dosing and poor oral intake resulting in hypernatremia, will discharge patient on same diuretic dosing that he was using prior to admission. Will need close monitoring of Na and renal function as an outpatient and likely ongoing diuretic adjustments.    Follow-up  -Needs BMP 01/18 and 01/25  -Needs follow with PCP, cardiology, and GI    Patient Discharge Condition & Exam     Discharge condition: Improved    /58   Pulse 73   Temp 98.1  F (36.7  C) (Oral)   Resp 18   Ht 1.778 m (5' 10\")   Wt 70 kg (154 lb 6.4 oz)   SpO2 97%   BMI 22.15 kg/m       General: In NAD.  Cardiac: RRR.  Lungs: CTAB.  Abd: Non-tender.  Ext: No edema.    Discharge Instructions     Patient/family instructions: Written discharge instruction given to patient/family    Discharge Medications:     Review of your medicines      START taking      Dose / Directions   carvedilol 12.5 MG tablet  Commonly known as: COREG      Dose: 12.5 mg  Take 1 tablet (12.5 mg) by mouth 2 times daily (with meals)  Refills: 0     hydrALAZINE 50 MG tablet  Commonly known as: " APRESOLINE  Used for: Primary hypertension      Dose: 50 mg  Take 1 tablet (50 mg) by mouth every 8 hours  Refills: 0     lactulose 10 GM/15ML solution  Commonly known as: CHRONULAC      Dose: 20 g  Take 30 mLs (20 g) by mouth 2 times daily  Refills: 0        CONTINUE these medicines which have NOT CHANGED      Dose / Directions   calcitRIOL 0.25 MCG capsule  Commonly known as: ROCALTROL      Dose: 0.25 mcg  Take 0.25 mcg by mouth daily  Refills: 0     colchicine 0.6 MG tablet  Commonly known as: COLCYRS      Dose: 0.6 mg  Take 0.6 mg by mouth daily  Refills: 0     cycloSPORINE modified 25 MG capsule  Commonly known as: GENERIC EQUIVALENT      Dose: 50 mg  Take 50 mg by mouth 2 times daily  Refills: 0     gabapentin 300 MG capsule  Commonly known as: NEURONTIN      Dose: 300-600 mg  Take 300-600 mg by mouth At Bedtime  Refills: 0     metolazone 2.5 MG tablet  Commonly known as: ZAROXOLYN      Take 1 Tablet (2.5 mg) by mouth one time if needed (Weight greater than 165 pounds can use once per week).  Refills: 0     multivitamin w/minerals tablet      Dose: 1 tablet  Take 1 tablet by mouth daily  Refills: 0     mycophenolate 500 MG tablet  Commonly known as: GENERIC EQUIVALENT      Dose: 500 mg  Take 500 mg by mouth 2 times daily  Refills: 0     potassium chloride ER 20 MEQ CR tablet  Commonly known as: K-TAB      Dose: 20 mEq  Take 20 mEq by mouth 2 times daily  Refills: 0     * torsemide 20 MG tablet  Commonly known as: DEMADEX  Used for: Kidney transplanted      Dose: 40 mg  Take 40 mg by mouth every morning  Quantity: 30 tablet  Refills: 2     * torsemide 20 MG tablet  Commonly known as: DEMADEX      Dose: 20 mg  Take 20 mg by mouth daily at 2 pm  Refills: 0     warfarin ANTICOAGULANT 3 MG tablet  Commonly known as: COUMADIN      3 mg every Tue, Fri; 1.5 mg (3 mg x 0.5) all other days in the evening OR as directed  Refills: 0         * This list has 2 medication(s) that are the same as other medications prescribed  for you. Read the directions carefully, and ask your doctor or other care provider to review them with you.            STOP taking    diltiazem ER COATED BEADS 180 MG 24 hr capsule  Commonly known as: CARDIZEM CD/CARTIA XT              Discharge diet: Low salt    Discharge activity: Activity as tolerated    Discharge follow-up:    Follow up with primary care provider within one week or earlier if symptoms return or gets worse.    Follow up with consultant as instructed.    Pending Results     Unresulted Labs Ordered in the Past 30 Days of this Admission     No orders found from 12/9/2022 to 1/9/2023.           Patient Allergies   No Known Allergies    Disposition   Disposition: TCU     I saw and evaluated the patient on day of discharge and  discharge instructions reviewed  and  all the patient's questions and concerns addressed. Over 30 minutes spent on discharge and coordination of discharge process for this patient.

## 2023-01-15 NOTE — PLAN OF CARE
Goal Outcome Evaluation:    Alert to self, speech is slurred and slow. Ax1 w/ GB & walker. Denies pain. Pt. Removed IV, no IV access MD aware. VSS. TELE: a fib CVR w/ BBB. Ex. Wife at bedside. Plan: TCU @ discharge.

## 2023-01-15 NOTE — PLAN OF CARE
Goal Outcome Evaluation:      Plan of Care Reviewed With: patient, other (see comments)    Overall Patient Progress: improving  Patient alert, oriented per self. Up with gait belt. Walker at bedside confuses patient. Will take medications at times but not consistently. Poor po intake. Patient discharging tomorrow to [facility]. 1030 wc transport. Family will be at bedside for comfort to the patient. Continue POC.

## 2023-01-15 NOTE — PROGRESS NOTES
Care Management Discharge Note    Discharge Date: 01/15/2023       Discharge Disposition: Transitional Care    Discharge Services: None    Discharge DME: None    Discharge Transportation:      Private pay costs discussed: Not applicable    PAS Confirmation Code:  You have successfully submitted the preadmission screening (PAS) to the [employer] on: 1/15/2023 2:52 PM  Your confirmation number is: [id number]  Level of Care: Based on the information you provided, it appears this person meets level of care for purposes of MA payment.   OBRA: It appears this person does not need an OBRA Level II assessment.     Education Provided on the Discharge Plan:  Yes  Persons Notified of Discharge Plans: Nursing, patient, daughter Lilli, Provider  Patient/Family in Agreement with the Plan: yes    Handoff Referral Completed: No    Additional Information:  Care management following for discharge planning to TCU.  [facility] [phone] has accepted into a shared room. Patient and family agreeable. Request transportation later today.  Paged provider requesting discharge orders for early afternoon. Discharge orders and scripts to be faxed to admissions at [facility] at Fax [phone].    [facility] [employer] transport scheduled at 2000 today.     Update 1220: Received message from [facility] stating that unable to admit patient on Sunday Evening. Notified provider with change in discharge plan.    Update 1300: Notified nursing, patient, and daughter Lilli of change in discharge plan.  Plan discharge Monday 1/16 to TCU at [facility] with [facility] transport at 1030.         Pat Bain, GALLITO Bain RN Case Manager  Inpatient Care Coordination  [facility]   [phone]

## 2023-01-15 NOTE — PHARMACY-ANTICOAGULATION SERVICE
Clinical Pharmacy- Warfarin Discharge Note  This patient is currently on warfarin for the treatment of Atrial fibrillation.  INR Goal= 2-3    Warfarin PTA Regimen: 3 mg Tuesday/Friday, 1.5 mg all other days      Anticoagulation Dose History     Recent Dosing and Labs Latest Ref Rng & Units 1/9/2023 1/10/2023 1/11/2023 1/12/2023 1/13/2023 1/14/2023 1/15/2023    Warfarin 1.5 mg - - - - 1.5 mg - - -    Warfarin 3 mg - - - 3 mg - - - -    INR 0.85 - 1.15 4.02(H) 3.46(H) 2.73(H) 2.76(H) 3.47(H) 3.59(H) 2.90(H)          Vitamin K doses administered during the last 7 days: none  FFP administered during the last 7 days: none    Recommend discharging the patient on warfarin 1.5mg daily given his INR upon admission was supratherapeutic and when given 3mg on 1/11 his INR was supratherapeutic again.      The patient should have an INR checked tomorrow, 1/16/23.      MARCIO PALUMBO RPH on 1/15/2023 at 1:50 PM

## 2023-01-16 NOTE — PHARMACY-ANTICOAGULATION SERVICE
Warfarin discharge reconciliation done yesterday, see [facility] note placed.  Pt discharge delayed to today.  Agree w/plan for warfarin 1.5mg daily on discharge.  Pt AVS updated by MD for INR on 1/18 next and another on 1/25.  Agree w/this plan.

## 2023-01-16 NOTE — PLAN OF CARE
Goal Outcome Evaluation:    Alert to self, confused. SBA. Denies pain. No IV access, MD aware. B. TELE: a fib CVR w/ BBB. Early discharge this AM to [facility].

## 2023-01-16 NOTE — PROGRESS NOTES
Care Management Discharge Note    Discharge Date: 01/16/2023       Discharge Disposition: Transitional Care    Discharge Services: None    Discharge DME: None    Discharge Transportation:      Private pay costs discussed: Not applicable    PAS Confirmation Code:  [id number]  Patient/family educated on Medicare website which has current facility and service quality ratings:      Education Provided on the Discharge Plan:    Persons Notified of Discharge Plans: MD  Patient/Family in Agreement with the Plan: yes    Handoff Referral Completed: No    Additional Information:  TENISHA sent orders to [facility], [facility]  Emergency Room   [phone]-Please contact the SW on the floor in which the patient is staying for any questions or concerns

## 2023-01-16 NOTE — PROGRESS NOTES
"[facility]  Hospitalist Progress Note     Assessment & Plan     ASSESSMENT AND PLAN    82M with hx of HTN, suspected dementia, pAF on warfarin, HFrEF EF 35% 2/2 NICM, and renal transplant w/ ongoing CKD Stage IV presented with shortness of breath and found to have AE CHF. Hospital course c/b AMS (suspect 2/2 delirium w/ underlying dementia), hypernatremia, and poor oral intake. Given issues with hypernatremia on higher diuretic dosing and poor oral intake resulting in hypernatremia, will discharge patient on same diuretic dosing that he was using prior to admission. Will need close monitoring of Na and renal function as an outpatient and likely ongoing diuretic adjustments.     Follow-up  -Needs BMP 01/18 and 01/25  -Needs follow with PCP, cardiology, and GI    Problem List  -AE HFrEF EF 35% 2/2 NICM  -Acute Metabolic Encephalopathy and Suspected Dementia  -Suspected alcoholic cirrhosis  -Hypernatremia   -Essential HTN and Hypertensive Urgency  -Renal transplant w/ ongoing CKD Stage IV   -Concern for Cirrhosis  -pAF  -Type II MI     Romie Hercules MD    Subjective     No events overnight. Patient did not discharge yesterday due to facility inability to take him. Will be discharging this morning.        Objective   Blood pressure 127/55, pulse 66, temperature 98.2  F (36.8  C), temperature source Oral, resp. rate 18, height 1.778 m (5' 10\"), weight 69.7 kg (153 lb 9.6 oz), SpO2 96 %.    PHYSICAL EXAM  General: Periodic confusion currently oriented to place and time but not situation  CV: RRR.  Lungs: CTAB. Nl WOB.  Abd: Non-tender.  Ext: No edema.    LABS AND IMAGING  Reviewed and pertinent results discussed in assessment and plan.   "

## 2023-01-16 NOTE — PLAN OF CARE
Occupational Therapy Discharge Summary    Reason for therapy discharge:    Discharged to transitional care facility.    Progress towards therapy goal(s). See goals on Care Plan in [facility] electronic health record for goal details.  Goals not met.  Barriers to achieving goals:   discharge from facility.    Therapy recommendation(s):    Continued therapy is recommended.  Rationale/Recommendations:  continued OT is recommended for increasing activity tolerance and working on ADL retraining.

## 2023-01-16 NOTE — PROGRESS NOTES
Patient's After Visit Summary was reviewed with patient and/or NA.   Patient verbalized understanding of After Visit Summary, recommended follow up and was given an opportunity to ask questions.   Discharge medications sent home with patient/family: YES, No, Not applicable   Discharged with daughter    Pt discharged at 1030 with family with all his belongings and paper work.

## 2023-01-16 NOTE — PROGRESS NOTES
Pt A&O to self only. Disoriented to place, time and situation. VSS. Up with SBA. Tolerating Cardiac diet. On 2000 mL of fluid restrictions. LS clear and diminished. BS+. Denies pain, SOB or nausea.

## 2023-01-17 NOTE — TELEPHONE ENCOUNTER
[facility] Geriatrics Triage Nurse INR     Provider: ZAY Mooney CNP   Facility: [facility]  Facility Type:  TCU    Caller: Janie  Call Back Number: [phone]  Reason for call: INR  Diagnosis/Goal: A. Fib    Todays INR: 2.4  Last INR 2.39 (1/16), 1.5 mg given    1/15 INR-2.9 (1.5 mg)  1/14 INR-3.59 (Coumadin held)  1/13 INR-3.47 (Coumadin held)    Heparin/Lovenox:  No  Currently on ABX?: No  Other interacting medication:  None  Missed or refused doses: No  On Cyclosporine and Mycophenolate for kidney transplant.       Verbal Order/Direction given by Provider: Coumadin 1.5 mg po daily and recheck INR on 1/20.    Provider Giving Order:  Josiah Saul MD    Verbal Order given to: Janie Puckett RN

## 2023-01-17 NOTE — PROGRESS NOTES
[place] [facility]  PRIMARY CARE PROVIDER AND CLINIC:  [facility], [address] / [place] [address]  Chief Complaint   Patient presents with     [facility] Medical Record Number:  [id number]  Place of Service where encounter took place:  [facility] ([facility]) [[id number]]    Matheus White Sr.  is a 82 year old  (1940), admitted to the above facility from  [facility]. Hospital stay 1/8/23 through 1/15/23..  Admitted to this facility for  rehab, medical management and nursing care.    HPI:    HPI information obtained from: facility chart records, facility staff and patient report.   Brief Summary of Hospital Course:   Updates on Status Since Skilled nursing Admission:     Patient Matheus White is a 82 male admitted to [facility] for rehabilitation s/p hospitalization [facility] 1/8-1/15/23 for CHF exacerbation, myocardial infarction, complicated by delirium (suspect dementia) and hypernatremia. Diuretics adjusted.    PMHx suspected dementia, pAF on warfarin, HFrEF EF 35% 2/2 NICM, and renal transplant w/ ongoing CKD Stage IV, anemia, gout    TODAY   States doing well, denies chest pain or shortness of breath or pain  Walking independently without walker  Well dressed, gets himself dressed and states he is a   Wants to discharge home soon  SLUM 22/30 per consult  ; A/O x3  Patient agreeable to follow up appointments   Elects DNR/DNI    CODE STATUS/ADVANCE DIRECTIVES DISCUSSION:   DNR / DNI  Patient's living condition: lives with family, granddaughter   ALLERGIES: Patient has no known allergies.  PAST MEDICAL HISTORY:  has a past medical history of Atrial fibrillation, permanent (H) (04/2009), Cellulitis of left forearm (07/10/2020), CKD (chronic kidney disease), and HTN (hypertension), benign.  PAST SURGICAL HISTORY:   has a past surgical history that includes as  transplant,prep cadaver renal graft (Right, 04/2009); IR Fine Needle Aspiration w Ultrasound (10/15/2019); transplant; and Percutaneous biopsy kidney (Right, 12/23/2019).  FAMILY HISTORY: family history includes Emphysema in his father.  SOCIAL HISTORY:   reports that he has never smoked. He has never used smokeless tobacco. He reports current alcohol use. He reports that he does not use drugs.    Post Discharge Medication Reconciliation Status: discharge medications reconciled and changed, per note/orders    Current Outpatient Medications   Medication Sig Dispense Refill     calcitRIOL (ROCALTROL) 0.25 MCG capsule Take 0.25 mcg by mouth daily       carvedilol (COREG) 12.5 MG tablet Take 1 tablet (12.5 mg) by mouth 2 times daily (with meals)       colchicine (COLCYRS) 0.6 MG tablet Take 0.6 mg by mouth daily       cycloSPORINE modified (GENERIC EQUIVALENT) 25 MG capsule Take 50 mg by mouth 2 times daily       gabapentin (NEURONTIN) 300 MG capsule Take 300 mg by mouth At Bedtime       hydrALAZINE (APRESOLINE) 50 MG tablet Take 1 tablet (50 mg) by mouth every 8 hours       lactulose (CHRONULAC) 10 GM/15ML solution Take 30 mLs (20 g) by mouth 2 times daily       metolazone (ZAROXOLYN) 2.5 MG tablet Take 1 Tablet (2.5 mg) by mouth one time if needed (Weight greater than 165 pounds can use once per week).       multivitamin w/minerals (THERA-VIT-M) tablet Take 1 tablet by mouth daily       mycophenolate (GENERIC EQUIVALENT) 500 MG tablet Take 500 mg by mouth 2 times daily       potassium chloride ER (K-TAB) 20 MEQ CR tablet Take 20 mEq by mouth 2 times daily       torsemide (DEMADEX) 20 MG tablet Take 20 mg by mouth daily at 2 pm       torsemide (DEMADEX) 20 MG tablet Take 40 mg by mouth every morning 30 tablet 2     WARFARIN SODIUM PO Per INR orders         ROS:  10 point ROS of systems including Constitutional, Eyes, Respiratory, Cardiovascular, Gastroenterology, Genitourinary, Integumentary, Musculoskeletal,  "Psychiatric were all negative except for pertinent positives noted in my HPI.    Vitals:  /60   Pulse 63   Temp 97.9  F (36.6  C)   Resp 18   Ht 1.778 m (5' 10\")   Wt 71.2 kg (157 lb)   SpO2 94%   BMI 22.53 kg/m    Exam:  GENERAL APPEARANCE:  Alert, in no distress  ENT:  Mouth and posterior oropharynx normal, moist mucous membranes  EYES:  EOM, conjunctivae, lids, pupils and irises normal  NECK:  No adenopathy,masses or thyromegaly  RESP:  respiratory effort and palpation of chest normal, lungs clear to auscultation , no respiratory distress  CV:  Palpation and auscultation of heart done , regular rate and rhythm, no murmur, rub, or gallop  ABDOMEN:  normal bowel sounds, soft, nontender, no hepatosplenomegaly or other masses  M/S:   sitting in WC  SKIN:  Inspection of skin and subcutaneous tissue baseline  NEURO:   Cranial nerves 2-12 are normal tested and grossly at patient's baseline  PSYCH:  oriented X 3    Lab/Diagnostic data:  Labs done in SNF are in [facility]. Please refer to them using [employer] Everywhere.     Last Comprehensive Metabolic Panel:  Sodium   Date Value Ref Range Status   01/15/2023 144 136 - 145 mmol/L Final   03/16/2020 143 133 - 144 mmol/L Final     Potassium   Date Value Ref Range Status   01/15/2023 3.6 3.4 - 5.3 mmol/L Final   06/21/2022 3.1 (L) 3.4 - 5.3 mmol/L Final   03/16/2020 3.6 3.4 - 5.3 mmol/L Final     Chloride   Date Value Ref Range Status   01/15/2023 105 98 - 107 mmol/L Final   06/21/2022 103 94 - 109 mmol/L Final   03/16/2020 109 94 - 109 mmol/L Final     Carbon Dioxide   Date Value Ref Range Status   03/16/2020 27 20 - 32 mmol/L Final     Carbon Dioxide (CO2)   Date Value Ref Range Status   01/15/2023 25 22 - 29 mmol/L Final   06/21/2022 29 20 - 32 mmol/L Final     Anion Gap   Date Value Ref Range Status   01/15/2023 14 7 - 15 mmol/L Final   06/21/2022 8 3 - 14 mmol/L Final   03/16/2020 7 3 - 14 mmol/L Final     Glucose   Date Value Ref Range Status "   06/21/2022 100 (H) 70 - 99 mg/dL Final   03/16/2020 103 (H) 70 - 99 mg/dL Final     GLUCOSE BY METER POCT   Date Value Ref Range Status   01/15/2023 126 (H) 70 - 99 mg/dL Final     Urea Nitrogen   Date Value Ref Range Status   01/15/2023 45.1 (H) 8.0 - 23.0 mg/dL Final   06/21/2022 51 (H) 7 - 30 mg/dL Final   03/16/2020 34 (H) 7 - 30 mg/dL Final     Creatinine   Date Value Ref Range Status   01/15/2023 2.34 (H) 0.67 - 1.17 mg/dL Final   03/16/2020 2.68 (H) 0.66 - 1.25 mg/dL Final     GFR Estimate   Date Value Ref Range Status   01/15/2023 27 (L) >60 mL/min/1.73m2 Final     Comment:     Effective December 21, 2021 eGFRcr in adults is calculated using the 2021 CKD-EPI creatinine equation which includes age and gender (Gabino et al., NEJ, DOI: 10.1056/YEQJzf8314528)   03/16/2020 21 (L) >60 mL/min/[1.73_m2] Final     Comment:     Non  GFR Calc  Starting 12/18/2018, serum creatinine based estimated GFR (eGFR) will be   calculated using the Chronic Kidney Disease Epidemiology Collaboration   (CKD-EPI) equation.       Calcium   Date Value Ref Range Status   01/15/2023 9.7 8.8 - 10.2 mg/dL Final   03/16/2020 8.8 8.5 - 10.1 mg/dL Final     Bilirubin Total   Date Value Ref Range Status   01/13/2023 1.4 (H) <=1.2 mg/dL Final   03/16/2020 1.3 0.2 - 1.3 mg/dL Final     Alkaline Phosphatase   Date Value Ref Range Status   01/13/2023 152 (H) 40 - 129 U/L Final   03/16/2020 262 (H) 40 - 150 U/L Final     ALT   Date Value Ref Range Status   01/13/2023 52 (H) 10 - 50 U/L Final   03/16/2020 17 0 - 70 U/L Final     AST   Date Value Ref Range Status   01/13/2023 44 10 - 50 U/L Final     Comment:     Specimen is hemolyzed which can falsely elevate AST. Analysis of a non-hemolyzed specimen may result in a lower value.   03/16/2020 20 0 - 45 U/L Final         Lab Results   Component Value Date    WBC 6.9 01/14/2023    WBC 5.8 03/16/2020     Lab Results   Component Value Date    RBC 3.85 01/14/2023    RBC 3.68 03/16/2020      Lab Results   Component Value Date    HGB 11.4 01/14/2023    HGB 10.7 03/16/2020     Lab Results   Component Value Date    HCT 36.3 01/14/2023    HCT 34.8 03/16/2020     No components found for: MCT  Lab Results   Component Value Date    MCV 94 01/14/2023    MCV 95 03/16/2020     Lab Results   Component Value Date    MCH 29.6 01/14/2023    MCH 29.1 03/16/2020     Lab Results   Component Value Date    MCHC 31.4 01/14/2023    MCHC 30.7 03/16/2020     Lab Results   Component Value Date    RDW 15.3 01/14/2023    RDW 14.3 03/16/2020     Lab Results   Component Value Date     01/14/2023     03/16/2020         ASSESSMENT/PLAN:    HFrEF EF 35% 2/2 NICM  Wt Readings from Last 2 Encounters:   01/17/23 71.2 kg (157 lb)   01/16/23 69.7 kg (153 lb 9.6 oz)     Fluid status, not appear fluid up; lung sounds clear, no edema in lower extremity and denies shortness of breath   Weight 157lb on admission TCU scale  Continue Torsemide and as needed Metolazone; taking potassium  Continue daily weights     Type II MI   2/2 the above now resolved     Hypernatremia   Diuretics held in hospital and give D5W per nephrology  Resolved  Continue Torsemide   Monitor      Essential HTN and Hypertensive Urgency  blood pressure medications adjusted per nephrology  Continue Coreg, torsemide and hydralazine     Renal transplant w/ ongoing CKD Stage IV   Continue Cellcept and cyclosporine  Creatinine   Date Value Ref Range Status   01/15/2023 2.34 (H) 0.67 - 1.17 mg/dL Final   03/16/2020 2.68 (H) 0.66 - 1.25 mg/dL Final     Creatinine   Date Value Ref Range Status   01/18/2023 3.19 (H) 0.67 - 1.17 mg/dL Final   03/16/2020 2.68 (H) 0.66 - 1.25 mg/dL Final     Cr elevated  Will decrease Torsemide 30mg AM and Torsemide 20mg afternoon    Follow up nephrologist as advised     Anemia  Hemoglobin   Date Value Ref Range Status   01/14/2023 11.4 (L) 13.3 - 17.7 g/dL Final   03/16/2020 10.7 (L) 13.3 - 17.7 g/dL Final   monitor      Concern for  Cirrhosis  Evidence of cirrhosis on imaging although minimal clinical evidence   GI recommends f/u for outpatient Fibroscan  Started on lactulose, tolerating     pAF  INR 2.4 on 1/17/23; INR goal 2-3  On coumadin 1.5mg  no signs and symptoms bleeding   Follow up INR 1/20/23  Continue Coreg   Hr and blood pressure managed      Metabolic Encephalopathy and Suspected Dementia  Pending cognitive testing  Lives with family, granddaugter   involved in safe plan home; may need more support  Now on lactulose; potential ammonia build up, tolerating    Deconditioned  Comment: d/t recent hospitalization & comorbidity, expect delay rehabilitation d/t age & comorbidity  Plan: PT/OT eval & treat, monitor    Gout  History flares in hands  Continue Colchicine    Advanced care planning  Elects DNR/DNI    Follow up   CMP,CBC 1/8/23  follow up nephrology , cardiology Dr. Cj Bagley, and gastrointestinal ([facility])    Total time spent with patient visit at the skilled nursing facility was 45 min including patient visit and review of past records. Greater than 50% of total time spent with counseling and coordinating care due to discussion on CHF management, pain management, functional goals, goals of care and discharge planning and consult with  as noted above .     Electronically signed by:  ZAY Kaiser CNP

## 2023-01-18 NOTE — LETTER
1/18/2023        RE: Matheus White Sr.  [id number] [facility] [id number]        [place] [facility]  PRIMARY CARE PROVIDER AND CLINIC:  [facility], [address] / [place] [address]  Chief Complaint   Patient presents with     [facility] Medical Record Number:  [id number]  Place of Service where encounter took place:  [facility] ([facility]) [[id number]]    Matheus White Sr.  is a 82 year old  (1940), admitted to the above facility from  [facility]. Hospital stay 1/8/23 through 1/15/23..  Admitted to this facility for  rehab, medical management and nursing care.    HPI:    HPI information obtained from: facility chart records, facility staff and patient report.   Brief Summary of Hospital Course:   Updates on Status Since Skilled nursing Admission:     Patient Matheus White is a 82 male admitted to [facility] for rehabilitation s/p hospitalization [facility] 1/8-1/15/23 for CHF exacerbation, myocardial infarction, complicated by delirium (suspect dementia) and hypernatremia. Diuretics adjusted.    PMHx suspected dementia, pAF on warfarin, HFrEF EF 35% 2/2 NICM, and renal transplant w/ ongoing CKD Stage IV, anemia, gout    TODAY   States doing well, denies chest pain or shortness of breath or pain  Walking independently without walker  Well dressed, gets himself dressed and states he is a   Wants to discharge home soon  SLUM 22/30 per consult  ; A/O x3  Patient agreeable to follow up appointments   Elects DNR/DNI    CODE STATUS/ADVANCE DIRECTIVES DISCUSSION:   DNR / DNI  Patient's living condition: lives with family, granddaughter   ALLERGIES: Patient has no known allergies.  PAST MEDICAL HISTORY:  has a past medical history of Atrial fibrillation, permanent (H) (04/2009), Cellulitis of left forearm (07/10/2020), CKD (chronic kidney disease), and HTN  (hypertension), benign.  PAST SURGICAL HISTORY:   has a past surgical history that includes as transplant,prep cadaver renal graft (Right, 04/2009); IR Fine Needle Aspiration w Ultrasound (10/15/2019); transplant; and Percutaneous biopsy kidney (Right, 12/23/2019).  FAMILY HISTORY: family history includes Emphysema in his father.  SOCIAL HISTORY:   reports that he has never smoked. He has never used smokeless tobacco. He reports current alcohol use. He reports that he does not use drugs.    Post Discharge Medication Reconciliation Status: discharge medications reconciled and changed, per note/orders    Current Outpatient Medications   Medication Sig Dispense Refill     calcitRIOL (ROCALTROL) 0.25 MCG capsule Take 0.25 mcg by mouth daily       carvedilol (COREG) 12.5 MG tablet Take 1 tablet (12.5 mg) by mouth 2 times daily (with meals)       colchicine (COLCYRS) 0.6 MG tablet Take 0.6 mg by mouth daily       cycloSPORINE modified (GENERIC EQUIVALENT) 25 MG capsule Take 50 mg by mouth 2 times daily       gabapentin (NEURONTIN) 300 MG capsule Take 300 mg by mouth At Bedtime       hydrALAZINE (APRESOLINE) 50 MG tablet Take 1 tablet (50 mg) by mouth every 8 hours       lactulose (CHRONULAC) 10 GM/15ML solution Take 30 mLs (20 g) by mouth 2 times daily       metolazone (ZAROXOLYN) 2.5 MG tablet Take 1 Tablet (2.5 mg) by mouth one time if needed (Weight greater than 165 pounds can use once per week).       multivitamin w/minerals (THERA-VIT-M) tablet Take 1 tablet by mouth daily       mycophenolate (GENERIC EQUIVALENT) 500 MG tablet Take 500 mg by mouth 2 times daily       potassium chloride ER (K-TAB) 20 MEQ CR tablet Take 20 mEq by mouth 2 times daily       torsemide (DEMADEX) 20 MG tablet Take 20 mg by mouth daily at 2 pm       torsemide (DEMADEX) 20 MG tablet Take 40 mg by mouth every morning 30 tablet 2     WARFARIN SODIUM PO Per INR orders         ROS:  10 point ROS of systems including Constitutional, Eyes,  "Respiratory, Cardiovascular, Gastroenterology, Genitourinary, Integumentary, Musculoskeletal, Psychiatric were all negative except for pertinent positives noted in my HPI.    Vitals:  /60   Pulse 63   Temp 97.9  F (36.6  C)   Resp 18   Ht 1.778 m (5' 10\")   Wt 71.2 kg (157 lb)   SpO2 94%   BMI 22.53 kg/m    Exam:  GENERAL APPEARANCE:  Alert, in no distress  ENT:  Mouth and posterior oropharynx normal, moist mucous membranes  EYES:  EOM, conjunctivae, lids, pupils and irises normal  NECK:  No adenopathy,masses or thyromegaly  RESP:  respiratory effort and palpation of chest normal, lungs clear to auscultation , no respiratory distress  CV:  Palpation and auscultation of heart done , regular rate and rhythm, no murmur, rub, or gallop  ABDOMEN:  normal bowel sounds, soft, nontender, no hepatosplenomegaly or other masses  M/S:   sitting in WC  SKIN:  Inspection of skin and subcutaneous tissue baseline  NEURO:   Cranial nerves 2-12 are normal tested and grossly at patient's baseline  PSYCH:  oriented X 3    Lab/Diagnostic data:  Labs done in SNF are in [facility]. Please refer to them using [employer] Everywhere.     Last Comprehensive Metabolic Panel:  Sodium   Date Value Ref Range Status   01/15/2023 144 136 - 145 mmol/L Final   03/16/2020 143 133 - 144 mmol/L Final     Potassium   Date Value Ref Range Status   01/15/2023 3.6 3.4 - 5.3 mmol/L Final   06/21/2022 3.1 (L) 3.4 - 5.3 mmol/L Final   03/16/2020 3.6 3.4 - 5.3 mmol/L Final     Chloride   Date Value Ref Range Status   01/15/2023 105 98 - 107 mmol/L Final   06/21/2022 103 94 - 109 mmol/L Final   03/16/2020 109 94 - 109 mmol/L Final     Carbon Dioxide   Date Value Ref Range Status   03/16/2020 27 20 - 32 mmol/L Final     Carbon Dioxide (CO2)   Date Value Ref Range Status   01/15/2023 25 22 - 29 mmol/L Final   06/21/2022 29 20 - 32 mmol/L Final     Anion Gap   Date Value Ref Range Status   01/15/2023 14 7 - 15 mmol/L Final   06/21/2022 8 3 - 14 " mmol/L Final   03/16/2020 7 3 - 14 mmol/L Final     Glucose   Date Value Ref Range Status   06/21/2022 100 (H) 70 - 99 mg/dL Final   03/16/2020 103 (H) 70 - 99 mg/dL Final     GLUCOSE BY METER POCT   Date Value Ref Range Status   01/15/2023 126 (H) 70 - 99 mg/dL Final     Urea Nitrogen   Date Value Ref Range Status   01/15/2023 45.1 (H) 8.0 - 23.0 mg/dL Final   06/21/2022 51 (H) 7 - 30 mg/dL Final   03/16/2020 34 (H) 7 - 30 mg/dL Final     Creatinine   Date Value Ref Range Status   01/15/2023 2.34 (H) 0.67 - 1.17 mg/dL Final   03/16/2020 2.68 (H) 0.66 - 1.25 mg/dL Final     GFR Estimate   Date Value Ref Range Status   01/15/2023 27 (L) >60 mL/min/1.73m2 Final     Comment:     Effective December 21, 2021 eGFRcr in adults is calculated using the 2021 CKD-EPI creatinine equation which includes age and gender (Gabino et al., NEJM, DOI: 10.1056/UPEMyf9722463)   03/16/2020 21 (L) >60 mL/min/[1.73_m2] Final     Comment:     Non  GFR Calc  Starting 12/18/2018, serum creatinine based estimated GFR (eGFR) will be   calculated using the Chronic Kidney Disease Epidemiology Collaboration   (CKD-EPI) equation.       Calcium   Date Value Ref Range Status   01/15/2023 9.7 8.8 - 10.2 mg/dL Final   03/16/2020 8.8 8.5 - 10.1 mg/dL Final     Bilirubin Total   Date Value Ref Range Status   01/13/2023 1.4 (H) <=1.2 mg/dL Final   03/16/2020 1.3 0.2 - 1.3 mg/dL Final     Alkaline Phosphatase   Date Value Ref Range Status   01/13/2023 152 (H) 40 - 129 U/L Final   03/16/2020 262 (H) 40 - 150 U/L Final     ALT   Date Value Ref Range Status   01/13/2023 52 (H) 10 - 50 U/L Final   03/16/2020 17 0 - 70 U/L Final     AST   Date Value Ref Range Status   01/13/2023 44 10 - 50 U/L Final     Comment:     Specimen is hemolyzed which can falsely elevate AST. Analysis of a non-hemolyzed specimen may result in a lower value.   03/16/2020 20 0 - 45 U/L Final         Lab Results   Component Value Date    WBC 6.9 01/14/2023    WBC 5.8  03/16/2020     Lab Results   Component Value Date    RBC 3.85 01/14/2023    RBC 3.68 03/16/2020     Lab Results   Component Value Date    HGB 11.4 01/14/2023    HGB 10.7 03/16/2020     Lab Results   Component Value Date    HCT 36.3 01/14/2023    HCT 34.8 03/16/2020     No components found for: MCT  Lab Results   Component Value Date    MCV 94 01/14/2023    MCV 95 03/16/2020     Lab Results   Component Value Date    MCH 29.6 01/14/2023    MCH 29.1 03/16/2020     Lab Results   Component Value Date    MCHC 31.4 01/14/2023    MCHC 30.7 03/16/2020     Lab Results   Component Value Date    RDW 15.3 01/14/2023    RDW 14.3 03/16/2020     Lab Results   Component Value Date     01/14/2023     03/16/2020         ASSESSMENT/PLAN:    HFrEF EF 35% 2/2 NICM  Wt Readings from Last 2 Encounters:   01/17/23 71.2 kg (157 lb)   01/16/23 69.7 kg (153 lb 9.6 oz)     Fluid status, not appear fluid up; lung sounds clear, no edema in lower extremity and denies shortness of breath   Weight 157lb on admission TCU scale  Continue Torsemide and as needed Metolazone; taking potassium  Continue daily weights     Type II MI   2/2 the above now resolved     Hypernatremia   Diuretics held in hospital and give D5W per nephrology  Resolved  Continue Torsemide   Monitor      Essential HTN and Hypertensive Urgency  blood pressure medications adjusted per nephrology  Continue Coreg, torsemide and hydralazine     Renal transplant w/ ongoing CKD Stage IV   Continue Cellcept and cyclosporine  Creatinine   Date Value Ref Range Status   01/15/2023 2.34 (H) 0.67 - 1.17 mg/dL Final   03/16/2020 2.68 (H) 0.66 - 1.25 mg/dL Final     Follow up nephrologist as advised     Anemia  Hemoglobin   Date Value Ref Range Status   01/14/2023 11.4 (L) 13.3 - 17.7 g/dL Final   03/16/2020 10.7 (L) 13.3 - 17.7 g/dL Final   monitor      Concern for Cirrhosis  Evidence of cirrhosis on imaging although minimal clinical evidence   GI recommends f/u for outpatient  Fibroscan  Started on lactulose, tolerating     pAF  INR 2.4 on 1/17/23; INR goal 2-3  On coumadin 1.5mg  no signs and symptoms bleeding   Follow up INR 1/20/23  Continue Coreg   Hr and blood pressure managed      Metabolic Encephalopathy and Suspected Dementia  Pending cognitive testing  Lives with family, granddaugter   involved in safe plan home; may need more support  Now on lactulose; potential ammonia build up, tolerating    Deconditioned  Comment: d/t recent hospitalization & comorbidity, expect delay rehabilitation d/t age & comorbidity  Plan: PT/OT eval & treat, monitor    Gout  History flares in hands  Continue Colchicine    Advanced care planning  Elects DNR/DNI    Follow up   CMP,CBC 1/8/23  follow up nephrology , cardiology Dr. Cj Bagley, and gastrointestinal ([facility])    Total time spent with patient visit at the skilled nursing facility was 45 min including patient visit and review of past records. Greater than 50% of total time spent with counseling and coordinating care due to discussion on CHF management, pain management, functional goals, goals of care and discharge planning and consult with  as noted above .     Electronically signed by:  ZAY Kaiser CNP                           Sincerely,        ZAY Kaiser CNP

## 2023-01-20 NOTE — PROGRESS NOTES
[place] [facility]  [facility] [place] Medical Record Number:  [id number]  Place of Service where encounter took place:  [facility] (TCU) [[id number]]  Chief Complaint   Patient presents with     Nursing Home Acute       HPI:    Matheus White Sr.  is a 82 year old (1940), who is being seen today for an episodic care visit.  HPI information obtained from: facility chart records, facility staff and patient report. Today's concern is:    Patient Matheus White is a 82 male admitted to [facility] for rehabilitation s/p hospitalization [facility] 1/8-1/15/23 for CHF exacerbation, myocardial infarction, complicated by delirium (suspect dementia) and hypernatremia. Diuretics adjusted and then patient discharged on prior to hospital admission diuretics    PMHx suspected dementia, pAF on warfarin, HFrEF EF 35% 2/2 NICM, and renal transplant w/ ongoing CKD Stage IV, anemia, gout       Chronic congestive heart failure, unspecified heart failure type (H)  Hypertension, unspecified type  Permanent atrial fibrillation (H)  Generalized muscle weakness  Stage 3 chronic kidney disease, unspecified whether stage 3a or 3b CKD (H)     Patient eager to return home  Per consult with therapies; patient physically appropriate     Recent decrease in diuretic, tolerating  Cr still elevated today  Wt Readings from Last 2 Encounters:   01/20/23 71.4 kg (157 lb 6.4 oz)   01/17/23 71.2 kg (157 lb)     Weights stable, not appear fluid up  Patient states Cr ~3 is his new baseline; per Epic Cr baseline ~2.4-2.8    INR today therapeutic 2.9    Vitals stable    Past Medical and Surgical History reviewed in Epic today.    MEDICATIONS:    Current Outpatient Medications   Medication Sig Dispense Refill     calcitRIOL (ROCALTROL) 0.25 MCG capsule Take 0.25 mcg by mouth daily       carvedilol (COREG) 12.5 MG tablet Take 1 tablet (12.5 mg) by mouth 2 times daily (with meals)       colchicine (COLCYRS)  "0.6 MG tablet Take 0.6 mg by mouth daily       cycloSPORINE modified (GENERIC EQUIVALENT) 25 MG capsule Take 50 mg by mouth 2 times daily       gabapentin (NEURONTIN) 300 MG capsule Take 300 mg by mouth At Bedtime       hydrALAZINE (APRESOLINE) 50 MG tablet Take 1 tablet (50 mg) by mouth every 8 hours       lactulose (CHRONULAC) 10 GM/15ML solution Take 30 mLs (20 g) by mouth 2 times daily       metolazone (ZAROXOLYN) 2.5 MG tablet Take 1 Tablet (2.5 mg) by mouth one time if needed (Weight greater than 165 pounds can use once per week).       multivitamin w/minerals (THERA-VIT-M) tablet Take 1 tablet by mouth daily       mycophenolate (GENERIC EQUIVALENT) 500 MG tablet Take 500 mg by mouth 2 times daily       potassium chloride ER (K-TAB) 20 MEQ CR tablet Take 20 mEq by mouth 2 times daily       torsemide (DEMADEX) 20 MG tablet Take 20 mg by mouth daily at 2 pm       torsemide (DEMADEX) 20 MG tablet Take 30 mg by mouth every morning 30 tablet 2     WARFARIN SODIUM PO Per INR orders           REVIEW OF SYSTEMS:  4 point ROS including Respiratory, CV, GI and , other than that noted in the HPI,  is negative    Objective:  /67   Pulse 72   Temp 97.8  F (36.6  C)   Resp 18   Ht 1.778 m (5' 10\")   Wt 71.4 kg (157 lb 6.4 oz)   SpO2 98%   BMI 22.58 kg/m    Exam:  GENERAL APPEARANCE:  Alert, in no distress  RESP:  respiratory effort and palpation of chest normal, lungs clear to auscultation , no respiratory distress  CV:  Palpation and auscultation of heart done , regular rate and rhythm, no murmur, rub, or gallop  ABDOMEN:  normal bowel sounds, soft, nontender, no hepatosplenomegaly or other masses  M/S:   Gait and station normal  SKIN:  Inspection of skin and subcutaneous tissue no edema in LE bilateral  PSYCH:  oriented X 3    Labs:   Labs done in SNF are in [place] EPIC. Please refer to them using [employer]/Care Everywhere.   Last Comprehensive Metabolic Panel:  Sodium   Date Value Ref Range Status "   01/20/2023 140 136 - 145 mmol/L Final   03/16/2020 143 133 - 144 mmol/L Final     Potassium   Date Value Ref Range Status   01/20/2023 4.1 3.4 - 5.3 mmol/L Final   06/21/2022 3.1 (L) 3.4 - 5.3 mmol/L Final   03/16/2020 3.6 3.4 - 5.3 mmol/L Final     Chloride   Date Value Ref Range Status   01/20/2023 104 98 - 107 mmol/L Final   06/21/2022 103 94 - 109 mmol/L Final   03/16/2020 109 94 - 109 mmol/L Final     Carbon Dioxide   Date Value Ref Range Status   03/16/2020 27 20 - 32 mmol/L Final     Carbon Dioxide (CO2)   Date Value Ref Range Status   01/20/2023 20 (L) 22 - 29 mmol/L Final   06/21/2022 29 20 - 32 mmol/L Final     Anion Gap   Date Value Ref Range Status   01/20/2023 16 (H) 7 - 15 mmol/L Final   06/21/2022 8 3 - 14 mmol/L Final   03/16/2020 7 3 - 14 mmol/L Final     Glucose   Date Value Ref Range Status   01/20/2023 70 70 - 99 mg/dL Final   06/21/2022 100 (H) 70 - 99 mg/dL Final   03/16/2020 103 (H) 70 - 99 mg/dL Final     GLUCOSE BY METER POCT   Date Value Ref Range Status   01/15/2023 126 (H) 70 - 99 mg/dL Final     Urea Nitrogen   Date Value Ref Range Status   01/20/2023 50.3 (H) 8.0 - 23.0 mg/dL Final   06/21/2022 51 (H) 7 - 30 mg/dL Final   03/16/2020 34 (H) 7 - 30 mg/dL Final     Creatinine   Date Value Ref Range Status   01/20/2023 3.11 (H) 0.67 - 1.17 mg/dL Final   03/16/2020 2.68 (H) 0.66 - 1.25 mg/dL Final     GFR Estimate   Date Value Ref Range Status   01/20/2023 19 (L) >60 mL/min/1.73m2 Final     Comment:     Effective December 21, 2021 eGFRcr in adults is calculated using the 2021 CKD-EPI creatinine equation which includes age and gender (Gabino et al., NEJM, DOI: 10.1056/TZTSoo7551563)   03/16/2020 21 (L) >60 mL/min/[1.73_m2] Final     Comment:     Non  GFR Calc  Starting 12/18/2018, serum creatinine based estimated GFR (eGFR) will be   calculated using the Chronic Kidney Disease Epidemiology Collaboration   (CKD-EPI) equation.       Calcium   Date Value Ref Range Status    01/20/2023 8.9 8.8 - 10.2 mg/dL Final   03/16/2020 8.8 8.5 - 10.1 mg/dL Final         ASSESSMENT/PLAN:     Chronic congestive heart failure, unspecified heart failure type (H)  Hypertension, unspecified type  Permanent atrial fibrillation (H)  Generalized muscle weakness  Stage 3 chronic kidney disease, unspecified whether stage 3a or 3b CKD (H)     Patient eager to return home  Per consult with therapies; patient physically appropriate    involved in safe pan home     Recent decrease in diuretic, tolerating  Cr still elevated today  Wt Readings from Last 2 Encounters:   01/20/23 71.4 kg (157 lb 6.4 oz)   01/17/23 71.2 kg (157 lb)     Weights stable, not appear fluid up  Patient states Cr ~3 is his new baseline; per Epic Cr baseline ~2.4-2.8, however, in hospital appear Cr near 3 often   Will continue current dose diuretic and monitor  Has follow up nephrologist     INR today therapeutic 2.9  Order continue coumadin 1.5mg daily and INR 1/23/23    Vitals stable        Electronically signed by:  ZAY Kaiser CNP

## 2023-01-23 NOTE — PROGRESS NOTES
[place] [facility] DISCHARGE SUMMARY  PATIENT'S NAME: Matheus White Sr.  YOB: 1940  MEDICAL RECORD NUMBER:  [id number]  Place of Service where encounter took place:  [facility] (TCU) [[id number]]    PRIMARY CARE PROVIDER AND CLINIC RESPONSIBLE AFTER TRANSFER:   [facility], [address] / [place] [address]    Non-FMG Provider     Transferring providers: Farheen Raman, ZAY MOCTEZUMA, MD Kg  Recent Hospitalization/ED:  [facility] stay 1/8/23 to 1/15/23.  Date of SNF Admission: January 15, 2023  Date of SNF (anticipated) Discharge: January 26, 2023  Discharged to: previous independent home  Cognitive Scores/Physical Function/DME:   Therapies completes this Wed and patient plans to discharge home   Independently with activities of daily living  Amb ~250, able to do steps  recommend help with medications and finances due to some cognitive impairment noted on SLUMS    CODE STATUS/ADVANCE DIRECTIVES DISCUSSION:  DNR / DNI   ALLERGIES: Patient has no known allergies.    DISCHARGE DIAGNOSIS/NURSING FACILITY COURSE:     Patient Matheus White is a 82 male admitted to [facility] for rehabilitation s/p hospitalization [facility] 1/8-1/15/23 for CHF exacerbation, myocardial infarction, complicated by delirium (suspect dementia) and hypernatremia. Diuretics adjusted and then patient discharged on prior to hospital admission diuretics    PMHx suspected dementia, pAF on warfarin, HFrEF EF 35% 2/2 NICM, and renal transplant w/ ongoing CKD Stage IV, anemia, gout    HFrEF EF 35% 2/2 NICM  Fluid status, not appear fluid up; lung sounds clear, no edema in lower extremity and denies shortness of breath   Weights unclear if accurate; remind staff to weigh patient in AM  increasing Torsemide to prior dose Torsemide 40mg AM and 20mg afternoon, monitor   Weight 164lb; up from admission (possible  admission weight inaccurate); patient states staff weight him with clothes on  Has order for Metolazone if weights >165lb; patient states weights typically ~160lb  Follow up BMP,CBC 1/30/23  Continue Torsemide and as needed Metolazone; taking potassium  Continue daily weights     Type II MI   2/2 the above now resolved     Essential HTN and Hypertensive Urgency  blood pressure medications adjusted per nephrology  Continue Coreg, torsemide and hydralazine     Renal transplant w/ ongoing CKD Stage IV   Continue Cellcept and cyclosporine  Creatinine   Date Value Ref Range Status   01/23/2023 2.85 (H) 0.67 - 1.17 mg/dL Final   03/16/2020 2.68 (H) 0.66 - 1.25 mg/dL Final     Cr trend near normal  Follow up nephrologist as advised ; patient states he has appt    Anemia  Hemoglobin   Date Value Ref Range Status   01/23/2023 9.7 (L) 13.3 - 17.7 g/dL Final   03/16/2020 10.7 (L) 13.3 - 17.7 g/dL Final   monitor   No signs and symptoms bleeding     Concern for Cirrhosis  Evidence of cirrhosis on imaging although minimal clinical evidence   GI recommends f/u for outpatient Fibroscan  Started on lactulose, tolerating     pAF  INR 1.5 on coumadin 1.5mg daily  INR 2.9 on 1/20/23  Order coumadin 2mg daily and INR 1/25/23  Continue Coreg   Hr and blood pressure managed      Metabolic Encephalopathy and Suspected Dementia  Lives with family, granddaugter   involved in safe plan home  Plans to discharge home with homecare  Now on lactulose; potential ammonia build up, tolerating    Deconditioned  Comment: d/t recent hospitalization & comorbidity, expect delay rehabilitation d/t age & comorbidity  Progressing in therapies and plans to discharge home with homecare    Gout  History flares in hands  Continue Colchicine    Advanced care planning  Elects DNR/DNI    Follow up   CMP,CBC 1/30/23follow up nephrology , cardiology Dr. Cj Bagley, and gastrointestinal ([place] [facility])      Past Medical History:  has a  "past medical history of Atrial fibrillation, permanent (H) (04/2009), Cellulitis of left forearm (07/10/2020), CKD (chronic kidney disease), and HTN (hypertension), benign.    Discharge Medications:    Current Outpatient Medications   Medication Sig Dispense Refill     calcitRIOL (ROCALTROL) 0.25 MCG capsule Take 0.25 mcg by mouth daily       carvedilol (COREG) 12.5 MG tablet Take 1 tablet (12.5 mg) by mouth 2 times daily (with meals)       colchicine (COLCYRS) 0.6 MG tablet Take 0.6 mg by mouth daily       cycloSPORINE modified (GENERIC EQUIVALENT) 25 MG capsule Take 50 mg by mouth 2 times daily       gabapentin (NEURONTIN) 300 MG capsule Take 300 mg by mouth At Bedtime       hydrALAZINE (APRESOLINE) 50 MG tablet Take 1 tablet (50 mg) by mouth every 8 hours       lactulose (CHRONULAC) 10 GM/15ML solution Take 30 mLs (20 g) by mouth 2 times daily       metolazone (ZAROXOLYN) 2.5 MG tablet Take 1 Tablet (2.5 mg) by mouth one time if needed (Weight greater than 165 pounds can use once per week).       multivitamin w/minerals (THERA-VIT-M) tablet Take 1 tablet by mouth daily       mycophenolate (GENERIC EQUIVALENT) 500 MG tablet Take 500 mg by mouth 2 times daily       potassium chloride ER (K-TAB) 20 MEQ CR tablet Take 20 mEq by mouth 2 times daily       torsemide (DEMADEX) 20 MG tablet Take 20 mg by mouth daily at 2 pm       torsemide (DEMADEX) 20 MG tablet Take 40 mg by mouth every morning 30 tablet 2     WARFARIN SODIUM PO Per INR orders         Medication Changes/Rationale:         Controlled medications sent with patient:   not applicable/none     ROS:   4 point ROS including Respiratory, CV, GI and , other than that noted in the HPI,  is negative    Physical Exam:   Vitals: BP (!) 150/84   Pulse 64   Temp 97.9  F (36.6  C)   Resp 18   Ht 1.778 m (5' 10\")   Wt 74.4 kg (164 lb)   SpO2 95%   BMI 23.53 kg/m    BMI= Body mass index is 23.53 kg/m .  GENERAL APPEARANCE:  Alert, in no distress  ENT:  Mouth and " posterior oropharynx normal, moist mucous membranes  EYES:  EOM, conjunctivae, lids, pupils and irises normal  NECK:  No adenopathy,masses or thyromegaly  RESP:  respiratory effort and palpation of chest normal, lungs clear to auscultation , no respiratory distress  CV:  Palpation and auscultation of heart done , regular rate and rhythm, no murmur, rub, or gallop  ABDOMEN:  normal bowel sounds, soft, nontender, no hepatosplenomegaly or other masses  M/S:   Gait and station normal  SKIN:  Inspection of skin and subcutaneous tissue no edema noted bilateral LE   NEURO:   Cranial nerves 2-12 are normal tested and grossly at patient's baseline  PSYCH:  oriented X 3     SNF labs: Labs done in SNF are in [place] EPIC. Please refer to them using [employer]/Care Everywhere.    Last Comprehensive Metabolic Panel:  Sodium   Date Value Ref Range Status   01/23/2023 142 136 - 145 mmol/L Final   03/16/2020 143 133 - 144 mmol/L Final     Potassium   Date Value Ref Range Status   01/23/2023 3.5 3.4 - 5.3 mmol/L Final   06/21/2022 3.1 (L) 3.4 - 5.3 mmol/L Final   03/16/2020 3.6 3.4 - 5.3 mmol/L Final     Chloride   Date Value Ref Range Status   01/23/2023 104 98 - 107 mmol/L Final   06/21/2022 103 94 - 109 mmol/L Final   03/16/2020 109 94 - 109 mmol/L Final     Carbon Dioxide   Date Value Ref Range Status   03/16/2020 27 20 - 32 mmol/L Final     Carbon Dioxide (CO2)   Date Value Ref Range Status   01/23/2023 22 22 - 29 mmol/L Final   06/21/2022 29 20 - 32 mmol/L Final     Anion Gap   Date Value Ref Range Status   01/23/2023 16 (H) 7 - 15 mmol/L Final   06/21/2022 8 3 - 14 mmol/L Final   03/16/2020 7 3 - 14 mmol/L Final     Glucose   Date Value Ref Range Status   01/23/2023 67 (L) 70 - 99 mg/dL Final   06/21/2022 100 (H) 70 - 99 mg/dL Final   03/16/2020 103 (H) 70 - 99 mg/dL Final     GLUCOSE BY METER POCT   Date Value Ref Range Status   01/15/2023 126 (H) 70 - 99 mg/dL Final     Urea Nitrogen   Date Value Ref Range Status   01/23/2023  49.5 (H) 8.0 - 23.0 mg/dL Final   06/21/2022 51 (H) 7 - 30 mg/dL Final   03/16/2020 34 (H) 7 - 30 mg/dL Final     Creatinine   Date Value Ref Range Status   01/23/2023 2.85 (H) 0.67 - 1.17 mg/dL Final   03/16/2020 2.68 (H) 0.66 - 1.25 mg/dL Final     GFR Estimate   Date Value Ref Range Status   01/23/2023 21 (L) >60 mL/min/1.73m2 Final     Comment:     eGFR calculated using 2021 CKD-EPI equation.   03/16/2020 21 (L) >60 mL/min/[1.73_m2] Final     Comment:     Non  GFR Calc  Starting 12/18/2018, serum creatinine based estimated GFR (eGFR) will be   calculated using the Chronic Kidney Disease Epidemiology Collaboration   (CKD-EPI) equation.       Calcium   Date Value Ref Range Status   01/23/2023 9.1 8.8 - 10.2 mg/dL Final   03/16/2020 8.8 8.5 - 10.1 mg/dL Final         DISCHARGE PLAN:    Follow up labs: BMP,CBC 1/30/23    Medical Follow Up:      Follow up with primary care provider in 1-2 weeks    MTM referral needed and placed by this provider: No    Current [place] scheduled appointments:       Discharge Services: Home Care:  Occupational Therapy, Physical Therapy, Registered Nurse and Home Health Aide        TOTAL DISCHARGE TIME:   Greater than 30 minutes  Electronically signed by:  ZAY Kaiser CNP     Home care Face to Face documentation done in [facility] attached to Home care orders for homecare.

## 2023-01-24 NOTE — PROGRESS NOTES
[place] [facility]  [place] Medical Record Number:  [id number]  Place of Service where encounter took place:  [facility] (TCU) [[id number]]  Chief Complaint   Patient presents with     Nursing Home Acute       HPI:    Matheus White Sr.  is a 82 year old (1940), who is being seen today for an episodic care visit.  HPI information obtained from: facility chart records, facility staff and patient report. Today's concern is:    Patient Matheus White is a 82 male admitted to [facility] for rehabilitation s/p hospitalization [facility] 1/8-1/15/23 for CHF exacerbation, myocardial infarction, complicated by delirium (suspect dementia) and hypernatremia. Diuretics adjusted and then patient discharged on prior to hospital admission diuretics    PMHx suspected dementia, pAF on warfarin, HFrEF EF 35% 2/2 NICM, and renal transplant w/ ongoing CKD Stage IV, anemia, gout       Chronic congestive heart failure, unspecified heart failure type (H)  Permanent atrial fibrillation (H)  Anticoagulation monitoring, INR range 2-3     INR 1.3  Wt Readings from Last 2 Encounters:   01/24/23 74.7 kg (164 lb 11.2 oz)   01/24/23 74.7 kg (164 lb 9.6 oz)     Weights stable    Patient states he is ready to go home  Per consult with  and patient; he plans to take driving classes at [employer]    Past Medical and Surgical History reviewed in Epic today.    MEDICATIONS:    Current Outpatient Medications   Medication Sig Dispense Refill     calcitRIOL (ROCALTROL) 0.25 MCG capsule Take 0.25 mcg by mouth daily       carvedilol (COREG) 12.5 MG tablet Take 1 tablet (12.5 mg) by mouth 2 times daily (with meals)       colchicine (COLCYRS) 0.6 MG tablet Take 0.6 mg by mouth daily       cycloSPORINE modified (GENERIC EQUIVALENT) 25 MG capsule Take 50 mg by mouth 2 times daily       gabapentin (NEURONTIN) 300 MG capsule Take 300 mg by mouth At Bedtime       hydrALAZINE  "(APRESOLINE) 50 MG tablet Take 1 tablet (50 mg) by mouth every 8 hours       lactulose (CHRONULAC) 10 GM/15ML solution Take 30 mLs (20 g) by mouth 2 times daily       metolazone (ZAROXOLYN) 2.5 MG tablet Take 1 Tablet (2.5 mg) by mouth one time if needed (Weight greater than 165 pounds can use once per week).       multivitamin w/minerals (THERA-VIT-M) tablet Take 1 tablet by mouth daily       mycophenolate (GENERIC EQUIVALENT) 500 MG tablet Take 500 mg by mouth 2 times daily       potassium chloride ER (K-TAB) 20 MEQ CR tablet Take 20 mEq by mouth 2 times daily       torsemide (DEMADEX) 20 MG tablet Take 20 mg by mouth daily at 2 pm       torsemide (DEMADEX) 20 MG tablet Take 40 mg by mouth every morning 30 tablet 2     WARFARIN SODIUM PO Per INR orders           REVIEW OF SYSTEMS:  4 point ROS including Respiratory, CV, GI and , other than that noted in the HPI,  is negative    Objective:  /60   Pulse 62   Temp 97.6  F (36.4  C)   Resp 18   Ht 1.778 m (5' 10\")   Wt 74.7 kg (164 lb 11.2 oz)   SpO2 97%   BMI 23.63 kg/m    Exam:  GENERAL APPEARANCE:  Alert, in no distress  RESP:  respiratory effort and palpation of chest normal, lungs clear to auscultation , no respiratory distress  CV:  Palpation and auscultation of heart done , regular rate and rhythm, no murmur, rub, or gallop  SKIN:  Inspection of skin and subcutaneous tissue no edema in LE noted  PSYCH:  oriented X 3    Labs:   Labs done in SNF are in [place] EPIC. Please refer to them using EPIC/Care Everywhere.   Last Comprehensive Metabolic Panel:  Sodium   Date Value Ref Range Status   01/23/2023 142 136 - 145 mmol/L Final   03/16/2020 143 133 - 144 mmol/L Final     Potassium   Date Value Ref Range Status   01/23/2023 3.5 3.4 - 5.3 mmol/L Final   06/21/2022 3.1 (L) 3.4 - 5.3 mmol/L Final   03/16/2020 3.6 3.4 - 5.3 mmol/L Final     Chloride   Date Value Ref Range Status   01/23/2023 104 98 - 107 mmol/L Final   06/21/2022 103 94 - 109 mmol/L " Final   03/16/2020 109 94 - 109 mmol/L Final     Carbon Dioxide   Date Value Ref Range Status   03/16/2020 27 20 - 32 mmol/L Final     Carbon Dioxide (CO2)   Date Value Ref Range Status   01/23/2023 22 22 - 29 mmol/L Final   06/21/2022 29 20 - 32 mmol/L Final     Anion Gap   Date Value Ref Range Status   01/23/2023 16 (H) 7 - 15 mmol/L Final   06/21/2022 8 3 - 14 mmol/L Final   03/16/2020 7 3 - 14 mmol/L Final     Glucose   Date Value Ref Range Status   01/23/2023 67 (L) 70 - 99 mg/dL Final   06/21/2022 100 (H) 70 - 99 mg/dL Final   03/16/2020 103 (H) 70 - 99 mg/dL Final     GLUCOSE BY METER POCT   Date Value Ref Range Status   01/15/2023 126 (H) 70 - 99 mg/dL Final     Urea Nitrogen   Date Value Ref Range Status   01/23/2023 49.5 (H) 8.0 - 23.0 mg/dL Final   06/21/2022 51 (H) 7 - 30 mg/dL Final   03/16/2020 34 (H) 7 - 30 mg/dL Final     Creatinine   Date Value Ref Range Status   01/23/2023 2.85 (H) 0.67 - 1.17 mg/dL Final   03/16/2020 2.68 (H) 0.66 - 1.25 mg/dL Final     GFR Estimate   Date Value Ref Range Status   01/23/2023 21 (L) >60 mL/min/1.73m2 Final     Comment:     eGFR calculated using 2021 CKD-EPI equation.   03/16/2020 21 (L) >60 mL/min/[1.73_m2] Final     Comment:     Non  GFR Calc  Starting 12/18/2018, serum creatinine based estimated GFR (eGFR) will be   calculated using the Chronic Kidney Disease Epidemiology Collaboration   (CKD-EPI) equation.       Calcium   Date Value Ref Range Status   01/23/2023 9.1 8.8 - 10.2 mg/dL Final   03/16/2020 8.8 8.5 - 10.1 mg/dL Final     Lab Results   Component Value Date    WBC 6.0 01/23/2023    WBC 5.8 03/16/2020     Lab Results   Component Value Date    RBC 3.29 01/23/2023    RBC 3.68 03/16/2020     Lab Results   Component Value Date    HGB 9.7 01/23/2023    HGB 10.7 03/16/2020     Lab Results   Component Value Date    HCT 31.4 01/23/2023    HCT 34.8 03/16/2020     No components found for: MCT  Lab Results   Component Value Date    MCV 95 01/23/2023     MCV 95 03/16/2020     Lab Results   Component Value Date    MCH 29.5 01/23/2023    MCH 29.1 03/16/2020     Lab Results   Component Value Date    MCHC 30.9 01/23/2023    MCHC 30.7 03/16/2020     Lab Results   Component Value Date    RDW 15.8 01/23/2023    RDW 14.3 03/16/2020     Lab Results   Component Value Date     01/23/2023     03/16/2020         ASSESSMENT/PLAN:     Chronic congestive heart failure, unspecified heart failure type (H)  Permanent atrial fibrillation (H)  Anticoagulation monitoring, INR range 2-3       INR 1.3; on coumadin 2mg daily  Order coumadin 3mg daily and INR 1/27/23  Update on discharge orders    Wt Readings from Last 2 Encounters:   01/24/23 74.7 kg (164 lb 11.2 oz)   01/24/23 74.7 kg (164 lb 9.6 oz)     Weights stable    Patient states he is ready to go home  Per consult with  and patient; he plans to take driving classes at [email or web address]      Electronically signed by:  ZAY Kaiser CNP

## 2023-01-24 NOTE — LETTER
1/24/2023        RE: Matheus White Sr.  [id number] [address]        Error        Sincerely,        Josiah Saul MD

## 2023-03-31 PROBLEM — Z94.0 STATUS POST KIDNEY TRANSPLANT: Status: ACTIVE | Noted: 2023-01-01

## 2023-03-31 PROBLEM — N18.9 ACUTE RENAL FAILURE SUPERIMPOSED ON CHRONIC KIDNEY DISEASE, UNSPECIFIED CKD STAGE, UNSPECIFIED ACUTE RENAL FAILURE TYPE: Status: ACTIVE | Noted: 2023-01-01

## 2023-03-31 PROBLEM — N17.9 AKI (ACUTE KIDNEY INJURY) (H): Status: ACTIVE | Noted: 2023-01-01

## 2023-03-31 PROBLEM — R19.7 DIARRHEA, UNSPECIFIED TYPE: Status: ACTIVE | Noted: 2023-01-01

## 2023-03-31 PROBLEM — N17.9 ACUTE RENAL FAILURE SUPERIMPOSED ON CHRONIC KIDNEY DISEASE, UNSPECIFIED CKD STAGE, UNSPECIFIED ACUTE RENAL FAILURE TYPE: Status: ACTIVE | Noted: 2023-01-01

## 2023-03-31 PROBLEM — E86.0 DEHYDRATION: Status: ACTIVE | Noted: 2023-01-01

## 2023-04-01 NOTE — PROGRESS NOTES
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  S/p renal transplant 2009.  Suspect DEB secondary to dehydration in setting of diarrhea, poor p.o. intake, and continuing to take his torsemide.  He is on mycophenolate 500 mg twice daily and cyclosporine 50 mg twice daily.  It looks like he did take his immunosuppressants per the pillbox on day of admission although he took Saturdays meds and not Fridays.  Urinalysis shows 7 WBCs, small LE, and 15 hyaline casts. Received 2 L NS in the ER. Nephrology consulted and following.   -Nephrology consulted    - Check Myla and FENa . Re-check labs in am.    Additional 500 ml NS bolus   Increase LR to 100 ml/h   No diuretics: hold torsemide and metolazone   Cont CSA   Stop Cellcept given diarrhea and low WBC.    Given this patients complicated medical condition, I suggest transfer to Transplant Service at [facility].   -on transfer list at [facility] per nephrology team, transplant docs at [facility] are available for questions and to assist with management.   -CMP QAM   -Strict intake and output     Diarrhea, nausea   Reports nausea without emesis and multiple loose stools per day for the last couple of days although the diarrhea seems to be improving some.  He does not have any abdominal pain.  He has a reducible left inguinal hernia that is nontender on exam.  Denies any fevers or chills and he is afebrile here.  He is significantly leukopenic more so than before.  He did have neurovirus positive testing 2 months ago when hospitalized.  This may be run-of-the-mill gastroenteritis again although he is immunosuppressed so other etiologies are possible if symptoms are persisting more than a couple of days such as CMV infection.  Serum CMV PCR was negative 2 months ago.  He is very leukopenic.   Mycophenolate can cause diarrhea, but this has not been an issue before.  -enteric panel and C. difficile PCR pending    - hold off no Imodium until these tests are back  -IV Compazine as needed for nausea, avoiding Zofran due to prolonged QTc      Elevated LFTs   Bilirubin 2.1, , ALT 99, alk phos 228.  Similar levels when hospitalized 2 months ago.  Hepatitis A/B/C and CMV PCR were negative then.  CT at that time showed findings of cirrhosis with mild portal hypertension, or ultrasound did not show any cirrhotic appearance.  He received lactulose during the hospital stay and at TCU in case this was contributing to his ongoing confusion that started just prior to that hospitalization, however ammonia level was never elevated and he no longer takes lactulose.  He does not have any right upper quadrant abdominal tenderness on exam.  -CMP QAM   -May need to involve GI for this and diarrhea, awaiting testing as above      Acute metabolic encephalopathy   More confused and having very vivid dreams when he is able to sleep the last couple of nights although he has had significant difficulty falling asleep.  Has been essentially confused since just prior to last admission that did not resolve back to baseline after going to TCU and then home.  Suspect currently he has an acute metabolic encephalopathy in setting of DEB and likely GI infection.  No focal neurodeficits other than some word finding difficulty on exam.  Previously given lactulose during the hospital stay in TCU, but no longer takes this and ammonia level was never elevated.  As above cirrhosis diagnosis is questionable.  -Fall precautions  -Seroquel 12.5 mg at bedtime as needed for sleep      Acute on chronic leukopenia  Chronic anemia   Hemoglobin 11.2 with baseline 9-10, he is hemoconcentrated.  He does have significant leukopenia with WBC count of 1.4.  Has had some leukopenia in the past ranging from 2.5-6 more recently.  Suspect this may be due to  acute infection versus his immunosuppression medications.  -CBC with differential in the morning  -Question if immunosuppression dose change needed, nephrology following, transplant docs at [facility] are available for questions and to assist with management      Chronic systolic CHF  Nonischemic cardiomyopathy  HTN  Mild to moderate pulmonary pretension  Moderate aortic, mitral, and tricuspid regurgitation   Most recent EF 35% when hospitalized here 2 months ago.  He is on torsemide 40 mg in the morning and 20 mg afternoon.  Has a as needed metolazone prescription as well.  He is on carvedilol 12.5 mg twice daily and hydralazine 50 mg 3 times daily.  -PTA carvedilol and hydralazine  -Holding torsemide due to dehydration/DEB  -Strict intake and output and daily weights     A-fib  Prolonged QTc   Chronically on carvedilol 12.5 mg twice daily with controlled rate.  Also, on warfarin with supratherapeutic INR initially at 5.8.  EKG shows A-fib with controlled rate and prolonged QTc at roughly 530.  Coreg will be continued.  -Consult Pharm.D. for warfarin dosing while here, hold dose for now   -Daily INR  -dose of PO vitamin K 04/01/23      Hypokalemia: Potassium 3.3.  Secondary to diuretics and diarrhea.       Diet: Combination Diet Regular Diet Adult    DVT Prophylaxis: Pneumatic Compression Devices  Olvera Catheter: Not present  Lines: None     Cardiac Monitoring: None  Code Status: No CPR- Do NOT Intubate      Clinically Significant Risk Factors Present on Admission        # Hypokalemia: Lowest K = 3.1 mmol/L in last 2 days, will replace as needed        # Drug Induced Coagulation Defect: home medication list includes an anticoagulant medication   # Acute Kidney Injury, unspecified: based on a >150% or 0.3 mg/dL increase in last creatinine compared to past 90 day average, will monitor renal function  # Hypertension: home medication list includes antihypertensive(s)  # Chronic systolic heart failure: echo within the past  year with EF <40%              Disposition Plan      Expected Discharge Date: 04/04/2023                  Marisol Watters DO  Hospitalist Service  [facility]  Securely message with [employer] (more info)  Text page via [employer] Paging/Directory   ______________________________________________________________________    Interval History   Patient is doing well   No bleeding overnight   Nausea and diarrhea seem to be slightly improved now   No new symptoms.     Physical Exam   Vital Signs: Temp: 98.1  F (36.7  C) Temp src: Oral BP: 132/61 Pulse: 62   Resp: 20 SpO2: 95 % O2 Device: None (Room air)    Weight: 152 lbs 8 oz    Constitutional: awake, alert, cooperative, no apparent distress, and appears stated age  HEENT: Normocephalic, atraumatic  Wearing glasses   Respiratory: Normal work of breathing, good air exchange, clear to auscultation bilaterally, no crackles or wheezing noted on auscultation   Cardiovascular: Irregularly, irregular rhythm, regular rate.  2/6 systolic murmur noted on exam, maximally parasternally   GI: Soft and nontender to palpation, nondistended, no rebound or guarding  Skin: normal skin color, texture, turgor  Musculoskeletal: No deformities, no edema present  Neurologic: Alert and oriented, no focal deficits   Slightly repetitive questioning, tracks conversation well overall , some intermittent word finding issues   Neuropsychiatric: General: normal, calm and normal eye contact     Data   ------------------------- PAST 24 HR DATA REVIEWED -----------------------------------------------    I have personally reviewed the following data over the past 24 hrs:    1.4 (L)  \   10.3 (L)   / 152     138 98 63.5 (H) /  83   3.1 (L) 22 4.10 (H) \       ALT: 93 (H) AST: 171 (H) AP: 202 (H) TBILI: 2.1 (H)   ALB: 4.0 TOT PROTEIN: 6.5 LIPASE: 9 (L)       Procal: N/A CRP: N/A Lactic Acid: 0.9       INR:  7.65 (HH) PTT:  N/A   D-dimer:  N/A Fibrinogen:  N/A       Imaging results reviewed  over the past 24 hrs:   No results found for this or any previous visit (from the past 24 hour(s)).

## 2023-04-01 NOTE — PROVIDER NOTIFICATION
DATE:  4/1/2023   TIME OF RECEIPT FROM LAB:  0722  LAB TEST:  INR  LAB VALUE:  7.27  RESULTS GIVEN WITH READ-BACK TO (PROVIDER):  Dr Watters  TIME LAB VALUE REPORTED TO PROVIDER:   0724

## 2023-04-01 NOTE — PROGRESS NOTES
Pt decided to leave AMA staff tried to explain to Pt why he was here and he was adamant. Daughter tried to tell pt to stay and pt insisted on leaving. MD notified and pt signed the AMA paper work and left with family.

## 2023-04-01 NOTE — PROGRESS NOTES
Spoke with patient placement regarding patient transfer to the [facility] for transplant services.  Awaiting callback at this time.    Marisol Watters, DO 2:54 PM     Patient accepted for transfer at [facility]. On wait list. No bed currently available. Will transfer once bed becomes available. In the meantime, transplant docs at [facility] are available for questions and to assist with management.     Marisol Watters, DO 4:13 PM

## 2023-04-01 NOTE — ED NOTES
Pt used call light to ask for water. Writer gave pt 2 glasses ice water per request with combination diet order noted.

## 2023-04-01 NOTE — PLAN OF CARE
End of Shift Summary  For vital signs and complete assessments, please see documentation flowsheets.     Pertinent assessments: Patient is alert and oriented, but forgetful. Was pleasant at admit, but due to not sleeping became a little agitated this am.  Up SBA in the room. Denies nausea or pain presently. Patient has had a 22 pound weight loss since Jan due to decr appetite. Patient has a healthcare directive, the documents were scanned and faxed to honoring choices.   Major Shift Events admit to inpatient. Critical INR  Treatment Plan: IVF, monitor labs, rule out C-diff, strict I/O's

## 2023-04-01 NOTE — ED PROVIDER NOTES
History     Chief Complaint:  Nausea, Diarrhea, and Headache       HPI   Matheus White Sr. is a 83 year old male with a history of renal transplant and liver disease who presents with dehydration.  He has had 2+ days of diarrhea with multiple loose bowel movements.  No blood or black stool.  He is nauseous with no vomiting no abdominal pain.  He has been drinking enough and has been feeling dehydrated.  He had a mild headache that is now resolved.  He had no sick contacts no antibiotic use of recent.      Independent Historian:   Ex-wife and guardian significant contributed to history and is at the bedside.    Review of External Notes: Reviewed discharge summary on 1/15/2023 for alcoholic cirrhosis and altered mental status      Allergies:  No Known Allergies     Medications:    calcitRIOL (ROCALTROL) 0.25 MCG capsule  carvedilol (COREG) 12.5 MG tablet  colchicine (COLCYRS) 0.6 MG tablet  cycloSPORINE modified (GENERIC EQUIVALENT) 25 MG capsule  gabapentin (NEURONTIN) 300 MG capsule  hydrALAZINE (APRESOLINE) 50 MG tablet  lactulose (CHRONULAC) 10 GM/15ML solution  metolazone (ZAROXOLYN) 2.5 MG tablet  multivitamin w/minerals (THERA-VIT-M) tablet  mycophenolate (GENERIC EQUIVALENT) 500 MG tablet  potassium chloride ER (K-TAB) 20 MEQ CR tablet  torsemide (DEMADEX) 20 MG tablet  torsemide (DEMADEX) 20 MG tablet  WARFARIN SODIUM PO        Past Medical History:    Past Medical History:   Diagnosis Date     Atrial fibrillation, permanent (H) 04/2009     Cellulitis of left forearm 07/10/2020     CKD (chronic kidney disease)      HTN (hypertension), benign        Past Surgical History:    Past Surgical History:   Procedure Laterality Date     AS TRANSPLANT,PREP CADAVER RENAL GRAFT Right 04/2009     IR FINE NEEDLE ASPIRATION W ULTRASOUND  10/15/2019     PERCUTANEOUS BIOPSY KIDNEY Right 12/23/2019    Procedure: Right Kidney Biopsy;  Surgeon: Josiah Ponce MD;  Location:  OR     TRANSPLANT          Family  "History:    family history includes Emphysema in his father.    Social History:   reports that he has never smoked. He has never used smokeless tobacco. He reports current alcohol use. He reports that he does not use drugs.  PCP: [facility], [facility]     Physical Exam     Patient Vitals for the past 24 hrs:   BP Temp Temp src Pulse Resp SpO2 Height Weight   03/31/23 2125 -- -- -- 67 -- 96 % -- --   03/31/23 2100 (!) 133/93 -- -- 60 -- 96 % -- --   03/31/23 2019 (!) 147/86 97.2  F (36.2  C) Temporal 64 18 97 % 1.803 m (5' 11\") 68.9 kg (152 lb)        Physical Exam  Constitutional:  Oriented to person, place, and time.   HENT:   Head:    Normocephalic.   Mouth/Throat:   Oropharynx is clear and moist.   Eyes:    EOM are normal. Pupils are equal, round, and reactive to light.   Neck:    Neck supple.   Cardiovascular:  Normal rate, regular rhythm and normal heart sounds.      Exam reveals no gallop and no friction rub.       No murmur heard.  Pulmonary/Chest:  Effort normal and breath sounds normal.      No respiratory distress. No wheezes. No rales.      No reproducible chest wall pain.  Abdominal:   Soft. No distension. No tenderness. No rebound and no guarding.                                  no gross blood yellow stool.  Musculoskeletal:  Normal range of motion.   Neurological:   Alert and oriented to person, place, and time.           Moves all 4 extremities spontaneously    Skin:    No rash noted. No pallor.       Emergency Department Course   ECG  ECG taken at 2246, ECG read at 2246  Atrial fibrillation with premature ventricular or aberrantly conducted complexes   Left bundle branch block   Abnormal ECG    Rate 75 bpm. MO interval * ms. QRS duration 152 ms. QT/QTc 476/531 ms. P-R-T axes * -31 122.      Laboratory:  Labs Ordered and Resulted from Time of ED Arrival to Time of ED Departure   COMPREHENSIVE METABOLIC PANEL - Abnormal       Result Value    Sodium 140      Potassium 3.3 (*)     Chloride 97 (*)  "    Carbon Dioxide (CO2) 25      Anion Gap 18 (*)     Urea Nitrogen 65.9 (*)     Creatinine 4.29 (*)     Calcium 9.8      Glucose 96      Alkaline Phosphatase 228 (*)      (*)     ALT 99 (*)     Protein Total 7.6      Albumin 4.6      Bilirubin Total 2.1 (*)     GFR Estimate 13 (*)    ROUTINE UA WITH MICROSCOPIC REFLEX TO CULTURE - Abnormal    Color Urine Yellow      Appearance Urine Clear      Glucose Urine Negative      Bilirubin Urine Negative      Ketones Urine Negative      Specific Gravity Urine 1.012      Blood Urine Negative      pH Urine 6.0      Protein Albumin Urine Negative      Urobilinogen Urine Normal      Nitrite Urine Negative      Leukocyte Esterase Urine Small (*)     Mucus Urine Present (*)     RBC Urine 2      WBC Urine 7 (*)     Squamous Epithelials Urine <1      Hyaline Casts Urine 17 (*)    CBC WITH PLATELETS AND DIFFERENTIAL - Abnormal    WBC Count 1.4 (*)     RBC Count 3.83 (*)     Hemoglobin 11.2 (*)     Hematocrit 35.0 (*)     MCV 91      MCH 29.2      MCHC 32.0      RDW 14.7      Platelet Count 184      % Neutrophils 55      % Lymphocytes 23      % Monocytes 19      % Eosinophils 1      % Basophils 1      % Immature Granulocytes 1      NRBCs per 100 WBC 0      Absolute Neutrophils 0.8 (*)     Absolute Lymphocytes 0.3 (*)     Absolute Monocytes 0.3      Absolute Eosinophils 0.0      Absolute Basophils 0.0      Absolute Immature Granulocytes 0.0      Absolute NRBCs 0.0     INR - Abnormal    INR 5.81 (*)    LIPASE - Abnormal    Lipase 9 (*)    ISTAT GASES LACTATE VENOUS POCT - Abnormal    Lactic Acid POCT 0.9      Bicarbonate Venous POCT 26      O2 Sat, Venous POCT 73 (*)     pCO2V Venous POCT 41      pH Venous POCT 7.41      pO2 Venous POCT 38     AMMONIA - Normal    Ammonia 33     CYCLOSPORINE BY TANDEM MASS SPECTROMETRY   MYCOPHENOLIC ACID BY TANDEM MASS SPECTROMETRY   C. DIFFICILE TOXIN B PCR WITH REFLEX TO C. DIFFICILE ANTIGEN AND TOXINS A/B EIA   ENTERIC BACTERIA AND VIRUS PANEL  BY [facility]            Emergency Department Course & Assessments:             Interventions:  Medications   0.9% sodium chloride BOLUS (has no administration in time range)   ondansetron (ZOFRAN) injection 4 mg (has no administration in time range)   ondansetron (ZOFRAN) injection 4 mg (4 mg Intravenous $Given 3/31/23 2036)   0.9% sodium chloride BOLUS (1,000 mLs Intravenous $New Bag 3/31/23 2036)   acetaminophen (TYLENOL) tablet 1,000 mg (1,000 mg Oral $Given 3/31/23 2037)            Consultations/Discussion of Management or Tests:  I discussed case with the hospitalist will be admitting the patient.  Dr. Gonsalez       Social Determinants of Health affecting care:  Dimension alcoholic cirrhosis affecting history and follow-up and care.    Assessments:      Disposition:  The patient was admitted to the hospital under the care of Dr. Gonsalez.     Impression & Plan      Medical Decision Makin-year-old male history of renal transplant and liver disease who is here with diarrhea dehydration.  Differential includes C. difficile colitis, gastroenteritis, colitis, or other causes.  Unfortunately creatinine is significantly elevated in comparison to his baseline.  Fluids have been started and he will need further hydration.  He otherwise has benign abdomen therefore would doubt surgical process and or need for imaging.  As he is a renal transplant patient with acute on chronic renal failure he will be admitted for further hydration and monitoring.        Diagnosis:    ICD-10-CM    1. Diarrhea, unspecified type  R19.7       2. Acute renal failure superimposed on chronic kidney disease, unspecified CKD stage, unspecified acute renal failure type (H)  N17.9     N18.9       3. Dehydration  E86.0       4. Status post kidney transplant  Z94.0            Discharge Medications:  New Prescriptions    No medications on file          Gurwinder Jaimes MD    3/31/2023   Gurwinder Jaimes MD Shapin, Ryan P, MD  [phone]

## 2023-04-01 NOTE — PROGRESS NOTES
End of Shift Summary  For vital signs and complete assessments, please see documentation flowsheets.     Pertinent assessments: Patient is alert and oriented, but forgetful. Patient is very argumentative. Up SBA in the room. Denies nausea or pain presently. Impulsive, family at bedside.     Major Shift Events: admit to inpatient. Critical INR, vitamin K given    Treatment Plan: IVF, monitor labs, rule out C-diff, strict I/O's, urine excretion lab    Bedside Nurse: Taylor Traore RN   No

## 2023-04-01 NOTE — PHARMACY-ANTICOAGULATION SERVICE
Clinical Pharmacy - Warfarin Dosing Consult     Pharmacy has been consulted to manage this patient s warfarin therapy.  Indication: Atrial Fibrillation  Therapy Goal: INR 2-3  Warfarin Prior to Admission: Yes  Warfarin PTA Regimen: 3 mg SuTuTh, 1.5 mg (1/2 tab) AOD or as directed by INR clinic    INR   Date Value Ref Range Status   04/01/2023 7.65 (HH) 0.85 - 1.15 Final   04/01/2023 7.27 (HH) 0.85 - 1.15 Final       Recommend hold warfarin today. Pt was given 10 mg oral Vitamin K to attempt to reverse supratherapeutic INR. Pharmacy will monitor Matheus White Sr. daily and order warfarin doses to achieve specified goal.      Please contact pharmacy as soon as possible if the warfarin needs to be held for a procedure or if the warfarin goals change.

## 2023-04-01 NOTE — ED NOTES
"[facility]  ED Nurse Handoff Report    Matheus White Sr. is a 83 year old male   ED Chief complaint: Nausea, Diarrhea, and Headache  . ED Diagnosis:   Final diagnoses:   Diarrhea, unspecified type   Acute renal failure superimposed on chronic kidney disease, unspecified CKD stage, unspecified acute renal failure type (H)   Dehydration   Status post kidney transplant     Allergies: No Known Allergies    Code Status: DNR / DNI  Activity level - Baseline/Home:  Independent. Activity Level - Current:   Stand by Assist. Lift room needed: No. Bariatric: No   Needed: No   Isolation: Yes. Infection: Not Applicable  C-Diff Pending.     Vital Signs:   Vitals:    03/31/23 2019 03/31/23 2100 03/31/23 2125   BP: (!) 147/86 (!) 133/93    Pulse: 64 60 67   Resp: 18     Temp: 97.2  F (36.2  C)     TempSrc: Temporal     SpO2: 97% 96% 96%   Weight: 68.9 kg (152 lb)     Height: 1.803 m (5' 11\")         Cardiac Rhythm:  ,      Pain level:    Patient confused: No. Patient Falls Risk: Yes.   Elimination Status: Has voided   Patient Report - Initial Complaint: Nausea, diarrhea. Focused Assessment: Per MD note: \"Matheus White Sr. is a 83 year old male with a history of renal transplant and liver disease who presents with dehydration.  He has had 2+ days of diarrhea with multiple loose bowel movements.  No blood or black stool.  He is nauseous with no vomiting no abdominal pain.  He has been drinking enough and has been feeling dehydrated.  He had a mild headache that is now resolved.  He had no sick contacts no antibiotic use of recent.\"   Tests Performed: EKG, labs. Abnormal Results:   Labs Ordered and Resulted from Time of ED Arrival to Time of ED Departure   COMPREHENSIVE METABOLIC PANEL - Abnormal       Result Value    Sodium 140      Potassium 3.3 (*)     Chloride 97 (*)     Carbon Dioxide (CO2) 25      Anion Gap 18 (*)     Urea Nitrogen 65.9 (*)     Creatinine 4.29 (*)     Calcium 9.8      Glucose 96   "    Alkaline Phosphatase 228 (*)      (*)     ALT 99 (*)     Protein Total 7.6      Albumin 4.6      Bilirubin Total 2.1 (*)     GFR Estimate 13 (*)    ROUTINE UA WITH MICROSCOPIC REFLEX TO CULTURE - Abnormal    Color Urine Yellow      Appearance Urine Clear      Glucose Urine Negative      Bilirubin Urine Negative      Ketones Urine Negative      Specific Gravity Urine 1.012      Blood Urine Negative      pH Urine 6.0      Protein Albumin Urine Negative      Urobilinogen Urine Normal      Nitrite Urine Negative      Leukocyte Esterase Urine Small (*)     Mucus Urine Present (*)     RBC Urine 2      WBC Urine 7 (*)     Squamous Epithelials Urine <1      Hyaline Casts Urine 17 (*)    CBC WITH PLATELETS AND DIFFERENTIAL - Abnormal    WBC Count 1.4 (*)     RBC Count 3.83 (*)     Hemoglobin 11.2 (*)     Hematocrit 35.0 (*)     MCV 91      MCH 29.2      MCHC 32.0      RDW 14.7      Platelet Count 184      % Neutrophils 55      % Lymphocytes 23      % Monocytes 19      % Eosinophils 1      % Basophils 1      % Immature Granulocytes 1      NRBCs per 100 WBC 0      Absolute Neutrophils 0.8 (*)     Absolute Lymphocytes 0.3 (*)     Absolute Monocytes 0.3      Absolute Eosinophils 0.0      Absolute Basophils 0.0      Absolute Immature Granulocytes 0.0      Absolute NRBCs 0.0     INR - Abnormal    INR 5.81 (*)    LIPASE - Abnormal    Lipase 9 (*)    ISTAT GASES LACTATE VENOUS POCT - Abnormal    Lactic Acid POCT 0.9      Bicarbonate Venous POCT 26      O2 Sat, Venous POCT 73 (*)     pCO2V Venous POCT 41      pH Venous POCT 7.41      pO2 Venous POCT 38     AMMONIA - Normal    Ammonia 33     CYCLOSPORINE BY TANDEM MASS SPECTROMETRY   MYCOPHENOLIC ACID BY TANDEM MASS SPECTROMETRY   C. DIFFICILE TOXIN B PCR WITH REFLEX TO C. DIFFICILE ANTIGEN AND TOXINS A/B EIA   ENTERIC BACTERIA AND VIRUS PANEL BY CAMILLE STOOL     No orders to display     Treatments provided: See MAR   Family Comments: At bedside, cooperative  OBS brochure/video  discussed/provided to patient:  No  ED Medications:   Medications   calcitRIOL (ROCALTROL) capsule 0.25 mcg (has no administration in time range)   carvedilol (COREG) tablet 12.5 mg (has no administration in time range)   cycloSPORINE modified (GENERIC EQUIVALENT) capsule 50 mg (has no administration in time range)   gabapentin (NEURONTIN) capsule 300 mg (has no administration in time range)   hydrALAZINE (APRESOLINE) tablet 50 mg (has no administration in time range)   mycophenolate (GENERIC EQUIVALENT) tablet 500 mg (has no administration in time range)   Warfarin Dose Required Daily - Pharmacist Managed (has no administration in time range)   warfarin-No DOSE today (has no administration in time range)   lidocaine 1 % 0.1-1 mL (has no administration in time range)   lidocaine (LMX4) cream (has no administration in time range)   sodium chloride (PF) 0.9% PF flush 3 mL (3 mLs Intracatheter Not Given 3/31/23 2351)   sodium chloride (PF) 0.9% PF flush 3 mL (has no administration in time range)   melatonin tablet 1 mg (has no administration in time range)   acetaminophen (TYLENOL) tablet 650 mg (has no administration in time range)     Or   acetaminophen (TYLENOL) Suppository 650 mg (has no administration in time range)   prochlorperazine (COMPAZINE) injection 5 mg (has no administration in time range)     Or   prochlorperazine (COMPAZINE) tablet 5 mg (has no administration in time range)     Or   prochlorperazine (COMPAZINE) suppository 12.5 mg (has no administration in time range)   potassium chloride ER (KLOR-CON M) CR tablet 20 mEq (has no administration in time range)   lactated ringers infusion (has no administration in time range)   QUEtiapine (SEROquel) half-tab 12.5 mg (has no administration in time range)   ondansetron (ZOFRAN) injection 4 mg (4 mg Intravenous $Given 3/31/23 2036)   0.9% sodium chloride BOLUS (0 mLs Intravenous Stopped 3/31/23 2333)   acetaminophen (TYLENOL) tablet 1,000 mg (1,000 mg Oral  $Given 3/31/23 2037)   0.9% sodium chloride BOLUS (1,000 mLs Intravenous $New Bag 3/31/23 2339)     Drips infusing:  No  For the majority of the shift, the patient's behavior Green. Interventions performed were NA.    Sepsis treatment initiated: No     Patient tested for COVID 19 prior to admission: NO    ED Nurse Name/Phone Number: Veronica Altman RN,   1:21 AM    RECEIVING UNIT ED HANDOFF REVIEW    Above ED Nurse Handoff Report was reviewed: Yes  Reviewed by: Arielle Napier RN on April 1, 2023 at 1:42 AM

## 2023-04-01 NOTE — PHARMACY-ADMISSION MEDICATION HISTORY
Pharmacist Admission Medication History    Admission medication history is complete. The information provided in this note is only as accurate as the sources available at the time of the update.    Medication reconciliation/reorder completed by provider prior to medication history? Yes    Information Source(s): Patient, Family member, Clinic records and CareEverywhere/SureScripts via in-person    Pertinent Information: Patient appeared confused and was resistant to interview throughout the medication history. Much of the list was gathered from chart review [Nephrology visit 02/06/23] and dispense records.    Hydralazine has not been dispensed, though patient was receiving at [facility] and thinks he was taking at home.     Changes made to PTA medication list:    Added: None    Deleted: None    Changed:  Warfarin dosing [per last AC visit note 03/27/23]; Colchicine 1 tab daily --> 1 tab BID PRN    Allergies reviewed with patient and updates made in EHR: yes    Medication History Completed By: Pascual Khan [facility] 4/1/2023 11:01 AM    PTA Med List   Medication Sig Last Dose     calcitRIOL (ROCALTROL) 0.25 MCG capsule Take 0.25 mcg by mouth daily 3/31/2023     carvedilol (COREG) 12.5 MG tablet Take 1 tablet (12.5 mg) by mouth 2 times daily (with meals) 3/31/2023     colchicine (COLCYRS) 0.6 MG tablet Take 0.6 mg by mouth 2 times daily as needed for gout pain Unknown     cycloSPORINE modified (GENERIC EQUIVALENT) 25 MG capsule Take 50 mg by mouth 2 times daily 3/31/2023     gabapentin (NEURONTIN) 300 MG capsule Take 300 mg by mouth At Bedtime Past Week     hydrALAZINE (APRESOLINE) 50 MG tablet Take 1 tablet (50 mg) by mouth every 8 hours Unknown     lactulose (CHRONULAC) 10 GM/15ML solution Take 30 mLs (20 g) by mouth 2 times daily Unknown     metolazone (ZAROXOLYN) 2.5 MG tablet Take 1 Tablet (2.5 mg) by mouth one time if needed (Weight greater than 165 pounds can use once per week). Unknown     multivitamin w/minerals  (THERA-VIT-M) tablet Take 1 tablet by mouth daily 3/31/2023     mycophenolate (GENERIC EQUIVALENT) 500 MG tablet Take 500 mg by mouth 2 times daily 3/31/2023     potassium chloride ER (K-TAB) 20 MEQ CR tablet Take 20 mEq by mouth 2 times daily 3/31/2023     torsemide (DEMADEX) 20 MG tablet Take 20 mg by mouth daily at 2 pm 3/31/2023     torsemide (DEMADEX) 20 MG tablet Take 40 mg by mouth every morning 3/31/2023     warfarin ANTICOAGULANT (COUMADIN) 3 MG tablet Take 3mg by mouth on Sun, Tue, Thur and take 1.5mg (half-tablet) by mouth all other days of the week or as directed by INR clinic. 3/31/2023 at 1.5mg

## 2023-04-01 NOTE — ED TRIAGE NOTES
Presents to triage with c/o nausea and diarrhea that have been intermittent for the past several days. Patient now stated his mouth is very dry and he is getting a headache. Denies any blood or dark stool, took immodium PTA.

## 2023-04-01 NOTE — PROGRESS NOTES
"Transfer Type: [facility]  Transfer Triage Note    Date of call: 04/01/23  Time of call: 4:18 PM    Current Patient Location:  [facility]   Current Level of Care: Med Surg    Vitals: Temp: 98.1  F (36.7  C) Temp src: Oral BP: 132/61 Pulse: 62   Resp: 20 SpO2: 95 % Height: 177.8 cm (5' 10\") Weight: 69.2 kg (152 lb 8 oz)  O2 Device: None (Room air) at    Diagnosis: DEB, diarrhea, dehydration   Is COVID-19 a concern? No  Reason for requested transfer: Further diagnostic work up, management, and consultation for specialized care   Isolation Needs: Enteric--R/o C.diff    Outside Records: Available  Additional records may be faxed to [phone].    Transfer accepted: Yes  Stability of Patient: Patient is vitally stable, with no critical labs, and will likely remain stable throughout the transfer process  Level of Care Needed: Med Surg  Telemetry Needed:  None  Expected Time of Arrival for Transfer: greater than 24 hours  Arrival Location:  [facility]    Recommendations for Management and Stabilization: Not needed    Additional Comments: 82 y/o M with history of kidney transplant (2009), CKD IV, a.fib on warfarin, and NICM presented to [facility] for nausea, diarrhea, and confusion. On admission noted to have DEB with Cr 4.1 (baseline 2-3), hypokalemia, and supratherapeutic INR 7.6. DEB thought most likely to be pre-renal in the setting of dehydration and diarrhea. Enteric panel/C.diff in process. Nephrology consulted there who have given initial recommendations but recommend transfer to [facility]. Dr. Ibrahim from Transplant nephrology was paged and recommended admission to medicine with Tx Nephrology consult.     Patient accepted on wait list for transfer once bed available.     Montserrat Mc MD      "

## 2023-04-01 NOTE — H&P
[facility]  Hospitalist Admission Note  Name: Matheus White Sr.    MRN: [id number]  YOB: 1940    Age: 83 year old  Date of admission: 3/31/2023  Primary care provider: Erika, Marco Mendez    Chief Complaint:  Diarrhea, confusion    Assessment and Plan:   DEB on CKD stage IV  S/p Renal transplant: Creatinine 4.29 with BUN 65 up from baseline creatinine roughly 2.3.  S/p renal transplant 2009.  Suspect DEB secondary to dehydration in setting of diarrhea, poor p.o. intake, and continuing to take his torsemide.  He is on mycophenolate 500 mg twice daily and cyclosporine 50 mg twice daily.  It looks like he did take his immunosuppressants per the pillbox today although he took Saturdays meds and not Fridays.  Urinalysis shows 7 WBCs, small LE, and 15 hyaline casts.  -Consult nephrology for assistance  -Ordered cyclosporine and mycophenolate levels added on  -Hold his evening dose of immunosuppressants, I think he may have already taken them.  Resumed PTA doses in the morning  -Received 2 L NS in the ER  -Cautious with IV fluids, LR at 50 ml/hr given CHF  -Hold PTA torsemide  -CMP in the morning  -Strict intake and output    Diarrhea, nausea: Reports nausea without emesis and multiple loose stools per day for the last couple of days although the diarrhea seems to be improving some.  He does not have any abdominal pain.  He has a reducible left inguinal hernia that is nontender on exam.  Denies any fevers or chills and he is afebrile here.  He is significantly leukopenic more so than before.  He did have neurovirus positive testing 2 months ago when hospitalized.  This may be run-of-the-mill gastroenteritis again although he is immunosuppressed so other etiologies are possible if symptoms are persisting more than a couple of days such as CMV infection.  Serum CMV PCR was negative 2 months ago.  He is very leukopenic.  Mycophenolate can cause diarrhea, but this has not been an issue  before.  -Send enteric panel and C. difficile PCR, no Imodium until these tests are back  -IV Compazine as needed for nausea, avoiding Zofran due to prolonged QTc    Elevated LFTs: Bilirubin 2.1, , ALT 99, alk phos 228.  Similar levels when hospitalized 2 months ago.  Hepatitis A/B/C and CMV PCR were negative then.  CT at that time showed findings of cirrhosis with mild portal hypertension, or ultrasound did not show any cirrhotic appearance.  He received lactulose during the hospital stay and at TCU in case this was contributing to his ongoing confusion that started just prior to that hospitalization, however ammonia level was never elevated and he no longer takes lactulose.  He does not have any right upper quadrant abdominal tenderness on exam.  -CMP in the morning  -May need to involve GI for this and diarrhea    Acute metabolic encephalopathy: More confused and having very vivid dreams when he is able to sleep the last couple of nights although he has had significant difficulty falling asleep.  Has been essentially confused since just prior to last admission that did not resolve back to baseline after going to TCU and then home.  Suspect currently he has an acute metabolic encephalopathy in setting of DEB and likely GI infection.  No focal neurodeficits other than some word finding difficulty on exam.  Previously given lactulose during the hospital stay in TCU, but no longer takes this and ammonia level was never elevated.  As above cirrhosis diagnosis is questionable.  -Fall precautions  -Seroquel 12.5 mg at bedtime as needed for sleep    Acute on chronic leukopenia  Chronic anemia: Hemoglobin 11.2 with baseline 9-10, he is hemoconcentrated.  He does have significant leukopenia with WBC count of 1.4.  Has had some leukopenia in the past ranging from 2.5-6 more recently.  Suspect this may be due to acute infection versus his immunosuppression medications.  -CBC with differential in the morning  -Question  if immunosuppression dose change needed, nephrology consulted    Chronic systolic CHF  Nonischemic cardiomyopathy  HTN  Mild to moderate pulmonary pretension  Moderate aortic, mitral, and tricuspid regurgitation: Most recent EF 35% when hospitalized here 2 months ago.  He is on torsemide 40 mg in the morning and 20 mg afternoon.  Has a as needed metolazone prescription as well.  He is on carvedilol 12.5 mg twice daily and hydralazine 50 mg 3 times daily.  -Resume carvedilol and hydralazine  -Hold torsemide due to dehydration/DEB  -Strict intake and output and daily weights    A-fib  Prolonged QTc: Chronically on carvedilol 12.5 mg twice daily with controlled rate.  Also, on warfarin with supratherapeutic INR initially at 5.8.  EKG shows A-fib with controlled rate and prolonged QTc at roughly 530.  Coreg will be continued.  -Consult Pharm.D. for warfarin dosing while here, hold dose today although I believe he took half a warfarin pill while in triage  -If develops any acute bleeding he will need reversal, hold off for now  -Daily INR    Hypokalemia: Potassium 3.3.  Secondary to diuretics and diarrhea.  -Give 20 meq oral potassium and recheck CMP in the morning    DVT Prophylaxis: Pneumatic Compression Devices  Code Status: DNR / DNI -consistent with previous wishes  FEN: Regular diet, LR at 50 mL/h  Discharge Dispo: Consult PT/SW due to weakness in setting of acute infection.  Did go to TCU for 10 days after hospitalization 2 months ago  Estimated Disch Date / # of Days until Disch: Admit inpatient due to DEB in the setting of renal transplant and acute encephalopathy.  Anticipate at least 2 night hospitalization.      History of Present Illness:  Matheus AALIYAH White Sr. is a 83 year old male with PMH including ESRD s/p renal transplant in 2009 with baseline creatinine 2.3, A-fib on warfarin, HTN, anemia, systolic CHF/NICM with EF 35%, anemia, and leukopenia who presents with nausea, diarrhea, and confusion.  He was  hospitalized here 2 months ago for CHF exacerbation and he also had norovirus at that time.  He discharged to TCU for 10 days and then returned home.  Per family at the bedside he developed some confusion just prior to that hospitalization that has persisted since then and he has not returned back to baseline that he was at previously.  He is having some word finding difficulties for me and does admit that he has been more confused the last couple of days.  He is having difficulty sleeping at night when he does he is having very vivid dreams almost like hallucinations.  For the last 2 to 3 days he has had multiple loose stools per day and has felt nauseous although has not had any emesis.  He thinks the diarrhea may be getting a little bit better today was taking Imodium.  He denies any abdominal pain although he does point to a left inguinal hernia that sometimes causes problems, but not currently.  He has not had any fevers or chills.  He did have a mild headache today.  Overall he feels dehydrated and he has not been drinking enough fluids in the past few days.  He has been taking his medications including his torsemide and immunosuppressive medications.  He took half of a warfarin pill in triage prior to coming here.  Denies any recent melena or hematochezia.  Denies any new cough or shortness of breath.    History obtained from patient, family member, medical record, and from Dr. Jaimes in the emergency department.  Blood pressure 133/93, heart rate 67, temperature 97.2  F, oxygen 96% on room air.  WBC 1.4, hemoglobin 11.2, COVID count 184.  Sodium 140, potassium low at 3.3, chloride 97, bicarb 25, BUN 65, creatinine 4.29.  Urinalysis shows 7 WBCs, small LE, 17 hyaline casts.  AST elevated 176, ALT 99, alk phos 228, bilirubin 2.1.  Lipase normal at 9.  Lactic acid is 0.9.  INR supratherapeutic at 5.81.  EKG shows A-fib with controlled rate and PVCs, QTc prolonged at 530.  He received 2 L of normal saline,  Zofran, and acetaminophen.  He will be admitted as inpatient due to DEB in the setting of renal transplant and metabolic encephalopathy.       Clinically Significant Risk Factors Present on Admission        # Hypokalemia: Lowest K = 3.3 mmol/L in last 2 days, will replace as needed        # Drug Induced Coagulation Defect: home medication list includes an anticoagulant medication   # Acute Kidney Injury, unspecified: based on a >150% or 0.3 mg/dL increase in last creatinine compared to past 90 day average, will monitor renal function  # Hypertension: home medication list includes antihypertensive(s)  # Chronic systolic heart failure: echo within the past year with EF <40%                        Past Medical History reviewed:  Past Medical History:   Diagnosis Date     Atrial fibrillation, permanent (H) 04/2009     Cellulitis of left forearm 07/10/2020     CKD (chronic kidney disease)      HTN (hypertension), benign    S/p renal transplant  Chronic immunosuppression  Anemia  Leukopenia  Nonischemic cardiomyopathy  Chronic systolic CHF  Moderate aortic regurgitation  Moderate mitral regurgitation  Moderate tricuspid regurgitation    Past Surgical History reviewed:  Past Surgical History:   Procedure Laterality Date     AS TRANSPLANT,PREP CADAVER RENAL GRAFT Right 04/2009     IR FINE NEEDLE ASPIRATION W ULTRASOUND  10/15/2019     PERCUTANEOUS BIOPSY KIDNEY Right 12/23/2019    Procedure: Right Kidney Biopsy;  Surgeon: Josiah Ponce MD;  Location: [facility] OR     TRANSPLANT       Social History reviewed:  Social History     Tobacco Use     Smoking status: Never     Smokeless tobacco: Never   Substance Use Topics     Alcohol use: Yes     Comment: 12  oz. beer/week     Social History     Social History Narrative     Not on file     Family History reviewed:  Family History   Problem Relation Age of Onset     Emphysema Father      Allergies:  No Known Allergies  Medications:  Prior to Admission medications    Medication Sig  "Last Dose Taking? Auth Provider Long Term End Date   calcitRIOL (ROCALTROL) 0.25 MCG capsule Take 0.25 mcg by mouth daily   Unknown, Entered By History Yes    carvedilol (COREG) 12.5 MG tablet Take 1 tablet (12.5 mg) by mouth 2 times daily (with meals)   Jb Ordoñez MD Yes    colchicine (COLCYRS) 0.6 MG tablet Take 0.6 mg by mouth daily   Unknown, Entered By History     cycloSPORINE modified (GENERIC EQUIVALENT) 25 MG capsule Take 50 mg by mouth 2 times daily   Unknown, Entered By History     gabapentin (NEURONTIN) 300 MG capsule Take 300 mg by mouth At Bedtime   Unknown, Entered By History     hydrALAZINE (APRESOLINE) 50 MG tablet Take 1 tablet (50 mg) by mouth every 8 hours   Jb Ordoñez MD Yes        Jb Ordoñez MD     metolazone (ZAROXOLYN) 2.5 MG tablet Take 1 Tablet (2.5 mg) by mouth one time if needed (Weight greater than 165 pounds can use once per week).   Unknown, Entered By History Yes    multivitamin w/minerals (THERA-VIT-M) tablet Take 1 tablet by mouth daily   Unknown, Entered By History     mycophenolate (GENERIC EQUIVALENT) 500 MG tablet Take 500 mg by mouth 2 times daily   Unknown, Entered By History     potassium chloride ER (K-TAB) 20 MEQ CR tablet Take 20 mEq by mouth 2 times daily   Unknown, Entered By History     torsemide (DEMADEX) 20 MG tablet Take 20 mg by mouth daily at 2 pm   Unknown, Entered By History Yes    torsemide (DEMADEX) 20 MG tablet Take 40 mg by mouth every morning   Cj Bagley MD Yes    WARFARIN SODIUM PO Per INR orders   Reported, Patient       Review of Systems:  A Comprehensive greater than 10 system review of systems was carried out.  Pertinent positives and negatives are noted above.  Otherwise negative.     Physical Exam:  Blood pressure (!) 133/93, pulse 67, temperature 97.2  F (36.2  C), temperature source Temporal, resp. rate 18, height 1.803 m (5' 11\"), weight 68.9 kg (152 lb), SpO2 96 %.  Wt Readings from Last 1 " Encounters:   03/31/23 68.9 kg (152 lb)     Exam:  Constitutional: Awake, NAD   Eyes: sclera white   HEENT: atraumatic, dry mucous membranes  Respiratory: no respiratory distress, lungs cta bilaterally, no crackles or wheeze  Cardiovascular: Irregularly, irregular rhythm, regular rate.  2/6 systolic murmur.  GI: Soft, nontender to palpation, bowel sounds present, not distended.  Left inguinal hernia reducible and nontender  Skin: no rash    Musculoskeletal/extremities: atraumatic, no major deformities. No LE edema  Neurologic: Alert, oriented to being in the ER, oriented to the year, speech clear, some word finding difficulties, strength equal in all extremities and light touch sensation intact  Psychiatric: calm, cooperative    Lab and imaging data personally reviewed:  Labs:  Recent Labs   Lab 03/31/23 2313   PH 7.41   HCO3V 26     Recent Labs   Lab 03/31/23 2031   WBC 1.4*   HGB 11.2*   HCT 35.0*   MCV 91        Recent Labs   Lab 03/31/23 2031      POTASSIUM 3.3*   CHLORIDE 97*   CO2 25   ANIONGAP 18*   GLC 96   BUN 65.9*   CR 4.29*   GFRESTIMATED 13*   ELEAZAR 9.8   PROTTOTAL 7.6   ALBUMIN 4.6   BILITOTAL 2.1*   ALKPHOS 228*   *   ALT 99*     Recent Labs   Lab 03/31/23 2031 03/27/23  1557   INR 5.81* 3.22*     Recent Labs   Lab 03/31/23 2313   LACT 0.9     Recent Labs   Lab 03/31/23  2300   COLOR Yellow   APPEARANCE Clear   URINEGLC Negative   URINEBILI Negative   URINEKETONE Negative   SG 1.012   UBLD Negative   URINEPH 6.0   PROTEIN Negative   NITRITE Negative   LEUKEST Small*   RBCU 2   WBCU 7*       EKG: A-fib, PVCs, QTc 530    Imaging:  No imaging obtained    Reji Gonsalez MD  Hospitalist  [facility]
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
## 2023-04-02 NOTE — DISCHARGE SUMMARY
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  S/p Renal transplant  Diarrhea, nausea   Elevated LFTs   Acute metabolic encephalopathy   Acute on chronic leukopenia  Chronic anemia   Chronic systolic CHF  Nonischemic cardiomyopathy  HTN  Mild to moderate pulmonary pretension  Moderate aortic, mitral, and tricuspid regurgitation   A-fib  Prolonged QTc   Hypokalemia    Follow-ups Needed After Discharge     Unresulted Labs Ordered in the Past 30 Days of this Admission     Date and Time Order Name Status Description    3/31/2023 11:36 PM Mycophenolic Acid by Tandem Mass Spectrometry In process       These results will be followed up by PCP     Discharge Disposition   Discharged to home  Condition at discharge: AMA     Hospital Course   Mr. Matheus White Sr. is an 84 y/o M with history of kidney transplant (2009), CKD IV, a.fib on warfarin, and NICM presented to [facility] for nausea, diarrhea, and confusion. On admission noted to have DEB with Cr 4.1 (baseline 2-3), hypokalemia, and supratherapeutic INR 7.6. DEB thought most likely to be pre-renal in the setting of dehydration and diarrhea. Enteric panel/C.diff in process. Nephrology consulted here at [facility] who recommended transfer to [facility]. I spoke with patient placement and Dr. Mc at [facility] for transfer. She spoke with Dr. Ibrahim from Transplant nephrology who recommended admission to medicine with Tx Nephrology consult. Patient was awaiting bed placement when he insisted on leaving, patient was alert, oriented x3 earlier in the day. Daughter was at bedside. The patient adamantly requested to leave, was encouraged to stay but despite our best efforts elected to leave against medical advice. This was reportedly witnessed by his daughter. He was  discharged under the care of his daughter. Encouraged to return at any time.     Consultations This Hospital Stay   PHARMACY TO DOSE WARFARIN  NEPHROLOGY IP CONSULT  PHYSICAL THERAPY ADULT IP CONSULT  CARE MANAGEMENT / SOCIAL WORK IP CONSULT  SPIRITUAL HEALTH SERVICES IP CONSULT    Code Status   Prior    Time Spent on this Encounter   IMarisol DO, personally saw the patient today and spent less than or equal to 30 minutes discharging this patient.       Marisol Watters DO  [place] [address] [facility]  [address] [place] [address]  Phone: [phone]  Fax: [phone]  ______________________________________________________________________    Physical Exam   Vital Signs: Temp: 98.1  F (36.7  C) Temp src: Oral BP: 132/61 Pulse: 62   Resp: 20 SpO2: 95 % O2 Device: None (Room air)    Weight: 152 lbs 8 oz  Constitutional: awake, alert, cooperative, no apparent distress, and appears stated age  HEENT: Normocephalic, atraumatic  Wearing glasses   Respiratory: Normal work of breathing, good air exchange, clear to auscultation bilaterally, no crackles or wheezing noted on auscultation   Cardiovascular: Irregularly, irregular rhythm, regular rate.  2/6 systolic murmur noted on exam, maximally parasternally   GI: Soft and nontender to palpation, nondistended, no rebound or guarding  Skin: normal skin color, texture, turgor  Musculoskeletal: No deformities, no edema present  Neurologic: Alert and oriented, no focal deficits   Slightly repetitive questioning, tracks conversation well overall , some intermittent word finding issues   Neuropsychiatric: General: normal, calm and normal eye contact        Primary Care Physician   [facility]    Discharge Orders   No discharge procedures on file.    Significant Results and Procedures   Most Recent 3 CBC's:  Recent Labs   Lab Test 04/01/23  0630 03/31/23  2031 01/23/23  0444   WBC 1.4* 1.4* 6.0   HGB 10.3* 11.2* 9.7*    MCV 92 91 95    184 231     Most Recent 3 BMP's:  Recent Labs   Lab Test 04/01/23  0630 03/31/23 2031 01/23/23  0444    140 142   POTASSIUM 3.1* 3.3* 3.5   CHLORIDE 98 97* 104   CO2 22 25 22   BUN 63.5* 65.9* 49.5*   CR 4.10* 4.29* 2.85*   ANIONGAP 18* 18* 16*   ELEAZAR 9.1 9.8 9.1   GLC 83 96 67*     Most Recent 3 INR's:  Recent Labs   Lab Test 04/01/23  0957 04/01/23  0630 03/31/23 2031   INR 7.65* 7.27* 5.81*     Most Recent INR's and Anticoagulation Dosing History:  Anticoagulation Dose History         Latest Ref Rng & Units 1/13/2023 1/14/2023 1/15/2023 1/16/2023   Recent Dosing and Labs   warfarin ANTICOAGULANT (COUMADIN) half-tab 1.5 mg    1.5 mg, $Given    INR 0.85 - 1.15 3.47   3.59   2.90   2.39         3/27/2023 3/31/2023 4/1/2023   Recent Dosing and Labs   warfarin ANTICOAGULANT (COUMADIN) half-tab 1.5 mg      INR 3.22   5.81   7.65      7.27           Multiple values from one day are sorted in reverse-chronological order         7-Day Micro Results     Collected Updated Procedure Result Status      04/01/2023 1308 04/01/2023 2211 Enteric Bacteria and Virus Panel by CAMILLE Stool [[id number]]    Stool from Per Rectum    Final result Component Value   Campylobacter group Not Detected   Salmonella species Not Detected   Shigella species Not Detected   Vibrio group Not Detected   Rotavirus Not Detected   Shiga toxin 1 gene Not Detected   Shiga toxin 2 gene Not Detected   Norovirus I and II Not Detected   Yersinia enterocolitica Not Detected            04/01/2023 1307 04/01/2023 1932 C. difficile Toxin B PCR with reflex to C. difficile Antigen and Toxins A/B EIA [[id number]]    Stool from Per Rectum    Final result Component Value   C Difficile Toxin B by PCR Negative   A negative result does not exclude actual disease due to C. difficile and may be due to improper collection, handling and storage of the specimen or the number of organisms in the specimen is below the detection limit of the  assay.              ,   Results for orders placed or performed during the hospital encounter of 01/08/23   CT Head w/o Contrast    Narrative    EXAM: CT HEAD W/O CONTRAST  LOCATION: [facility]  DATE/TIME: 1/8/2023 6:09 PM    INDICATION: confusion  COMPARISON: 06/16/2022.  TECHNIQUE: Routine CT Head without IV contrast. Multiplanar reformats. Dose reduction techniques were used.    FINDINGS:  INTRACRANIAL CONTENTS: No intracranial hemorrhage, extraaxial collection, or mass effect.  No CT evidence of acute infarct. There is scattered diffuse low attenuation within the periventricular and subcortical white matter consistent with diffuse small   vessel ischemic disease. There is an old lacunar infarct left thalamus. The ventricular system, basal cisterns and the cortical sulci are consistent with diffuse volume loss.     VISUALIZED ORBITS/SINUSES/MASTOIDS: No intraorbital abnormality. No paranasal sinus mucosal disease. No middle ear or mastoid effusion.    BONES/SOFT TISSUES: No acute abnormality.      Impression    IMPRESSION:  1.  No CT finding of a mass, hemorrhage or focal area suggestive of infarct.  2.  Diffuse age related changes along with old lacunar infarct left thalamus.   XR Chest 2 Views    Narrative    EXAM: XR CHEST 2 VIEWS  LOCATION: [facility]  DATE/TIME: 1/8/2023 6:21 PM    INDICATION: sob  COMPARISON: 1/7/2023      Impression    IMPRESSION: Heart size is prominent. This is unchanged. Mild pulmonary vascular congestion. No pleural effusion or pneumothorax.   CT Abdomen Pelvis w/o Contrast    Narrative    EXAM: CT ABDOMEN PELVIS W/O CONTRAST  LOCATION: [facility]  DATE/TIME: 1/8/2023 7:50 PM    INDICATION: Abdominal pain, LFT up, kidney dz.  COMPARISON: None.  TECHNIQUE: CT scan of the abdomen and pelvis was performed without IV contrast. Multiplanar reformats were obtained. Dose reduction techniques were used.  CONTRAST:  None.    FINDINGS:   LOWER CHEST: Mild mosaic perfusion which is nonspecific, but most typical for air trapping secondary to small airway inflammation. 1.5 x 1.5 mm noncalcified pulmonary nodule in the left lower lobe. Mild cardiomegaly. Moderate coronary artery   calcifications.    HEPATOBILIARY: There is some slight stranding seen in the fat surrounding the gallbladder and I cannot exclude subtle findings of acute cholecystitis. If this is of concern clinically, ultrasound is recommended of the gallbladder for further evaluation.   There are changes most typical for slight cirrhotic configuration of the liver with associated mild hepatomegaly. There are findings most typical for some portal venous hypertension with slight varicosities and the spleen is at the upper limits of normal   in size at 12.9 cm.    PANCREAS: Mildly atrophic, but otherwise normal.    SPLEEN: Upper limits of normal in size at 12.9 cm.    ADRENAL GLANDS: Normal.    KIDNEYS/BLADDER: Significant atrophy is seen of the left kidney with some scattered benign cysts seen in the left kidney, some of which show some benign associated calcifications. There is a transplant kidney seen in the right hemipelvis with two tiny   nonobstructive stones and the largest measures approximately 4 mm in greatest dimension. No complications are seen of the transplanted kidney. The native right kidney is surgically absent. The bladder is normal.    BOWEL: There is a small to moderate-sized left inguinal hernia which contains some fat and a portion of the sigmoid colon with no evidence for obstruction or inflammation, however. Mild findings of diverticulosis with no evidence for diverticulitis. The   bowel is otherwise normal.    LYMPH NODES: Normal.    VASCULATURE: Advanced calcification with no aneurysmal dilatation.    PELVIC ORGANS: Mild to moderate calcification. Prostatic gland enlargement.    MUSCULOSKELETAL: Advanced multilevel degenerative changes of the  lumbar spine with associated degenerative disc disease.      Impression    IMPRESSION:   1.  Possible subtle changes cholecystitis and ultrasound is recommended for further evaluation.    2.  Mild findings of hepatomegaly and cirrhosis with associated secondary findings of mild portal venous hypertension.    3.  Left inguinal hernia which contains some fat and sigmoid colon with no evidence for inflammation or obstruction.    4.  Atrophic native left kidney, surgically absent right kidney, and there is a transplant kidney in the right hemipelvis. There are some benign cysts seen in the native left kidney and no follow-up is recommended.    5.  Nonobstructive nephrolithiasis transplant kidney.    6.  Spleen is at the upper limits of normal in size at 12.9 cm, but otherwise normal.    7.  Moderate coronary artery calcifications.    8.  1 mm x 1 mm noncalcified pulmonary nodule right lower lobe and no follow-up is recommended unless there is high risk factors for malignancy.     Abdomen US, limited (RUQ only)    Narrative    EXAM: US ABDOMEN LIMITED  LOCATION: [facility]  DATE/TIME: 1/8/2023 9:44 PM    INDICATION: abnm LFT  COMPARISON: CT dated 1/8/2023  TECHNIQUE: Limited abdominal ultrasound.    FINDINGS:    GALLBLADDER: Normal. No gallstones, wall thickening, or pericholecystic fluid. Negative sonographic Gustafson's sign.    BILE DUCTS: No biliary dilatation. The common duct measures 7.6 mm.    LIVER: Normal parenchyma with smooth contour. No focal mass.    RIGHT KIDNEY: Surgically absent    PANCREAS: The visualized portions are normal.    No ascites.      Impression    IMPRESSION:  1.  No significant gallbladder wall thickening, or gallstones identified.       US Abdomen Pelvis Duplex Limited Port    Narrative    US ABDOMEN OR PELVIS DOPPLER LIMITED PORTABLE 1/10/2023 9:36 AM    CLINICAL HISTORY: Assess for portal vein thrombosis, hepatic vein  thrombosis.    TECHNIQUE: Limited abdominal  ultrasound. Color flow with spectral  Doppler and waveform analysis performed.    COMPARISON: Ultrasound abdomen 2023.     FINDINGS:    ABDOMINAL DUPLEX: The hepatic artery, hepatic veins, IVC, portal  veins, and splenic vein are patent with flow in the normal direction.       Impression    IMPRESSION: Color and Doppler spectral assessment of the hepatic  vessels shows no evidence for thrombosis or specific abnormality.    KRISTINA ANDERSON MD         SYSTEM ID:  [id number]   CT Head w/o Contrast    Narrative    EXAM: CT HEAD W/O CONTRAST  LOCATION: [facility]  DATE/TIME: 2023 4:33 PM    INDICATION: change in mental status, on AC  COMPARISON: 2023 head CT.  TECHNIQUE: Routine CT Head without IV contrast. Multiplanar reformats. Dose reduction techniques were used.    FINDINGS:  INTRACRANIAL CONTENTS: No intracranial hemorrhage, extraaxial collection, or mass effect.  No CT evidence of acute infarct. Moderate presumed chronic small vessel ischemic changes. Mild to moderate diffuse parenchymal volume loss. Ventricular size is in   keeping with this volume loss.     VISUALIZED ORBITS/SINUSES/MASTOIDS: No intraorbital abnormality. No paranasal sinus mucosal disease. No middle ear or mastoid effusion.    BONES/SOFT TISSUES: No skull fracture. No scalp hematoma.      Impression    IMPRESSION:  1.  Age-related changes as above with no acute intracranial abnormality.   Echocardiogram Complete     Value    LVEF  35%    Narrative    [id number]  [id number]  [id number]  [id number]^ASHLEY^AL^AJ     [facility]  Echocardiography Laboratory  [address] [place] [address]     Name: SR BRAD MOHAMUD SR.  MRN: [id number]  : 1940  Study Date: 2023 08:10 AM  Age: 82 yrs  Gender: Male  Patient Location: [facility]  Reason For Study: CHF  Ordering Physician: CORINNE RAMIREZ  Performed By: Tye Franz RDCS     BSA: 1.9 m2  Height: 70 in  Weight: 160 lb  HR: 80  BP:  157/90 mmHg  ______________________________________________________________________________  Procedure  Complete Portable Echo Adult.  ______________________________________________________________________________  Interpretation Summary     1. The left ventricle is normal in size. The visual ejection fraction is  estimated at 35%. There is moderate global hypokinesia of the left ventricle.  2. The right ventricle is normal in structure, function and size.  3. There is moderate (2+) tricuspid regurgitation.  4. Mild (35-45mmHg) pulmonary hypertension is present. The right ventricular  systolic pressure is approximated at 37mmHg plus the right atrial pressure.  5. There is moderate (2+) aortic regurgitation.  6. The ascending aorta is Mildly dilated. 4.0cm.     Echo 12/2019 showed EF 40-45%, 1+ MR/AI, 2+ TR, aorta 4.1cm.  ______________________________________________________________________________  Left Ventricle  The left ventricle is normal in size. There is mild concentric left  ventricular hypertrophy. The visual ejection fraction is estimated at 35%.  Left ventricular diastolic function is indeterminate. There is moderate global  hypokinesia of the left ventricle.     Right Ventricle  The right ventricle is normal in structure, function and size.     Atria  The left atrium is moderately dilated. The right atrium is mild to moderately  dilated. There is no atrial shunt seen.     Mitral Valve  There is moderate mitral annular calcification. The mitral valve leaflets  appear thickened, but open well. There is moderate (2+) mitral regurgitation.     Tricuspid Valve  There is moderate (2+) tricuspid regurgitation. Mild (35-45mmHg) pulmonary  hypertension is present. The right ventricular systolic pressure is  approximated at 37mmHg plus the right atrial pressure.     Aortic Valve  There is moderate trileaflet aortic sclerosis. There is moderate (2+) aortic  regurgitation. No aortic stenosis is present.     Pulmonic  Valve  The pulmonic valve is normal in structure and function.     Vessels  The ascending aorta is Mildly dilated. Dilation of the inferior vena cava is  present with abnormal respiratory variation in diameter.     Pericardium  There is no pericardial effusion.     Rhythm  Rhythm uncertain, wide QRS.  ______________________________________________________________________________  MMode/2D Measurements & Calculations     IVSd: 1.5 cm  LVIDd: 5.4 cm  LVIDs: 4.3 cm  LVPWd: 1.3 cm  FS: 20.4 %  LV mass(C)d: 326.7 grams  LV mass(C)dI: 172.1 grams/m2  Ao root diam: 3.7 cm  LA dimension: 5.6 cm  LA/Ao: 1.5  LVOT diam: 2.1 cm  LVOT area: 3.5 cm2  LA Volume (BP): 94.9 ml  LA Volume Index (BP): 49.9 ml/m2  RWT: 0.49     Doppler Measurements & Calculations  MV E max jonny: 114.0 cm/sec  MV A max jonny: 34.6 cm/sec  MV E/A: 3.3  MV dec time: 0.12 sec  Ao V2 max: 168.0 cm/sec  Ao max P.0 mmHg  Ao V2 mean: 124.0 cm/sec  Ao mean P.0 mmHg  Ao V2 VTI: 33.6 cm  MARCOS(I,D): 1.8 cm2  MARCOS(V,D): 1.8 cm2  AI P1/2t: 357.4 msec  LV V1 max PG: 3.1 mmHg  LV V1 max: 88.5 cm/sec  LV V1 VTI: 17.5 cm  SV(LVOT): 60.6 ml  SI(LVOT): 31.9 ml/m2  TR max jonny: 304.0 cm/sec  TR max P.0 mmHg  AV Jonny Ratio (DI): 0.53  MARCOS Index (cm2/m2): 0.95  E/E' av.9  Lateral E/e': 13.5  Medial E/e': 24.4     ______________________________________________________________________________  Report approved by: Winsome Jones 2023 09:22 AM               Discharge Medications   Discharge Medication List as of 2023  5:20 PM      CONTINUE these medications which have NOT CHANGED    Details   calcitRIOL (ROCALTROL) 0.25 MCG capsule Take 0.25 mcg by mouth daily, Historical      carvedilol (COREG) 12.5 MG tablet Take 1 tablet (12.5 mg) by mouth 2 times daily (with meals), Transitional      colchicine (COLCYRS) 0.6 MG tablet Take 0.6 mg by mouth 2 times daily as needed for gout pain, Historical      cycloSPORINE modified (GENERIC EQUIVALENT) 25 MG  capsule Take 50 mg by mouth 2 times daily, Historical      gabapentin (NEURONTIN) 300 MG capsule Take 300 mg by mouth At Bedtime, Historical      hydrALAZINE (APRESOLINE) 50 MG tablet Take 1 tablet (50 mg) by mouth every 8 hours, Transitional      lactulose (CHRONULAC) 10 GM/15ML solution Take 30 mLs (20 g) by mouth 2 times daily, Transitional      metolazone (ZAROXOLYN) 2.5 MG tablet Take 1 Tablet (2.5 mg) by mouth one time if needed (Weight greater than 165 pounds can use once per week)., Historical      multivitamin w/minerals (THERA-VIT-M) tablet Take 1 tablet by mouth daily, Historical      mycophenolate (GENERIC EQUIVALENT) 500 MG tablet Take 500 mg by mouth 2 times daily, Historical      potassium chloride ER (K-TAB) 20 MEQ CR tablet Take 20 mEq by mouth 2 times daily, Historical      !! torsemide (DEMADEX) 20 MG tablet Take 20 mg by mouth daily at 2 pm, Historical      !! torsemide (DEMADEX) 20 MG tablet Take 40 mg by mouth every morning, Disp-30 tablet, R-2, Historical      warfarin ANTICOAGULANT (COUMADIN) 3 MG tablet Take 3mg by mouth on Sun, Tue, Thur and take 1.5mg (half-tablet) by mouth all other days of the week or as directed by INR clinic., Historical       !! - Potential duplicate medications found. Please discuss with provider.        Allergies   No Known Allergies

## 2023-05-16 ENCOUNTER — POST MORTEM DOCUMENTATION (OUTPATIENT)
Dept: TRANSPLANT | Facility: CLINIC | Age: 83
End: 2023-05-16
Payer: MEDICARE

## 2023-05-16 NOTE — PROGRESS NOTES
Received notification of patient's death from Care Everywhere.  Place of death was reported as home on hospice care.  Graft status at the time of death was reported as Functioning.  Additional information: Patient with ESRD not seeking dialysis. Entered into hospice care 4/4/23 and passed 4/7/23.  TIS verification is: Complete

## 2023-06-26 NOTE — TELEPHONE ENCOUNTER
Matheus Agudelo Cindy reports he is retaining fluid, he is up five pounds. He reports leg swelling in bilateral and above knees.  He took 40 mg lasix for swelling (ordered by previous PCP).   BP not recently taken at home, reports good control at previous clinic visits.    Plan:    RNCC encouraged him to use compression stalkings and monitor BP at home. Will review with MD.      Dr. Rodas, Attending Physician-  I performed a face to face bedside interview with patient regarding history of present illness, review of symptoms and past medical history. I completed an independent physical exam.  I have discussed patient's plan of care with the resident.    78M, afib on eliquis, HTN, HLD, COPD, DM2, prior nasopharyngeal CA, who presents with fatigue. Patient is unable to provide thorough hx - hx is primarily obtained by wife. Wife notes that over the past 1-2 weeks, patient has had a persistent cough and SOB. Also notes generalized weakness and difficulty ambulating as a results. Denies falls/head trauma. Had one episode of emesis today and wife finally convinced patient to come to the ER. Patient only complaining of chronic neck pain 2/2 to old CA. No headaches, fevers, chills, chest pain, belly pain, diarrhea, dysuria, hematuria, recent travel, trauma, syncope. Physical: frail, chronically ill appearing, rrr, ctabl, abdomen soft, no le edema, no evidence of head trauma. Plan: infectious evaluation - labs, urine, CXR. HCT given remote hx of CA. Low threshold to admit given age and weak appearance. Wife reportedly en route to the hospital.

## 2025-01-10 NOTE — PROCEDURES
[facility], [place]    Procedure: IR FINE NEEDLE ASPIRATION  Date/Time: 10/15/2019 3:54 PM  Performed by: Mitchell Howard PA-C  Authorized by: Mitchell Howard PA-C     UNIVERSAL PROTOCOL   Site Marked: No  Prior Images Obtained and Reviewed:  Yes  Required items: Required blood products, implants, devices and special equipment available    Patient identity confirmed:  Verbally with patient, provided demographic data, hospital-assigned identification number and arm band  NA - No sedation, light sedation, or local anesthesia  Confirmation Checklist:  Patient's identity using two indicators, relevant allergies, procedure was appropriate and matched the consent or emergent situation and correct equipment/implants were available  Time out: Immediately prior to the procedure a time out was called    Preparation: Patient was prepped and draped in usual sterile fashion       ANESTHESIA    Anesthesia: Local infiltration  Local Anesthetic:  Lidocaine 1% without epinephrine  Anesthetic Total (mL):  1      SEDATION    Patient Sedated: No    See dictated procedure note for full details.  Findings: Local    Specimens: fluid and/or tissue for laboratory analysis and culture    Complications: None    Condition: Stable    Plan: Per primary team    PROCEDURE   Patient Tolerance:  Patient tolerated the procedure well with no immediate complications  Describe Procedure: Completed aspiration of left anterior shin. 3 mL thick dark red fluid consistent with hematoma.  Time of Sedation in Minutes by Physician:  0      
Oriented - self; Oriented - place; Oriented - time

## (undated) DEVICE — NDL BX MONOPTY 18GAX16CM 121816

## (undated) RX ORDER — LIDOCAINE HYDROCHLORIDE 10 MG/ML
INJECTION, SOLUTION EPIDURAL; INFILTRATION; INTRACAUDAL; PERINEURAL
Status: DISPENSED
Start: 2019-10-15